# Patient Record
Sex: MALE | Race: WHITE | NOT HISPANIC OR LATINO
[De-identification: names, ages, dates, MRNs, and addresses within clinical notes are randomized per-mention and may not be internally consistent; named-entity substitution may affect disease eponyms.]

---

## 2017-09-22 ENCOUNTER — RESULT REVIEW (OUTPATIENT)
Age: 52
End: 2017-09-22

## 2017-11-13 PROBLEM — Z00.00 ENCOUNTER FOR PREVENTIVE HEALTH EXAMINATION: Status: ACTIVE | Noted: 2017-11-13

## 2020-02-20 ENCOUNTER — APPOINTMENT (OUTPATIENT)
Dept: ORTHOPEDIC SURGERY | Facility: CLINIC | Age: 55
End: 2020-02-20
Payer: COMMERCIAL

## 2020-02-20 VITALS
HEIGHT: 74 IN | WEIGHT: 250 LBS | HEART RATE: 76 BPM | BODY MASS INDEX: 32.08 KG/M2 | SYSTOLIC BLOOD PRESSURE: 110 MMHG | DIASTOLIC BLOOD PRESSURE: 68 MMHG

## 2020-02-20 PROCEDURE — 99203 OFFICE O/P NEW LOW 30 MIN: CPT

## 2020-02-20 PROCEDURE — 73560 X-RAY EXAM OF KNEE 1 OR 2: CPT | Mod: LT

## 2020-02-21 DIAGNOSIS — Z78.9 OTHER SPECIFIED HEALTH STATUS: ICD-10-CM

## 2020-02-21 DIAGNOSIS — Z72.89 OTHER PROBLEMS RELATED TO LIFESTYLE: ICD-10-CM

## 2020-02-21 DIAGNOSIS — Z80.9 FAMILY HISTORY OF MALIGNANT NEOPLASM, UNSPECIFIED: ICD-10-CM

## 2020-02-27 ENCOUNTER — APPOINTMENT (OUTPATIENT)
Dept: ORTHOPEDIC SURGERY | Facility: CLINIC | Age: 55
End: 2020-02-27
Payer: COMMERCIAL

## 2020-02-27 VITALS — HEIGHT: 74 IN

## 2020-02-27 PROCEDURE — 99213 OFFICE O/P EST LOW 20 MIN: CPT | Mod: 25

## 2020-02-27 PROCEDURE — 20610 DRAIN/INJ JOINT/BURSA W/O US: CPT | Mod: LT

## 2020-03-02 NOTE — DISCUSSION/SUMMARY
[de-identified] : At this time, due to left knee osteoarthritis, the patient decided that he would like the cortisone injection that he was offered a few weeks ago.  He is advised to ice it and take anti-inflammatories. He will return to the office.

## 2020-03-02 NOTE — ADDENDUM
[FreeTextEntry1] : This note was written by Macrina Nava on 01/15/2020 acting as a scribe for Annabelle Jimenez, OTDARNELL/JEROME, RPA-C\par

## 2020-03-02 NOTE — PHYSICAL EXAM
[de-identified] : Left Knee: \par Range of Motion in Degrees	\par 	                  Claimant:	Normal:	\par Flexion Active	  135 	                135-degrees	\par Flexion Passive	  135	                135-degrees	\par Extension Active	  0-5	                0-5-degrees	\par Extension Passive	  0-5	                0-5-degrees	\par \par No weakness to flexion/extension.  No evidence of instability in the AP plane or varus or valgus stress.  Negative  Lachman.  Negative pivot shift.  Negative anterior drawer test.  Negative posterior drawer test.  Negative Jossue.  Negative Apley grind.  No medial or lateral joint line tenderness.  Positive tenderness over the medial and lateral facet of the patella.  Positive patellofemoral crepitations.  No lateral tilting patella.  No patella apprehension.  Positive crepitation in the medial and lateral femoral condyle.  No proximal or distal swelling, edema or tenderness.  No gross motor or sensory deficits.  Mild intra-articular swelling.  2+ DP and PT pulses.  No varus or valgus malalignment.  Skin is intact.  No rashes, scars or lesions.  \par  [de-identified] : Appearance:  Well-developed, well-nourished male in no acute distress.\par \par   [de-identified] : Ambulating with a slightly antalgic to antalgic gait.  Station:  Normal.

## 2020-03-02 NOTE — PROCEDURE
[de-identified] : Consent: \par At this time, I have recommended an injection to the left knee.  The risks and benefits of the procedure were discussed with the patient in detail.  Upon verbal consent of the patient, we proceeded with the injection as noted below.  \par \par Procedure:  \par After a sterile prep, the patient underwent an injection of 9 cc of 1% Lidocaine without epinephrine and 1 cc of Kenalog into the left knee.  The patient tolerated the procedure well.  There were no complications.  \par

## 2020-03-05 NOTE — HISTORY OF PRESENT ILLNESS
[4] : a current pain level of 4/10 [de-identified] : Negro comes in today with complaints of left knee.  He states he was standing for three hours playing a video game.  The patient states he has had this condition for two weeks..  This injury is not due to an automobile accident.  The patient states the pain is intermittent.  The patient describes the pain as dull and sharp.  The patient states being off the leg makes the symptoms better while standing makes the symptoms worse. [] : No

## 2020-03-05 NOTE — ADDENDUM
[FreeTextEntry1] : This note was dictated by Annabelle Jimenez, OTR/L, PA. \par \par This note was written by Crystal Khalil on 03/02/2020 acting as scribe for Zaki Menchaca III, MD

## 2020-03-05 NOTE — DISCUSSION/SUMMARY
[de-identified] : The patient presents with left knee OA.  The patient was offered a cortisone injection.  We are going to just do conservative treatment at this time - anti-inflammatories and ice.  He will return back to the office in two weeks.  If he is not better, we will offer him the cortisone injection.

## 2020-03-05 NOTE — CONSULT LETTER

## 2020-03-05 NOTE — PHYSICAL EXAM
[de-identified] : Right knee:\par Knee: Range of Motion in Degrees	\par 	                  Claimant:	Normal:	\par Flexion Active	  135 	                135-degrees	\par Flexion Passive	  135	                135-degrees	\par Extension Active	  0-5	                0-5-degrees	\par Extension Passive	  0-5	                0-5-degrees	\par \par No weakness to flexion/extension.  No evidence of instability in the AP plane or varus or valgus stress.  Negative  Lachman.  Negative pivot shift.  Negative anterior drawer test.  Negative posterior drawer test.  Negative Jossue.  Negative Apley grind.  No medial or lateral joint line tenderness.  No tenderness over the medial and lateral facet of the patella.  No patellofemoral crepitations.  No lateral tilting patella.  No patellar apprehension.  No crepitation in the medial and lateral femoral condyle.  No proximal or distal swelling, edema or tenderness.  No gross motor or sensory deficits.  No intra-articular swelling.  2+ DP and PT pulses. No varus or valgus malalignment.  Skin is intact.  No rashes, scars or lesions.  \par  \par Left knee:\par Knee: Range of Motion in Degrees	\par 	                  Claimant:	Normal:	\par Flexion Active	  135 	                135-degrees	\par Flexion Passive	  135	                135-degrees	\par Extension Active	  0-5	                0-5-degrees	\par Extension Passive	  0-5	                0-5-degrees	\par \par No weakness to flexion/extension. No evidence of instability in the AP plane or varus or valgus stress.  Negative  Lachman.  Negative pivot shift.  Negative anterior drawer test.  Negative posterior drawer test.  Negative Jossue.  Negative Apley grind.  No medial or lateral joint line tenderness.  Positive tenderness over the medial and lateral facet of the patella.  Positive patellofemoral crepitations.  No lateral tilting patella.  No patella apprehension.  Positive crepitation in the medial and lateral femoral condyle.  No proximal or distal swelling, edema or tenderness.  No gross motor or sensory deficits. Mild intra-articular swelling.  2+ DP and PT pulses. No varus or valgus malalignment.  Skin is intact.  No rashes, scars or lesions.  [de-identified] : Gait: Slightly antalgic to antalgic gait.  Station: Normal  [de-identified] : Appearance: Well-developed, well-nourished male  in no acute distress.  [de-identified] : X-rays, one to two views of the left knee, reveal no acute fracture or dislocation.

## 2022-05-27 ENCOUNTER — INPATIENT (INPATIENT)
Facility: HOSPITAL | Age: 57
LOS: 24 days | Discharge: HOME | DRG: 720 | End: 2022-06-21
Attending: STUDENT IN AN ORGANIZED HEALTH CARE EDUCATION/TRAINING PROGRAM | Admitting: HOSPITALIST
Payer: COMMERCIAL

## 2022-05-27 VITALS — RESPIRATION RATE: 20 BRPM | TEMPERATURE: 99 F | OXYGEN SATURATION: 97 % | HEART RATE: 96 BPM

## 2022-05-27 DIAGNOSIS — R09.89 OTHER SPECIFIED SYMPTOMS AND SIGNS INVOLVING THE CIRCULATORY AND RESPIRATORY SYSTEMS: ICD-10-CM

## 2022-05-27 DIAGNOSIS — J18.9 PNEUMONIA, UNSPECIFIED ORGANISM: ICD-10-CM

## 2022-05-27 LAB
ALBUMIN SERPL ELPH-MCNC: 3.3 G/DL — SIGNIFICANT CHANGE UP (ref 3.3–5)
ALP SERPL-CCNC: 84 U/L — SIGNIFICANT CHANGE UP (ref 40–120)
ALT FLD-CCNC: 32 U/L — SIGNIFICANT CHANGE UP (ref 12–78)
ANION GAP SERPL CALC-SCNC: 10 MMOL/L — SIGNIFICANT CHANGE UP (ref 5–17)
APPEARANCE UR: CLEAR — SIGNIFICANT CHANGE UP
APTT BLD: 33.8 SEC — SIGNIFICANT CHANGE UP (ref 27.5–35.5)
AST SERPL-CCNC: 30 U/L — SIGNIFICANT CHANGE UP (ref 15–37)
BASOPHILS # BLD AUTO: 0.05 K/UL — SIGNIFICANT CHANGE UP (ref 0–0.2)
BASOPHILS NFR BLD AUTO: 0.5 % — SIGNIFICANT CHANGE UP (ref 0–2)
BILIRUB SERPL-MCNC: 0.7 MG/DL — SIGNIFICANT CHANGE UP (ref 0.2–1.2)
BILIRUB UR-MCNC: NEGATIVE — SIGNIFICANT CHANGE UP
BUN SERPL-MCNC: 12 MG/DL — SIGNIFICANT CHANGE UP (ref 7–23)
CALCIUM SERPL-MCNC: 9.6 MG/DL — SIGNIFICANT CHANGE UP (ref 8.5–10.1)
CHLORIDE SERPL-SCNC: 102 MMOL/L — SIGNIFICANT CHANGE UP (ref 96–108)
CO2 SERPL-SCNC: 23 MMOL/L — SIGNIFICANT CHANGE UP (ref 22–31)
COLOR SPEC: YELLOW — SIGNIFICANT CHANGE UP
CREAT SERPL-MCNC: 1.06 MG/DL — SIGNIFICANT CHANGE UP (ref 0.5–1.3)
DIFF PNL FLD: ABNORMAL
EGFR: 82 ML/MIN/1.73M2 — SIGNIFICANT CHANGE UP
EOSINOPHIL # BLD AUTO: 0.07 K/UL — SIGNIFICANT CHANGE UP (ref 0–0.5)
EOSINOPHIL NFR BLD AUTO: 0.7 % — SIGNIFICANT CHANGE UP (ref 0–6)
GLUCOSE SERPL-MCNC: 169 MG/DL — HIGH (ref 70–99)
GLUCOSE UR QL: NEGATIVE — SIGNIFICANT CHANGE UP
HCT VFR BLD CALC: 41 % — SIGNIFICANT CHANGE UP (ref 39–50)
HGB BLD-MCNC: 14.1 G/DL — SIGNIFICANT CHANGE UP (ref 13–17)
IMM GRANULOCYTES NFR BLD AUTO: 0.4 % — SIGNIFICANT CHANGE UP (ref 0–1.5)
INR BLD: 1.21 RATIO — HIGH (ref 0.88–1.16)
KETONES UR-MCNC: ABNORMAL
LACTATE SERPL-SCNC: 1.6 MMOL/L — SIGNIFICANT CHANGE UP (ref 0.7–2)
LEUKOCYTE ESTERASE UR-ACNC: NEGATIVE — SIGNIFICANT CHANGE UP
LYMPHOCYTES # BLD AUTO: 0.99 K/UL — LOW (ref 1–3.3)
LYMPHOCYTES # BLD AUTO: 9.7 % — LOW (ref 13–44)
MCHC RBC-ENTMCNC: 29.2 PG — SIGNIFICANT CHANGE UP (ref 27–34)
MCHC RBC-ENTMCNC: 34.4 GM/DL — SIGNIFICANT CHANGE UP (ref 32–36)
MCV RBC AUTO: 84.9 FL — SIGNIFICANT CHANGE UP (ref 80–100)
MONOCYTES # BLD AUTO: 1.2 K/UL — HIGH (ref 0–0.9)
MONOCYTES NFR BLD AUTO: 11.7 % — SIGNIFICANT CHANGE UP (ref 2–14)
NEUTROPHILS # BLD AUTO: 7.87 K/UL — HIGH (ref 1.8–7.4)
NEUTROPHILS NFR BLD AUTO: 77 % — SIGNIFICANT CHANGE UP (ref 43–77)
NITRITE UR-MCNC: NEGATIVE — SIGNIFICANT CHANGE UP
NT-PROBNP SERPL-SCNC: 34 PG/ML — SIGNIFICANT CHANGE UP (ref 0–125)
PH UR: 6 — SIGNIFICANT CHANGE UP (ref 5–8)
PLATELET # BLD AUTO: 326 K/UL — SIGNIFICANT CHANGE UP (ref 150–400)
POTASSIUM SERPL-MCNC: 3.3 MMOL/L — LOW (ref 3.5–5.3)
POTASSIUM SERPL-SCNC: 3.3 MMOL/L — LOW (ref 3.5–5.3)
PROT SERPL-MCNC: 7.9 GM/DL — SIGNIFICANT CHANGE UP (ref 6–8.3)
PROT UR-MCNC: NEGATIVE — SIGNIFICANT CHANGE UP
PROTHROM AB SERPL-ACNC: 14.1 SEC — HIGH (ref 10.5–13.4)
RAPID RVP RESULT: SIGNIFICANT CHANGE UP
RBC # BLD: 4.83 M/UL — SIGNIFICANT CHANGE UP (ref 4.2–5.8)
RBC # FLD: 13.2 % — SIGNIFICANT CHANGE UP (ref 10.3–14.5)
SARS-COV-2 RNA SPEC QL NAA+PROBE: SIGNIFICANT CHANGE UP
SODIUM SERPL-SCNC: 135 MMOL/L — SIGNIFICANT CHANGE UP (ref 135–145)
SP GR SPEC: 1.01 — SIGNIFICANT CHANGE UP (ref 1.01–1.02)
TROPONIN I, HIGH SENSITIVITY RESULT: 19.11 NG/L — SIGNIFICANT CHANGE UP
UROBILINOGEN FLD QL: NEGATIVE — SIGNIFICANT CHANGE UP
WBC # BLD: 10.22 K/UL — SIGNIFICANT CHANGE UP (ref 3.8–10.5)
WBC # FLD AUTO: 10.22 K/UL — SIGNIFICANT CHANGE UP (ref 3.8–10.5)

## 2022-05-27 PROCEDURE — 83880 ASSAY OF NATRIURETIC PEPTIDE: CPT

## 2022-05-27 PROCEDURE — 84550 ASSAY OF BLOOD/URIC ACID: CPT

## 2022-05-27 PROCEDURE — 93005 ELECTROCARDIOGRAM TRACING: CPT

## 2022-05-27 PROCEDURE — 86255 FLUORESCENT ANTIBODY SCREEN: CPT

## 2022-05-27 PROCEDURE — 99285 EMERGENCY DEPT VISIT HI MDM: CPT

## 2022-05-27 PROCEDURE — 71275 CT ANGIOGRAPHY CHEST: CPT | Mod: 26

## 2022-05-27 PROCEDURE — 81001 URINALYSIS AUTO W/SCOPE: CPT

## 2022-05-27 PROCEDURE — 93306 TTE W/DOPPLER COMPLETE: CPT

## 2022-05-27 PROCEDURE — 99497 ADVNCD CARE PLAN 30 MIN: CPT | Mod: 25

## 2022-05-27 PROCEDURE — 86160 COMPLEMENT ANTIGEN: CPT

## 2022-05-27 PROCEDURE — 86665 EPSTEIN-BARR CAPSID VCA: CPT

## 2022-05-27 PROCEDURE — 80053 COMPREHEN METABOLIC PANEL: CPT

## 2022-05-27 PROCEDURE — 71275 CT ANGIOGRAPHY CHEST: CPT

## 2022-05-27 PROCEDURE — 86720 LEPTOSPIRA ANTIBODY: CPT

## 2022-05-27 PROCEDURE — 87449 NOS EACH ORGANISM AG IA: CPT

## 2022-05-27 PROCEDURE — 94760 N-INVAS EAR/PLS OXIMETRY 1: CPT

## 2022-05-27 PROCEDURE — 82595 ASSAY OF CRYOGLOBULIN: CPT

## 2022-05-27 PROCEDURE — 99222 1ST HOSP IP/OBS MODERATE 55: CPT

## 2022-05-27 PROCEDURE — 86431 RHEUMATOID FACTOR QUANT: CPT

## 2022-05-27 PROCEDURE — 36415 COLL VENOUS BLD VENIPUNCTURE: CPT

## 2022-05-27 PROCEDURE — 82962 GLUCOSE BLOOD TEST: CPT

## 2022-05-27 PROCEDURE — 71045 X-RAY EXAM CHEST 1 VIEW: CPT

## 2022-05-27 PROCEDURE — 86664 EPSTEIN-BARR NUCLEAR ANTIGEN: CPT

## 2022-05-27 PROCEDURE — U0003: CPT

## 2022-05-27 PROCEDURE — 94640 AIRWAY INHALATION TREATMENT: CPT

## 2022-05-27 PROCEDURE — 83516 IMMUNOASSAY NONANTIBODY: CPT

## 2022-05-27 PROCEDURE — 85652 RBC SED RATE AUTOMATED: CPT

## 2022-05-27 PROCEDURE — 73560 X-RAY EXAM OF KNEE 1 OR 2: CPT | Mod: LT

## 2022-05-27 PROCEDURE — 86663 EPSTEIN-BARR ANTIBODY: CPT

## 2022-05-27 PROCEDURE — 86225 DNA ANTIBODY NATIVE: CPT

## 2022-05-27 PROCEDURE — 86645 CMV ANTIBODY IGM: CPT

## 2022-05-27 PROCEDURE — 87389 HIV-1 AG W/HIV-1&-2 AB AG IA: CPT

## 2022-05-27 PROCEDURE — 82164 ANGIOTENSIN I ENZYME TEST: CPT

## 2022-05-27 PROCEDURE — 86039 ANTINUCLEAR ANTIBODIES (ANA): CPT

## 2022-05-27 PROCEDURE — 71250 CT THORAX DX C-: CPT

## 2022-05-27 PROCEDURE — 86644 CMV ANTIBODY: CPT

## 2022-05-27 PROCEDURE — 71045 X-RAY EXAM CHEST 1 VIEW: CPT | Mod: 26

## 2022-05-27 PROCEDURE — 86235 NUCLEAR ANTIGEN ANTIBODY: CPT

## 2022-05-27 PROCEDURE — 86803 HEPATITIS C AB TEST: CPT

## 2022-05-27 PROCEDURE — 83935 ASSAY OF URINE OSMOLALITY: CPT

## 2022-05-27 PROCEDURE — 84443 ASSAY THYROID STIM HORMONE: CPT

## 2022-05-27 PROCEDURE — 94761 N-INVAS EAR/PLS OXIMETRY MLT: CPT

## 2022-05-27 PROCEDURE — 87493 C DIFF AMPLIFIED PROBE: CPT

## 2022-05-27 PROCEDURE — 84300 ASSAY OF URINE SODIUM: CPT

## 2022-05-27 PROCEDURE — 84132 ASSAY OF SERUM POTASSIUM: CPT

## 2022-05-27 PROCEDURE — 83036 HEMOGLOBIN GLYCOSYLATED A1C: CPT

## 2022-05-27 PROCEDURE — 85025 COMPLETE CBC W/AUTO DIFF WBC: CPT

## 2022-05-27 PROCEDURE — 86140 C-REACTIVE PROTEIN: CPT

## 2022-05-27 PROCEDURE — 86320 SERUM IMMUNOELECTROPHORESIS: CPT

## 2022-05-27 PROCEDURE — 93010 ELECTROCARDIOGRAM REPORT: CPT

## 2022-05-27 PROCEDURE — U0005: CPT

## 2022-05-27 PROCEDURE — 85379 FIBRIN DEGRADATION QUANT: CPT

## 2022-05-27 PROCEDURE — 85027 COMPLETE CBC AUTOMATED: CPT

## 2022-05-27 PROCEDURE — 86480 TB TEST CELL IMMUN MEASURE: CPT

## 2022-05-27 PROCEDURE — 83930 ASSAY OF BLOOD OSMOLALITY: CPT

## 2022-05-27 PROCEDURE — 80048 BASIC METABOLIC PNL TOTAL CA: CPT

## 2022-05-27 PROCEDURE — 84484 ASSAY OF TROPONIN QUANT: CPT

## 2022-05-27 PROCEDURE — 87507 IADNA-DNA/RNA PROBE TQ 12-25: CPT

## 2022-05-27 RX ORDER — SIMVASTATIN 20 MG/1
20 TABLET, FILM COATED ORAL AT BEDTIME
Refills: 0 | Status: DISCONTINUED | OUTPATIENT
Start: 2022-05-27 | End: 2022-06-21

## 2022-05-27 RX ORDER — PIPERACILLIN AND TAZOBACTAM 4; .5 G/20ML; G/20ML
3.38 INJECTION, POWDER, LYOPHILIZED, FOR SOLUTION INTRAVENOUS ONCE
Refills: 0 | Status: COMPLETED | OUTPATIENT
Start: 2022-05-27 | End: 2022-05-27

## 2022-05-27 RX ORDER — DOXAZOSIN MESYLATE 4 MG
8 TABLET ORAL AT BEDTIME
Refills: 0 | Status: DISCONTINUED | OUTPATIENT
Start: 2022-05-27 | End: 2022-06-21

## 2022-05-27 RX ORDER — SODIUM CHLORIDE 9 MG/ML
1000 INJECTION INTRAMUSCULAR; INTRAVENOUS; SUBCUTANEOUS
Refills: 0 | Status: DISCONTINUED | OUTPATIENT
Start: 2022-05-27 | End: 2022-05-31

## 2022-05-27 RX ORDER — AZITHROMYCIN 500 MG/1
TABLET, FILM COATED ORAL
Refills: 0 | Status: DISCONTINUED | OUTPATIENT
Start: 2022-05-27 | End: 2022-05-31

## 2022-05-27 RX ORDER — SODIUM CHLORIDE 9 MG/ML
2000 INJECTION INTRAMUSCULAR; INTRAVENOUS; SUBCUTANEOUS ONCE
Refills: 0 | Status: COMPLETED | OUTPATIENT
Start: 2022-05-27 | End: 2022-05-27

## 2022-05-27 RX ORDER — CEFTRIAXONE 500 MG/1
1000 INJECTION, POWDER, FOR SOLUTION INTRAMUSCULAR; INTRAVENOUS EVERY 24 HOURS
Refills: 0 | Status: DISCONTINUED | OUTPATIENT
Start: 2022-05-27 | End: 2022-05-27

## 2022-05-27 RX ORDER — LOSARTAN POTASSIUM 100 MG/1
100 TABLET, FILM COATED ORAL DAILY
Refills: 0 | Status: DISCONTINUED | OUTPATIENT
Start: 2022-05-27 | End: 2022-06-09

## 2022-05-27 RX ORDER — HEPARIN SODIUM 5000 [USP'U]/ML
5000 INJECTION INTRAVENOUS; SUBCUTANEOUS EVERY 8 HOURS
Refills: 0 | Status: DISCONTINUED | OUTPATIENT
Start: 2022-05-27 | End: 2022-06-21

## 2022-05-27 RX ORDER — POTASSIUM CHLORIDE 20 MEQ
40 PACKET (EA) ORAL EVERY 4 HOURS
Refills: 0 | Status: COMPLETED | OUTPATIENT
Start: 2022-05-27 | End: 2022-05-27

## 2022-05-27 RX ORDER — ACETAMINOPHEN 500 MG
650 TABLET ORAL ONCE
Refills: 0 | Status: COMPLETED | OUTPATIENT
Start: 2022-05-27 | End: 2022-05-27

## 2022-05-27 RX ORDER — PANTOPRAZOLE SODIUM 20 MG/1
40 TABLET, DELAYED RELEASE ORAL ONCE
Refills: 0 | Status: COMPLETED | OUTPATIENT
Start: 2022-05-27 | End: 2022-05-28

## 2022-05-27 RX ORDER — AZITHROMYCIN 500 MG/1
500 TABLET, FILM COATED ORAL ONCE
Refills: 0 | Status: COMPLETED | OUTPATIENT
Start: 2022-05-27 | End: 2022-05-27

## 2022-05-27 RX ORDER — AZITHROMYCIN 500 MG/1
500 TABLET, FILM COATED ORAL EVERY 24 HOURS
Refills: 0 | Status: DISCONTINUED | OUTPATIENT
Start: 2022-05-28 | End: 2022-05-31

## 2022-05-27 RX ORDER — CEFTRIAXONE 500 MG/1
1000 INJECTION, POWDER, FOR SOLUTION INTRAMUSCULAR; INTRAVENOUS EVERY 24 HOURS
Refills: 0 | Status: DISCONTINUED | OUTPATIENT
Start: 2022-05-27 | End: 2022-05-31

## 2022-05-27 RX ORDER — ALBUTEROL 90 UG/1
2 AEROSOL, METERED ORAL EVERY 6 HOURS
Refills: 0 | Status: DISCONTINUED | OUTPATIENT
Start: 2022-05-27 | End: 2022-06-21

## 2022-05-27 RX ADMIN — PIPERACILLIN AND TAZOBACTAM 200 GRAM(S): 4; .5 INJECTION, POWDER, LYOPHILIZED, FOR SOLUTION INTRAVENOUS at 12:43

## 2022-05-27 RX ADMIN — CEFTRIAXONE 100 MILLIGRAM(S): 500 INJECTION, POWDER, FOR SOLUTION INTRAMUSCULAR; INTRAVENOUS at 18:15

## 2022-05-27 RX ADMIN — Medication 40 MILLIEQUIVALENT(S): at 17:11

## 2022-05-27 RX ADMIN — Medication 650 MILLIGRAM(S): at 12:43

## 2022-05-27 RX ADMIN — AZITHROMYCIN 500 MILLIGRAM(S): 500 TABLET, FILM COATED ORAL at 17:09

## 2022-05-27 RX ADMIN — SODIUM CHLORIDE 125 MILLILITER(S): 9 INJECTION INTRAMUSCULAR; INTRAVENOUS; SUBCUTANEOUS at 18:11

## 2022-05-27 RX ADMIN — SODIUM CHLORIDE 2000 MILLILITER(S): 9 INJECTION INTRAMUSCULAR; INTRAVENOUS; SUBCUTANEOUS at 12:44

## 2022-05-27 NOTE — H&P ADULT - ASSESSMENT
56/M with PMHx of HTN, brought to the ED for c/o worsening SOB and cough along with a recent Dx of PNA. He also states that he has been feeling very weak and dizzy. He recently got 4th dose of COVID vaccine 3 weeks ago and Sx started 1 week thereafter.     Also HAs cough lizzie when inhales deeply.    Also Hypoxic on ra.     He also, c/o  chest pain when coughing.     States he has not been eating much recently and has a 15 lb weight loss.     Also states he has been very fatigued and sleeping more.    No recent travel or periods of immobilization or surgeries.    Pt admitted with ::    # SOB + Fever + Acute Hypoxic respiratory  failure: PNA CAP ; r/o PE  admit to med floor  iv fluids  supportive O2  stat CTA chest to r/o PE  iv rocephin and iv zithro  f/u blood cx  albuterol inh prn     # Hypokalemia 3.3:  replace recheck    # HTN: cont home meds    # DVT PPX: heparin s/q    poc discussed with pt, team.      GOC and advance care planning meeting done with the patient. He wishes to be fully resuscitated and mechanically ventilated if need arises. There are no limitations of any sort in his medical care and management. He would like everything to be done to treat him and keep him alive. He is FULL CODE. Additional time spent 18 min.    56/M with PMHx of HTN, brought to the ED for c/o worsening SOB and cough along with a recent Dx of PNA. He also states that he has been feeling very weak and dizzy. He recently got 4th dose of COVID vaccine 3 weeks ago and Sx started 1 week thereafter.     Also HAs cough lizzie when inhales deeply.    Also Hypoxic on ra.     He also, c/o  chest pain when coughing.     States he has not been eating much recently and has a 15 lb weight loss.     Also states he has been very fatigued and sleeping more.    No recent travel or periods of immobilization or surgeries.    COVID neg    Pt admitted with ::    # SOB + Fever + Acute Hypoxic respiratory  failure: PNA CAP ; r/o PE  admit to med floor  iv fluids  supportive O2  stat CTA chest to r/o PE; NO PE  iv rocephin and iv zithro  f/u blood cx  albuterol inh prn   pulmonary consult    # Hypokalemia 3.3:  replace recheck    # HTN: cont home meds    # DVT PPX: heparin s/q    poc discussed with pt, team.      GOC and advance care planning meeting done with the patient. He wishes to be fully resuscitated and mechanically ventilated if need arises. There are no limitations of any sort in his medical care and management. He would like everything to be done to treat him and keep him alive. He is FULL CODE. Additional time spent 18 min.

## 2022-05-27 NOTE — H&P ADULT - NSHPLABSRESULTS_GEN_ALL_CORE
All Labs/EKG/Radiology/Meds reviewed by me.     Lab Results:  CBC  CBC Full  -  ( 27 May 2022 12:24 )  WBC Count : 10.22 K/uL  RBC Count : 4.83 M/uL  Hemoglobin : 14.1 g/dL  Hematocrit : 41.0 %  Platelet Count - Automated : 326 K/uL  Mean Cell Volume : 84.9 fl  Mean Cell Hemoglobin : 29.2 pg  Mean Cell Hemoglobin Concentration : 34.4 gm/dL  Auto Neutrophil # : 7.87 K/uL  Auto Lymphocyte # : 0.99 K/uL  Auto Monocyte # : 1.20 K/uL  Auto Eosinophil # : 0.07 K/uL  Auto Basophil # : 0.05 K/uL  Auto Neutrophil % : 77.0 %  Auto Lymphocyte % : 9.7 %  Auto Monocyte % : 11.7 %  Auto Eosinophil % : 0.7 %  Auto Basophil % : 0.5 %    .		Differential:	[] Automated		[] Manual  Chemistry                        14.1   10.22 )-----------( 326      ( 27 May 2022 12:24 )             41.0     05-27    135  |  102  |  12  ----------------------------<  169<H>  3.3<L>   |  23  |  1.06    Ca    9.6      27 May 2022 12:24    TPro  7.9  /  Alb  3.3  /  TBili  0.7  /  DBili  x   /  AST  30  /  ALT  32  /  AlkPhos  84  05-27    LIVER FUNCTIONS - ( 27 May 2022 12:24 )  Alb: 3.3 g/dL / Pro: 7.9 gm/dL / ALK PHOS: 84 U/L / ALT: 32 U/L / AST: 30 U/L / GGT: x           PT/INR - ( 27 May 2022 12:24 )   PT: 14.1 sec;   INR: 1.21 ratio         PTT - ( 27 May 2022 12:24 )  PTT:33.8 sec      RADIOLOGY RESULTS:  cxr: ? PNA    MEDICATIONS  (STANDING):  azithromycin  IVPB      azithromycin  IVPB 500 milliGRAM(s) IV Intermittent once  cefTRIAXone   IVPB 1000 milliGRAM(s) IV Intermittent every 24 hours  doxazosin 8 milliGRAM(s) Oral at bedtime  heparin   Injectable 5000 Unit(s) SubCutaneous every 8 hours  losartan 100 milliGRAM(s) Oral daily  potassium chloride    Tablet ER 40 milliEquivalent(s) Oral every 4 hours  simvastatin 20 milliGRAM(s) Oral at bedtime  sodium chloride 0.9%. 1000 milliLiter(s) (125 mL/Hr) IV Continuous <Continuous>    MEDICATIONS  (PRN):

## 2022-05-27 NOTE — ED PROVIDER NOTE - OBJECTIVE STATEMENT
56 M hx HTN here complaining of shortness of breath and cough in setting of known multifocal pneumonia after having had chest x ray yesterday. reports associated symptoms of dizziness, lightheadedness. pt states that he coughs especially after taking  a deep breath. pt also states he has chest pain when coughing. states he not been eating as much recently and had a 15 lb weight loss. also states he has been very fatigued and sleeping more. denies LE swelling. no recent travel or periods of immobilization. no recent surgeries.

## 2022-05-27 NOTE — H&P ADULT - BIRTH SEX
11/8/2019 1:17 PM    Cell number on file is a wrong number. Left VM on home number to call office back to explain titration. Rx sent to pharmacy. Cannot be sent as 90 day supply as it has to be tirated up. If patient calls back please read them the following message. \"The Memantine which is generic for Johnathan Hicks has been sent to the pharmacy. It is a 5mg tablet. Take 1 tab each morning for 1 week then 1 tab twice a day the next week. Then the 3rd week take 2 tabs in AM and 1 tab in PM. The 4th week take 2 tabs twice daily. If she is tolerating the medication well after 2 weeks I will send in the next month and we will go up to the 10 mg tablets. The Rx cannot be sent in a 90 day supply as it has to be titrated up. \"    If she has any additional questions I would be happy to call her back.      Megan Peters, DO
Patient daughter called back, I have updated the contact information on the account and read her the message (excluding the dosing instructions - because I am not clinical) She was driving and could not take the directions from the nurse at this time. She will call back after she picks up the medication if she has any questions.
Male

## 2022-05-27 NOTE — ED STATDOCS - PROGRESS NOTE DETAILS
Alfredo Watkins for attending Dr. Day: 57 y/o M with PMHx of HTN, presents to ED sent in by MD for multi lobe PNA. Pt reports he has a multi lobe PNA. Pt reports he got a chest x-ray was done yesterday. MD advised patient to come to ED today. COVID test PCR results negative. Pt reports he received his 4th vaccine dose. He started feeling exhausted since 2 weeks and slight SOB. SOB has worsen. Pt also reports he is unable to lie flat and woke up sweating. Pt was advised by his MD to come to ED. No fevers. Pt with visible dyspnea and lightheadedness. Will sent pt to main ED for further evaluation.

## 2022-05-27 NOTE — PHARMACOTHERAPY INTERVENTION NOTE - COMMENTS
Medication history complete, reviewed medication with patient and confirmed with DrFirst.  Patient states he did not take any medication today except Doxycycline.

## 2022-05-27 NOTE — ED ADULT NURSE NOTE - OBJECTIVE STATEMENT
pt sent to ED by PMD for treatment of PNA. tp c/o SOB, worsened by lying flat. pt denies any other pain or complaints.

## 2022-05-27 NOTE — H&P ADULT - NSHPPHYSICALEXAM_GEN_ALL_CORE
PHYSICAL EXAM:    Daily     Daily     Vital Signs Last 24 Hrs  T(C): 38.3 (27 May 2022 12:23), Max: 38.3 (27 May 2022 12:23)  T(F): 101 (27 May 2022 12:23), Max: 101 (27 May 2022 12:23)  HR: 83 (27 May 2022 12:23) (83 - 96)  BP: 147/77 (27 May 2022 12:23) (136/83 - 147/77)  BP(mean): 94 (27 May 2022 12:23) (94 - 98)  RR: 23 (27 May 2022 12:23) (20 - 23)  SpO2: 98% (27 May 2022 12:23) (97% - 98%)    Constitutional: Weak  appearing  HEENT: Atraumatic, [place], Normal, No congestion  Respiratory: Breath Sounds normal, no rhonchi/wheeze  Cardiovascular: N S1S2;   Gastrointestinal: Abdomen soft, non tender, Bowel Sounds present  Extremities: No edema, peripheral pulses present  Neurological: AAO x 3, no gross focal motor deficits  Skin: Non cellulitic, no rash, ulcers  Lymph Nodes: No lymphadenopathy noted  Back: No CVA tenderness   Musculoskeletal: non tender  Breasts: Deferred  Genitourinary: deferred  Rectal: Deferred

## 2022-05-27 NOTE — H&P ADULT - HISTORY OF PRESENT ILLNESS
56/M with PMHx of HTN, brought to the ED for c/o worsening SOB and cough along with a recent Dx of PNA. He also states that he has been feeling very weak and dizzy. He recently got 4th dose of COVID vaccine 3 weeks ago and Sx started 1 week thereafter.     Also HAs cough lizzie when inhales deeply.    Also Hypoxic on ra.     He also, c/o  chest pain when coughing.     States he has not been eating much recently and has a 15 lb weight loss.     Also states he has been very fatigued and sleeping more.    No recent travel or periods of immobilization or surgeries. 56/M with PMHx of HTN, brought to the ED for c/o worsening SOB and cough along with a recent Dx of PNA. He also states that he has been feeling very weak and dizzy. He recently got 4th dose of COVID vaccine 3 weeks ago and Sx started 1 week thereafter.     Also HAs cough lizzie when inhales deeply.    Also Hypoxic on ra.     He also, c/o  chest pain when coughing.     States he has not been eating much recently and has a 15 lb weight loss.     Also states he has been very fatigued and sleeping more.    No recent travel or periods of immobilization or surgeries.    COVID neg

## 2022-05-27 NOTE — ED PROVIDER NOTE - CLINICAL SUMMARY MEDICAL DECISION MAKING FREE TEXT BOX
pt with known multifocal pneumonia. will likely require admission. labs and sepsis protocol initiated. pt with known multifocal pneumonia. will likely require admission. labs and sepsis protocol initiated.  Refractory to doxy.  Further care per inpatient treatment team.

## 2022-05-28 DIAGNOSIS — R06.00 DYSPNEA, UNSPECIFIED: ICD-10-CM

## 2022-05-28 DIAGNOSIS — R05.8 OTHER SPECIFIED COUGH: ICD-10-CM

## 2022-05-28 DIAGNOSIS — Z87.898 PERSONAL HISTORY OF OTHER SPECIFIED CONDITIONS: ICD-10-CM

## 2022-05-28 DIAGNOSIS — R91.8 OTHER NONSPECIFIC ABNORMAL FINDING OF LUNG FIELD: ICD-10-CM

## 2022-05-28 LAB
ANION GAP SERPL CALC-SCNC: 7 MMOL/L — SIGNIFICANT CHANGE UP (ref 5–17)
BUN SERPL-MCNC: 9 MG/DL — SIGNIFICANT CHANGE UP (ref 7–23)
CALCIUM SERPL-MCNC: 8.6 MG/DL — SIGNIFICANT CHANGE UP (ref 8.5–10.1)
CHLORIDE SERPL-SCNC: 108 MMOL/L — SIGNIFICANT CHANGE UP (ref 96–108)
CO2 SERPL-SCNC: 24 MMOL/L — SIGNIFICANT CHANGE UP (ref 22–31)
CREAT SERPL-MCNC: 0.95 MG/DL — SIGNIFICANT CHANGE UP (ref 0.5–1.3)
CULTURE RESULTS: SIGNIFICANT CHANGE UP
EGFR: 94 ML/MIN/1.73M2 — SIGNIFICANT CHANGE UP
GLUCOSE SERPL-MCNC: 149 MG/DL — HIGH (ref 70–99)
HCT VFR BLD CALC: 37.4 % — LOW (ref 39–50)
HCV AB S/CO SERPL IA: 0.11 S/CO — SIGNIFICANT CHANGE UP (ref 0–0.99)
HCV AB SERPL-IMP: SIGNIFICANT CHANGE UP
HGB BLD-MCNC: 12.4 G/DL — LOW (ref 13–17)
MCHC RBC-ENTMCNC: 28.8 PG — SIGNIFICANT CHANGE UP (ref 27–34)
MCHC RBC-ENTMCNC: 33.2 GM/DL — SIGNIFICANT CHANGE UP (ref 32–36)
MCV RBC AUTO: 86.8 FL — SIGNIFICANT CHANGE UP (ref 80–100)
PLATELET # BLD AUTO: 287 K/UL — SIGNIFICANT CHANGE UP (ref 150–400)
POTASSIUM SERPL-MCNC: 4 MMOL/L — SIGNIFICANT CHANGE UP (ref 3.5–5.3)
POTASSIUM SERPL-SCNC: 4 MMOL/L — SIGNIFICANT CHANGE UP (ref 3.5–5.3)
RBC # BLD: 4.31 M/UL — SIGNIFICANT CHANGE UP (ref 4.2–5.8)
RBC # FLD: 13.3 % — SIGNIFICANT CHANGE UP (ref 10.3–14.5)
SODIUM SERPL-SCNC: 139 MMOL/L — SIGNIFICANT CHANGE UP (ref 135–145)
SPECIMEN SOURCE: SIGNIFICANT CHANGE UP
TROPONIN I, HIGH SENSITIVITY RESULT: 24.76 NG/L — SIGNIFICANT CHANGE UP
WBC # BLD: 8.35 K/UL — SIGNIFICANT CHANGE UP (ref 3.8–10.5)
WBC # FLD AUTO: 8.35 K/UL — SIGNIFICANT CHANGE UP (ref 3.8–10.5)

## 2022-05-28 PROCEDURE — 99223 1ST HOSP IP/OBS HIGH 75: CPT

## 2022-05-28 PROCEDURE — 93010 ELECTROCARDIOGRAM REPORT: CPT

## 2022-05-28 PROCEDURE — 99233 SBSQ HOSP IP/OBS HIGH 50: CPT

## 2022-05-28 RX ORDER — ACETAMINOPHEN 500 MG
650 TABLET ORAL EVERY 6 HOURS
Refills: 0 | Status: DISCONTINUED | OUTPATIENT
Start: 2022-05-28 | End: 2022-06-21

## 2022-05-28 RX ORDER — LANOLIN ALCOHOL/MO/W.PET/CERES
3 CREAM (GRAM) TOPICAL AT BEDTIME
Refills: 0 | Status: COMPLETED | OUTPATIENT
Start: 2022-05-28 | End: 2022-05-28

## 2022-05-28 RX ORDER — PANTOPRAZOLE SODIUM 20 MG/1
40 TABLET, DELAYED RELEASE ORAL
Refills: 0 | Status: DISCONTINUED | OUTPATIENT
Start: 2022-05-28 | End: 2022-06-21

## 2022-05-28 RX ORDER — BUDESONIDE AND FORMOTEROL FUMARATE DIHYDRATE 160; 4.5 UG/1; UG/1
2 AEROSOL RESPIRATORY (INHALATION)
Refills: 0 | Status: DISCONTINUED | OUTPATIENT
Start: 2022-05-28 | End: 2022-06-21

## 2022-05-28 RX ADMIN — PANTOPRAZOLE SODIUM 40 MILLIGRAM(S): 20 TABLET, DELAYED RELEASE ORAL at 00:00

## 2022-05-28 RX ADMIN — SODIUM CHLORIDE 125 MILLILITER(S): 9 INJECTION INTRAMUSCULAR; INTRAVENOUS; SUBCUTANEOUS at 03:11

## 2022-05-28 RX ADMIN — LOSARTAN POTASSIUM 100 MILLIGRAM(S): 100 TABLET, FILM COATED ORAL at 10:23

## 2022-05-28 RX ADMIN — Medication 650 MILLIGRAM(S): at 23:15

## 2022-05-28 RX ADMIN — AZITHROMYCIN 255 MILLIGRAM(S): 500 TABLET, FILM COATED ORAL at 16:57

## 2022-05-28 RX ADMIN — Medication 1200 MILLIGRAM(S): at 22:13

## 2022-05-28 RX ADMIN — SIMVASTATIN 20 MILLIGRAM(S): 20 TABLET, FILM COATED ORAL at 22:13

## 2022-05-28 RX ADMIN — SODIUM CHLORIDE 125 MILLILITER(S): 9 INJECTION INTRAMUSCULAR; INTRAVENOUS; SUBCUTANEOUS at 10:23

## 2022-05-28 RX ADMIN — ALBUTEROL 2 PUFF(S): 90 AEROSOL, METERED ORAL at 06:53

## 2022-05-28 RX ADMIN — CEFTRIAXONE 100 MILLIGRAM(S): 500 INJECTION, POWDER, FOR SOLUTION INTRAMUSCULAR; INTRAVENOUS at 18:42

## 2022-05-28 RX ADMIN — Medication 8 MILLIGRAM(S): at 22:13

## 2022-05-28 RX ADMIN — HEPARIN SODIUM 5000 UNIT(S): 5000 INJECTION INTRAVENOUS; SUBCUTANEOUS at 05:44

## 2022-05-28 RX ADMIN — HEPARIN SODIUM 5000 UNIT(S): 5000 INJECTION INTRAVENOUS; SUBCUTANEOUS at 14:20

## 2022-05-28 RX ADMIN — Medication 650 MILLIGRAM(S): at 22:35

## 2022-05-28 RX ADMIN — HEPARIN SODIUM 5000 UNIT(S): 5000 INJECTION INTRAVENOUS; SUBCUTANEOUS at 22:13

## 2022-05-28 NOTE — PROGRESS NOTE ADULT - SUBJECTIVE AND OBJECTIVE BOX
History of Present Illness:   56/M with PMHx of HTN, brought to the ED for c/o worsening SOB and cough along with a recent Dx of PNA. He also states that he has been feeling very weak and dizzy. He recently got 4th dose of COVID vaccine 3 weeks ago and Sx started 1 week thereafter.   Also HAs cough lizzie when inhales deeply.  Hypoxic on ra.  States he has not been eating much recently and has a 15 lb weight loss. Also states he has been very fatigued and sleeping more.    5.28: +non productive cough, +CARMONA            REVIEW OF SYSTEMS:    CONSTITUTIONAL: No weakness, No fevers or chills  ENT: No ear ache, No sorethroat  NECK: No pain, No stiffness  RESPIRATORY: + cough, No wheezing, No hemoptysis; +dyspnea  CARDIOVASCULAR: No chest pain, No palpitations  GASTROINTESTINAL: No abd pain, No nausea, No vomiting, No hematemesis, No diarrhea or constipation. No melena, No hematochezia.  GENITOURINARY: No dysuria, No  hematuria  NEUROLOGICAL: No diplopia, No paresthesia, No motor dysfunction  MUSCULOSKELETAL: No arthralgia, No myalgia  SKIN: No rashes, or lesions   PSYCH: no anxiety, no suicidal ideation    All other review of systems is negative unless indicated above    Vital Signs Last 24 Hrs  T(C): 36.9 (28 May 2022 07:10), Max: 37.3 (27 May 2022 21:16)  T(F): 98.5 (28 May 2022 07:10), Max: 99.2 (27 May 2022 21:16)  HR: 77 (28 May 2022 07:10) (77 - 96)  BP: 135/69 (28 May 2022 07:10) (133/79 - 145/85)  BP(mean): --  RR: 17 (28 May 2022 07:10) (17 - 18)  SpO2: 95% (28 May 2022 07:10) (95% - 99%)    PHYSICAL EXAM:    GENERAL: NAD  HEENT:  NC/AT, EOMI, PERRLA, No scleral icterus, Moist mucous membranes  NECK: Supple, No JVD  CNS:  Alert & Oriented X3, Motor Strength 5/5 B/L upper and lower extremities; DTRs 2+ intact   LUNG: Normal Breath sounds,+bilat rales, No rhonchi, No wheezing  HEART: RRR; No murmurs, No rubs  ABDOMEN: +BS, ST/ND/NT  GENITOURINARY: Voiding, Bladder not distended  EXTREMITIES:  2+ Peripheral Pulses, No clubbing, No cyanosis, No tibial edema  MUSCULOSKELTAL: Joints normal ROM, No TTP, No effusion  SKIN: no rashes  RECTAL: deferred, not indicated  BREAST: deferred                          12.4   8.35  )-----------( 287      ( 28 May 2022 08:01 )             37.4     05-28    139  |  108  |  9   ----------------------------<  149<H>  4.0   |  24  |  0.95    Ca    8.6      28 May 2022 08:01    TPro  7.9  /  Alb  3.3  /  TBili  0.7  /  DBili  x   /  AST  30  /  ALT  32  /  AlkPhos  84  05-27    Vancomycin levels:   Cultures:     MEDICATIONS  (STANDING):  azithromycin  IVPB 500 milliGRAM(s) IV Intermittent every 24 hours  azithromycin  IVPB      cefTRIAXone   IVPB 1000 milliGRAM(s) IV Intermittent every 24 hours  doxazosin 8 milliGRAM(s) Oral at bedtime  heparin   Injectable 5000 Unit(s) SubCutaneous every 8 hours  losartan 100 milliGRAM(s) Oral daily  simvastatin 20 milliGRAM(s) Oral at bedtime  sodium chloride 0.9%. 1000 milliLiter(s) (125 mL/Hr) IV Continuous <Continuous>    MEDICATIONS  (PRN):  ALBUTerol    90 MICROgram(s) HFA Inhaler 2 Puff(s) Inhalation every 6 hours PRN Shortness of Breath and/or Wheezing      all labs reviewed  all imaging reviewed      a/p:    1. Acute hypoxic resp failure  Atypical Pna  Covid negative   failed oral Abx   admit to med floor  supportive O2  CTA chest; NO PE, atypical infiltrates   iv rocephin and iv zithro  f/u blood cx: ngtd  albuterol inh prn   Mucinex   pulmonary consult    # Hypokalemia 3.3:  replace recheck    # HTN: cont home meds    # DVT PPX: heparin s/q

## 2022-05-28 NOTE — CONSULT NOTE ADULT - SUBJECTIVE AND OBJECTIVE BOX
HPI:  56/M with PMHx of HTN, brought to the ED for c/o worsening SOB and cough along with a recent Dx of PNA. He also states that he has been feeling very weak and dizzy. He recently got 4th dose of COVID vaccine 3 weeks ago and Sx started 1 week thereafter.     Also HAs cough lizzie when inhales deeply.    Also Hypoxic on ra.     He also, c/o  chest pain when coughing.     States he has not been eating much recently and has a 15 lb weight loss.     Also states he has been very fatigued and sleeping more.    No recent travel or periods of immobilization or surgeries.    COVID neg (27 May 2022 16:25)    : History as above. Still having sob especially when supine. Had normal ECHO last week. Cough w clear phlegm. O2 sat 97% during episode dyspnea. CT scan noted - does have apppearance of covid. Nasal PCR negative 3 times including admission. No fever. WBC normal      PAST MEDICAL & SURGICAL HISTORY:  HTN (hypertension)          MEDICATIONS  (STANDING):  azithromycin  IVPB 500 milliGRAM(s) IV Intermittent every 24 hours  azithromycin  IVPB      cefTRIAXone   IVPB 1000 milliGRAM(s) IV Intermittent every 24 hours  doxazosin 8 milliGRAM(s) Oral at bedtime  heparin   Injectable 5000 Unit(s) SubCutaneous every 8 hours  losartan 100 milliGRAM(s) Oral daily  simvastatin 20 milliGRAM(s) Oral at bedtime  sodium chloride 0.9%. 1000 milliLiter(s) (125 mL/Hr) IV Continuous <Continuous>    MEDICATIONS  (PRN):  ALBUTerol    90 MICROgram(s) HFA Inhaler 2 Puff(s) Inhalation every 6 hours PRN Shortness of Breath and/or Wheezing      Allergies    codeine (Unknown)    Intolerances        SOCIAL HISTORY: Denies tobacco, etoh abuse or illicit drug use    FAMILY HISTORY:  No pertinent family history in first degree relatives        Vital Signs Last 24 Hrs  T(C): 36.9 (28 May 2022 07:10), Max: 38.3 (27 May 2022 12:23)  T(F): 98.5 (28 May 2022 07:10), Max: 101 (27 May 2022 12:23)  HR: 77 (28 May 2022 07:10) (77 - 96)  BP: 135/69 (28 May 2022 07:10) (133/79 - 147/77)  BP(mean): 94 (27 May 2022 12:23) (94 - 94)  RR: 17 (28 May 2022 07:10) (17 - 23)  SpO2: 95% (28 May 2022 07:10) (95% - 99%)    REVIEW OF SYSTEMS:    CONSTITUTIONAL:  As per HPI.  SKIN: no rashes  HEENT:  Eyes:  No diplopia or blurred vision. ENT:  No earache, sore throat or runny nose.  CARDIOVASCULAR:  No pressure, squeezing, tightness, heaviness or aching about the chest, neck, axilla or epigastrium.  RESPIRATORY:  cough, shortness of breath, orthopnea.  GASTROINTESTINAL:  No nausea, vomiting or diarrhea.  GENITOURINARY:  No dysuria, frequency or urgency.  MUSCULOSKELETAL:  As per HPI.  SKIN:  No change in skin, hair or nails.  NEUROLOGIC:  No paresthesias, fasciculations, seizures or weakness.  PSYCHIATRIC:  No disorder of thought or mood.  ENDOCRINE:  No heat or cold intolerance, polyuria or polydipsia.  HEMATOLOGICAL:  No easy bruising or bleedings:  .     PHYSICAL EXAMINATION:    GENERAL APPEARANCE:  Pt. is not currently dyspneic, in no distress. Pt. is alert, oriented, and pleasant.  HEENT:  Pupils are normal and react normally. No icterus. Mucous membranes well colored.  NECK:  Supple. No lymphadenopathy. Jugular venous pressure not elevated. Carotids equal.   HEART:   S1S2 reg  CHEST:  basilar crackles  ABDOMEN:  Soft and nontender.   EXTREMITIES:  There is no cyanosis, clubbing or edema.   SKIN:  No rash or significant lesions are noted.    LABS:                        12.4   8.35  )-----------( 287      ( 28 May 2022 08:01 )             37.4     05-    139  |  108  |  9   ----------------------------<  149<H>  4.0   |  24  |  0.95    Ca    8.6      28 May 2022 08:01    TPro  7.9  /  Alb  3.3  /  TBili  0.7  /  DBili  x   /  AST  30  /  ALT  32  /  AlkPhos  84  05-    LIVER FUNCTIONS - ( 27 May 2022 12:24 )  Alb: 3.3 g/dL / Pro: 7.9 gm/dL / ALK PHOS: 84 U/L / ALT: 32 U/L / AST: 30 U/L / GGT: x           PT/INR - ( 27 May 2022 12:24 )   PT: 14.1 sec;   INR: 1.21 ratio         PTT - ( 27 May 2022 12:24 )  PTT:33.8 sec      Urinalysis Basic - ( 27 May 2022 12:24 )    Color: Yellow / Appearance: Clear / S.010 / pH: x  Gluc: x / Ketone: Trace  / Bili: Negative / Urobili: Negative   Blood: x / Protein: Negative / Nitrite: Negative   Leuk Esterase: Negative / RBC: 3-5 /HPF / WBC 3-5   Sq Epi: x / Non Sq Epi: Occasional / Bacteria: Occasional          RADIOLOGY & ADDITIONAL STUDIES:

## 2022-05-29 LAB
CULTURE RESULTS: SIGNIFICANT CHANGE UP
SPECIMEN SOURCE: SIGNIFICANT CHANGE UP

## 2022-05-29 PROCEDURE — 99232 SBSQ HOSP IP/OBS MODERATE 35: CPT

## 2022-05-29 PROCEDURE — 99233 SBSQ HOSP IP/OBS HIGH 50: CPT

## 2022-05-29 RX ORDER — LANOLIN ALCOHOL/MO/W.PET/CERES
3 CREAM (GRAM) TOPICAL AT BEDTIME
Refills: 0 | Status: DISCONTINUED | OUTPATIENT
Start: 2022-05-29 | End: 2022-06-21

## 2022-05-29 RX ORDER — BENZOCAINE AND MENTHOL 5; 1 G/100ML; G/100ML
1 LIQUID ORAL THREE TIMES A DAY
Refills: 0 | Status: DISCONTINUED | OUTPATIENT
Start: 2022-05-29 | End: 2022-06-21

## 2022-05-29 RX ORDER — LACTOBACILLUS ACIDOPHILUS 100MM CELL
1 CAPSULE ORAL
Refills: 0 | Status: DISCONTINUED | OUTPATIENT
Start: 2022-05-29 | End: 2022-06-21

## 2022-05-29 RX ADMIN — Medication 3 MILLIGRAM(S): at 00:05

## 2022-05-29 RX ADMIN — Medication 3 MILLIGRAM(S): at 22:14

## 2022-05-29 RX ADMIN — SODIUM CHLORIDE 125 MILLILITER(S): 9 INJECTION INTRAMUSCULAR; INTRAVENOUS; SUBCUTANEOUS at 18:41

## 2022-05-29 RX ADMIN — PANTOPRAZOLE SODIUM 40 MILLIGRAM(S): 20 TABLET, DELAYED RELEASE ORAL at 06:33

## 2022-05-29 RX ADMIN — BUDESONIDE AND FORMOTEROL FUMARATE DIHYDRATE 2 PUFF(S): 160; 4.5 AEROSOL RESPIRATORY (INHALATION) at 20:38

## 2022-05-29 RX ADMIN — Medication 650 MILLIGRAM(S): at 10:01

## 2022-05-29 RX ADMIN — ALBUTEROL 2 PUFF(S): 90 AEROSOL, METERED ORAL at 20:36

## 2022-05-29 RX ADMIN — Medication 30 MILLILITER(S): at 06:33

## 2022-05-29 RX ADMIN — Medication 1200 MILLIGRAM(S): at 22:14

## 2022-05-29 RX ADMIN — BENZOCAINE AND MENTHOL 1 LOZENGE: 5; 1 LIQUID ORAL at 22:12

## 2022-05-29 RX ADMIN — Medication 1 TABLET(S): at 22:13

## 2022-05-29 RX ADMIN — CEFTRIAXONE 100 MILLIGRAM(S): 500 INJECTION, POWDER, FOR SOLUTION INTRAMUSCULAR; INTRAVENOUS at 17:29

## 2022-05-29 RX ADMIN — Medication 650 MILLIGRAM(S): at 11:00

## 2022-05-29 RX ADMIN — Medication 1200 MILLIGRAM(S): at 10:00

## 2022-05-29 RX ADMIN — SIMVASTATIN 20 MILLIGRAM(S): 20 TABLET, FILM COATED ORAL at 22:14

## 2022-05-29 RX ADMIN — SODIUM CHLORIDE 125 MILLILITER(S): 9 INJECTION INTRAMUSCULAR; INTRAVENOUS; SUBCUTANEOUS at 02:44

## 2022-05-29 RX ADMIN — BUDESONIDE AND FORMOTEROL FUMARATE DIHYDRATE 2 PUFF(S): 160; 4.5 AEROSOL RESPIRATORY (INHALATION) at 10:53

## 2022-05-29 RX ADMIN — LOSARTAN POTASSIUM 100 MILLIGRAM(S): 100 TABLET, FILM COATED ORAL at 10:00

## 2022-05-29 RX ADMIN — HEPARIN SODIUM 5000 UNIT(S): 5000 INJECTION INTRAVENOUS; SUBCUTANEOUS at 06:33

## 2022-05-29 RX ADMIN — AZITHROMYCIN 255 MILLIGRAM(S): 500 TABLET, FILM COATED ORAL at 16:27

## 2022-05-29 RX ADMIN — Medication 8 MILLIGRAM(S): at 22:13

## 2022-05-29 RX ADMIN — ALBUTEROL 2 PUFF(S): 90 AEROSOL, METERED ORAL at 10:52

## 2022-05-29 RX ADMIN — SODIUM CHLORIDE 125 MILLILITER(S): 9 INJECTION INTRAMUSCULAR; INTRAVENOUS; SUBCUTANEOUS at 10:03

## 2022-05-29 RX ADMIN — Medication 30 MILLILITER(S): at 22:12

## 2022-05-29 RX ADMIN — HEPARIN SODIUM 5000 UNIT(S): 5000 INJECTION INTRAVENOUS; SUBCUTANEOUS at 22:13

## 2022-05-29 RX ADMIN — HEPARIN SODIUM 5000 UNIT(S): 5000 INJECTION INTRAVENOUS; SUBCUTANEOUS at 14:10

## 2022-05-29 NOTE — PROGRESS NOTE ADULT - ASSESSMENT
- O2 sat 96% room air  - CT scan chest reviewed. No PE. has bilat ground glass and consolidative infiltrates'  - is covid negative  - had normal echo  - on rocephine/ zithro. Can change to po ceftin for outpatient rx  - should repeat CT scan in 2-4 weeks  - dvt proph

## 2022-05-29 NOTE — PROGRESS NOTE ADULT - SUBJECTIVE AND OBJECTIVE BOX
History of Present Illness:   56/M with PMHx of HTN, brought to the ED for c/o worsening SOB and cough along with a recent Dx of PNA. He also states that he has been feeling very weak and dizzy. He recently got 4th dose of COVID vaccine 3 weeks ago and Sx started 1 week thereafter.   Also HAs cough lizzie when inhales deeply.  Hypoxic on ra.  States he has not been eating much recently and has a 15 lb weight loss. Also states he has been very fatigued and sleeping more.    5.28: +non productive cough, +CARMONA  5.29: less dyspnea and less anxiety, slept well with melatonin, no hypoxia today  +multiple watery diarrhea episodes           REVIEW OF SYSTEMS:    CONSTITUTIONAL: No weakness, No fevers or chills  ENT: No ear ache, No sorethroat  NECK: No pain, No stiffness  RESPIRATORY: + cough, No wheezing, No hemoptysis; +dyspnea  CARDIOVASCULAR: No chest pain, No palpitations  GASTROINTESTINAL: No abd pain, No nausea, No vomiting, No hematemesis, No diarrhea or constipation. No melena, No hematochezia.  GENITOURINARY: No dysuria, No  hematuria  NEUROLOGICAL: No diplopia, No paresthesia, No motor dysfunction  MUSCULOSKELETAL: No arthralgia, No myalgia  SKIN: No rashes, or lesions   PSYCH: no anxiety, no suicidal ideation    All other review of systems is negative unless indicated above    Vital Signs Last 24 Hrs  T(C): 37.7 (29 May 2022 08:04), Max: 37.7 (29 May 2022 08:04)  T(F): 99.9 (29 May 2022 08:04), Max: 99.9 (29 May 2022 08:04)  HR: 75 (29 May 2022 08:04) (72 - 87)  BP: 128/70 (29 May 2022 08:04) (128/70 - 146/81)  BP(mean): --  RR: 18 (29 May 2022 08:04) (18 - 18)  SpO2: 93% (29 May 2022 08:04) (93% - 98%)    PHYSICAL EXAM:    GENERAL: NAD  HEENT:  NC/AT, EOMI, PERRLA, No scleral icterus, Moist mucous membranes  NECK: Supple, No JVD  CNS:  Alert & Oriented X3, Motor Strength 5/5 B/L upper and lower extremities; DTRs 2+ intact   LUNG: Normal Breath sounds,+bilat rales, No rhonchi, No wheezing  HEART: RRR; No murmurs, No rubs  ABDOMEN: +BS, ST/ND/NT  GENITOURINARY: Voiding, Bladder not distended  EXTREMITIES:  2+ Peripheral Pulses, No clubbing, No cyanosis, No tibial edema  MUSCULOSKELTAL: Joints normal ROM, No TTP, No effusion  SKIN: no rashes  RECTAL: deferred, not indicated  BREAST: deferred                          12.4   8.35  )-----------( 287      ( 28 May 2022 08:01 )             37.4     05-28    139  |  108  |  9   ----------------------------<  149<H>  4.0   |  24  |  0.95    Ca    8.6      28 May 2022 08:01    TPro  7.9  /  Alb  3.3  /  TBili  0.7  /  DBili  x   /  AST  30  /  ALT  32  /  AlkPhos  84  05-27    Vancomycin levels:   Cultures:     MEDICATIONS  (STANDING):  azithromycin  IVPB 500 milliGRAM(s) IV Intermittent every 24 hours  azithromycin  IVPB      cefTRIAXone   IVPB 1000 milliGRAM(s) IV Intermittent every 24 hours  doxazosin 8 milliGRAM(s) Oral at bedtime  heparin   Injectable 5000 Unit(s) SubCutaneous every 8 hours  losartan 100 milliGRAM(s) Oral daily  simvastatin 20 milliGRAM(s) Oral at bedtime  sodium chloride 0.9%. 1000 milliLiter(s) (125 mL/Hr) IV Continuous <Continuous>    MEDICATIONS  (PRN):  ALBUTerol    90 MICROgram(s) HFA Inhaler 2 Puff(s) Inhalation every 6 hours PRN Shortness of Breath and/or Wheezing      all labs reviewed  all imaging reviewed      a/p:    1. Acute hypoxic resp failure  Atypical Pna  Covid negative   failed oral Abx   admit to med floor  supportive O2 if needed   CTA chest; NO PE, atypical peripheral bilateral infiltrates   iv rocephin and iv zithro d#3  f/u blood cx: ngtd  albuterol inh prn   Mucinex   pulmonary consult    # Hypokalemia 3.3:  replaced    # HTN: cont home meds    # DVT PPX: heparin s/q    #Diarrhea r/o Cdiff  isolation     dc home tomorrow if not hypoxic on ambulation

## 2022-05-29 NOTE — PROGRESS NOTE ADULT - SUBJECTIVE AND OBJECTIVE BOX
Subjective:  much better    MEDICATIONS  (STANDING):  azithromycin  IVPB 500 milliGRAM(s) IV Intermittent every 24 hours  azithromycin  IVPB      budesonide 160 MICROgram(s)/formoterol 4.5 MICROgram(s) Inhaler 2 Puff(s) Inhalation two times a day  cefTRIAXone   IVPB 1000 milliGRAM(s) IV Intermittent every 24 hours  doxazosin 8 milliGRAM(s) Oral at bedtime  guaiFENesin ER 1200 milliGRAM(s) Oral every 12 hours  heparin   Injectable 5000 Unit(s) SubCutaneous every 8 hours  losartan 100 milliGRAM(s) Oral daily  pantoprazole    Tablet 40 milliGRAM(s) Oral before breakfast  simvastatin 20 milliGRAM(s) Oral at bedtime  sodium chloride 0.9%. 1000 milliLiter(s) (125 mL/Hr) IV Continuous <Continuous>    MEDICATIONS  (PRN):  acetaminophen     Tablet .. 650 milliGRAM(s) Oral every 6 hours PRN Temp greater or equal to 38C (100.4F), Mild Pain (1 - 3)  ALBUTerol    90 MICROgram(s) HFA Inhaler 2 Puff(s) Inhalation every 6 hours PRN Shortness of Breath and/or Wheezing  aluminum hydroxide/magnesium hydroxide/simethicone Suspension 30 milliLiter(s) Oral every 6 hours PRN Dyspepsia      Allergies    codeine (Unknown)    Intolerances        REVIEW OF SYSTEMS:    CONSTITUTIONAL:  As per HPI.  HEENT:  Eyes:  No diplopia or blurred vision. ENT:  No earache, sore throat or runny nose.  CARDIOVASCULAR:  No pressure, squeezing, tightness, heaviness or aching about the chest, neck, axilla or epigastrium.  RESPIRATORY:  No cough, shortness of breath, PND or orthopnea.  GASTROINTESTINAL:  No nausea, vomiting or diarrhea.  GENITOURINARY:  No dysuria, frequency or urgency.  MUSCULOSKELETAL:  no joint pain, deformity, tenderness  EXTREMITIES: no clubbing cyanosis,edema  SKIN:  No change in skin, hair or nails.  NEUROLOGIC:  No paresthesias, fasciculations, seizures or weakness.  PSYCHIATRIC:  No disorder of thought or mood.  ENDOCRINE:  No heat or cold intolerance, polyuria or polydipsia.  HEMATOLOGICAL:  No easy bruising or bleedings:    Vital Signs Last 24 Hrs  T(C): 37.7 (29 May 2022 08:04), Max: 37.7 (29 May 2022 08:04)  T(F): 99.9 (29 May 2022 08:04), Max: 99.9 (29 May 2022 08:04)  HR: 75 (29 May 2022 08:04) (72 - 87)  BP: 128/70 (29 May 2022 08:04) (128/70 - 146/81)  BP(mean): --  RR: 18 (29 May 2022 08:04) (18 - 18)  SpO2: 93% (29 May 2022 08:04) (93% - 98%)    PHYSICAL EXAMINATION:  SKIN: no rashes  HEAD: NC/AT  EYES: PERRLA, EOMI  EARS: TM's intact  NOSE: no abnormalities  NECK:  Supple. No lymphadenopathy. Jugular venous pressure not elevated. Carotids equal.   HEART:   scattered ronchi  CHEST:  Chest is clear to auscultation. Normal respiratory effort.  ABDOMEN:  Soft and nontender.   EXTREMITIES:  no C/C/E  NEURO: AAO x 3, no focal deficts       LABS:                        12.4   8.35  )-----------( 287      ( 28 May 2022 08:01 )             37.4     05-28    139  |  108  |  9   ----------------------------<  149<H>  4.0   |  24  |  0.95    Ca    8.6      28 May 2022 08:01            RADIOLOGY & ADDITIONAL TESTS:

## 2022-05-30 LAB
C DIFF BY PCR RESULT: SIGNIFICANT CHANGE UP
C DIFF TOX GENS STL QL NAA+PROBE: SIGNIFICANT CHANGE UP

## 2022-05-30 PROCEDURE — 99232 SBSQ HOSP IP/OBS MODERATE 35: CPT

## 2022-05-30 PROCEDURE — 99233 SBSQ HOSP IP/OBS HIGH 50: CPT

## 2022-05-30 RX ORDER — IBUPROFEN 200 MG
600 TABLET ORAL ONCE
Refills: 0 | Status: COMPLETED | OUTPATIENT
Start: 2022-05-30 | End: 2022-05-30

## 2022-05-30 RX ADMIN — ALBUTEROL 2 PUFF(S): 90 AEROSOL, METERED ORAL at 09:00

## 2022-05-30 RX ADMIN — Medication 30 MILLILITER(S): at 06:31

## 2022-05-30 RX ADMIN — HEPARIN SODIUM 5000 UNIT(S): 5000 INJECTION INTRAVENOUS; SUBCUTANEOUS at 06:31

## 2022-05-30 RX ADMIN — Medication 600 MILLIGRAM(S): at 21:42

## 2022-05-30 RX ADMIN — BENZOCAINE AND MENTHOL 1 LOZENGE: 5; 1 LIQUID ORAL at 06:31

## 2022-05-30 RX ADMIN — SIMVASTATIN 20 MILLIGRAM(S): 20 TABLET, FILM COATED ORAL at 21:43

## 2022-05-30 RX ADMIN — PANTOPRAZOLE SODIUM 40 MILLIGRAM(S): 20 TABLET, DELAYED RELEASE ORAL at 06:31

## 2022-05-30 RX ADMIN — BUDESONIDE AND FORMOTEROL FUMARATE DIHYDRATE 2 PUFF(S): 160; 4.5 AEROSOL RESPIRATORY (INHALATION) at 09:00

## 2022-05-30 RX ADMIN — ALBUTEROL 2 PUFF(S): 90 AEROSOL, METERED ORAL at 16:08

## 2022-05-30 RX ADMIN — LOSARTAN POTASSIUM 100 MILLIGRAM(S): 100 TABLET, FILM COATED ORAL at 09:21

## 2022-05-30 RX ADMIN — AZITHROMYCIN 255 MILLIGRAM(S): 500 TABLET, FILM COATED ORAL at 16:43

## 2022-05-30 RX ADMIN — Medication 8 MILLIGRAM(S): at 21:42

## 2022-05-30 RX ADMIN — Medication 1 TABLET(S): at 21:42

## 2022-05-30 RX ADMIN — BUDESONIDE AND FORMOTEROL FUMARATE DIHYDRATE 2 PUFF(S): 160; 4.5 AEROSOL RESPIRATORY (INHALATION) at 21:26

## 2022-05-30 RX ADMIN — HEPARIN SODIUM 5000 UNIT(S): 5000 INJECTION INTRAVENOUS; SUBCUTANEOUS at 16:43

## 2022-05-30 RX ADMIN — CEFTRIAXONE 100 MILLIGRAM(S): 500 INJECTION, POWDER, FOR SOLUTION INTRAMUSCULAR; INTRAVENOUS at 18:41

## 2022-05-30 RX ADMIN — Medication 650 MILLIGRAM(S): at 16:43

## 2022-05-30 RX ADMIN — Medication 1200 MILLIGRAM(S): at 09:20

## 2022-05-30 RX ADMIN — SODIUM CHLORIDE 125 MILLILITER(S): 9 INJECTION INTRAMUSCULAR; INTRAVENOUS; SUBCUTANEOUS at 20:11

## 2022-05-30 RX ADMIN — HEPARIN SODIUM 5000 UNIT(S): 5000 INJECTION INTRAVENOUS; SUBCUTANEOUS at 21:43

## 2022-05-30 RX ADMIN — Medication 1200 MILLIGRAM(S): at 21:43

## 2022-05-30 RX ADMIN — Medication 1 TABLET(S): at 09:20

## 2022-05-30 RX ADMIN — ALBUTEROL 2 PUFF(S): 90 AEROSOL, METERED ORAL at 21:18

## 2022-05-30 RX ADMIN — Medication 3 MILLIGRAM(S): at 21:43

## 2022-05-30 NOTE — PROGRESS NOTE ADULT - SUBJECTIVE AND OBJECTIVE BOX
History of Present Illness:   56/M with PMHx of HTN, brought to the ED for c/o worsening SOB and cough along with a recent Dx of PNA. He also states that he has been feeling very weak and dizzy. He recently got 4th dose of COVID vaccine 3 weeks ago and Sx started 1 week thereafter.   Also HAs cough lizzie when inhales deeply.  Hypoxic on ra.  States he has not been eating much recently and has a 15 lb weight loss. Also states he has been very fatigued and sleeping more.    5.28: +non productive cough, +CARMONA  5.29: less dyspnea and less anxiety, slept well with melatonin, no hypoxia today  +multiple watery diarrhea episodes     5/30: low grade temp overnight 99.1  diarrhea continue; GI PCR neg; stool for C diff awaited      PHYSICAL EXAM:    Daily     Daily     Vital Signs Last 24 Hrs  T(C): 37 (30 May 2022 08:50), Max: 37.3 (29 May 2022 22:17)  T(F): 98.6 (30 May 2022 08:50), Max: 99.1 (29 May 2022 22:17)  HR: 79 (30 May 2022 08:50) (79 - 80)  BP: 142/68 (30 May 2022 08:50) (131/68 - 150/73)  BP(mean): --  RR: 18 (30 May 2022 08:50) (18 - 19)  SpO2: 96% (30 May 2022 08:50) (93% - 97%)    Constitutional: Weak and ill appearing  HEENT: Atraumatic, [place]IXTO,   Respiratory: Breath Sounds normal, no rhonchi/wheeze  Cardiovascular: N S1S2;   Gastrointestinal: Abdomen soft, non tender, Bowel Sounds present  Extremities: No edema, peripheral pulses present  Neurological: AAO x 3, no gross focal motor deficits  Skin: Non cellulitic, no rash, ulcers  Lymph Nodes: No lymphadenopathy noted  Back: No CVA tenderness   Musculoskeletal: non tender  Breasts: Deferred  Genitourinary: deferred  Rectal: Deferred    All Labs/EKG/Radiology/Meds reviewed by me  \< from: CT Angio Chest PE Protocol w/ IV Cont (05.27.22 @ 16:55) >  No pulmonary embolism from the main pulmonary artery up to and including   the segmental branches. Limited assessment of subsegmental branches.    Bilateral peripheral lung disease. Differential includes infection (COVID   in particular), organizing pneumonia, or eosinophilic pneumonia.    Incidental incompletely characterized left adrenal nodule. Further   evaluation with MRI is recommended, which can be done on a nonemergent   basis.    < end of copied text >      MEDICATIONS  (STANDING):  azithromycin  IVPB 500 milliGRAM(s) IV Intermittent every 24 hours  azithromycin  IVPB      budesonide 160 MICROgram(s)/formoterol 4.5 MICROgram(s) Inhaler 2 Puff(s) Inhalation two times a day  cefTRIAXone   IVPB 1000 milliGRAM(s) IV Intermittent every 24 hours  doxazosin 8 milliGRAM(s) Oral at bedtime  guaiFENesin ER 1200 milliGRAM(s) Oral every 12 hours  heparin   Injectable 5000 Unit(s) SubCutaneous every 8 hours  lactobacillus acidophilus 1 Tablet(s) Oral two times a day  losartan 100 milliGRAM(s) Oral daily  melatonin 3 milliGRAM(s) Oral at bedtime  pantoprazole    Tablet 40 milliGRAM(s) Oral before breakfast  simvastatin 20 milliGRAM(s) Oral at bedtime  sodium chloride 0.9%. 1000 milliLiter(s) (125 mL/Hr) IV Continuous <Continuous>    MEDICATIONS  (PRN):  acetaminophen     Tablet .. 650 milliGRAM(s) Oral every 6 hours PRN Temp greater or equal to 38C (100.4F), Mild Pain (1 - 3)  ALBUTerol    90 MICROgram(s) HFA Inhaler 2 Puff(s) Inhalation every 6 hours PRN Shortness of Breath and/or Wheezing  aluminum hydroxide/magnesium hydroxide/simethicone Suspension 30 milliLiter(s) Oral every 6 hours PRN Dyspepsia  benzocaine 15 mG/menthol 3.6 mG Lozenge 1 Lozenge Oral three times a day PRN Sore Throat          a/p:    1. Acute hypoxic resp failure  Atypical Pna  Covid negative   failed oral Abx   admit to med floor  supportive O2 if needed   CTA chest; NO PE, atypical peripheral bilateral infiltrates   iv rocephin and iv zithro   f/u blood cx: ngtd  albuterol inh prn   Mucinex   pulmonary consult appreciated    # Hypokalemia 3.3:  replaced, 4.0    # HTN: cont home meds    # DVT PPX: heparin s/q    #Diarrhea r/o Cdiff  isolation

## 2022-05-30 NOTE — PROGRESS NOTE ADULT - SUBJECTIVE AND OBJECTIVE BOX
Subjective:    Awake, alert. Feeling better. Anxious. No sputum.    MEDICATIONS  (STANDING):  azithromycin  IVPB 500 milliGRAM(s) IV Intermittent every 24 hours  azithromycin  IVPB      budesonide 160 MICROgram(s)/formoterol 4.5 MICROgram(s) Inhaler 2 Puff(s) Inhalation two times a day  cefTRIAXone   IVPB 1000 milliGRAM(s) IV Intermittent every 24 hours  doxazosin 8 milliGRAM(s) Oral at bedtime  guaiFENesin ER 1200 milliGRAM(s) Oral every 12 hours  heparin   Injectable 5000 Unit(s) SubCutaneous every 8 hours  lactobacillus acidophilus 1 Tablet(s) Oral two times a day  losartan 100 milliGRAM(s) Oral daily  melatonin 3 milliGRAM(s) Oral at bedtime  pantoprazole    Tablet 40 milliGRAM(s) Oral before breakfast  simvastatin 20 milliGRAM(s) Oral at bedtime  sodium chloride 0.9%. 1000 milliLiter(s) (125 mL/Hr) IV Continuous <Continuous>    MEDICATIONS  (PRN):  acetaminophen     Tablet .. 650 milliGRAM(s) Oral every 6 hours PRN Temp greater or equal to 38C (100.4F), Mild Pain (1 - 3)  ALBUTerol    90 MICROgram(s) HFA Inhaler 2 Puff(s) Inhalation every 6 hours PRN Shortness of Breath and/or Wheezing  aluminum hydroxide/magnesium hydroxide/simethicone Suspension 30 milliLiter(s) Oral every 6 hours PRN Dyspepsia  benzocaine 15 mG/menthol 3.6 mG Lozenge 1 Lozenge Oral three times a day PRN Sore Throat      Allergies    codeine (Unknown)    Intolerances        Vital Signs Last 24 Hrs  T(C): 37.3 (29 May 2022 22:17), Max: 37.7 (29 May 2022 08:04)  T(F): 99.1 (29 May 2022 22:17), Max: 99.9 (29 May 2022 08:04)  HR: 79 (29 May 2022 22:17) (75 - 80)  BP: 150/73 (29 May 2022 22:17) (128/70 - 150/73)  BP(mean): --  RR: 19 (29 May 2022 22:17) (18 - 19)  SpO2: 97% (29 May 2022 22:17) (93% - 97%)    PHYSICAL EXAMINATION:    NECK:  Supple. No lymphadenopathy. Jugular venous pressure not elevated.   HEART:   The cardiac impulse has a normal quality. Reg., Nl S1 and S2.    CHEST:  Chest with bilateral rales in lower lung zones. Normal respiratory effort.  ABDOMEN:  Soft and nontender.   EXTREMITIES:  There is no edema.       LABS:                        12.4   8.35  )-----------( 287      ( 28 May 2022 08:01 )             37.4     05-28    139  |  108  |  9   ----------------------------<  149<H>  4.0   |  24  |  0.95    Ca    8.6      28 May 2022 08:01            RADIOLOGY & ADDITIONAL TESTS:    Assessment and Plan:   · Assessment  	  - O2 sat 96% room air  - CT scan chest reviewed. No PE. has bilat ground glass and consolidative infiltrates'  - on rocephin/ zithro.   - should repeat CT scan in 2-4 weeks  - dvt proph  - Ambulatory O2 Sat prior to D/C    Problem/Plan - 1:  ·  Problem: Multilobar lung infiltrate.   Problem/Plan - 2:  ·  Problem: Acute dyspnea.   Problem/Plan - 3:  ·  Problem: Paroxysmal cough.   Problem/Plan - 4:  ·  Problem: H/O fatigue.

## 2022-05-31 PROCEDURE — 99232 SBSQ HOSP IP/OBS MODERATE 35: CPT

## 2022-05-31 RX ORDER — CEFEPIME 1 G/1
1000 INJECTION, POWDER, FOR SOLUTION INTRAMUSCULAR; INTRAVENOUS EVERY 12 HOURS
Refills: 0 | Status: ACTIVE | OUTPATIENT
Start: 2022-05-31 | End: 2023-04-29

## 2022-05-31 RX ADMIN — SODIUM CHLORIDE 125 MILLILITER(S): 9 INJECTION INTRAMUSCULAR; INTRAVENOUS; SUBCUTANEOUS at 10:18

## 2022-05-31 RX ADMIN — SODIUM CHLORIDE 125 MILLILITER(S): 9 INJECTION INTRAMUSCULAR; INTRAVENOUS; SUBCUTANEOUS at 05:05

## 2022-05-31 RX ADMIN — HEPARIN SODIUM 5000 UNIT(S): 5000 INJECTION INTRAVENOUS; SUBCUTANEOUS at 06:14

## 2022-05-31 RX ADMIN — Medication 1200 MILLIGRAM(S): at 10:18

## 2022-05-31 RX ADMIN — CEFEPIME 1000 MILLIGRAM(S): 1 INJECTION, POWDER, FOR SOLUTION INTRAMUSCULAR; INTRAVENOUS at 22:29

## 2022-05-31 RX ADMIN — BUDESONIDE AND FORMOTEROL FUMARATE DIHYDRATE 2 PUFF(S): 160; 4.5 AEROSOL RESPIRATORY (INHALATION) at 09:37

## 2022-05-31 RX ADMIN — PANTOPRAZOLE SODIUM 40 MILLIGRAM(S): 20 TABLET, DELAYED RELEASE ORAL at 06:14

## 2022-05-31 RX ADMIN — HEPARIN SODIUM 5000 UNIT(S): 5000 INJECTION INTRAVENOUS; SUBCUTANEOUS at 13:54

## 2022-05-31 RX ADMIN — Medication 3 MILLIGRAM(S): at 22:30

## 2022-05-31 RX ADMIN — ALBUTEROL 2 PUFF(S): 90 AEROSOL, METERED ORAL at 18:22

## 2022-05-31 RX ADMIN — ALBUTEROL 2 PUFF(S): 90 AEROSOL, METERED ORAL at 09:41

## 2022-05-31 RX ADMIN — LOSARTAN POTASSIUM 100 MILLIGRAM(S): 100 TABLET, FILM COATED ORAL at 10:18

## 2022-05-31 RX ADMIN — BUDESONIDE AND FORMOTEROL FUMARATE DIHYDRATE 2 PUFF(S): 160; 4.5 AEROSOL RESPIRATORY (INHALATION) at 21:08

## 2022-05-31 RX ADMIN — SIMVASTATIN 20 MILLIGRAM(S): 20 TABLET, FILM COATED ORAL at 22:40

## 2022-05-31 RX ADMIN — Medication 100 MILLIGRAM(S): at 22:30

## 2022-05-31 RX ADMIN — Medication 1200 MILLIGRAM(S): at 22:31

## 2022-05-31 RX ADMIN — Medication 1 TABLET(S): at 22:30

## 2022-05-31 RX ADMIN — Medication 1 TABLET(S): at 10:18

## 2022-05-31 RX ADMIN — Medication 650 MILLIGRAM(S): at 22:29

## 2022-05-31 RX ADMIN — Medication 8 MILLIGRAM(S): at 22:30

## 2022-05-31 RX ADMIN — HEPARIN SODIUM 5000 UNIT(S): 5000 INJECTION INTRAVENOUS; SUBCUTANEOUS at 22:29

## 2022-05-31 NOTE — CONSULT NOTE ADULT - SUBJECTIVE AND OBJECTIVE BOX
Patient is a 56y old  Male who presents with a chief complaint of PNA (30 May 2022 13:58)    HPI:  56/M with PMHx of HTN, admitted on 5/27 for evaluation of shortness of breath about one week after receiving second COVID-19 booster, which was given over 3 weeks ago; he tested multiple times on COVID-19 tests, has mild cough and was initially hypoxic on room air, which is resolved; has been started on zithromax and ceftriaxone since admission, with mild improvement. 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 Overall he does not fully feel himself. Had low grade fever overnight.       PMH: as above  PSH: as above  Meds: per reconciliation sheet, noted below  MEDICATIONS  (STANDING):  budesonide 160 MICROgram(s)/formoterol 4.5 MICROgram(s) Inhaler 2 Puff(s) Inhalation two times a day  cefepime  Injectable. 1000 milliGRAM(s) IV Push every 12 hours  doxazosin 8 milliGRAM(s) Oral at bedtime  doxycycline hyclate Capsule 100 milliGRAM(s) Oral every 12 hours  guaiFENesin ER 1200 milliGRAM(s) Oral every 12 hours  heparin   Injectable 5000 Unit(s) SubCutaneous every 8 hours  lactobacillus acidophilus 1 Tablet(s) Oral two times a day  losartan 100 milliGRAM(s) Oral daily  melatonin 3 milliGRAM(s) Oral at bedtime  pantoprazole    Tablet 40 milliGRAM(s) Oral before breakfast    simvastatin 20 milliGRAM(s) Oral at bedtime  sodium chloride 0.9%. 1000 milliLiter(s) (125 mL/Hr) IV Continuous <Continuous>    MEDICATIONS  (PRN):  acetaminophen     Tablet .. 650 milliGRAM(s) Oral every 6 hours PRN Temp greater or equal to 38C (100.4F), Mild Pain (1 - 3)  ALBUTerol    90 MICROgram(s) HFA Inhaler 2 Puff(s) Inhalation every 6 hours PRN Shortness of Breath and/or Wheezing  aluminum hydroxide/magnesium hydroxide/simethicone Suspension 30 milliLiter(s) Oral every 6 hours PRN Dyspepsia  benzocaine 15 mG/menthol 3.6 mG Lozenge 1 Lozenge Oral three times a day PRN Sore Throat    Allergies    codeine (Unknown)    Intolerances      Social: no smoking, no alcohol, no illegal drugs; no recent travel, no exposure to TB  FAMILY HISTORY:  No pertinent family history in first degree relatives       no history of premature cardiovascular disease in first degree relatives  ROS: the patient has no chills, no HA, no dizziness, no sore throat, no blurry vision, no CP, no palpitations,  no abdominal pain, no diarrhea, no N/V, no dysuria, no leg pain, no claudication, no rash, no joint aches, no rectal pain or bleeding, no night sweats  All other systems reviewed and are negative    Vital Signs Last 24 Hrs  T(C): 37.2 (31 May 2022 08:02), Max: 38.1 (30 May 2022 21:19)  T(F): 99 (31 May 2022 08:02), Max: 100.6 (30 May 2022 21:19)  HR: 87 (31 May 2022 08:02) (87 - 89)  BP: 144/80 (31 May 2022 08:02) (130/75 - 144/80)  BP(mean): --  RR: 19 (31 May 2022 08:02) (19 - 20)  SpO2: 94% (31 May 2022 08:02) (93% - 95%)  Daily     Daily     PE:    Constitutional: frail looking  HEENT: NC/AT, EOMI, PERRLA, conjunctivae clear; ears and nose atraumatic; pharynx clear  Neck: supple; thyroid not palpable  Back: no tenderness  Respiratory: respiratory effort normal; clear to auscultation  Cardiovascular: S1S2 regular, no murmurs  Abdomen: soft, not tender, not distended, positive BS; no liver or spleen organomegaly  Genitourinary: no suprapubic tenderness  Musculoskeletal: no muscle tenderness, no joint swelling or tenderness  Neurological/ Psychiatric: AxOx3, judgement and insight normal;  moving all extremities  Skin: no rashes; no palpable lesions    Labs: all available labs reviewed         Labs:                 Cultures:       GI PCR Panel, Stool (collected 05-29-22 @ 08:20)  Source: .Stool Feces  Final Report (05-29-22 @ 18:46):    GI PCR Results: NOT detected    *******Please Note:*******    GI panel PCR evaluates for:    Campylobacter, Plesiomonas shigelloides, Salmonella,    Vibrio, Yersinia enterocolitica, Enteroaggregative    Escherichia coli (EAEC), Enteropathogenic E.coli (EPEC),    Enterotoxigenic E. coli (ETEC) lt/st, Shiga-like    toxin-producing E. coli (STEC) stx1/stx2,    Shigella/ Enteroinvasive E. coli (EIEC), Cryptosporidium,    Cyclospora cayetanensis, Entamoeba histolytica,    Giardia lamblia, Adenovirus F 40/41, Astrovirus,    Norovirus GI/GII, Rotavirus A, Sapovirus    Culture - Urine (collected 05-27-22 @ 12:24)  Source: Clean Catch Clean Catch (Midstream)  Final Report (05-28-22 @ 20:31):    <10,000 CFU/mL Normal Urogenital Aliyah    Culture - Blood (collected 05-27-22 @ 12:24)  Source: .Blood Blood-Peripheral  Preliminary Report (05-28-22 @ 15:06):    No growth to date.    Culture - Blood (collected 05-27-22 @ 12:24)  Source: .Blood Blood-Peripheral  Preliminary Report (05-28-22 @ 15:06):    No growth to date.      < from: CT Angio Chest PE Protocol w/ IV Cont (05.27.22 @ 16:55) >    ACC: [id number] EXAM:  CT ANGIO CHEST PULM [facility]                          PROCEDURE DATE:  05/27/2022          INTERPRETATION:  INDICATION: Shortness of breath    TECHNIQUE: Volumetric images of the chest were obtained after the   administration of 90 mL of Omnipaque 350. Maximum intensity projection   images were generated.    COMPARISON: None.    FINDINGS:    PULMONARY ANGIOGRAM: No pulmonary embolism from the main pulmonary artery   up to and including the segmental branches. Limited assessment of   subsegmental branches in the lower lobes as they are not well opacified.   Normal caliber pulmonary artery.    LUNGS/AIRWAYS/PLEURA: Patent trachea and bronchi. Peripheral groundglass   and consolidative opacities in all lobes, preferentialto the lower   lobes. Unremarkable pleura.    LYMPH NODES/MEDIASTINUM: No enlarged lymph nodes.    HEART/VASCULATURE: Normal heart size. Small pericardial effusion. Normal   caliber aorta. Coronary artery calcifications.    UPPER ABDOMEN: Partially included incompletely characterized 2.0 cm left   adrenal nodule.    BONES/SOFT TISSUES: Degenerative changes of the spine.      IMPRESSION:    No pulmonary embolism from the main pulmonary artery up to and including   the segmental branches. Limited assessment of subsegmental branches.    Bilateral peripheral lung disease. Differential includes infection (COVID   in particular), organizing pneumonia, or eosinophilic pneumonia.    Incidental incompletely characterized left adrenal nodule. Further   evaluation with MRI is recommended, which can be done on a nonemergent   basis.    < end of copied text >            Radiology: all available radiological tests reviewed    Advanced directives addressed: full resuscitation

## 2022-05-31 NOTE — PROGRESS NOTE ADULT - SUBJECTIVE AND OBJECTIVE BOX
History of Present Illness:   56/M with PMHx of HTN, brought to the ED for c/o worsening SOB and cough along with a recent Dx of PNA. He also states that he has been feeling very weak and dizzy. He recently got 4th dose of COVID vaccine 3 weeks ago and Sx started 1 week thereafter.   Also HAs cough lizzie when inhales deeply.  Hypoxic on ra.  States he has not been eating much recently and has a 15 lb weight loss. Also states he has been very fatigued and sleeping more.    5.28: +non productive cough, +CARMONA  5.29: less dyspnea and less anxiety, slept well with melatonin, no hypoxia today  +multiple watery diarrhea episodes     5/30: low grade temp overnight 99.1  diarrhea continue; GI PCR neg; stool for C diff awaited    5/31: diarrhea much improved  no c diff  temp of 100.6 last night  ABX changed to cefepime + doxy  ID appreciated    PHYSICAL EXAM:    Daily     Daily     Vital Signs Last 24 Hrs  T(C): 37.2 (31 May 2022 08:02), Max: 38.1 (30 May 2022 21:19)  T(F): 99 (31 May 2022 08:02), Max: 100.6 (30 May 2022 21:19)  HR: 87 (31 May 2022 08:02) (87 - 89)  BP: 144/80 (31 May 2022 08:02) (130/75 - 144/80)  BP(mean): --  RR: 19 (31 May 2022 08:02) (19 - 20)  SpO2: 94% (31 May 2022 08:02) (93% - 94%)    Constitutional: Well appearing  HEENT: Atraumatic, [place]IXTO,   Respiratory: Breath Sounds normal, no rhonchi/wheeze  Cardiovascular: N S1S2;   Gastrointestinal: Abdomen soft, non tender, Bowel Sounds present  Extremities: No edema, peripheral pulses present  Neurological: AAO x 3, no gross focal motor deficits  Skin: Non cellulitic, no rash, ulcers  Lymph Nodes: No lymphadenopathy noted  Back: No CVA tenderness   Musculoskeletal: non tender  Breasts: Deferred  Genitourinary: deferred  Rectal: Deferred    All Labs/EKG/Radiology/Meds reviewed by me      \< from: CT Angio Chest PE Protocol w/ IV Cont (05.27.22 @ 16:55) >  No pulmonary embolism from the main pulmonary artery up to and including   the segmental branches. Limited assessment of subsegmental branches.    Bilateral peripheral lung disease. Differential includes infection (COVID   in particular), organizing pneumonia, or eosinophilic pneumonia.    Incidental incompletely characterized left adrenal nodule. Further   evaluation with MRI is recommended, which can be done on a nonemergent   basis.    < end of copied text >      MEDICATIONS  (STANDING):  budesonide 160 MICROgram(s)/formoterol 4.5 MICROgram(s) Inhaler 2 Puff(s) Inhalation two times a day  cefepime  Injectable. 1000 milliGRAM(s) IV Push every 12 hours  doxazosin 8 milliGRAM(s) Oral at bedtime  doxycycline hyclate Capsule 100 milliGRAM(s) Oral every 12 hours  guaiFENesin ER 1200 milliGRAM(s) Oral every 12 hours  heparin   Injectable 5000 Unit(s) SubCutaneous every 8 hours  lactobacillus acidophilus 1 Tablet(s) Oral two times a day  losartan 100 milliGRAM(s) Oral daily  melatonin 3 milliGRAM(s) Oral at bedtime  pantoprazole    Tablet 40 milliGRAM(s) Oral before breakfast  simvastatin 20 milliGRAM(s) Oral at bedtime    MEDICATIONS  (PRN):  acetaminophen     Tablet .. 650 milliGRAM(s) Oral every 6 hours PRN Temp greater or equal to 38C (100.4F), Mild Pain (1 - 3)  ALBUTerol    90 MICROgram(s) HFA Inhaler 2 Puff(s) Inhalation every 6 hours PRN Shortness of Breath and/or Wheezing  aluminum hydroxide/magnesium hydroxide/simethicone Suspension 30 milliLiter(s) Oral every 6 hours PRN Dyspepsia  benzocaine 15 mG/menthol 3.6 mG Lozenge 1 Lozenge Oral three times a day PRN Sore Throat      1. Acute hypoxic resp failure  Atypical Pna  Covid negative   failed oral Abx   admit to med floor  supportive O2 if needed   CTA chest; NO PE, atypical peripheral bilateral infiltrates   iv rocephin and iv zithro changed to cefepime + doxy on 5/31  f/u blood cx: ngtd  albuterol inh prn   Mucinex   pulmonary consult appreciated  ID consult appreciated    # Hypokalemia 3.3:  replaced, 4.0    # HTN: cont home meds    # DVT PPX: heparin s/q    #Diarrhea no Cdiff  isolation d/suad

## 2022-06-01 LAB
CRP SERPL-MCNC: 183 MG/L — HIGH
CULTURE RESULTS: SIGNIFICANT CHANGE UP
CULTURE RESULTS: SIGNIFICANT CHANGE UP
ERYTHROCYTE [SEDIMENTATION RATE] IN BLOOD: 94 MM/HR — HIGH (ref 0–20)
LEGIONELLA AG UR QL: NEGATIVE — SIGNIFICANT CHANGE UP
RHEUMATOID FACT SERPL-ACNC: 14 IU/ML — HIGH (ref 0–13)
SPECIMEN SOURCE: SIGNIFICANT CHANGE UP
SPECIMEN SOURCE: SIGNIFICANT CHANGE UP

## 2022-06-01 PROCEDURE — 99232 SBSQ HOSP IP/OBS MODERATE 35: CPT

## 2022-06-01 PROCEDURE — 99233 SBSQ HOSP IP/OBS HIGH 50: CPT

## 2022-06-01 PROCEDURE — 73560 X-RAY EXAM OF KNEE 1 OR 2: CPT | Mod: 26,LT

## 2022-06-01 RX ADMIN — BUDESONIDE AND FORMOTEROL FUMARATE DIHYDRATE 2 PUFF(S): 160; 4.5 AEROSOL RESPIRATORY (INHALATION) at 21:17

## 2022-06-01 RX ADMIN — Medication 1 TABLET(S): at 22:20

## 2022-06-01 RX ADMIN — BUDESONIDE AND FORMOTEROL FUMARATE DIHYDRATE 2 PUFF(S): 160; 4.5 AEROSOL RESPIRATORY (INHALATION) at 09:21

## 2022-06-01 RX ADMIN — PANTOPRAZOLE SODIUM 40 MILLIGRAM(S): 20 TABLET, DELAYED RELEASE ORAL at 06:30

## 2022-06-01 RX ADMIN — Medication 650 MILLIGRAM(S): at 20:24

## 2022-06-01 RX ADMIN — Medication 1200 MILLIGRAM(S): at 22:20

## 2022-06-01 RX ADMIN — Medication 1 TABLET(S): at 11:08

## 2022-06-01 RX ADMIN — Medication 1200 MILLIGRAM(S): at 11:08

## 2022-06-01 RX ADMIN — SIMVASTATIN 20 MILLIGRAM(S): 20 TABLET, FILM COATED ORAL at 22:20

## 2022-06-01 RX ADMIN — Medication 650 MILLIGRAM(S): at 21:14

## 2022-06-01 RX ADMIN — HEPARIN SODIUM 5000 UNIT(S): 5000 INJECTION INTRAVENOUS; SUBCUTANEOUS at 06:30

## 2022-06-01 RX ADMIN — Medication 100 MILLIGRAM(S): at 11:09

## 2022-06-01 RX ADMIN — CEFEPIME 1000 MILLIGRAM(S): 1 INJECTION, POWDER, FOR SOLUTION INTRAMUSCULAR; INTRAVENOUS at 11:10

## 2022-06-01 RX ADMIN — HEPARIN SODIUM 5000 UNIT(S): 5000 INJECTION INTRAVENOUS; SUBCUTANEOUS at 13:15

## 2022-06-01 RX ADMIN — ALBUTEROL 2 PUFF(S): 90 AEROSOL, METERED ORAL at 04:25

## 2022-06-01 RX ADMIN — Medication 3 MILLIGRAM(S): at 22:20

## 2022-06-01 RX ADMIN — Medication 650 MILLIGRAM(S): at 00:28

## 2022-06-01 RX ADMIN — HEPARIN SODIUM 5000 UNIT(S): 5000 INJECTION INTRAVENOUS; SUBCUTANEOUS at 22:19

## 2022-06-01 RX ADMIN — Medication 100 MILLIGRAM(S): at 22:20

## 2022-06-01 RX ADMIN — ALBUTEROL 2 PUFF(S): 90 AEROSOL, METERED ORAL at 18:34

## 2022-06-01 RX ADMIN — Medication 8 MILLIGRAM(S): at 22:20

## 2022-06-01 RX ADMIN — CEFEPIME 1000 MILLIGRAM(S): 1 INJECTION, POWDER, FOR SOLUTION INTRAMUSCULAR; INTRAVENOUS at 22:19

## 2022-06-01 RX ADMIN — Medication 650 MILLIGRAM(S): at 11:09

## 2022-06-01 RX ADMIN — LOSARTAN POTASSIUM 100 MILLIGRAM(S): 100 TABLET, FILM COATED ORAL at 11:08

## 2022-06-01 NOTE — PROGRESS NOTE ADULT - ASSESSMENT
1. Acute hypoxic resp failure- slow recovery   Atypical PNA  COVID negative   failed oral Abx  supplemental O2 as needed   CTA chest; NO PE, atypical peripheral bilateral infiltrates   iv rocephin and iv zithro changed to cefepime + doxy on 5/31  f/u blood cx: ngtd  albuterol inh prn   Mucinex   pulmonary consult appreciated- sent for legionella/ ILD workup/ CMV and EBV serologies   ID consult appreciated    # Hypokalemia 3.3:  replaced, 4.0    # HTN: cont home meds    # DVT PPX: heparin s/q    #Diarrhea no Cdiff- improved   -isolation d/suad      Case d/w team on IDR.

## 2022-06-01 NOTE — PROGRESS NOTE ADULT - SUBJECTIVE AND OBJECTIVE BOX
Subjective:  still dyspneic with minimal exertion  desats    MEDICATIONS  (STANDING):  budesonide 160 MICROgram(s)/formoterol 4.5 MICROgram(s) Inhaler 2 Puff(s) Inhalation two times a day  cefepime  Injectable. 1000 milliGRAM(s) IV Push every 12 hours  doxazosin 8 milliGRAM(s) Oral at bedtime  doxycycline hyclate Capsule 100 milliGRAM(s) Oral every 12 hours  guaiFENesin ER 1200 milliGRAM(s) Oral every 12 hours  heparin   Injectable 5000 Unit(s) SubCutaneous every 8 hours  lactobacillus acidophilus 1 Tablet(s) Oral two times a day  losartan 100 milliGRAM(s) Oral daily  melatonin 3 milliGRAM(s) Oral at bedtime  pantoprazole    Tablet 40 milliGRAM(s) Oral before breakfast  simvastatin 20 milliGRAM(s) Oral at bedtime    MEDICATIONS  (PRN):  acetaminophen     Tablet .. 650 milliGRAM(s) Oral every 6 hours PRN Temp greater or equal to 38C (100.4F), Mild Pain (1 - 3)  ALBUTerol    90 MICROgram(s) HFA Inhaler 2 Puff(s) Inhalation every 6 hours PRN Shortness of Breath and/or Wheezing  aluminum hydroxide/magnesium hydroxide/simethicone Suspension 30 milliLiter(s) Oral every 6 hours PRN Dyspepsia  benzocaine 15 mG/menthol 3.6 mG Lozenge 1 Lozenge Oral three times a day PRN Sore Throat      Allergies    codeine (Unknown)    Intolerances        REVIEW OF SYSTEMS:    CONSTITUTIONAL:  As per HPI.  HEENT:  Eyes:  No diplopia or blurred vision. ENT:  No earache, sore throat or runny nose.  CARDIOVASCULAR:  No pressure, squeezing, tightness, heaviness or aching about the chest, neck, axilla or epigastrium.  RESPIRATORY: sob  GASTROINTESTINAL:  No nausea, vomiting or diarrhea.  GENITOURINARY:  No dysuria, frequency or urgency.  MUSCULOSKELETAL:  no joint pain, deformity, tenderness  EXTREMITIES: no clubbing cyanosis,edema  SKIN:  No change in skin, hair or nails.  NEUROLOGIC:  No paresthesias, fasciculations, seizures or weakness.  PSYCHIATRIC:  No disorder of thought or mood.  ENDOCRINE:  No heat or cold intolerance, polyuria or polydipsia.  HEMATOLOGICAL:  No easy bruising or bleedings:    Vital Signs Last 24 Hrs  T(C): 37.2 (01 Jun 2022 08:55), Max: 38.1 (31 May 2022 22:10)  T(F): 99 (01 Jun 2022 08:55), Max: 100.6 (31 May 2022 22:10)  HR: 79 (01 Jun 2022 08:55) (79 - 103)  BP: 139/80 (01 Jun 2022 08:55) (126/70 - 139/80)  BP(mean): 86 (31 May 2022 22:10) (86 - 86)  RR: 18 (01 Jun 2022 08:55) (18 - 19)  SpO2: 94% (01 Jun 2022 08:55) (90% - 94%)    PHYSICAL EXAMINATION:  SKIN: no rashes  HEAD: NC/AT  EYES: PERRLA, EOMI  EARS: TM's intact  NOSE: no abnormalities  NECK:  Supple. No lymphadenopathy. Jugular venous pressure not elevated. Carotids equal.   HEART:   S1S2 reg  CHEST:  bilat inspiratory crackles 1/2  ABDOMEN:  Soft and nontender.   EXTREMITIES:  no C/C/E  NEURO: AAO x 3, no focal deficts       LABS:                RADIOLOGY & ADDITIONAL TESTS:

## 2022-06-01 NOTE — PROGRESS NOTE ADULT - SUBJECTIVE AND OBJECTIVE BOX
Date of service: 06-01-22 @ 14:46      Patient lying in bed; is fatigued, has shortness of breath with exertion, on oxygen via supplemental oxygen      ROS: no  chills; denies dizziness, no HA,  no abdominal pain, no diarrhea or constipation; no dysuria, no urinary frequency, no legs pain, no rashes    MEDICATIONS  (STANDING):  budesonide 160 MICROgram(s)/formoterol 4.5 MICROgram(s) Inhaler 2 Puff(s) Inhalation two times a day  cefepime  Injectable. 1000 milliGRAM(s) IV Push every 12 hours  doxazosin 8 milliGRAM(s) Oral at bedtime  doxycycline hyclate Capsule 100 milliGRAM(s) Oral every 12 hours  guaiFENesin ER 1200 milliGRAM(s) Oral every 12 hours  heparin   Injectable 5000 Unit(s) SubCutaneous every 8 hours  lactobacillus acidophilus 1 Tablet(s) Oral two times a day  losartan 100 milliGRAM(s) Oral daily  melatonin 3 milliGRAM(s) Oral at bedtime  pantoprazole    Tablet 40 milliGRAM(s) Oral before breakfast  simvastatin 20 milliGRAM(s) Oral at bedtime    MEDICATIONS  (PRN):  acetaminophen     Tablet .. 650 milliGRAM(s) Oral every 6 hours PRN Temp greater or equal to 38C (100.4F), Mild Pain (1 - 3)  ALBUTerol    90 MICROgram(s) HFA Inhaler 2 Puff(s) Inhalation every 6 hours PRN Shortness of Breath and/or Wheezing  aluminum hydroxide/magnesium hydroxide/simethicone Suspension 30 milliLiter(s) Oral every 6 hours PRN Dyspepsia  benzocaine 15 mG/menthol 3.6 mG Lozenge 1 Lozenge Oral three times a day PRN Sore Throat      Vital Signs Last 24 Hrs  T(C): 37.2 (01 Jun 2022 08:55), Max: 38.1 (31 May 2022 22:10)  T(F): 99 (01 Jun 2022 08:55), Max: 100.6 (31 May 2022 22:10)  HR: 79 (01 Jun 2022 08:55) (79 - 103)  BP: 139/80 (01 Jun 2022 08:55) (126/70 - 139/80)  BP(mean): 86 (31 May 2022 22:10) (86 - 86)  RR: 18 (01 Jun 2022 08:55) (18 - 19)  SpO2: 95% (01 Jun 2022 09:20) (90% - 95%)        Physical Exam:            Constitutional: frail looking  HEENT: NC/AT, EOMI, PERRLA, conjunctivae clear; ears and nose atraumatic; pharynx clear  Neck: supple; thyroid not palpable  Back: no tenderness  Respiratory: respiratory effort normal; clear to auscultation  Cardiovascular: S1S2 regular, no murmurs  Abdomen: soft, not tender, not distended, positive BS; no liver or spleen organomegaly  Genitourinary: no suprapubic tenderness  Musculoskeletal: no muscle tenderness, no joint swelling or tenderness  Neurological/ Psychiatric: AxOx3, judgement and insight normal;  moving all extremities  Skin: no rashes; no palpable lesions    Labs: all available labs reviewed        Labs:                 Cultures:       GI PCR Panel, Stool (collected 05-29-22 @ 08:20)  Source: .Stool Feces  Final Report (05-29-22 @ 18:46):    GI PCR Results: NOT detected    *******Please Note:*******    GI panel PCR evaluates for:    Campylobacter, Plesiomonas shigelloides, Salmonella,    Vibrio, Yersinia enterocolitica, Enteroaggregative    Escherichia coli (EAEC), Enteropathogenic E.coli (EPEC),    Enterotoxigenic E. coli (ETEC) lt/st, Shiga-like    toxin-producing E. coli (STEC) stx1/stx2,    Shigella/ Enteroinvasive E. coli (EIEC), Cryptosporidium,    Cyclospora cayetanensis, Entamoeba histolytica,    Giardia lamblia, Adenovirus F 40/41, Astrovirus,    Norovirus GI/GII, Rotavirus A, Sapovirus    Culture - Urine (collected 05-27-22 @ 12:24)  Source: Clean Catch Clean Catch (Midstream)  Final Report (05-28-22 @ 20:31):    <10,000 CFU/mL Normal Urogenital Aliyah    Culture - Blood (collected 05-27-22 @ 12:24)  Source: .Blood Blood-Peripheral  Preliminary Report (05-28-22 @ 15:06):    No growth to date.    Culture - Blood (collected 05-27-22 @ 12:24)  Source: .Blood Blood-Peripheral  Preliminary Report (05-28-22 @ 15:06):    No growth to date.          < from: CT Angio Chest PE Protocol w/ IV Cont (05.27.22 @ 16:55) >    ACC: [id number] EXAM:  CT ANGIO CHEST PULM KENYETTA SAWANT                          PROCEDURE DATE:  05/27/2022          INTERPRETATION:  INDICATION: Shortness of breath    TECHNIQUE: Volumetric images of the chest were obtained after the   administration of 90 mL of Omnipaque 350. Maximum intensity projection   images were generated.    COMPARISON: None.    FINDINGS:    PULMONARY ANGIOGRAM: No pulmonary embolism from the main pulmonary artery   up to and including the segmental branches. Limited assessment of   subsegmental branches in the lower lobes as they are not well opacified.   Normal caliber pulmonary artery.    LUNGS/AIRWAYS/PLEURA: Patent trachea and bronchi. Peripheral groundglass   and consolidative opacities in all lobes, preferentialto the lower   lobes. Unremarkable pleura.    LYMPH NODES/MEDIASTINUM: No enlarged lymph nodes.    HEART/VASCULATURE: Normal heart size. Small pericardial effusion. Normal   caliber aorta. Coronary artery calcifications.    UPPER ABDOMEN: Partially included incompletely characterized 2.0 cm left   adrenal nodule.    BONES/SOFT TISSUES: Degenerative changes of the spine.      IMPRESSION:    No pulmonary embolism from the main pulmonary artery up to and including   the segmental branches. Limited assessment of subsegmental branches.    Bilateral peripheral lung disease. Differential includes infection (COVID   in particular), organizing pneumonia, or eosinophilic pneumonia.    Incidental incompletely characterized left adrenal nodule. Further   evaluation with MRI is recommended, which can be done on a nonemergent   basis.    < end of copied text >            Radiology: all available radiological tests reviewed    Advanced directives addressed: full resuscitation

## 2022-06-01 NOTE — PROGRESS NOTE ADULT - ASSESSMENT
56/M with PMHx of HTN, admitted on 5/27 for evaluation of shortness of breath about one week after receiving second COVID-19 booster, which was given over 3 weeks ago; he tested multiple times on COVID-19 tests, has mild cough and was initially hypoxic on room air, which is resolved; has been started on zithromax and ceftriaxone since admission, with mild improvement. 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 Overall he does not fully feel himself. Had low grade fever overnight.    1. Patient admitted with pneumonia, which is not really improving with typical coverage for community acquired pneumonia  - does not work around cooling towers or swimming pools, however will send urine for Legionella  - all other cultures are no growth  - serial cbc and monitor temperature   - reviewed prior medical records to evaluate for resistant or atypical pathogens   - COVID-19 test negative  - day #2 doxycycline and cefepime  - tolerating antibiotics without rashes or side effects   - iv hydration and supportive care   - oxygen and nebs as needed   - will check for viral serology for leptospirosis with sick dog  - also check for cmv and ebv serologies  - if eventually no improvement may need pulmonary workup including bronchoscopy  2. other issues: per medicine

## 2022-06-01 NOTE — PROGRESS NOTE ADULT - SUBJECTIVE AND OBJECTIVE BOX
History of Present Illness:   56/M with PMHx of HTN, brought to the ED for c/o worsening SOB and cough along with a recent Dx of PNA. He also states that he has been feeling very weak and dizzy. He recently got 4th dose of COVID vaccine 3 weeks ago and Sx started 1 week thereafter.   Also HAs cough lizzie when inhales deeply.  Hypoxic on ra.  States he has not been eating much recently and has a 15 lb weight loss. Also states he has been very fatigued and sleeping more.    6/1- Tmax 100.6- patient was seen and examined. Patient states "I am not feeling well and I seem to be getting worse." He reported that last night he has SOB walking to the BR and it took him a while to recover- he states that he is also SOB when he just attempts to get out of bed and use the commode. He feels that he is suffocating because he cannot control the temp or air in his room. He is also complaining of sore throat from coughing, +left knee painon ROM- he is asking for cortisone shot for left knee.     Vital Signs Last 24 Hrs  T(C): 37.2 (01 Jun 2022 08:55), Max: 38.1 (31 May 2022 22:10)  T(F): 99 (01 Jun 2022 08:55), Max: 100.6 (31 May 2022 22:10)  HR: 79 (01 Jun 2022 08:55) (79 - 103)  BP: 139/80 (01 Jun 2022 08:55) (126/70 - 139/80)  BP(mean): 86 (31 May 2022 22:10) (86 - 86)  RR: 18 (01 Jun 2022 08:55) (18 - 19)  SpO2: 95% (01 Jun 2022 09:20) (90% - 95%)    PE:  Constitutional: NAD, laying in bed  HEENT: NC/AT  Back: no tenderness  Respiratory: respirations even and non labored, LCTA  Cardiovascular: S1S2 regular, no murmurs  Abdomen: soft, not tender, not distended, positive BS  Genitourinary: voiding  Musculoskeletal: no muscle tenderness, no joint swelling or tenderness  Extremities: no pedal edema - left knee with limited movement 10/10 pain with ROM   Neurological: no focal deficits      Labs- reviewed    MEDICATIONS  (STANDING):  budesonide 160 MICROgram(s)/formoterol 4.5 MICROgram(s) Inhaler 2 Puff(s) Inhalation two times a day  cefepime  Injectable. 1000 milliGRAM(s) IV Push every 12 hours  doxazosin 8 milliGRAM(s) Oral at bedtime  doxycycline hyclate Capsule 100 milliGRAM(s) Oral every 12 hours  guaiFENesin ER 1200 milliGRAM(s) Oral every 12 hours  heparin   Injectable 5000 Unit(s) SubCutaneous every 8 hours  lactobacillus acidophilus 1 Tablet(s) Oral two times a day  losartan 100 milliGRAM(s) Oral daily  melatonin 3 milliGRAM(s) Oral at bedtime  pantoprazole    Tablet 40 milliGRAM(s) Oral before breakfast  simvastatin 20 milliGRAM(s) Oral at bedtime    MEDICATIONS  (PRN):  acetaminophen     Tablet .. 650 milliGRAM(s) Oral every 6 hours PRN Temp greater or equal to 38C (100.4F), Mild Pain (1 - 3)  ALBUTerol    90 MICROgram(s) HFA Inhaler 2 Puff(s) Inhalation every 6 hours PRN Shortness of Breath and/or Wheezing  aluminum hydroxide/magnesium hydroxide/simethicone Suspension 30 milliLiter(s) Oral every 6 hours PRN Dyspepsia  benzocaine 15 mG/menthol 3.6 mG Lozenge 1 Lozenge Oral three times a day PRN Sore Throat          CT Angio Chest PE Protocol w/ IV Cont (05.27.22 @ 16:55) >  No pulmonary embolism from the main pulmonary artery up to and including   the segmental branches. Limited assessment of subsegmental branches.    Bilateral peripheral lung disease. Differential includes infection (COVID   in particular), organizing pneumonia, or eosinophilic pneumonia.    Incidental incompletely characterized left adrenal nodule. Further   evaluation with MRI is recommended, which can be done on a nonemergent   basis.

## 2022-06-02 LAB
ANION GAP SERPL CALC-SCNC: 11 MMOL/L — SIGNIFICANT CHANGE UP (ref 5–17)
BUN SERPL-MCNC: 12 MG/DL — SIGNIFICANT CHANGE UP (ref 7–23)
CALCIUM SERPL-MCNC: 8.9 MG/DL — SIGNIFICANT CHANGE UP (ref 8.5–10.1)
CHLORIDE SERPL-SCNC: 102 MMOL/L — SIGNIFICANT CHANGE UP (ref 96–108)
CO2 SERPL-SCNC: 22 MMOL/L — SIGNIFICANT CHANGE UP (ref 22–31)
CREAT SERPL-MCNC: 0.78 MG/DL — SIGNIFICANT CHANGE UP (ref 0.5–1.3)
EGFR: 105 ML/MIN/1.73M2 — SIGNIFICANT CHANGE UP
GLUCOSE SERPL-MCNC: 159 MG/DL — HIGH (ref 70–99)
HCT VFR BLD CALC: 35.2 % — LOW (ref 39–50)
HGB BLD-MCNC: 11.9 G/DL — LOW (ref 13–17)
MCHC RBC-ENTMCNC: 29.2 PG — SIGNIFICANT CHANGE UP (ref 27–34)
MCHC RBC-ENTMCNC: 33.8 GM/DL — SIGNIFICANT CHANGE UP (ref 32–36)
MCV RBC AUTO: 86.3 FL — SIGNIFICANT CHANGE UP (ref 80–100)
PLATELET # BLD AUTO: 337 K/UL — SIGNIFICANT CHANGE UP (ref 150–400)
POTASSIUM SERPL-MCNC: 3.4 MMOL/L — LOW (ref 3.5–5.3)
POTASSIUM SERPL-SCNC: 3.4 MMOL/L — LOW (ref 3.5–5.3)
RBC # BLD: 4.08 M/UL — LOW (ref 4.2–5.8)
RBC # FLD: 14.1 % — SIGNIFICANT CHANGE UP (ref 10.3–14.5)
SARS-COV-2 RNA SPEC QL NAA+PROBE: SIGNIFICANT CHANGE UP
SODIUM SERPL-SCNC: 135 MMOL/L — SIGNIFICANT CHANGE UP (ref 135–145)
WBC # BLD: 10.08 K/UL — SIGNIFICANT CHANGE UP (ref 3.8–10.5)
WBC # FLD AUTO: 10.08 K/UL — SIGNIFICANT CHANGE UP (ref 3.8–10.5)

## 2022-06-02 PROCEDURE — 99223 1ST HOSP IP/OBS HIGH 75: CPT

## 2022-06-02 PROCEDURE — 99232 SBSQ HOSP IP/OBS MODERATE 35: CPT

## 2022-06-02 PROCEDURE — 71250 CT THORAX DX C-: CPT | Mod: 26

## 2022-06-02 PROCEDURE — 99233 SBSQ HOSP IP/OBS HIGH 50: CPT

## 2022-06-02 RX ORDER — POTASSIUM CHLORIDE 20 MEQ
40 PACKET (EA) ORAL ONCE
Refills: 0 | Status: COMPLETED | OUTPATIENT
Start: 2022-06-02 | End: 2022-06-02

## 2022-06-02 RX ORDER — ALPRAZOLAM 0.25 MG
0.25 TABLET ORAL EVERY 12 HOURS
Refills: 0 | Status: DISCONTINUED | OUTPATIENT
Start: 2022-06-02 | End: 2022-06-08

## 2022-06-02 RX ORDER — SIMETHICONE 80 MG/1
80 TABLET, CHEWABLE ORAL EVERY 6 HOURS
Refills: 0 | Status: DISCONTINUED | OUTPATIENT
Start: 2022-06-02 | End: 2022-06-21

## 2022-06-02 RX ADMIN — Medication 30 MILLILITER(S): at 01:03

## 2022-06-02 RX ADMIN — ALBUTEROL 2 PUFF(S): 90 AEROSOL, METERED ORAL at 14:40

## 2022-06-02 RX ADMIN — HEPARIN SODIUM 5000 UNIT(S): 5000 INJECTION INTRAVENOUS; SUBCUTANEOUS at 22:35

## 2022-06-02 RX ADMIN — CEFEPIME 1000 MILLIGRAM(S): 1 INJECTION, POWDER, FOR SOLUTION INTRAMUSCULAR; INTRAVENOUS at 10:19

## 2022-06-02 RX ADMIN — HEPARIN SODIUM 5000 UNIT(S): 5000 INJECTION INTRAVENOUS; SUBCUTANEOUS at 14:43

## 2022-06-02 RX ADMIN — Medication 40 MILLIEQUIVALENT(S): at 11:16

## 2022-06-02 RX ADMIN — Medication 1 TABLET(S): at 10:17

## 2022-06-02 RX ADMIN — PANTOPRAZOLE SODIUM 40 MILLIGRAM(S): 20 TABLET, DELAYED RELEASE ORAL at 06:09

## 2022-06-02 RX ADMIN — SIMVASTATIN 20 MILLIGRAM(S): 20 TABLET, FILM COATED ORAL at 22:36

## 2022-06-02 RX ADMIN — Medication 40 MILLIGRAM(S): at 13:42

## 2022-06-02 RX ADMIN — Medication 3 MILLIGRAM(S): at 22:36

## 2022-06-02 RX ADMIN — Medication 8 MILLIGRAM(S): at 22:36

## 2022-06-02 RX ADMIN — ALBUTEROL 2 PUFF(S): 90 AEROSOL, METERED ORAL at 20:24

## 2022-06-02 RX ADMIN — Medication 650 MILLIGRAM(S): at 12:10

## 2022-06-02 RX ADMIN — Medication 30 MILLILITER(S): at 11:20

## 2022-06-02 RX ADMIN — SIMETHICONE 80 MILLIGRAM(S): 80 TABLET, CHEWABLE ORAL at 22:54

## 2022-06-02 RX ADMIN — Medication 650 MILLIGRAM(S): at 11:16

## 2022-06-02 RX ADMIN — SIMETHICONE 80 MILLIGRAM(S): 80 TABLET, CHEWABLE ORAL at 11:17

## 2022-06-02 RX ADMIN — LOSARTAN POTASSIUM 100 MILLIGRAM(S): 100 TABLET, FILM COATED ORAL at 11:17

## 2022-06-02 RX ADMIN — ALBUTEROL 2 PUFF(S): 90 AEROSOL, METERED ORAL at 06:13

## 2022-06-02 RX ADMIN — Medication 1200 MILLIGRAM(S): at 10:18

## 2022-06-02 RX ADMIN — Medication 1 TABLET(S): at 22:37

## 2022-06-02 RX ADMIN — BUDESONIDE AND FORMOTEROL FUMARATE DIHYDRATE 2 PUFF(S): 160; 4.5 AEROSOL RESPIRATORY (INHALATION) at 21:50

## 2022-06-02 RX ADMIN — HEPARIN SODIUM 5000 UNIT(S): 5000 INJECTION INTRAVENOUS; SUBCUTANEOUS at 06:09

## 2022-06-02 RX ADMIN — Medication 100 MILLIGRAM(S): at 10:18

## 2022-06-02 RX ADMIN — Medication 1200 MILLIGRAM(S): at 22:36

## 2022-06-02 RX ADMIN — BUDESONIDE AND FORMOTEROL FUMARATE DIHYDRATE 2 PUFF(S): 160; 4.5 AEROSOL RESPIRATORY (INHALATION) at 09:43

## 2022-06-02 RX ADMIN — Medication 40 MILLIGRAM(S): at 22:35

## 2022-06-02 NOTE — PROGRESS NOTE ADULT - ASSESSMENT
1. Acute hypoxic resp failure- very slow recovery   - Atypical PNA r/o ILD   - COVID negative   - failed oral Abx  - supplemental O2 as needed   - CTA chest; NO PE, atypical peripheral bilateral infiltrates   - iv rocephin and iv zithro changed to cefepime + doxy on 5/31  - f/u blood cx: ngtd  - albuterol inh prn   - Mucinex   - pulmonary consult appreciated- sent for legionella/ ILD workup/ CMV and EBV serologies   - ?ILD- elevated CRP/ESR- very slightly elevated Rheumatoid factor- rheumatology consult  - repeat CT chest  - Check COVID PCR- repeat  - possible lung biopsy vs bronchoscopy   - ID consult appreciated    # Hypokalemia 3.3:  replete and monitor     # HTN: cont home meds    # DVT PPX: heparin s/q    *left knee pain 10/10  - patient requesting steroid shot on the left knee  - Ortho consult  - Left knee Xray results noted    #Diarrhea no Cdiff- improved   -isolation d/suad      Case d/w team on IDR.    1. Sepsis POA due to atypical PNA-  Acute hypoxic resp failure- very slow recovery   - Atypical PNA r/o ILD   - COVID negative   - failed oral Abx  - supplemental O2 as needed   - CTA chest; NO PE, atypical peripheral bilateral infiltrates   - iv rocephin and iv zithro changed to cefepime + doxy on 5/31  - f/u blood cx: ngtd  - albuterol inh prn   - Mucinex   - pulmonary consult appreciated- sent for legionella/ ILD workup/ CMV and EBV serologies   - ?ILD- elevated CRP/ESR- very slightly elevated Rheumatoid factor- rheumatology consult  - repeat CT chest  - Check COVID PCR- repeat  - possible lung biopsy vs bronchoscopy   - ID consult appreciated    # Hypokalemia 3.3:  replete and monitor     # HTN: cont home meds    # DVT PPX: heparin s/q    *left knee pain 10/10  - patient requesting steroid shot on the left knee  - Ortho consult  - Left knee Xray results noted    #Diarrhea no Cdiff- improved   -isolation d/suad      Case d/w team on IDR.

## 2022-06-02 NOTE — PROGRESS NOTE ADULT - SUBJECTIVE AND OBJECTIVE BOX
Subjective:  feeling better    MEDICATIONS  (STANDING):  budesonide 160 MICROgram(s)/formoterol 4.5 MICROgram(s) Inhaler 2 Puff(s) Inhalation two times a day  cefepime  Injectable. 1000 milliGRAM(s) IV Push every 12 hours  doxazosin 8 milliGRAM(s) Oral at bedtime  doxycycline hyclate Capsule 100 milliGRAM(s) Oral every 12 hours  guaiFENesin ER 1200 milliGRAM(s) Oral every 12 hours  heparin   Injectable 5000 Unit(s) SubCutaneous every 8 hours  lactobacillus acidophilus 1 Tablet(s) Oral two times a day  losartan 100 milliGRAM(s) Oral daily  melatonin 3 milliGRAM(s) Oral at bedtime  pantoprazole    Tablet 40 milliGRAM(s) Oral before breakfast  simvastatin 20 milliGRAM(s) Oral at bedtime    MEDICATIONS  (PRN):  acetaminophen     Tablet .. 650 milliGRAM(s) Oral every 6 hours PRN Temp greater or equal to 38C (100.4F), Mild Pain (1 - 3)  ALBUTerol    90 MICROgram(s) HFA Inhaler 2 Puff(s) Inhalation every 6 hours PRN Shortness of Breath and/or Wheezing  aluminum hydroxide/magnesium hydroxide/simethicone Suspension 30 milliLiter(s) Oral every 6 hours PRN Dyspepsia  benzocaine 15 mG/menthol 3.6 mG Lozenge 1 Lozenge Oral three times a day PRN Sore Throat      Allergies    codeine (Unknown)    Intolerances        REVIEW OF SYSTEMS:    CONSTITUTIONAL:  As per HPI.  HEENT:  Eyes:  No diplopia or blurred vision. ENT:  No earache, sore throat or runny nose.  CARDIOVASCULAR:  No pressure, squeezing, tightness, heaviness or aching about the chest, neck, axilla or epigastrium.  RESPIRATORY:  No cough, shortness of breath, PND or orthopnea.  GASTROINTESTINAL:  No nausea, vomiting or diarrhea.  GENITOURINARY:  No dysuria, frequency or urgency.  MUSCULOSKELETAL:  no joint pain, deformity, tenderness  EXTREMITIES: no clubbing cyanosis,edema  SKIN:  No change in skin, hair or nails.  NEUROLOGIC:  No paresthesias, fasciculations, seizures or weakness.  PSYCHIATRIC:  No disorder of thought or mood.  ENDOCRINE:  No heat or cold intolerance, polyuria or polydipsia.  HEMATOLOGICAL:  No easy bruising or bleedings:    Vital Signs Last 24 Hrs  T(C): 37.4 (02 Jun 2022 07:49), Max: 37.8 (01 Jun 2022 20:20)  T(F): 99.3 (02 Jun 2022 07:49), Max: 100.1 (01 Jun 2022 20:20)  HR: 78 (02 Jun 2022 09:47) (78 - 105)  BP: 121/71 (02 Jun 2022 07:49) (118/70 - 141/66)  BP(mean): 84 (01 Jun 2022 20:20) (84 - 84)  RR: 19 (02 Jun 2022 07:49) (18 - 19)  SpO2: 93% (02 Jun 2022 07:49) (93% - 95%)    PHYSICAL EXAMINATION:  SKIN: no rashes  HEAD: NC/AT  EYES: PERRLA, EOMI  EARS: TM's intact  NOSE: no abnormalities  NECK:  Supple. No lymphadenopathy. Jugular venous pressure not elevated. Carotids equal.   HEART:   S1S2 reg  CHEST:  bibasilar crackles L>R  ABDOMEN:  Soft and nontender.   EXTREMITIES:  no C/C/E  NEURO: AAO x 3, no focal deficts       LABS:                        11.9   10.08 )-----------( 337      ( 02 Jun 2022 08:35 )             35.2     06-02    135  |  102  |  12  ----------------------------<  159<H>  3.4<L>   |  22  |  0.78    Ca    8.9      02 Jun 2022 08:35            RADIOLOGY & ADDITIONAL TESTS:

## 2022-06-02 NOTE — CHART NOTE - NSCHARTNOTEFT_GEN_A_CORE
Pt seen, chart reviewed.  bilat GGO, thoracic consult for lung biopsy.    Will discuss with Dr. Fernández - full consult to follow

## 2022-06-02 NOTE — PROGRESS NOTE ADULT - SUBJECTIVE AND OBJECTIVE BOX
History of Present Illness:   56/M with PMHx of HTN, brought to the ED for c/o worsening SOB and cough along with a recent Dx of PNA. He also states that he has been feeling very weak and dizzy. He recently got 4th dose of COVID vaccine 3 weeks ago and Sx started 1 week thereafter.   Also HAs cough lizzie when inhales deeply.  Hypoxic on ra.  States he has not been eating much recently and has a 15 lb weight loss. Also states he has been very fatigued and sleeping more.    6/1- Tmax 100.6- patient was seen and examined. Patient states "I am not feeling well and I seem to be getting worse." He reported that last night he has SOB walking to the BR and it took him a while to recover- he states that he is also SOB when he just attempts to get out of bed and use the commode. He feels that he is suffocating because he cannot control the temp or air in his room. He is also complaining of sore throat from coughing, +left knee painon ROM- he is asking for cortisone shot for left knee.   6/2    Vital Signs Last 24 Hrs  T(C): 37.4 (02 Jun 2022 07:49), Max: 37.8 (01 Jun 2022 20:20)  T(F): 99.3 (02 Jun 2022 07:49), Max: 100.1 (01 Jun 2022 20:20)  HR: 78 (02 Jun 2022 09:47) (78 - 105)  BP: 121/71 (02 Jun 2022 07:49) (118/70 - 141/66)  BP(mean): 84 (01 Jun 2022 20:20) (84 - 84)  RR: 19 (02 Jun 2022 07:49) (18 - 19)  SpO2: 93% (02 Jun 2022 07:49) (93% - 95%)    PE:  Constitutional: NAD, laying in bed  HEENT: NC/AT  Back: no tenderness  Respiratory: respirations even and non labored, LCTA  Cardiovascular: S1S2 regular, no murmurs  Abdomen: soft, not tender, not distended, positive BS  Genitourinary: voiding  Musculoskeletal: no muscle tenderness, no joint swelling or tenderness  Extremities: no pedal edema - left knee with limited movement 10/10 pain with ROM   Neurological: no focal deficits      Labs                11.9   10.08 )-----------( 337      ( 02 Jun 2022 08:35 )             35.2       06-02    135  |  102  |  12  ----------------------------<  159<H>  3.4<L>   |  22  |  0.78    Ca    8.9      02 Jun 2022 08:35    Rheumatoid Factor Quant, Serum or Plasma: 14 IU/mL (06.01.22 @ 13:01)     C-Reactive Protein, Serum: 183 mg/L (06.01.22 @ 13:01)  Sedimentation Rate, Erythrocyte: 94 mm/hr (06.01.22 @ 13:01)   Legionella Urine- negative     MEDICATIONS  (STANDING):  budesonide 160 MICROgram(s)/formoterol 4.5 MICROgram(s) Inhaler 2 Puff(s) Inhalation two times a day  cefepime  Injectable. 1000 milliGRAM(s) IV Push every 12 hours  doxazosin 8 milliGRAM(s) Oral at bedtime  doxycycline hyclate Capsule 100 milliGRAM(s) Oral every 12 hours  guaiFENesin ER 1200 milliGRAM(s) Oral every 12 hours  heparin   Injectable 5000 Unit(s) SubCutaneous every 8 hours  lactobacillus acidophilus 1 Tablet(s) Oral two times a day  losartan 100 milliGRAM(s) Oral daily  melatonin 3 milliGRAM(s) Oral at bedtime  pantoprazole    Tablet 40 milliGRAM(s) Oral before breakfast  simvastatin 20 milliGRAM(s) Oral at bedtime    MEDICATIONS  (PRN):  acetaminophen     Tablet .. 650 milliGRAM(s) Oral every 6 hours PRN Temp greater or equal to 38C (100.4F), Mild Pain (1 - 3)  ALBUTerol    90 MICROgram(s) HFA Inhaler 2 Puff(s) Inhalation every 6 hours PRN Shortness of Breath and/or Wheezing  aluminum hydroxide/magnesium hydroxide/simethicone Suspension 30 milliLiter(s) Oral every 6 hours PRN Dyspepsia  benzocaine 15 mG/menthol 3.6 mG Lozenge 1 Lozenge Oral three times a day PRN Sore Throat          CT Angio Chest PE Protocol w/ IV Cont (05.27.22 @ 16:55) >  No pulmonary embolism from the main pulmonary artery up to and including   the segmental branches. Limited assessment of subsegmental branches.    Bilateral peripheral lung disease. Differential includes infection (COVID   in particular), organizing pneumonia, or eosinophilic pneumonia.    Incidental incompletely characterized left adrenal nodule. Further   evaluation with MRI is recommended, which can be done on a nonemergent   basis.                 History of Present Illness:   56/M with PMHx of HTN, brought to the ED for c/o worsening SOB and cough along with a recent Dx of PNA. He also states that he has been feeling very weak and dizzy. He recently got 4th dose of COVID vaccine 3 weeks ago and Sx started 1 week thereafter.   Also HAs cough lizzie when inhales deeply.  Hypoxic on ra.  States he has not been eating much recently and has a 15 lb weight loss. Also states he has been very fatigued and sleeping more.    6/1- Tmax 100.6- patient was seen and examined. Patient states "I am not feeling well and I seem to be getting worse." He reported that last night he has SOB walking to the [place] and it took him a while to recover- he states that he is also SOB when he just attempts to get out of bed and use the commode. He feels that he is suffocating because he cannot control the temp or air in his room. He is also complaining of sore throat from coughing, +left knee painon ROM- he is asking for cortisone shot for left knee.   6/2- patient was seen and examined. stated that he feels better this morning- able to take deep breaths and was able to walk to the stretcher for CT_ still with some CARMONA but improved. Left knee pain is improved- left hip pain is improved. he feels that he pulled a muscle when he turned. cough is improved. c/o abdominal distention and burping- stated that gasx works well  when he takes it. Denies chest pain, fever or chills.       Vital Signs Last 24 Hrs  T(C): 37.4 (02 Jun 2022 07:49), Max: 37.8 (01 Jun 2022 20:20)  T(F): 99.3 (02 Jun 2022 07:49), Max: 100.1 (01 Jun 2022 20:20)  HR: 78 (02 Jun 2022 09:47) (78 - 105)  BP: 121/71 (02 Jun 2022 07:49) (118/70 - 141/66)  BP(mean): 84 (01 Jun 2022 20:20) (84 - 84)  RR: 19 (02 Jun 2022 07:49) (18 - 19)  SpO2: 93% (02 Jun 2022 07:49) (93% - 95%)    PE:  Constitutional: NAD, laying in bed  HEENT: NC/AT  Back: no tenderness  Respiratory: respirations even and non labored, LCTA  Cardiovascular: S1S2 regular, no murmurs  Abdomen: soft, not tender, not distended, positive BS  Genitourinary: voiding  Musculoskeletal: no muscle tenderness, no joint swelling or tenderness  Extremities: no pedal edema  Neurological: no focal deficits      Labs                11.9   10.08 )-----------( 337      ( 02 Jun 2022 08:35 )             35.2       06-02    135  |  102  |  12  ----------------------------<  159<H>  3.4<L>   |  22  |  0.78    Ca    8.9      02 Jun 2022 08:35    Rheumatoid Factor Quant, Serum or Plasma: 14 IU/mL (06.01.22 @ 13:01)     C-Reactive Protein, Serum: 183 mg/L (06.01.22 @ 13:01)  Sedimentation Rate, Erythrocyte: 94 mm/hr (06.01.22 @ 13:01)   Legionella Urine- negative     MEDICATIONS  (STANDING):  budesonide 160 MICROgram(s)/formoterol 4.5 MICROgram(s) Inhaler 2 Puff(s) Inhalation two times a day  cefepime  Injectable. 1000 milliGRAM(s) IV Push every 12 hours  doxazosin 8 milliGRAM(s) Oral at bedtime  doxycycline hyclate Capsule 100 milliGRAM(s) Oral every 12 hours  guaiFENesin ER 1200 milliGRAM(s) Oral every 12 hours  heparin   Injectable 5000 Unit(s) SubCutaneous every 8 hours  lactobacillus acidophilus 1 Tablet(s) Oral two times a day  losartan 100 milliGRAM(s) Oral daily  melatonin 3 milliGRAM(s) Oral at bedtime  pantoprazole    Tablet 40 milliGRAM(s) Oral before breakfast  simvastatin 20 milliGRAM(s) Oral at bedtime    MEDICATIONS  (PRN):  acetaminophen     Tablet .. 650 milliGRAM(s) Oral every 6 hours PRN Temp greater or equal to 38C (100.4F), Mild Pain (1 - 3)  ALBUTerol    90 MICROgram(s) HFA Inhaler 2 Puff(s) Inhalation every 6 hours PRN Shortness of Breath and/or Wheezing  aluminum hydroxide/magnesium hydroxide/simethicone Suspension 30 milliLiter(s) Oral every 6 hours PRN Dyspepsia  benzocaine 15 mG/menthol 3.6 mG Lozenge 1 Lozenge Oral three times a day PRN Sore Throat          CT Angio Chest PE Protocol w/ IV Cont (05.27.22 @ 16:55) >  No pulmonary embolism from the main pulmonary artery up to and including   the segmental branches. Limited assessment of subsegmental branches.    Bilateral peripheral lung disease. Differential includes infection (COVID   in particular), organizing pneumonia, or eosinophilic pneumonia.    Incidental incompletely characterized left adrenal nodule. Further   evaluation with MRI is recommended, which can be done on a nonemergent   basis.

## 2022-06-02 NOTE — CONSULT NOTE ADULT - SUBJECTIVE AND OBJECTIVE BOX
HPI:  56/M with PMHx of HTN, brought to the ED for c/o worsening SOB and cough along with a recent Dx of PNA. He also states that he has been feeling very weak and dizzy. He recently got 4th dose of COVID vaccine 3 weeks ago and Sx started 1 week thereafter.     Also HAs cough lizzie when inhales deeply.    Also Hypoxic on ra.     He also, c/o  chest pain when coughing.     States he has not been eating much recently and has a 15 lb weight loss.     Also states he has been very fatigued and sleeping more.    No recent travel or periods of immobilization or surgeries.    COVID neg (27 May 2022 16:25)    : History as above. Still having sob especially when supine. Had normal ECHO last week. Cough w clear phlegm. O2 sat 97% during episode dyspnea. CT scan noted - does have apppearance of covid. Nasal PCR negative 3 times including admission. No fever. WBC normal      PAST MEDICAL & SURGICAL HISTORY:  HTN (hypertension)          MEDICATIONS  (STANDING):  azithromycin  IVPB 500 milliGRAM(s) IV Intermittent every 24 hours  azithromycin  IVPB      cefTRIAXone   IVPB 1000 milliGRAM(s) IV Intermittent every 24 hours  doxazosin 8 milliGRAM(s) Oral at bedtime  heparin   Injectable 5000 Unit(s) SubCutaneous every 8 hours  losartan 100 milliGRAM(s) Oral daily  simvastatin 20 milliGRAM(s) Oral at bedtime  sodium chloride 0.9%. 1000 milliLiter(s) (125 mL/Hr) IV Continuous <Continuous>    MEDICATIONS  (PRN):  ALBUTerol    90 MICROgram(s) HFA Inhaler 2 Puff(s) Inhalation every 6 hours PRN Shortness of Breath and/or Wheezing      Allergies    codeine (Unknown)    Intolerances        SOCIAL HISTORY: Denies tobacco, etoh abuse or illicit drug use    FAMILY HISTORY:  No pertinent family history in first degree relatives        Vital Signs Last 24 Hrs  T(C): 36.9 (28 May 2022 07:10), Max: 38.3 (27 May 2022 12:23)  T(F): 98.5 (28 May 2022 07:10), Max: 101 (27 May 2022 12:23)  HR: 77 (28 May 2022 07:10) (77 - 96)  BP: 135/69 (28 May 2022 07:10) (133/79 - 147/77)  BP(mean): 94 (27 May 2022 12:23) (94 - 94)  RR: 17 (28 May 2022 07:10) (17 - 23)  SpO2: 95% (28 May 2022 07:10) (95% - 99%)    REVIEW OF SYSTEMS:    CONSTITUTIONAL:  As per HPI.  SKIN: no rashes  HEENT:  Eyes:  No diplopia or blurred vision. ENT:  No earache, sore throat or runny nose.  CARDIOVASCULAR:  No pressure, squeezing, tightness, heaviness or aching about the chest, neck, axilla or epigastrium.  RESPIRATORY:  cough, shortness of breath, orthopnea.  GASTROINTESTINAL:  No nausea, vomiting or diarrhea.  GENITOURINARY:  No dysuria, frequency or urgency.  MUSCULOSKELETAL:  As per HPI.  SKIN:  No change in skin, hair or nails.  NEUROLOGIC:  No paresthesias, fasciculations, seizures or weakness.  PSYCHIATRIC:  No disorder of thought or mood.  ENDOCRINE:  No heat or cold intolerance, polyuria or polydipsia.  HEMATOLOGICAL:  No easy bruising or bleedings:  .     PHYSICAL EXAMINATION:    GENERAL APPEARANCE:  Pt. is not currently dyspneic, in no distress. Pt. is alert, oriented, and pleasant.  HEENT:  Pupils are normal and react normally. No icterus. Mucous membranes well colored.  NECK:  Supple. No lymphadenopathy. Jugular venous pressure not elevated. Carotids equal.   HEART:   S1S2 reg  CHEST:  basilar crackles  ABDOMEN:  Soft and nontender.   EXTREMITIES:  There is no cyanosis, clubbing or edema.   SKIN:  No rash or significant lesions are noted.    LABS:                        12.4   8.35  )-----------( 287      ( 28 May 2022 08:01 )             37.4     05-    139  |  108  |  9   ----------------------------<  149<H>  4.0   |  24  |  0.95    Ca    8.6      28 May 2022 08:01    TPro  7.9  /  Alb  3.3  /  TBili  0.7  /  DBili  x   /  AST  30  /  ALT  32  /  AlkPhos  84  05-    LIVER FUNCTIONS - ( 27 May 2022 12:24 )  Alb: 3.3 g/dL / Pro: 7.9 gm/dL / ALK PHOS: 84 U/L / ALT: 32 U/L / AST: 30 U/L / GGT: x           PT/INR - ( 27 May 2022 12:24 )   PT: 14.1 sec;   INR: 1.21 ratio         PTT - ( 27 May 2022 12:24 )  PTT:33.8 sec      Urinalysis Basic - ( 27 May 2022 12:24 )    Color: Yellow / Appearance: Clear / S.010 / pH: x  Gluc: x / Ketone: Trace  / Bili: Negative / Urobili: Negative   Blood: x / Protein: Negative / Nitrite: Negative   Leuk Esterase: Negative / RBC: 3-5 /HPF / WBC 3-5   Sq Epi: x / Non Sq Epi: Occasional / Bacteria: Occasional          RADIOLOGY & ADDITIONAL STUDIES:        HPI:  56/M with PMHx of HTN, brought to the ED for c/o worsening SOB and cough along with a recent Dx of PNA.     Patient describes onset of SOB and fatigue occurring 1 week following the COVID vaccine.   He notes that other than SOB, fatigue and intermittent cough he had no other symptoms.  Denies sick contacts, COVID testing was negative, and had no URI symptoms.  Denies hemoptysis, significant fever or NS.   He was treated as an outpatient with oral abx which did not improve his symptoms and therefore was sent to the hospital.  He notes that his dyspnea was worsened with laying flat and therefore also underwent outpatient cardio workup which was negative.  He notes that he had significant CARMONA as well.   Upon admission he was found to be hypoxic on RA.   -- denies hemoptysis   -- denies rashes, inflammatory eye disease, motor dysfunction, nerve issues   -- denies joint swelling, oral ulcers rp or sicca  -- had some weight loss 2/2 loss of appetite   -- states the only other symptoms he felt were associated was extreme fatigue - he wanted to sleep all day  -- no recent travel or sick contacts   -- diarrhea after the abx   -- left knee pain - this only began yesterday.  he feels that he was sleeping crossed legs and on his back.  he says he was putting pressure on his left knee and hip and they were hurting quite severely.  denies swelling, warmth or redness.  has had oa in the past and was treated with cortisone.  today the left knee is much improved after placing a pillow between the knees o/n.  the left hip is still painful, though not as bad as yesterday.  he describes pain in his hip with flexion, not rotation, and with bearing weight.      Currently he feels much better.  He says the last 24 hours has made an enormous difference in his breathing and fatigue.  He has less coughing as well.  He is able to now carry on a full conversation without SOB.  He feels that the air blowing on his face improves his symptoms.   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  has a dog in the home    FAMILY HISTORY:  No pertinent family history in first degree relatives    Allergies  codeine (Unknown)  Intolerances    MEDICATIONS  (STANDING):  budesonide 160 MICROgram(s)/formoterol 4.5 MICROgram(s) Inhaler 2 Puff(s) Inhalation two times a day  doxazosin 8 milliGRAM(s) Oral at bedtime  guaiFENesin ER 1200 milliGRAM(s) Oral every 12 hours  heparin   Injectable 5000 Unit(s) SubCutaneous every 8 hours  lactobacillus acidophilus 1 Tablet(s) Oral two times a day  losartan 100 milliGRAM(s) Oral daily  melatonin 3 milliGRAM(s) Oral at bedtime  methylPREDNISolone sodium succinate Injectable 40 milliGRAM(s) IV Push every 8 hours  pantoprazole    Tablet 40 milliGRAM(s) Oral before breakfast  simvastatin 20 milliGRAM(s) Oral at bedtime    MEDICATIONS  (PRN):  acetaminophen     Tablet .. 650 milliGRAM(s) Oral every 6 hours PRN Temp greater or equal to 38C (100.4F), Mild Pain (1 - 3)  ALBUTerol    90 MICROgram(s) HFA Inhaler 2 Puff(s) Inhalation every 6 hours PRN Shortness of Breath and/or Wheezing  ALPRAZolam 0.25 milliGRAM(s) Oral every 12 hours PRN anxiety  aluminum hydroxide/magnesium hydroxide/simethicone Suspension 30 milliLiter(s) Oral every 6 hours PRN Dyspepsia  benzocaine 15 mG/menthol 3.6 mG Lozenge 1 Lozenge Oral three times a day PRN Sore Throat  simethicone 80 milliGRAM(s) Chew every 6 hours PRN Gas      Vital Signs Last 24 Hrs  T(C): 37.1 (02 Jun 2022 16:03), Max: 37.8 (01 Jun 2022 20:20)  T(F): 98.8 (02 Jun 2022 16:03), Max: 100.1 (01 Jun 2022 20:20)  HR: 89 (02 Jun 2022 16:03) (78 - 93)  BP: 137/83 (02 Jun 2022 16:03) (118/70 - 137/83)  BP(mean): 84 (01 Jun 2022 20:20) (84 - 84)  RR: 17 (02 Jun 2022 16:03) (17 - 19)  SpO2: 95% (02 Jun 2022 16:03) (93% - 95%)    REVIEW OF SYSTEMS:  CONSTITUTIONAL:  per HPI  SKIN: no rashes  HEENT:  Eyes:  No diplopia or blurred vision. ENT:  No earache, sore throat or runny nose.  CARDIOVASCULAR:  No pressure, squeezing, tightness, heaviness or aching about the chest, neck, axilla or epigastrium.  RESPIRATORY:  per HPI  GASTROINTESTINAL:  No nausea, vomiting - endorses a h/o diarrhea after abx - improving  GENITOURINARY:  No dysuria, frequency or urgency.  MUSCULOSKELETAL:  As per HPI.  SKIN:  No change in skin, hair or nails.  NEUROLOGIC:  No paresthesias, fasciculations, seizures or weakness.  PSYCHIATRIC:  No disorder of thought or mood.  ENDOCRINE:  No heat or cold intolerance, polyuria or polydipsia.  HEMATOLOGICAL:  No easy bruising or bleedings:  .     PHYSICAL EXAMINATION:    GENERAL APPEARANCE:  Pt. is not currently dyspneic, in no distress. Pt. is alert, oriented, and pleasant.  NC in place, able to speak clearly without coughing, though had a bit of CARMONA  HEENT:  Pupils are normal and react normally. No icterus. Mucous membranes well colored. sclera white  NECK:  Supple. No lymphadenopathy. Jugular venous pressure not elevated. Carotids equal.   HEART:   S1S2 reg  CHEST:  basilar crackles  ABDOMEN:  Soft and nontender.   EXTREMITIES:  There is no cyanosis, clubbing or edema.   VASC: no RP, LR  SKIN:  No rash or significant lesions are noted.  MSK:  no synovitis, no dactylitis.  left knee cool with from and NT passive motion.  left hip rotation internal and external full and wihtout pain or stiffness.   patient unable to flex at the left hip 2/2 pain.  distal strength at the wrist and ankle are 5/5    LABS:                                      11.9   10.08 )-----------( 337      ( 02 Jun 2022 08:35 )             35.2   06-02    135  |  102  |  12  ----------------------------<  159<H>  3.4<L>   |  22  |  0.78    Ca    8.9      02 Jun 2022 08:35    Rheumatoid Factor Quant, Serum or Plasma: 14 IU/mL (06.01.22 @ 13:01)   C-Reactive Protein, Serum: 183 mg/L (06.01.22 @ 13:01)   Sedimentation Rate, Erythrocyte: 94 mm/hr (06.01.22 @ 13:01)   Hepatitis C Virus S/CO Ratio: 0.11 S/CO (05.28.22 @ 08:01)         RADIOLOGY & ADDITIONAL STUDIES:     < from: CT Chest No Cont (06.02.22 @ 10:20) >    ACC: [id number] EXAM:  CT CHEST                          PROCEDURE DATE:  06/02/2022          INTERPRETATION:  HISTORY: Pneumonia with slow improvement.    EXAMINATION: CT CHEST was performed without IV contrast.    COMPARISON: 5/27/2022.    FINDINGS:    AIRWAYS, LUNGS, PLEURA: Central airways clear. No pleural effusion.    Peripheral predominant ground-glass and consolidative opacities involving   all lung lobes have demonstrated interval progression compared to   5/27/2022. For example, region of consolidation in the posterior right   upper lobe (image 44, series 2) is new and right lower lobe superior   segment consolidation (image 59, series 2) has progressed.    MEDIASTINUM: Normal heart size. Coronary atherosclerosis. Small   pericardial effusion. Thoracic aorta normal caliber.  No large   mediastinal lymph nodes. Small amount of fluid within the lumen of the   lower esophagus.    IMAGED ABDOMEN: Unchanged 2 cm left adrenal nodule.    SOFT TISSUES: Unremarkable.    BONES: Unremarkable.      IMPRESSION:.    Extensive peripheral predominant airspace disease involving all lung   lobes with interval progression compared to 5/27/2022. The exact etiology   is unclear and diagnostic considerations include infectious etiology and   inflammatory etiology (such as organizing pneumonia).    CT chest follow-up in one month recommended to ensure clearing.    --- End of Report ---            SILAS HERMAN MD; Attending Radiologist  This document has been electronically signed. Jun 2 2022 10:31AM    < end of copied text >  < from: CT Chest No Cont (06.02.22 @ 10:20) >    ACC: [id number] EXAM:  CT CHEST                          PROCEDURE DATE:  06/02/2022          INTERPRETATION:  HISTORY: Pneumonia with slow improvement.    EXAMINATION: CT CHEST was performed without IV contrast.    COMPARISON: 5/27/2022.    FINDINGS:    AIRWAYS, LUNGS, PLEURA: Central airways clear. No pleural effusion.    Peripheral predominant ground-glass and consolidative opacities involving   all lung lobes have demonstrated interval progression compared to   5/27/2022. For example, region of consolidation in the posterior right   upper lobe (image 44, series 2) is new and right lower lobe superior   segment consolidation (image 59, series 2) has progressed.    MEDIASTINUM: Normal heart size. Coronary atherosclerosis. Small   pericardial effusion. Thoracic aorta normal caliber.  No large   mediastinal lymph nodes. Small amount of fluid within the lumen of the   lower esophagus.    IMAGED ABDOMEN: Unchanged 2 cm left adrenal nodule.    SOFT TISSUES: Unremarkable.    BONES: Unremarkable.      IMPRESSION:.    Extensive peripheral predominant airspace disease involving all lung   lobes with interval progression compared to 5/27/2022. The exact etiology   is unclear and diagnostic considerations include infectious etiology and   inflammatory etiology (such as organizing pneumonia).    CT chest follow-up in one month recommended to ensure clearing.    --- End of Report ---            SILAS HERMAN MD; Attending Radiologist  This document has been electronically signed. Jun 2 2022 10:31AM    < end of copied text >  < from: Xray Knee 1 or 2 Views, Left (06.01.22 @ 16:03) >  CC: [id number] EXAM:  XR KNEE 1-2 VIEWS LT                          PROCEDURE DATE:  06/01/2022          INTERPRETATION:  CLINICAL HISTORY: LEFT knee pain.    TECHNIQUE: AP, lateral, and oblique radiographs    FINDINGS: The bone mineralization is normal.  There is no fracture or dislocation.  Mild degenerative narrowing of the medial joint compartment with medial   peripheral femoral tibial condyle osteophytes. Lateral joint compartment   height maintained. No joint effusion.   No gross soft tissue/abnormalities..    IMPRESSION:  Mild degenerative narrowing of the medial joint compartment.    If pain persists despite conservative therapy and soft tissue internal   derangement or occult fracture is clinically suspected follow-up MRI   recommended.    --- End of Report ---            ASHLEIGH SEPULVEDA MD; Attending Radiologist  This document has been electronically signed. Jun 1 2022  7:57PM    < end of copied text >  < from: CT Angio Chest PE Protocol w/ IV Cont (05.27.22 @ 16:55) >  ACC: [id number] EXAM:  CT ANGIO CHEST PULM [facility]                          PROCEDURE DATE:  05/27/2022          INTERPRETATION:  INDICATION: Shortness of breath    TECHNIQUE: Volumetric images of the chest were obtained after the   administration of 90 mL of Omnipaque 350. Maximum intensity projection   images were generated.    COMPARISON: None.    FINDINGS:    PULMONARY ANGIOGRAM: No pulmonary embolism from the main pulmonary artery   up to and including the segmental branches. Limited assessment of   subsegmental branches in the lower lobes as they are not well opacified.   Normal caliber pulmonary artery.    LUNGS/AIRWAYS/PLEURA: Patent trachea and bronchi. Peripheral groundglass   and consolidative opacities in all lobes, preferentialto the lower   lobes. Unremarkable pleura.    LYMPH NODES/MEDIASTINUM: No enlarged lymph nodes.    HEART/VASCULATURE: Normal heart size. Small pericardial effusion. Normal   caliber aorta. Coronary artery calcifications.    UPPER ABDOMEN: Partially included incompletely characterized 2.0 cm left   adrenal nodule.    BONES/SOFT TISSUES: Degenerative changes of the spine.      IMPRESSION:    No pulmonary embolism from the main pulmonary artery up to and including   the segmental branches. Limited assessment of subsegmental branches.    Bilateral peripheral lung disease. Differential includes infection (COVID   in particular), organizing pneumonia, or eosinophilic pneumonia.    Incidental incompletely characterized left adrenal nodule. Further   evaluation with MRI is recommended, which can be done on a nonemergent   basis.    --- End of Report ---          < end of copied text >

## 2022-06-02 NOTE — CONSULT NOTE ADULT - NSCONSULTADDITIONALINFOA_GEN_ALL_CORE
56/M with PMHx of HTN, brought to the ED for c/o worsening SOB and cough along with a recent Dx of PNA.     #SOB, fatigue   admitted for possible PNA with sob, fatigue associated with very elevated inflammatory markers.  Initially not improving on outpatient oral regimen, he notes improvement in the last 24 hours.  He notes significant reduction in cough, improved SOB and less fatigue.  Rheum asked to evaluated for aiCTD.    -- currently markedly abnormal CT scan - though not specific for aiCTD.  improvement after abx change hopeful for infectious issue.   -- markedly elevated inflammatory markers not specific for aiCTD - pro-calcitonin may be  helpful to help distinguish between processes  -- patient actively denying all other s/s of aiCTD - PE unremarkable for vasculitis, myopathy or SLE type of condition.  Given inflammatory markers and duration of illness will rec aiCTD workup to include serologies and sub-serologies (ZORA< LEA< dsDNA, myomarker 3, CPK, complements, cryo, SPEP, IPEP, RF, CCP, SS.  -- patient was empirically started on steroids today - will continue to monitor on steroids - though initial improvement was prior to steroids  -- if patient's pulmonary status continues to decline without explanation - bx should be considered  -- appreciate id and pulmonary evaluations    #left knee and hip pain   unclear etiology though it is non-inflammatory in nature without heat, redness or effusion. knee self-resolved in 24 hours.  hip is more persistent but a bit better c/w yesterday   -- xray reviewed and c/w OA - now with from and nt - observe  -- rec xray of the left hip should pain continue -   -- patient noted improvement with repositioning of legs - continue for now and re-evaluated tomorrow     #OA knee  has had steroid shots in the past.  currently with normal rom and nt  -- IA depot not indicated at this time     d/w Yrn Baker  will continue to follow

## 2022-06-02 NOTE — CDI QUERY NOTE - NSCDIOTHERTXTBX_GEN_ALL_CORE_HH
PN 5/30 "  brought to the ED for c/o worsening SOB and cough along with a recent Dx of PNA. He also states that he has been feeling very weak and dizzy."  " Acute hypoxic resp failure  Atypical Pna  Covid negative   failed oral Abx "  "atypical peripheral bilateral infiltrates , iv rocephin and iv zithro "    On admission 5/27:  WBC= 10.22  Temp= 98.6 > 101  HR= 96  RR= 20 > 23  BP= 136/83    Are the above clinical indicators indicative of a further diagnosis  a) Sepsis on admission due to atypical PNA  b) No clinical evidence of Sepsis  c) Above clinical indicators clinically insignificant  d) Other, please clarify
PN 5/30 "  brought to the ED for c/o worsening SOB and cough along with a recent Dx of PNA. He also states that he has been feeling very weak and dizzy."  " Acute hypoxic resp failure  Atypical Pna  Covid negative   failed oral Abx "  "atypical peripheral bilateral infiltrates , iv rocephin and iv zithro "    PN  6/2 : "  Acute hypoxic resp failure- very slow recovery   - Atypical PNA r/o ILD   - CTA chest; NO PE, atypical peripheral bilateral infiltrates   - iv rocephin and iv zithro changed to cefepime + doxy on 5/31    On admission 5/27:  WBC= 10.22  Temp= 98.6 > 101  HR= 96  RR= 20 > 23  BP= 136/83    Are the above clinical indicators indicative of a further diagnosis  a) Sepsis on admission due to atypical PNA  b) No clinical evidence of Sepsis  c) Above clinical indicators clinically insignificant  d) Other, please clarify

## 2022-06-02 NOTE — PROGRESS NOTE ADULT - SUBJECTIVE AND OBJECTIVE BOX
Date of service: 06-02-22 @ 15:16      Patient still with shortness of breath, on supplemental oxygen via nasal cannula, low grade fevers, anxious      ROS: no fever or chills; denies dizziness, no HA, no abdominal pain, no diarrhea or constipation; no dysuria, no urinary frequency, no legs pain, no rashes    MEDICATIONS  (STANDING):  budesonide 160 MICROgram(s)/formoterol 4.5 MICROgram(s) Inhaler 2 Puff(s) Inhalation two times a day  doxazosin 8 milliGRAM(s) Oral at bedtime  guaiFENesin ER 1200 milliGRAM(s) Oral every 12 hours  heparin   Injectable 5000 Unit(s) SubCutaneous every 8 hours  lactobacillus acidophilus 1 Tablet(s) Oral two times a day  losartan 100 milliGRAM(s) Oral daily  melatonin 3 milliGRAM(s) Oral at bedtime  methylPREDNISolone sodium succinate Injectable 40 milliGRAM(s) IV Push every 8 hours  pantoprazole    Tablet 40 milliGRAM(s) Oral before breakfast  simvastatin 20 milliGRAM(s) Oral at bedtime    MEDICATIONS  (PRN):  acetaminophen     Tablet .. 650 milliGRAM(s) Oral every 6 hours PRN Temp greater or equal to 38C (100.4F), Mild Pain (1 - 3)  ALBUTerol    90 MICROgram(s) HFA Inhaler 2 Puff(s) Inhalation every 6 hours PRN Shortness of Breath and/or Wheezing  ALPRAZolam 0.25 milliGRAM(s) Oral every 12 hours PRN anxiety  aluminum hydroxide/magnesium hydroxide/simethicone Suspension 30 milliLiter(s) Oral every 6 hours PRN Dyspepsia  benzocaine 15 mG/menthol 3.6 mG Lozenge 1 Lozenge Oral three times a day PRN Sore Throat  simethicone 80 milliGRAM(s) Chew every 6 hours PRN Gas      Vital Signs Last 24 Hrs  T(C): 37.4 (02 Jun 2022 07:49), Max: 37.8 (01 Jun 2022 20:20)  T(F): 99.3 (02 Jun 2022 07:49), Max: 100.1 (01 Jun 2022 20:20)  HR: 90 (02 Jun 2022 14:42) (78 - 105)  BP: 121/71 (02 Jun 2022 07:49) (118/70 - 141/66)  BP(mean): 84 (01 Jun 2022 20:20) (84 - 84)  RR: 19 (02 Jun 2022 07:49) (18 - 19)  SpO2: 93% (02 Jun 2022 07:49) (93% - 95%)        Physical Exam:          Constitutional: frail looking  HEENT: NC/AT, EOMI, PERRLA, conjunctivae clear; ears and nose atraumatic; pharynx clear  Neck: supple; thyroid not palpable  Back: no tenderness  Respiratory: respiratory effort normal; clear to auscultation  Cardiovascular: S1S2 regular, no murmurs  Abdomen: soft, not tender, not distended, positive BS; no liver or spleen organomegaly  Genitourinary: no suprapubic tenderness  Musculoskeletal: no muscle tenderness, no joint swelling or tenderness  Neurological/ Psychiatric: AxOx3, judgement and insight normal;  moving all extremities  Skin: no rashes; no palpable lesions    Labs: all available labs reviewed      Labs:                        11.9   10.08 )-----------( 337      ( 02 Jun 2022 08:35 )             35.2     06-02    135  |  102  |  12  ----------------------------<  159<H>  3.4<L>   |  22  |  0.78    Ca    8.9      02 Jun 2022 08:35    EBV-- past infection, CMV negative         Cultures:       GI PCR Panel, Stool (collected 05-29-22 @ 08:20)  Source: .Stool Feces  Final Report (05-29-22 @ 18:46):    GI PCR Results: NOT detected    *******Please Note:*******    GI panel PCR evaluates for:    Campylobacter, Plesiomonas shigelloides, Salmonella,    Vibrio, Yersinia enterocolitica, Enteroaggregative    Escherichia coli (EAEC), Enteropathogenic E.coli (EPEC),    Enterotoxigenic E. coli (ETEC) lt/st, Shiga-like    toxin-producing E. coli (STEC) stx1/stx2,    Shigella/ Enteroinvasive E. coli (EIEC), Cryptosporidium,    Cyclospora cayetanensis, Entamoeba histolytica,    Giardia lamblia, Adenovirus F 40/41, Astrovirus,    Norovirus GI/GII, Rotavirus A, Sapovirus    Culture - Urine (collected 05-27-22 @ 12:24)  Source: Clean Catch Clean Catch (Midstream)  Final Report (05-28-22 @ 20:31):    <10,000 CFU/mL Normal Urogenital Aliyah    Culture - Blood (collected 05-27-22 @ 12:24)  Source: .Blood Blood-Peripheral  Final Report (06-01-22 @ 15:05):    No Growth Final    Culture - Blood (collected 05-27-22 @ 12:24)  Source: .Blood Blood-Peripheral  Final Report (06-01-22 @ 15:05):    No Growth Final      C-Reactive Protein, Serum: 183 mg/L (06-01-22 @ 13:01)    Legionella pneumophila Antigen, Urine (06.01.22 @ 05:55)    Legionella Antigen, Urine: Negative: Positive Testing method: Immunochromatographic Assay.  Presumptive detection of L. pneumophila serogroup 1 antigen in urine,  suggesting recent or current infection. Order “Culture –Legionella” as  recommended to confirm infection.  Negative Testing method: Immunochromatographic Assay.  L. pneumophila serogroup 1 antigen in urine NOT detected, suggesting NO  recent or current infection. Infection due to Legionella cannot be ruled  out: other serogroups and species may cause disease, antigen may not be  present in urine in early infection, or the level of antigens in urine  may be below the detection limit of the test.  Order “Culture  –Legionella” is recommended for uncommon cases of suspected Legionella  pneumonia due to organisms other thanL. pneumophila serogroup 1.      < from: CT Chest No Cont (06.02.22 @ 10:20) >    ACC: [id number] EXAM:  CT CHEST                          PROCEDURE DATE:  06/02/2022          INTERPRETATION:  HISTORY: Pneumonia with slow improvement.    EXAMINATION: CT CHEST was performed without IV contrast.    COMPARISON: 5/27/2022.    FINDINGS:    AIRWAYS, LUNGS, PLEURA: Central airways clear. No pleural effusion.    Peripheral predominant ground-glass and consolidative opacities involving   all lung lobes have demonstrated interval progression compared to   5/27/2022. For example, region of consolidation in the posterior right   upper lobe (image 44, series 2) is new and right lower lobe superior   segment consolidation (image 59, series 2) has progressed.    MEDIASTINUM: Normal heart size. Coronary atherosclerosis. Small   pericardial effusion. Thoracic aorta normal caliber.  No large   mediastinal lymph nodes. Small amount of fluid within the lumen of the   lower esophagus.    IMAGED ABDOMEN: Unchanged 2 cm left adrenal nodule.    SOFT TISSUES: Unremarkable.    BONES: Unremarkable.      IMPRESSION:.    Extensive peripheral predominant airspace disease involving all lung   lobes with interval progression compared to 5/27/2022. The exact etiology   is unclear and diagnostic considerations include infectious etiology and     < end of copied text >                  < from: CT Angio Chest PE Protocol w/ IV Cont (05.27.22 @ 16:55) >    ACC: [id number] EXAM:  CT ANGIO CHEST PULM ART [facility]                          PROCEDURE DATE:  05/27/2022          INTERPRETATION:  INDICATION: Shortness of breath    TECHNIQUE: Volumetric images of the chest were obtained after the   administration of 90 mL of Omnipaque 350. Maximum intensity projection   images were generated.    COMPARISON: None.    FINDINGS:    PULMONARY ANGIOGRAM: No pulmonary embolism from the main pulmonary artery   up to and including the segmental branches. Limited assessment of   subsegmental branches in the lower lobes as they are not well opacified.   Normal caliber pulmonary artery.    LUNGS/AIRWAYS/PLEURA: Patent trachea and bronchi. Peripheral groundglass   and consolidative opacities in all lobes, preferentialto the lower   lobes. Unremarkable pleura.    LYMPH NODES/MEDIASTINUM: No enlarged lymph nodes.    HEART/VASCULATURE: Normal heart size. Small pericardial effusion. Normal   caliber aorta. Coronary artery calcifications.    UPPER ABDOMEN: Partially included incompletely characterized 2.0 cm left   adrenal nodule.    BONES/SOFT TISSUES: Degenerative changes of the spine.      IMPRESSION:    No pulmonary embolism from the main pulmonary artery up to and including   the segmental branches. Limited assessment of subsegmental branches.    Bilateral peripheral lung disease. Differential includes infection (COVID   in particular), organizing pneumonia, or eosinophilic pneumonia.    Incidental incompletely characterized left adrenal nodule. Further   evaluation with MRI is recommended, which can be done on a nonemergent   basis.    < end of copied text >            Radiology: all available radiological tests reviewed    Advanced directives addressed: full resuscitation

## 2022-06-02 NOTE — PROGRESS NOTE ADULT - ASSESSMENT
56/M with PMHx of HTN, admitted on 5/27 for evaluation of shortness of breath about one week after receiving second COVID-19 booster, which was given over 3 weeks ago; he tested multiple times on COVID-19 tests, has mild cough and was initially hypoxic on room air, which is resolved; has been started on zithromax and ceftriaxone since admission, with mild improvement. 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 Overall he does not fully feel himself. Had low grade fever overnight.    1. Patient admitted with pneumonia, which is not really improving with typical coverage for community acquired pneumonia  - does not work around cooling towers or swimming pools, however will send urine for Legionella---negative  - all other cultures are no growth  - serial cbc and monitor temperature   - reviewed prior medical records to evaluate for resistant or atypical pathogens   - COVID-19 test negative  - day #2 doxycycline and cefepime; will stop antibiotics, as may have autoimmune process in lungs, will be worked up with appropriate labs, and started on steroids  - tolerating antibiotics without rashes or side effects   - iv hydration and supportive care   - oxygen and nebs as needed   - will check for viral serology for leptospirosis with sick dog  - also check for cmv and ebv serologies--- unremarkable  - if eventually no improvement may need pulmonary workup including bronchoscopy or biopsy  2. other issues: per medicine

## 2022-06-02 NOTE — PROGRESS NOTE ADULT - ASSESSMENT
- cont O2  - CT scan chest reviewed. No PE. has bilat ground glass and consolidative infiltrates'  - reswab for COVID  - on cefepime  - think this may not be infectious. WBC normal. consider interstitial lung disease/inflammatory process  - inflammatory markers extremely elevated  - ESR 94, , RF 14  - rheumatology evaluation  - repeat CT scan chest. may consider lung biopsy  - dvt proph

## 2022-06-03 LAB
ANA PAT FLD IF-IMP: ABNORMAL
ANA TITR SER: ABNORMAL
ANION GAP SERPL CALC-SCNC: 10 MMOL/L — SIGNIFICANT CHANGE UP (ref 5–17)
BUN SERPL-MCNC: 18 MG/DL — SIGNIFICANT CHANGE UP (ref 7–23)
C3 SERPL-MCNC: 163 MG/DL — HIGH (ref 81–157)
C4 SERPL-MCNC: 40 MG/DL — HIGH (ref 13–39)
CALCIUM SERPL-MCNC: 9.2 MG/DL — SIGNIFICANT CHANGE UP (ref 8.5–10.1)
CHLORIDE SERPL-SCNC: 102 MMOL/L — SIGNIFICANT CHANGE UP (ref 96–108)
CO2 SERPL-SCNC: 23 MMOL/L — SIGNIFICANT CHANGE UP (ref 22–31)
CREAT SERPL-MCNC: 0.83 MG/DL — SIGNIFICANT CHANGE UP (ref 0.5–1.3)
DSDNA AB SER-ACNC: 12 IU/ML — SIGNIFICANT CHANGE UP
EGFR: 103 ML/MIN/1.73M2 — SIGNIFICANT CHANGE UP
GLUCOSE SERPL-MCNC: 264 MG/DL — HIGH (ref 70–99)
HCT VFR BLD CALC: 39.7 % — SIGNIFICANT CHANGE UP (ref 39–50)
HGB BLD-MCNC: 13 G/DL — SIGNIFICANT CHANGE UP (ref 13–17)
IGA FLD-MCNC: 237 MG/DL — SIGNIFICANT CHANGE UP (ref 84–499)
IGG FLD-MCNC: 828 MG/DL — SIGNIFICANT CHANGE UP (ref 610–1660)
IGM SERPL-MCNC: 128 MG/DL — SIGNIFICANT CHANGE UP (ref 35–242)
KAPPA LC SER QL IFE: 2.52 MG/DL — HIGH (ref 0.33–1.94)
KAPPA/LAMBDA FREE LIGHT CHAIN RATIO, SERUM: 1.23 RATIO — SIGNIFICANT CHANGE UP (ref 0.26–1.65)
LAMBDA LC SER QL IFE: 2.05 MG/DL — SIGNIFICANT CHANGE UP (ref 0.57–2.63)
MCHC RBC-ENTMCNC: 28.4 PG — SIGNIFICANT CHANGE UP (ref 27–34)
MCHC RBC-ENTMCNC: 32.7 GM/DL — SIGNIFICANT CHANGE UP (ref 32–36)
MCV RBC AUTO: 86.9 FL — SIGNIFICANT CHANGE UP (ref 80–100)
PLATELET # BLD AUTO: 416 K/UL — HIGH (ref 150–400)
POTASSIUM SERPL-MCNC: 4 MMOL/L — SIGNIFICANT CHANGE UP (ref 3.5–5.3)
POTASSIUM SERPL-SCNC: 4 MMOL/L — SIGNIFICANT CHANGE UP (ref 3.5–5.3)
RBC # BLD: 4.57 M/UL — SIGNIFICANT CHANGE UP (ref 4.2–5.8)
RBC # FLD: 14.1 % — SIGNIFICANT CHANGE UP (ref 10.3–14.5)
SODIUM SERPL-SCNC: 135 MMOL/L — SIGNIFICANT CHANGE UP (ref 135–145)
WBC # BLD: 9.29 K/UL — SIGNIFICANT CHANGE UP (ref 3.8–10.5)
WBC # FLD AUTO: 9.29 K/UL — SIGNIFICANT CHANGE UP (ref 3.8–10.5)

## 2022-06-03 PROCEDURE — 99232 SBSQ HOSP IP/OBS MODERATE 35: CPT

## 2022-06-03 PROCEDURE — 99221 1ST HOSP IP/OBS SF/LOW 40: CPT

## 2022-06-03 PROCEDURE — 99233 SBSQ HOSP IP/OBS HIGH 50: CPT

## 2022-06-03 RX ORDER — DEXTROSE 50 % IN WATER 50 %
15 SYRINGE (ML) INTRAVENOUS ONCE
Refills: 0 | Status: DISCONTINUED | OUTPATIENT
Start: 2022-06-03 | End: 2022-06-05

## 2022-06-03 RX ORDER — DEXTROSE 50 % IN WATER 50 %
25 SYRINGE (ML) INTRAVENOUS ONCE
Refills: 0 | Status: DISCONTINUED | OUTPATIENT
Start: 2022-06-03 | End: 2022-06-05

## 2022-06-03 RX ORDER — SODIUM CHLORIDE 9 MG/ML
1000 INJECTION, SOLUTION INTRAVENOUS
Refills: 0 | Status: DISCONTINUED | OUTPATIENT
Start: 2022-06-03 | End: 2022-06-05

## 2022-06-03 RX ORDER — GLUCAGON INJECTION, SOLUTION 0.5 MG/.1ML
1 INJECTION, SOLUTION SUBCUTANEOUS ONCE
Refills: 0 | Status: DISCONTINUED | OUTPATIENT
Start: 2022-06-03 | End: 2022-06-05

## 2022-06-03 RX ORDER — DEXTROSE 50 % IN WATER 50 %
12.5 SYRINGE (ML) INTRAVENOUS ONCE
Refills: 0 | Status: DISCONTINUED | OUTPATIENT
Start: 2022-06-03 | End: 2022-06-05

## 2022-06-03 RX ORDER — INSULIN LISPRO 100/ML
VIAL (ML) SUBCUTANEOUS
Refills: 0 | Status: DISCONTINUED | OUTPATIENT
Start: 2022-06-03 | End: 2022-06-05

## 2022-06-03 RX ORDER — INSULIN LISPRO 100/ML
VIAL (ML) SUBCUTANEOUS AT BEDTIME
Refills: 0 | Status: DISCONTINUED | OUTPATIENT
Start: 2022-06-03 | End: 2022-06-05

## 2022-06-03 RX ADMIN — ALBUTEROL 2 PUFF(S): 90 AEROSOL, METERED ORAL at 21:55

## 2022-06-03 RX ADMIN — HEPARIN SODIUM 5000 UNIT(S): 5000 INJECTION INTRAVENOUS; SUBCUTANEOUS at 13:41

## 2022-06-03 RX ADMIN — Medication 1200 MILLIGRAM(S): at 21:28

## 2022-06-03 RX ADMIN — Medication 1200 MILLIGRAM(S): at 11:00

## 2022-06-03 RX ADMIN — BUDESONIDE AND FORMOTEROL FUMARATE DIHYDRATE 2 PUFF(S): 160; 4.5 AEROSOL RESPIRATORY (INHALATION) at 08:53

## 2022-06-03 RX ADMIN — Medication 1 TABLET(S): at 11:00

## 2022-06-03 RX ADMIN — HEPARIN SODIUM 5000 UNIT(S): 5000 INJECTION INTRAVENOUS; SUBCUTANEOUS at 06:12

## 2022-06-03 RX ADMIN — Medication 1 TABLET(S): at 21:29

## 2022-06-03 RX ADMIN — Medication 40 MILLIGRAM(S): at 21:29

## 2022-06-03 RX ADMIN — Medication 40 MILLIGRAM(S): at 06:12

## 2022-06-03 RX ADMIN — LOSARTAN POTASSIUM 100 MILLIGRAM(S): 100 TABLET, FILM COATED ORAL at 11:00

## 2022-06-03 RX ADMIN — SIMVASTATIN 20 MILLIGRAM(S): 20 TABLET, FILM COATED ORAL at 21:29

## 2022-06-03 RX ADMIN — Medication 40 MILLIGRAM(S): at 13:41

## 2022-06-03 RX ADMIN — Medication 8 MILLIGRAM(S): at 21:29

## 2022-06-03 RX ADMIN — PANTOPRAZOLE SODIUM 40 MILLIGRAM(S): 20 TABLET, DELAYED RELEASE ORAL at 06:12

## 2022-06-03 RX ADMIN — SIMETHICONE 80 MILLIGRAM(S): 80 TABLET, CHEWABLE ORAL at 21:44

## 2022-06-03 RX ADMIN — HEPARIN SODIUM 5000 UNIT(S): 5000 INJECTION INTRAVENOUS; SUBCUTANEOUS at 21:28

## 2022-06-03 RX ADMIN — BUDESONIDE AND FORMOTEROL FUMARATE DIHYDRATE 2 PUFF(S): 160; 4.5 AEROSOL RESPIRATORY (INHALATION) at 21:55

## 2022-06-03 RX ADMIN — Medication 2: at 21:33

## 2022-06-03 RX ADMIN — Medication 3 MILLIGRAM(S): at 21:30

## 2022-06-03 NOTE — PROGRESS NOTE ADULT - SUBJECTIVE AND OBJECTIVE BOX
Goal Outcome Evaluation:              Outcome Summary: VSS. Patient A/Ox4. Remains on room air. A-fib with rate controlled. Patient continues to frequently seek out staff attention. No acute events this shift. Continue POC.   Subjective:  still dyspneic  very anxious    MEDICATIONS  (STANDING):  budesonide 160 MICROgram(s)/formoterol 4.5 MICROgram(s) Inhaler 2 Puff(s) Inhalation two times a day  dextrose 5%. 1000 milliLiter(s) (50 mL/Hr) IV Continuous <Continuous>  dextrose 5%. 1000 milliLiter(s) (100 mL/Hr) IV Continuous <Continuous>  dextrose 50% Injectable 25 Gram(s) IV Push once  dextrose 50% Injectable 12.5 Gram(s) IV Push once  dextrose 50% Injectable 25 Gram(s) IV Push once  doxazosin 8 milliGRAM(s) Oral at bedtime  glucagon  Injectable 1 milliGRAM(s) IntraMuscular once  guaiFENesin ER 1200 milliGRAM(s) Oral every 12 hours  heparin   Injectable 5000 Unit(s) SubCutaneous every 8 hours  insulin lispro (ADMELOG) corrective regimen sliding scale   SubCutaneous three times a day before meals  insulin lispro (ADMELOG) corrective regimen sliding scale   SubCutaneous at bedtime  lactobacillus acidophilus 1 Tablet(s) Oral two times a day  losartan 100 milliGRAM(s) Oral daily  melatonin 3 milliGRAM(s) Oral at bedtime  methylPREDNISolone sodium succinate Injectable 40 milliGRAM(s) IV Push every 8 hours  pantoprazole    Tablet 40 milliGRAM(s) Oral before breakfast  simvastatin 20 milliGRAM(s) Oral at bedtime    MEDICATIONS  (PRN):  acetaminophen     Tablet .. 650 milliGRAM(s) Oral every 6 hours PRN Temp greater or equal to 38C (100.4F), Mild Pain (1 - 3)  ALBUTerol    90 MICROgram(s) HFA Inhaler 2 Puff(s) Inhalation every 6 hours PRN Shortness of Breath and/or Wheezing  ALPRAZolam 0.25 milliGRAM(s) Oral every 12 hours PRN anxiety  aluminum hydroxide/magnesium hydroxide/simethicone Suspension 30 milliLiter(s) Oral every 6 hours PRN Dyspepsia  benzocaine 15 mG/menthol 3.6 mG Lozenge 1 Lozenge Oral three times a day PRN Sore Throat  dextrose Oral Gel 15 Gram(s) Oral once PRN Blood Glucose LESS THAN 70 milliGRAM(s)/deciliter  simethicone 80 milliGRAM(s) Chew every 6 hours PRN Gas      Allergies    codeine (Unknown)    Intolerances        REVIEW OF SYSTEMS:    CONSTITUTIONAL:  As per HPI.  HEENT:  Eyes:  No diplopia or blurred vision. ENT:  No earache, sore throat or runny nose.  CARDIOVASCULAR:  No pressure, squeezing, tightness, heaviness or aching about the chest, neck, axilla or epigastrium.  RESPIRATORY:  sob  GASTROINTESTINAL:  No nausea, vomiting or diarrhea.  GENITOURINARY:  No dysuria, frequency or urgency.  MUSCULOSKELETAL:  no joint pain, deformity, tenderness  EXTREMITIES: no clubbing cyanosis,edema  SKIN:  No change in skin, hair or nails.  NEUROLOGIC:  No paresthesias, fasciculations, seizures or weakness.  PSYCHIATRIC:  No disorder of thought or mood.  ENDOCRINE:  No heat or cold intolerance, polyuria or polydipsia.  HEMATOLOGICAL:  No easy bruising or bleedings:    Vital Signs Last 24 Hrs  T(C): 36.7 (03 Jun 2022 08:14), Max: 37.1 (02 Jun 2022 16:03)  T(F): 98 (03 Jun 2022 08:14), Max: 98.8 (02 Jun 2022 16:03)  HR: 96 (03 Jun 2022 08:14) (78 - 96)  BP: 154/80 (03 Jun 2022 08:14) (137/83 - 154/80)  BP(mean): 99 (02 Jun 2022 22:30) (99 - 99)  RR: 18 (03 Jun 2022 08:14) (17 - 18)  SpO2: 92% (03 Jun 2022 08:14) (92% - 95%)    PHYSICAL EXAMINATION:  SKIN: no rashes  HEAD: NC/AT  EYES: PERRLA, EOMI  EARS: TM's intact  NOSE: no abnormalities  NECK:  Supple. No lymphadenopathy. Jugular venous pressure not elevated. Carotids equal.   HEART:   sob  CHEST:  bibasilar crackles R>L  ABDOMEN:  Soft and nontender.   EXTREMITIES:  no C/C/E  NEURO: AAO x 3, no focal deficts       LABS:                        13.0   9.29  )-----------( 416      ( 03 Jun 2022 06:33 )             39.7     06-03    135  |  102  |  18  ----------------------------<  264<H>  4.0   |  23  |  0.83    Ca    9.2      03 Jun 2022 06:33            RADIOLOGY & ADDITIONAL TESTS:

## 2022-06-03 NOTE — PROGRESS NOTE ADULT - ASSESSMENT
- cont O2  - repeat CT scan chest reviewed. No PE. has bilat ground glass and consolidative infiltrates which have progressed'  - remains covid negative  - doubt infectious etiology. WBC normal w "pneumonia". Consider interstitial lung disease/inflammatory process  - inflammatory markers extremely elevated  - ESR 94, , RF 14  - rheumatology/ID f/u noted. Agree w d/c abx and serologic w/u  - started on steroids  - with worsening CT scan, elevated inflammatory markers and no response to abx recommend lung biopsy  - patient anxious and resistant to lung biopsy at present  - dvt proph

## 2022-06-03 NOTE — PROGRESS NOTE ADULT - ASSESSMENT
1. Sepsis POA due to atypical PNA-  Acute hypoxic resp failure- very slow recovery   - Atypical PNA r/o ILD   - COVID negative   - failed oral Abx  - supplemental O2 as needed   - CTA chest; NO PE, atypical peripheral bilateral infiltrates   - iv rocephin and iv zithro changed to cefepime + doxy on 5/31- DC'd   - f/u blood cx: ngtd  - albuterol inh prn   - Mucinex   - pulmonary consult appreciated- sent for legionella/ ILD workup/ CMV and EBV serologies   - ?ILD- elevated CRP/ESR- very slightly elevated Rheumatoid factor- rheumatology consult  - repeat CT chest- showed extensive predominant airspace disease involving all lung   lobes with interval progression- unclear etiology infectious v inflammatory.  - Check COVID PCR- repeat negative   - possible lung biopsy vs bronchoscopy - CT surgery consulted  - ID consult appreciated- antibiotic dc'd and patient was started on IV Steroids.   - Rheumatology consult for possible connective tissue disease- rheum labs sent d/w Dr Roa    # Hypokalemia 3.3:  replete and monitor     # HTN: cont home meds    # DVT PPX: heparin s/q    *left knee pain 10/10  - patient requesting steroid shot on the left knee  - Ortho consult  - Left knee Xray results noted    #Diarrhea no Cdiff- improved   -isolation d/suad      Case d/w team on IDR. Plan was d/w patient.

## 2022-06-03 NOTE — PROGRESS NOTE ADULT - ASSESSMENT
56/M with PMHx of HTN, brought to the ED for c/o worsening SOB and cough along with a recent Dx of PNA.     #SOB, fatigue   admitted for possible PNA with sob, fatigue associated with very elevated inflammatory markers.  Initially not improving on outpatient oral regimen, he notes improvement in the last 24 hours after IV.  however, now off IV Abx and on steroids with worsening of the CARMONA.  WBC have remained normal throughout his admission.  Rheum asked to evaluated for aiCTD given markedly abnormal CT scan - though not specific for aiCTD and strkenly elevated inflammatory which are also not specific for aiCTD   -- pro-calcitonin may be  helpful to help distinguish between processes  -- patient actively denying all other s/s of aiCTD - PE unremarkable for vasculitis, myopathy or SLE type of condition.  Given inflammatory markers and duration of illness will rec aiCTD workup to include serologies and sub-serologies (ZORA< LEA< dsDNA, myomarker 3, CPK, complements, cryo, SPEP, IPEP, RF, CCP, SS.  -- patient was empirically started on steroids thus far without improvement, though patietn very upset all day and this could be obstructing his progress  -- appreciate id and pulmonary evaluations  -- serologies and subserologies pending.   thus far only a borderline positive ZORA and elevated complements have returned, which could be from solely inflammatory/infectious process.   Await further testing.    #left knee and hip pain   unclear etiology though it is non-inflammatory in nature without heat, redness or effusion. knee self-resolved in 24 hours.  hip is more persistent but a bit better c/w yesterday   -- xray reviewed and c/w OA - now with from and nt - observe  -- complete resolution with changing position - observe for now    #OA knee  has had steroid shots in the past.  currently with normal rom and nt  -- IA depot not indicated at this time     #medication monitoring, steroid use   -- steroids - increased BS now on insulin - d/w patient.  patient still wants to continue steroids   -- check vitamin D  -- quant tb p    d/w Yrn Baker  will continue to follow

## 2022-06-03 NOTE — PROGRESS NOTE ADULT - SUBJECTIVE AND OBJECTIVE BOX
HPI:  56/M with PMHx of HTN, brought to the ED for c/o worsening SOB and cough along with a recent Dx of PNA.     INTERVAL HX   Mr. Garcia says he is worse today.  He says he doesn't know why his breathing is worse today and wonders if it is because he has been arguing with people all day. He was irate over a number of issues regarding his room and process during his stay, doesn't understand the hospital policy of no fans in the room, and is self-conscious to go to the bathroom at the bedside commode.  He says as a result of this, today is more sob and finds it difficult to take a deep breath in.  He denies new symptoms, but does note that today his left knee and left hip are perfect.  He says that he is completely able to move them and he no longer has pain.     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 1000 milliLiter(s) (50 mL/Hr) IV Continuous <Continuous>  dextrose 5%. 1000 milliLiter(s) (100 mL/Hr) IV Continuous <Continuous>  dextrose 50% Injectable 12.5 Gram(s) IV Push once  dextrose 50% Injectable 25 Gram(s) IV Push once  dextrose 50% Injectable 25 Gram(s) IV Push once  doxazosin 8 milliGRAM(s) Oral at bedtime  glucagon  Injectable 1 milliGRAM(s) IntraMuscular once  guaiFENesin ER 1200 milliGRAM(s) Oral every 12 hours  heparin   Injectable 5000 Unit(s) SubCutaneous every 8 hours  insulin lispro (ADMELOG) corrective regimen sliding scale   SubCutaneous three times a day before meals  insulin lispro (ADMELOG) corrective regimen sliding scale   SubCutaneous at bedtime  lactobacillus acidophilus 1 Tablet(s) Oral two times a day  losartan 100 milliGRAM(s) Oral daily  melatonin 3 milliGRAM(s) Oral at bedtime  methylPREDNISolone sodium succinate Injectable 40 milliGRAM(s) IV Push every 8 hours  pantoprazole    Tablet 40 milliGRAM(s) Oral before breakfast  simvastatin 20 milliGRAM(s) Oral at bedtime    MEDICATIONS  (PRN):  acetaminophen     Tablet .. 650 milliGRAM(s) Oral every 6 hours PRN Temp greater or equal to 38C (100.4F), Mild Pain (1 - 3)  ALBUTerol    90 MICROgram(s) HFA Inhaler 2 Puff(s) Inhalation every 6 hours PRN Shortness of Breath and/or Wheezing  ALPRAZolam 0.25 milliGRAM(s) Oral every 12 hours PRN anxiety  aluminum hydroxide/magnesium hydroxide/simethicone Suspension 30 milliLiter(s) Oral every 6 hours PRN Dyspepsia  benzocaine 15 mG/menthol 3.6 mG Lozenge 1 Lozenge Oral three times a day PRN Sore Throat  dextrose Oral Gel 15 Gram(s) Oral once PRN Blood Glucose LESS THAN 70 milliGRAM(s)/deciliter  simethicone 80 milliGRAM(s) Chew every 6 hours PRN Gas    Vital Signs Last 24 Hrs  T(C): 36.7 (2022 08:14), Max: 37.1 (2022 16:03)  T(F): 98 (2022 08:14), Max: 98.8 (2022 16:03)  HR: 96 (2022 08:14) (87 - 96)  BP: 154/80 (2022 08:14) (137/83 - 154/80)  BP(mean): 99 (2022 22:30) (99 - 99)  RR: 18 (2022 08:14) (17 - 18)  SpO2: 92% (2022 08:14) (92% - 95%)    REVIEW OF SYSTEMS:  CONSTITUTIONAL:  per HPI  SKIN: no rashes  HEENT:  Eyes:  No diplopia or blurred vision. ENT:  No earache, sore throat or runny nose.  CARDIOVASCULAR:  No pressure, squeezing, tightness, heaviness or aching about the chest, neck, axilla or epigastrium.  RESPIRATORY:  per HPI  GASTROINTESTINAL:  No nausea, vomiting - endorses a h/o diarrhea after abx - improving  GENITOURINARY:  No dysuria, frequency or urgency.  MUSCULOSKELETAL:  As per HPI.  SKIN:  No change in skin, hair or nails.  NEUROLOGIC:  No paresthesias, fasciculations, seizures or weakness.  PSYCHIATRIC:  No disorder of thought or mood.  ENDOCRINE:  No heat or cold intolerance, polyuria or polydipsia.  HEMATOLOGICAL:  No easy bruising or bleedings:  .     PHYSICAL EXAMINATION:    GENERAL APPEARANCE:  Mr. Garcia was speaking fast, irate and every so often would need to stop to take a deep breath in, which didnt happen yesterday.  He is alert, oriented, and pleasant.  NC in place, able to speak clearly without coughing, though CARMONA  EXTREMITIES:  There is no cyanosis, clubbing or edema.   VASC: no RP, LR  SKIN:  No rash or significant lesions are noted.  MSK:  no synovitis, no dactylitis.  able to spontaneously move at his knee and hip without pain. distal strength at the wrist and ankle are 5/5    LABS:             Labs                        13.0   9.29  )-----------( 416      ( 2022 06:33 )             39.7   06-    135  |  102  |  18  ----------------------------<  264<H>  4.0   |  23  |  0.83    Ca    9.2      2022 06:33      C4 Complement, Serum: 40 mg/dL (22 @ 16:33) C3 Complement, Serum: 163 mg/dL (22 @ 16:33) Quantitative Ig mg/dL   Quantitative IgA: 237 mg/dL   Quantitative IgM: 128 mg/dL   IFRAH Kappa: 2.52 mg/dL   IFRAH Lambda: 2.05 mg/dL   [place]/Lambda Free Light Chain Ratio, Serum: 1.23 Ratio Anti Nuclear Factor Titer: 1:80: Antinuclear AB (ZORA), IFA Method (22 @ 13:01)   Rheumatoid Factor Quant, Serum or Plasma: 14 IU/mL (22 @ 13:01)   C-Reactive Protein, Serum: 183 mg/L (22 @ 13:01)   Sedimentation Rate, Erythrocyte: 94 mm/hr (22 @ 13:01)   Hepatitis C Virus S/CO Ratio: 0.11 S/CO (22 @ 08:01)         RADIOLOGY & ADDITIONAL STUDIES:     < from: CT Chest No Cont (22 @ 10:20) >    ACC: [id number] EXAM:  CT CHEST                          PROCEDURE DATE:  2022          INTERPRETATION:  HISTORY: Pneumonia with slow improvement.    EXAMINATION: CT CHEST was performed without IV contrast.    COMPARISON: 2022.    FINDINGS:    AIRWAYS, LUNGS, PLEURA: Central airways clear. No pleural effusion.    Peripheral predominant ground-glass and consolidative opacities involving   all lung lobes have demonstrated interval progression compared to   2022. For example, region of consolidation in the posterior right   upper lobe (image 44, series 2) is new and right lower lobe superior   segment consolidation (image 59, series 2) has progressed.    MEDIASTINUM: Normal heart size. Coronary atherosclerosis. Small   pericardial effusion. Thoracic aorta normal caliber.  No large   mediastinal lymph nodes. Small amount of fluid within the lumen of the   lower esophagus.    IMAGED ABDOMEN: Unchanged 2 cm left adrenal nodule.    SOFT TISSUES: Unremarkable.    BONES: Unremarkable.      IMPRESSION:.    Extensive peripheral predominant airspace disease involving all lung   lobes with interval progression compared to 2022. The exact etiology   is unclear and diagnostic considerations include infectious etiology and   inflammatory etiology (such as organizing pneumonia).    CT chest follow-up in one month recommended to ensure clearing.    --- End of Report ---            SILAS HERMAN MD; Attending Radiologist  This document has been electronically signed. 2022 10:31AM    < end of copied text >  < from: CT Chest No Cont (22 @ 10:20) >    ACC: [id number] EXAM:  CT CHEST                          PROCEDURE DATE:  2022          INTERPRETATION:  HISTORY: Pneumonia with slow improvement.    EXAMINATION: CT CHEST was performed without IV contrast.    COMPARISON: 2022.    FINDINGS:    AIRWAYS, LUNGS, PLEURA: Central airways clear. No pleural effusion.    Peripheral predominant ground-glass and consolidative opacities involving   all lung lobes have demonstrated interval progression compared to   2022. For example, region of consolidation in the posterior right   upper lobe (image 44, series 2) is new and right lower lobe superior   segment consolidation (image 59, series 2) has progressed.    MEDIASTINUM: Normal heart size. Coronary atherosclerosis. Small   pericardial effusion. Thoracic aorta normal caliber.  No large   mediastinal lymph nodes. Small amount of fluid within the lumen of the   lower esophagus.    IMAGED ABDOMEN: Unchanged 2 cm left adrenal nodule.    SOFT TISSUES: Unremarkable.    BONES: Unremarkable.      IMPRESSION:.    Extensive peripheral predominant airspace disease involving all lung   lobes with interval progression compared to 2022. The exact etiology   is unclear and diagnostic considerations include infectious etiology and   inflammatory etiology (such as organizing pneumonia).    CT chest follow-up in one month recommended to ensure clearing.    --- End of Report ---            SILAS HERMAN MD; Attending Radiologist  This document has been electronically signed. 2022 10:31AM    < end of copied text >  < from: Xray Knee 1 or 2 Views, Left (22 @ 16:03) >  CC: [id number] EXAM:  XR KNEE 1-2 VIEWS LT                          PROCEDURE DATE:  2022          INTERPRETATION:  CLINICAL HISTORY: LEFT knee pain.    TECHNIQUE: AP, lateral, and oblique radiographs    FINDINGS: The bone mineralization is normal.  There is no fracture or dislocation.  Mild degenerative narrowing of the medial joint compartment with medial   peripheral femoral tibial condyle osteophytes. Lateral joint compartment   height maintained. No joint effusion.   No gross soft tissue/abnormalities..    IMPRESSION:  Mild degenerative narrowing of the medial joint compartment.    If pain persists despite conservative therapy and soft tissue internal   derangement or occult fracture is clinically suspected follow-up MRI   recommended.    --- End of Report ---            ASHLEIGH SEPULVEDA MD; Attending Radiologist  This document has been electronically signed. 2022  7:57PM    < end of copied text >  < from: CT Angio Chest PE Protocol w/ IV Cont (22 @ 16:55) >  ACC: [id number] EXAM:  CT ANGIO CHEST PULM ART [facility]                          PROCEDURE DATE:  2022          INTERPRETATION:  INDICATION: Shortness of breath    TECHNIQUE: Volumetric images of the chest were obtained after the   administration of 90 mL of Omnipaque 350. Maximum intensity projection   images were generated.    COMPARISON: None.    FINDINGS:    PULMONARY ANGIOGRAM: No pulmonary embolism from the main pulmonary artery   up to and including the segmental branches. Limited assessment of   subsegmental branches in the lower lobes as they are not well opacified.   Normal caliber pulmonary artery.    LUNGS/AIRWAYS/PLEURA: Patent trachea and bronchi. Peripheral groundglass   and consolidative opacities in all lobes, preferentialto the lower   lobes. Unremarkable pleura.    LYMPH NODES/MEDIASTINUM: No enlarged lymph nodes.    HEART/VASCULATURE: Normal heart size. Small pericardial effusion. Normal   caliber aorta. Coronary artery calcifications.    UPPER ABDOMEN: Partially included incompletely characterized 2.0 cm left   adrenal nodule.    BONES/SOFT TISSUES: Degenerative changes of the spine.      IMPRESSION:    No pulmonary embolism from the main pulmonary artery up to and including   the segmental branches. Limited assessment of subsegmental branches.    Bilateral peripheral lung disease. Differential includes infection (COVID   in particular), organizing pneumonia, or eosinophilic pneumonia.    Incidental incompletely characterized left adrenal nodule. Further   evaluation with MRI is recommended, which can be done on a nonemergent   basis.    --- End of Report ---          < end of copied text >

## 2022-06-03 NOTE — CONSULT NOTE ADULT - SUBJECTIVE AND OBJECTIVE BOX
Surgeon:  Pradeep     Consult requesting by: Chemo     HISTORY OF PRESENT ILLNESS:  56/M with PMHx of HTN, brought to the ED for c/o worsening SOB and cough along with a recent Dx of PNA. He also states that he has been feeling very weak and dizzy. He recently got 4th dose of COVID vaccine 3 weeks ago and Sx started 1 week thereafter.  Pt upon workup noted to have RUL RLL consolidation    pt seen at bedside. Is comfortable.    CT scan :  < from: CT Chest No Cont (06.02.22 @ 10:20) >  Extensive peripheral predominant airspace disease involving all lung   lobes with interval progression compared to 5/27/2022. The exact etiology   is unclear and diagnostic considerations include infectious etiology and   inflammatory etiology (such as organizing pneumonia).    CT chest follow-up in one month recommended to ensure clearing.    < end of copied text >      PAST MEDICAL & SURGICAL HISTORY:  HTN (hypertension)          MEDICATIONS  (STANDING):  budesonide 160 MICROgram(s)/formoterol 4.5 MICROgram(s) Inhaler 2 Puff(s) Inhalation two times a day  dextrose 5%. 1000 milliLiter(s) (100 mL/Hr) IV Continuous <Continuous>  dextrose 5%. 1000 milliLiter(s) (50 mL/Hr) IV Continuous <Continuous>  dextrose 50% Injectable 25 Gram(s) IV Push once  dextrose 50% Injectable 12.5 Gram(s) IV Push once  dextrose 50% Injectable 25 Gram(s) IV Push once  doxazosin 8 milliGRAM(s) Oral at bedtime  glucagon  Injectable 1 milliGRAM(s) IntraMuscular once  guaiFENesin ER 1200 milliGRAM(s) Oral every 12 hours  heparin   Injectable 5000 Unit(s) SubCutaneous every 8 hours  insulin lispro (ADMELOG) corrective regimen sliding scale   SubCutaneous three times a day before meals  insulin lispro (ADMELOG) corrective regimen sliding scale   SubCutaneous at bedtime  lactobacillus acidophilus 1 Tablet(s) Oral two times a day  losartan 100 milliGRAM(s) Oral daily  melatonin 3 milliGRAM(s) Oral at bedtime  methylPREDNISolone sodium succinate Injectable 40 milliGRAM(s) IV Push every 8 hours  pantoprazole    Tablet 40 milliGRAM(s) Oral before breakfast  simvastatin 20 milliGRAM(s) Oral at bedtime    MEDICATIONS  (PRN):  acetaminophen     Tablet .. 650 milliGRAM(s) Oral every 6 hours PRN Temp greater or equal to 38C (100.4F), Mild Pain (1 - 3)  ALBUTerol    90 MICROgram(s) HFA Inhaler 2 Puff(s) Inhalation every 6 hours PRN Shortness of Breath and/or Wheezing  ALPRAZolam 0.25 milliGRAM(s) Oral every 12 hours PRN anxiety  aluminum hydroxide/magnesium hydroxide/simethicone Suspension 30 milliLiter(s) Oral every 6 hours PRN Dyspepsia  benzocaine 15 mG/menthol 3.6 mG Lozenge 1 Lozenge Oral three times a day PRN Sore Throat  dextrose Oral Gel 15 Gram(s) Oral once PRN Blood Glucose LESS THAN 70 milliGRAM(s)/deciliter  simethicone 80 milliGRAM(s) Chew every 6 hours PRN Gas    Antiplatelet therapy:    HSQ                       Last dose/amt: current     Allergies    codeine (Unknown)    Intolerances        SOCIAL HISTORY:  Smoker: [ ] Yes  [ x] No        PACK YEARS:                         WHEN QUIT?  ETOH use: [ ] Yes  [x ] No              FREQUENCY / QUANTITY:  Ilicit Drug use:  [ ] Yes  [ ] No  Occupation: sells medical insurance   Live with: self   Assisted device use: no    FAMILY HISTORY:  No pertinent family history in first degree relatives        Review of Systems  CONSTITUTIONAL:  Fevers[ ] chills[ ] sweats[ x] fatigue[ ] weight loss[ ] weight gain [ ]                                     NEGATIVE [ ]   NEURO:  parathesias[ ] seizures [ ]  syncope [ ]  confusion [ ]                                                                                NEGATIVE[x ]   EYES: glasses[ ]  blurry vision[ ]  discharge[ ] pain[ ] glaucoma [ ]                                                                          NEGATIVE[x ]   ENMT:  difficulty hearing [ ]  vertigo[ ]  dysphagia[ ] epistaxis[ ] recent dental work [ ]                                    NEGATIVE[x ]   CV:  chest pain[ ] palpitations[ ] CARMONA [ ] diaphoresis [ ]                                                                                           NEGATIVE[ ]   RESPIRATORY:  wheezing[ ] SOB[x ] cough [ x] sputum[ ] hemoptysis[ ]                                                                  NEGATIVE[ ]   GI:  nausea[ ]  vomiting [ ]  diarrhea[ ] constipation [ ] melena [ ]                                                                      NEGATIVE[x ]   : hematuria[ ]  dysuria[ ] urgency[ ] incontinence[ ]                                                                                            NEGATIVE[x ]   MUSCULOSKELETAL  arthritis[ ]  joint swelling [ ] muscle weakness [ ]                                                                NEGATIVE[x ]   SKIN/BREAST:  rash[ ] itching [ ]  hair loss[ ] masses[ ]                                                                                              NEGATIVE[ x]   PSYCH:  dementia [ ] depression [ ] anxiety[ ]                                                                                                               NEGATIVE[x ]   HEME/LYMPH:  bruises easily[ ] enlarged lymph nodes[ ] tender lymph nodes[ ]                                               NEGATIVE[x ]   ENDOCRINE:  cold intolerance[ ] heat intolerance[ ] polydipsia[ ]                                                                          NEGATIVEx[ ]     PHYSICAL EXAM  Vital Signs Last 24 Hrs  T(C): 36.7 (03 Jun 2022 08:14), Max: 36.8 (02 Jun 2022 22:30)  T(F): 98 (03 Jun 2022 08:14), Max: 98.2 (02 Jun 2022 22:30)  HR: 96 (03 Jun 2022 08:14) (87 - 96)  BP: 154/80 (03 Jun 2022 08:14) (141/85 - 154/80)  BP(mean): 99 (02 Jun 2022 22:30) (99 - 99)  RR: 18 (03 Jun 2022 08:14) (18 - 18)  SpO2: 92% (03 Jun 2022 08:14) (92% - 95%)    CONSTITUTIONAL:                                                                          WNL[ x]   Neuro: WNL[x ] Normal exam oriented to person/place & time with no focal motor or sensory  deficits. Other                     Eyes: WNL[ x]   Normal exam of conjunctiva & lids, pupils equally reactive. Other     ENT: WNL[x ]    Normal exam of nasal/oral mucosa with absence of cyanosis. Other  Neck: WNL[x ]  Normal exam of jugular veins, trachea & thyroid. Other  Chest: WNL[ ] Normal lung exam with good air movement absence of wheezes, rales, or rhonchi: Other decreased b/l                                                                                 CV:  Auscultation: normal [x ] S3[ ] S4[ ] Irregular [ ] Rub[ ] Clicks[ ]    Murmurs none:[x ]systolic [ ]  diastolic [ ] holosystolic [ ]  Carotids: No Bruits[ x] Other                 Abdominal Aorta: normal [ ] nonpalpable[ ]Other                                                                                      GI:           WNL[x ] Normal exam of abdomen, liver & spleen with no noted masses or tenderness. Other                                                                                                        Extremities: WNL[ ] Normal no evidence of cyanosis or deformity Edema: none[ ]trace[x ]1+[ ]2+[ ]3+[ ]4+[ ]                                                            LABS:                        13.0   9.29  )-----------( 416      ( 03 Jun 2022 06:33 )             39.7     06-03    135  |  102  |  18  ----------------------------<  264<H>  4.0   |  23  |  0.83    Ca    9.2      03 Jun 2022 06:33

## 2022-06-03 NOTE — PROGRESS NOTE ADULT - SUBJECTIVE AND OBJECTIVE BOX
History of Present Illness:   56/M with PMHx of HTN, brought to the ED for c/o worsening SOB and cough along with a recent Dx of PNA. He also states that he has been feeling very weak and dizzy. He recently got 4th dose of COVID vaccine 3 weeks ago and Sx started 1 week thereafter.   Also HAs cough lizzie when inhales deeply.  Hypoxic on ra.  States he has not been eating much recently and has a 15 lb weight loss. Also states he has been very fatigued and sleeping more.    6/1- Tmax 100.6- patient was seen and examined. Patient states "I am not feeling well and I seem to be getting worse." He reported that last night he has SOB walking to the BR and it took him a while to recover- he states that he is also SOB when he just attempts to get out of bed and use the commode. He feels that he is suffocating because he cannot control the temp or air in his room. He is also complaining of sore throat from coughing, +left knee painon ROM- he is asking for cortisone shot for left knee.   6/2- patient was seen and examined. stated that he feels better this morning- able to take deep breaths and was able to walk to the stretcher for CT_ still with some CARMONA but improved. Left knee pain is improved- left hip pain is improved. he feels that he pulled a muscle when he turned. cough is improved. c/o abdominal distention and burping- stated that gasx works well  when he takes it. Denies chest pain, fever or chills.   6/3-       Vital Signs Last 24 Hrs  T(C): 36.7 (03 Jun 2022 08:14), Max: 37.1 (02 Jun 2022 16:03)  T(F): 98 (03 Jun 2022 08:14), Max: 98.8 (02 Jun 2022 16:03)  HR: 96 (03 Jun 2022 08:14) (78 - 96)  BP: 154/80 (03 Jun 2022 08:14) (137/83 - 154/80)  BP(mean): 99 (02 Jun 2022 22:30) (99 - 99)  RR: 18 (03 Jun 2022 08:14) (17 - 18)  SpO2: 92% (03 Jun 2022 08:14) (92% - 95%)      ROS:   All 10 systems reviewed and found to be negative with the exception of what has been described above.   PE:  Constitutional: NAD, laying in bed  HEENT: NC/AT  Back: no tenderness  Respiratory: respirations even and non labored, LCTA  Cardiovascular: S1S2 regular, no murmurs  Abdomen: soft, not tender, not distended, positive BS  Genitourinary: voiding  Musculoskeletal: no muscle tenderness, no joint swelling or tenderness  Extremities: no pedal edema  Neurological: no focal deficits      Labs      06-03    135  |  102  |  18  ----------------------------<  264<H>  4.0   |  23  |  0.83    Ca    9.2      03 Jun 2022 06:33              13.0   9.29  )-----------( 416      ( 03 Jun 2022 06:33 )             39.7                 11.9  10.08 )-----------( 337      ( 02 Jun 2022 08:35 )             35.2       06-02    135  |  102  |  12  ----------------------------<  159<H>  3.4<L>   |  22  |  0.78    Ca    8.9      02 Jun 2022 08:35    Rheumatoid Factor Quant, Serum or Plasma: 14 IU/mL (06.01.22 @ 13:01)     C-Reactive Protein, Serum: 183 mg/L (06.01.22 @ 13:01)  Sedimentation Rate, Erythrocyte: 94 mm/hr (06.01.22 @ 13:01)   Legionella Urine- negative     MEDICATIONS  (STANDING):  budesonide 160 MICROgram(s)/formoterol 4.5 MICROgram(s) Inhaler 2 Puff(s) Inhalation two times a day  doxazosin 8 milliGRAM(s) Oral at bedtime  guaiFENesin ER 1200 milliGRAM(s) Oral every 12 hours  heparin   Injectable 5000 Unit(s) SubCutaneous every 8 hours  lactobacillus acidophilus 1 Tablet(s) Oral two times a day  losartan 100 milliGRAM(s) Oral daily  melatonin 3 milliGRAM(s) Oral at bedtime  methylPREDNISolone sodium succinate Injectable 40 milliGRAM(s) IV Push every 8 hours  pantoprazole    Tablet 40 milliGRAM(s) Oral before breakfast  simvastatin 20 milliGRAM(s) Oral at bedtime    MEDICATIONS  (PRN):  acetaminophen     Tablet .. 650 milliGRAM(s) Oral every 6 hours PRN Temp greater or equal to 38C (100.4F), Mild Pain (1 - 3)  ALBUTerol    90 MICROgram(s) HFA Inhaler 2 Puff(s) Inhalation every 6 hours PRN Shortness of Breath and/or Wheezing  ALPRAZolam 0.25 milliGRAM(s) Oral every 12 hours PRN anxiety  aluminum hydroxide/magnesium hydroxide/simethicone Suspension 30 milliLiter(s) Oral every 6 hours PRN Dyspepsia  benzocaine 15 mG/menthol 3.6 mG Lozenge 1 Lozenge Oral three times a day PRN Sore Throat  simethicone 80 milliGRAM(s) Chew every 6 hours PRN Gas      CT Angio Chest PE Protocol w/ IV Cont (05.27.22 @ 16:55) >  No pulmonary embolism from the main pulmonary artery up to and including   the segmental branches. Limited assessment of subsegmental branches.    Bilateral peripheral lung disease. Differential includes infection (COVID   in particular), organizing pneumonia, or eosinophilic pneumonia.    Incidental incompletely characterized left adrenal nodule. Further   evaluation with MRI is recommended, which can be done on a nonemergent   basis.     CT Chest No Cont (06.02.22 @ 10:20) >  CT CHEST                        PROCEDURE DATE:  06/02/2022    INTERPRETATION:  HISTORY: Pneumonia with slow improvement.  EXAMINATION: CT CHEST was performed without IV contrast.  COMPARISON: 5/27/2022.  FINDINGS:  AIRWAYS, LUNGS, PLEURA: Central airways clear. No pleural effusion.  Peripheral predominant ground-glass and consolidative opacities involving   all lung lobes have demonstrated interval progression compared to   5/27/2022. For example, region of consolidation in the posterior right   upper lobe (image 44, series 2) is new and right lower lobe superior   segment consolidation (image 59, series 2) has progressed.  MEDIASTINUM: Normal heart size. Coronary atherosclerosis. Small   pericardial effusion. Thoracic aorta normal caliber.  No large   mediastinal lymph nodes. Small amount of fluid within the lumen of the   lower esophagus.  IMAGED ABDOMEN: Unchanged 2 cm left adrenal nodule.  SOFT TISSUES: Unremarkable.  BONES: Unremarkable.  IMPRESSION:.  Extensive peripheral predominant airspace disease involving all lung   lobes with interval progression compared to 5/27/2022. The exact etiology   is unclear and diagnostic considerations include infectious etiology and   inflammatory etiology (such as organizing pneumonia).  CT chest follow-up in one month recommended to ensure clearing.                       History of Present Illness:   56/M with PMHx of HTN, brought to the ED for c/o worsening SOB and cough along with a recent Dx of PNA. He also states that he has been feeling very weak and dizzy. He recently got 4th dose of COVID vaccine 3 weeks ago and Sx started 1 week thereafter.   Also HAs cough lizzie when inhales deeply.  Hypoxic on ra.  States he has not been eating much recently and has a 15 lb weight loss. Also states he has been very fatigued and sleeping more.    6/1- Tmax 100.6- patient was seen and examined. Patient states "I am not feeling well and I seem to be getting worse." He reported that last night he has SOB walking to the BR and it took him a while to recover- he states that he is also SOB when he just attempts to get out of bed and use the commode. He feels that he is suffocating because he cannot control the temp or air in his room. He is also complaining of sore throat from coughing, +left knee painon ROM- he is asking for cortisone shot for left knee.   6/2- patient was seen and examined. stated that he feels better this morning- able to take deep breaths and was able to walk to the stretcher for CT_ still with some CARMONA but improved. Left knee pain is improved- left hip pain is improved. he feels that he pulled a muscle when he turned. cough is improved. c/o abdominal distention and burping- stated that gasx works well  when he takes it. Denies chest pain, fever or chills.   6/3- patient very irate - stated that he wants a private room and he wants a fan blowing air directly on him. He complains that he cannot use the commode because there are too many people in the room. He is very unhappy with the care he is receiving. He is upset that the antibiotic was discontinued and that he was started on steroids. He feels so much worse than yesterday- he states that cannot take deep breaths      Vital Signs Last 24 Hrs  T(C): 36.7 (03 Jun 2022 08:14), Max: 37.1 (02 Jun 2022 16:03)  T(F): 98 (03 Jun 2022 08:14), Max: 98.8 (02 Jun 2022 16:03)  HR: 96 (03 Jun 2022 08:14) (78 - 96)  BP: 154/80 (03 Jun 2022 08:14) (137/83 - 154/80)  BP(mean): 99 (02 Jun 2022 22:30) (99 - 99)  RR: 18 (03 Jun 2022 08:14) (17 - 18)  SpO2: 92% (03 Jun 2022 08:14) (92% - 95%)      ROS:   All 10 systems reviewed and found to be negative with the exception of what has been described above.   PE:  Constitutional: NAD, laying in bed  HEENT: NC/AT  Back: no tenderness  Respiratory: respirations even and non labored, LCTA  Cardiovascular: S1S2 regular, no murmurs  Abdomen: soft, not tender, not distended, positive BS  Genitourinary: voiding  Musculoskeletal: no muscle tenderness, no joint swelling or tenderness  Extremities: no pedal edema  Neurological: no focal deficits      Labs      06-03    135  |  102  |  18  ----------------------------<  264<H>  4.0   |  23  |  0.83    Ca    9.2      03 Jun 2022 06:33              13.0   9.29  )-----------( 416      ( 03 Jun 2022 06:33 )             39.7                 11.9  10.08 )-----------( 337      ( 02 Jun 2022 08:35 )             35.2       06-02    135  |  102  |  12  ----------------------------<  159<H>  3.4<L>   |  22  |  0.78    Ca    8.9      02 Jun 2022 08:35    Rheumatoid Factor Quant, Serum or Plasma: 14 IU/mL (06.01.22 @ 13:01)     C-Reactive Protein, Serum: 183 mg/L (06.01.22 @ 13:01)  Sedimentation Rate, Erythrocyte: 94 mm/hr (06.01.22 @ 13:01)   Legionella Urine- negative     MEDICATIONS  (STANDING):  budesonide 160 MICROgram(s)/formoterol 4.5 MICROgram(s) Inhaler 2 Puff(s) Inhalation two times a day  doxazosin 8 milliGRAM(s) Oral at bedtime  guaiFENesin ER 1200 milliGRAM(s) Oral every 12 hours  heparin   Injectable 5000 Unit(s) SubCutaneous every 8 hours  lactobacillus acidophilus 1 Tablet(s) Oral two times a day  losartan 100 milliGRAM(s) Oral daily  melatonin 3 milliGRAM(s) Oral at bedtime  methylPREDNISolone sodium succinate Injectable 40 milliGRAM(s) IV Push every 8 hours  pantoprazole    Tablet 40 milliGRAM(s) Oral before breakfast  simvastatin 20 milliGRAM(s) Oral at bedtime    MEDICATIONS  (PRN):  acetaminophen     Tablet .. 650 milliGRAM(s) Oral every 6 hours PRN Temp greater or equal to 38C (100.4F), Mild Pain (1 - 3)  ALBUTerol    90 MICROgram(s) HFA Inhaler 2 Puff(s) Inhalation every 6 hours PRN Shortness of Breath and/or Wheezing  ALPRAZolam 0.25 milliGRAM(s) Oral every 12 hours PRN anxiety  aluminum hydroxide/magnesium hydroxide/simethicone Suspension 30 milliLiter(s) Oral every 6 hours PRN Dyspepsia  benzocaine 15 mG/menthol 3.6 mG Lozenge 1 Lozenge Oral three times a day PRN Sore Throat  simethicone 80 milliGRAM(s) Chew every 6 hours PRN Gas      CT Angio Chest PE Protocol w/ IV Cont (05.27.22 @ 16:55) >  No pulmonary embolism from the main pulmonary artery up to and including   the segmental branches. Limited assessment of subsegmental branches.    Bilateral peripheral lung disease. Differential includes infection (COVID   in particular), organizing pneumonia, or eosinophilic pneumonia.    Incidental incompletely characterized left adrenal nodule. Further   evaluation with MRI is recommended, which can be done on a nonemergent   basis.     CT Chest No Cont (06.02.22 @ 10:20) >  CT CHEST                        PROCEDURE DATE:  06/02/2022    INTERPRETATION:  HISTORY: Pneumonia with slow improvement.  EXAMINATION: CT CHEST was performed without IV contrast.  COMPARISON: 5/27/2022.  FINDINGS:  AIRWAYS, LUNGS, PLEURA: Central airways clear. No pleural effusion.  Peripheral predominant ground-glass and consolidative opacities involving   all lung lobes have demonstrated interval progression compared to   5/27/2022. For example, region of consolidation in the posterior right   upper lobe (image 44, series 2) is new and right lower lobe superior   segment consolidation (image 59, series 2) has progressed.  MEDIASTINUM: Normal heart size. Coronary atherosclerosis. Small   pericardial effusion. Thoracic aorta normal caliber.  No large   mediastinal lymph nodes. Small amount of fluid within the lumen of the   lower esophagus.  IMAGED ABDOMEN: Unchanged 2 cm left adrenal nodule.  SOFT TISSUES: Unremarkable.  BONES: Unremarkable.  IMPRESSION:.  Extensive peripheral predominant airspace disease involving all lung   lobes with interval progression compared to 5/27/2022. The exact etiology   is unclear and diagnostic considerations include infectious etiology and   inflammatory etiology (such as organizing pneumonia).  CT chest follow-up in one month recommended to ensure clearing.

## 2022-06-04 LAB
ANION GAP SERPL CALC-SCNC: 8 MMOL/L — SIGNIFICANT CHANGE UP (ref 5–17)
ANTI-RIBONUCLEAR PROTEIN: <0.2 AI — SIGNIFICANT CHANGE UP
BUN SERPL-MCNC: 26 MG/DL — HIGH (ref 7–23)
CALCIUM SERPL-MCNC: 9.3 MG/DL — SIGNIFICANT CHANGE UP (ref 8.5–10.1)
CHLORIDE SERPL-SCNC: 103 MMOL/L — SIGNIFICANT CHANGE UP (ref 96–108)
CO2 SERPL-SCNC: 23 MMOL/L — SIGNIFICANT CHANGE UP (ref 22–31)
CREAT SERPL-MCNC: 0.9 MG/DL — SIGNIFICANT CHANGE UP (ref 0.5–1.3)
DSDNA AB FLD-ACNC: <0.2 AI — SIGNIFICANT CHANGE UP
EGFR: 100 ML/MIN/1.73M2 — SIGNIFICANT CHANGE UP
ENA JO1 AB SER-ACNC: <0.2 AI — SIGNIFICANT CHANGE UP
ENA SM AB FLD QL: <0.2 AI — SIGNIFICANT CHANGE UP
ENA SS-A AB FLD IA-ACNC: <0.2 AI — SIGNIFICANT CHANGE UP
GAMMA INTERFERON BACKGROUND BLD IA-ACNC: 0.02 IU/ML — SIGNIFICANT CHANGE UP
GLUCOSE SERPL-MCNC: 337 MG/DL — HIGH (ref 70–99)
HCT VFR BLD CALC: 41.3 % — SIGNIFICANT CHANGE UP (ref 39–50)
HGB BLD-MCNC: 13.6 G/DL — SIGNIFICANT CHANGE UP (ref 13–17)
M TB IFN-G BLD-IMP: ABNORMAL
M TB IFN-G CD4+ BCKGRND COR BLD-ACNC: 0 IU/ML — SIGNIFICANT CHANGE UP
M TB IFN-G CD4+CD8+ BCKGRND COR BLD-ACNC: 0 IU/ML — SIGNIFICANT CHANGE UP
MCHC RBC-ENTMCNC: 28.5 PG — SIGNIFICANT CHANGE UP (ref 27–34)
MCHC RBC-ENTMCNC: 32.9 GM/DL — SIGNIFICANT CHANGE UP (ref 32–36)
MCV RBC AUTO: 86.6 FL — SIGNIFICANT CHANGE UP (ref 80–100)
PLATELET # BLD AUTO: 439 K/UL — HIGH (ref 150–400)
POTASSIUM SERPL-MCNC: 4.6 MMOL/L — SIGNIFICANT CHANGE UP (ref 3.5–5.3)
POTASSIUM SERPL-SCNC: 4.6 MMOL/L — SIGNIFICANT CHANGE UP (ref 3.5–5.3)
QUANT TB PLUS MITOGEN MINUS NIL: 0.02 IU/ML — SIGNIFICANT CHANGE UP
RBC # BLD: 4.77 M/UL — SIGNIFICANT CHANGE UP (ref 4.2–5.8)
RBC # FLD: 14.1 % — SIGNIFICANT CHANGE UP (ref 10.3–14.5)
SODIUM SERPL-SCNC: 134 MMOL/L — LOW (ref 135–145)
WBC # BLD: 14.79 K/UL — HIGH (ref 3.8–10.5)
WBC # FLD AUTO: 14.79 K/UL — HIGH (ref 3.8–10.5)

## 2022-06-04 PROCEDURE — 99233 SBSQ HOSP IP/OBS HIGH 50: CPT

## 2022-06-04 PROCEDURE — 99232 SBSQ HOSP IP/OBS MODERATE 35: CPT

## 2022-06-04 RX ADMIN — PANTOPRAZOLE SODIUM 40 MILLIGRAM(S): 20 TABLET, DELAYED RELEASE ORAL at 07:13

## 2022-06-04 RX ADMIN — Medication 1200 MILLIGRAM(S): at 08:57

## 2022-06-04 RX ADMIN — Medication 3: at 21:54

## 2022-06-04 RX ADMIN — SIMVASTATIN 20 MILLIGRAM(S): 20 TABLET, FILM COATED ORAL at 21:53

## 2022-06-04 RX ADMIN — ALBUTEROL 2 PUFF(S): 90 AEROSOL, METERED ORAL at 21:00

## 2022-06-04 RX ADMIN — Medication 3 MILLIGRAM(S): at 21:53

## 2022-06-04 RX ADMIN — LOSARTAN POTASSIUM 100 MILLIGRAM(S): 100 TABLET, FILM COATED ORAL at 08:56

## 2022-06-04 RX ADMIN — Medication 8 MILLIGRAM(S): at 21:53

## 2022-06-04 RX ADMIN — HEPARIN SODIUM 5000 UNIT(S): 5000 INJECTION INTRAVENOUS; SUBCUTANEOUS at 15:34

## 2022-06-04 RX ADMIN — HEPARIN SODIUM 5000 UNIT(S): 5000 INJECTION INTRAVENOUS; SUBCUTANEOUS at 21:52

## 2022-06-04 RX ADMIN — HEPARIN SODIUM 5000 UNIT(S): 5000 INJECTION INTRAVENOUS; SUBCUTANEOUS at 05:43

## 2022-06-04 RX ADMIN — Medication 5: at 12:04

## 2022-06-04 RX ADMIN — Medication 1 TABLET(S): at 21:53

## 2022-06-04 RX ADMIN — Medication 5: at 16:51

## 2022-06-04 RX ADMIN — Medication 40 MILLIGRAM(S): at 05:43

## 2022-06-04 RX ADMIN — Medication 40 MILLIGRAM(S): at 15:29

## 2022-06-04 RX ADMIN — Medication 40 MILLIGRAM(S): at 21:52

## 2022-06-04 RX ADMIN — Medication 1 TABLET(S): at 08:57

## 2022-06-04 RX ADMIN — SIMETHICONE 80 MILLIGRAM(S): 80 TABLET, CHEWABLE ORAL at 21:54

## 2022-06-04 RX ADMIN — BUDESONIDE AND FORMOTEROL FUMARATE DIHYDRATE 2 PUFF(S): 160; 4.5 AEROSOL RESPIRATORY (INHALATION) at 21:02

## 2022-06-04 RX ADMIN — ALBUTEROL 2 PUFF(S): 90 AEROSOL, METERED ORAL at 09:39

## 2022-06-04 RX ADMIN — Medication 3: at 07:13

## 2022-06-04 RX ADMIN — Medication 1200 MILLIGRAM(S): at 21:53

## 2022-06-04 NOTE — PROGRESS NOTE ADULT - ASSESSMENT
1. Sepsis POA due to atypical PNA-  Acute hypoxic resp failure- very slow recovery   - Atypical PNA r/o ILD   - COVID negative   - failed oral Abx  - supplemental O2 as needed   - CTA chest; NO PE, atypical peripheral bilateral infiltrates   - iv rocephin and iv zithro changed to cefepime + doxy on 5/31- DC'd   - f/u blood cx: ngtd  - albuterol inh prn   - Mucinex   - pulmonary consult appreciated- sent for legionella/ ILD workup/ CMV and EBV serologies   - ?ILD- elevated CRP/ESR- very slightly elevated Rheumatoid factor- rheumatology consult  - repeat CT chest- showed extensive predominant airspace disease involving all lung   lobes with interval progression- unclear etiology infectious v inflammatory.  - Check COVID PCR- repeat negative   - possible lung biopsy vs bronchoscopy - CT surgery consulted  - ID consult appreciated- antibiotic dc'd and patient was started on IV Steroids.   hyperglycemia 2/2 steroids - continue ISS  - Rheumatology consult for possible connective tissue disease- rheum labs sent d/w Dr Roa    # Hypokalemia resolved    # HTN: cont home meds    # DVT PPX: heparin s/q    *left knee pain 10/10  - patient requesting steroid shot on the left knee  - Ortho consult  - Left knee Xray results noted    #Diarrhea no Cdiff- improved   -isolation d/suad      Case d/w team on IDR. Plan was d/w patient.

## 2022-06-04 NOTE — PROGRESS NOTE ADULT - SUBJECTIVE AND OBJECTIVE BOX
Subjective:    Awake, alert. Anxious. No sputum. +CARMONA    MEDICATIONS  (STANDING):  budesonide 160 MICROgram(s)/formoterol 4.5 MICROgram(s) Inhaler 2 Puff(s) Inhalation two times a day  dextrose 5%. 1000 milliLiter(s) (50 mL/Hr) IV Continuous <Continuous>  dextrose 5%. 1000 milliLiter(s) (100 mL/Hr) IV Continuous <Continuous>  dextrose 50% Injectable 25 Gram(s) IV Push once  dextrose 50% Injectable 12.5 Gram(s) IV Push once  dextrose 50% Injectable 25 Gram(s) IV Push once  doxazosin 8 milliGRAM(s) Oral at bedtime  glucagon  Injectable 1 milliGRAM(s) IntraMuscular once  guaiFENesin ER 1200 milliGRAM(s) Oral every 12 hours  heparin   Injectable 5000 Unit(s) SubCutaneous every 8 hours  insulin lispro (ADMELOG) corrective regimen sliding scale   SubCutaneous three times a day before meals  insulin lispro (ADMELOG) corrective regimen sliding scale   SubCutaneous at bedtime  lactobacillus acidophilus 1 Tablet(s) Oral two times a day  losartan 100 milliGRAM(s) Oral daily  melatonin 3 milliGRAM(s) Oral at bedtime  methylPREDNISolone sodium succinate Injectable 40 milliGRAM(s) IV Push every 8 hours  pantoprazole    Tablet 40 milliGRAM(s) Oral before breakfast  simvastatin 20 milliGRAM(s) Oral at bedtime    MEDICATIONS  (PRN):  acetaminophen     Tablet .. 650 milliGRAM(s) Oral every 6 hours PRN Temp greater or equal to 38C (100.4F), Mild Pain (1 - 3)  ALBUTerol    90 MICROgram(s) HFA Inhaler 2 Puff(s) Inhalation every 6 hours PRN Shortness of Breath and/or Wheezing  ALPRAZolam 0.25 milliGRAM(s) Oral every 12 hours PRN anxiety  aluminum hydroxide/magnesium hydroxide/simethicone Suspension 30 milliLiter(s) Oral every 6 hours PRN Dyspepsia  benzocaine 15 mG/menthol 3.6 mG Lozenge 1 Lozenge Oral three times a day PRN Sore Throat  dextrose Oral Gel 15 Gram(s) Oral once PRN Blood Glucose LESS THAN 70 milliGRAM(s)/deciliter  simethicone 80 milliGRAM(s) Chew every 6 hours PRN Gas      Allergies    codeine (Unknown)    Intolerances        Vital Signs Last 24 Hrs  T(C): 36.7 (04 Jun 2022 08:29), Max: 36.7 (03 Jun 2022 16:22)  T(F): 98 (04 Jun 2022 08:29), Max: 98 (03 Jun 2022 16:22)  HR: 79 (04 Jun 2022 08:29) (79 - 103)  BP: 139/87 (04 Jun 2022 08:29) (130/81 - 139/87)  BP(mean): --  RR: 18 (04 Jun 2022 08:29) (17 - 20)  SpO2: 95% (04 Jun 2022 08:29) (92% - 95%)    PHYSICAL EXAMINATION:    NECK:  Supple. No lymphadenopathy. Jugular venous pressure not elevated.   HEART:   The cardiac impulse has a normal quality. Reg., Nl S1 and S2.    CHEST:  Chest with crackles in lower lung zones, L>R. Increased respiratory effort.  ABDOMEN:  Soft and nontender.   EXTREMITIES:  There is no edema.       LABS:                        13.6   14.79 )-----------( 439      ( 04 Jun 2022 06:37 )             41.3     06-04    134<L>  |  103  |  26<H>  ----------------------------<  337<H>  4.6   |  23  |  0.90    Ca    9.3      04 Jun 2022 06:37            RADIOLOGY & ADDITIONAL TESTS:    Assessment and Plan:   · Assessment  	  - cont O2  - repeat CT scan chest reviewed. No PE. has bilat ground glass and consolidative infiltrates which have progressed'  - doubt infectious etiology. WBC normal w "pneumonia". Consider interstitial lung disease/inflammatory process  - inflammatory markers extremely elevated  - rheumatology/ID f/u noted. Agree w d/c abx and serologic w/u-await results  - started on steroids-Solumedrol 40MG Q8H  - May need lung biopsy  - patient anxious and resistant to lung biopsy at present  - dvt proph    Problem/Plan - 1:  ·  Problem: Multilobar lung infiltrate.   Problem/Plan - 2:  ·  Problem: Acute dyspnea.   Problem/Plan - 3:  ·  Problem: Paroxysmal cough.   Problem/Plan - 4:  ·  Problem: H/O fatigue.

## 2022-06-04 NOTE — PROGRESS NOTE ADULT - SUBJECTIVE AND OBJECTIVE BOX
History of Present Illness:   56/M with PMHx of HTN, brought to the ED for c/o worsening SOB and cough along with a recent Dx of PNA. He also states that he has been feeling very weak and dizzy. He recently got 4th dose of COVID vaccine 3 weeks ago and Sx started 1 week thereafter.   Also HAs cough lizzie when inhales deeply.  Hypoxic on ra.  States he has not been eating much recently and has a 15 lb weight loss. Also states he has been very fatigued and sleeping more.    6/1- Tmax 100.6- patient was seen and examined. Patient states "I am not feeling well and I seem to be getting worse." He reported that last night he has SOB walking to the [place] and it took him a while to recover- he states that he is also SOB when he just attempts to get out of bed and use the commode. He feels that he is suffocating because he cannot control the temp or air in his room. He is also complaining of sore throat from coughing, +left knee painon ROM- he is asking for cortisone shot for left knee.   6/2- patient was seen and examined. stated that he feels better this morning- able to take deep breaths and was able to walk to the stretcher for CT_ still with some CARMONA but improved. Left knee pain is improved- left hip pain is improved. he feels that he pulled a muscle when he turned. cough is improved. c/o abdominal distention and burping- stated that gasx works well  when he takes it. Denies chest pain, fever or chills.   6/3- patient very irate - stated that he wants a private room and he wants a fan blowing air directly on him. He complains that he cannot use the commode because there are too many people in the room. He is very unhappy with the care he is receiving. He is upset that the antibiotic was discontinued and that he was started on steroids. He feels so much worse than yesterday- he states that cannot take deep breaths  6/4 - seen and examined with dr arora at bedside.  both of us explained to patient in detail regarding care plan and reviewed imaging and lab results.      ROS:   All 10 systems reviewed and found to be negative with the exception of what has been described above.    Vital Signs Last 24 Hrs  T(C): 36.7 (04 Jun 2022 08:29), Max: 36.7 (03 Jun 2022 16:22)  T(F): 98 (04 Jun 2022 08:29), Max: 98 (03 Jun 2022 16:22)  HR: 79 (04 Jun 2022 08:29) (79 - 103)  BP: 139/87 (04 Jun 2022 08:29) (130/81 - 139/87)  BP(mean): --  RR: 18 (04 Jun 2022 08:29) (17 - 20)  SpO2: 95% (04 Jun 2022 08:29) (92% - 95%)    Constitutional: NAD, laying in bed  HEENT: NC/AT  Back: no tenderness  Respiratory: respirations even and non labored, LCTA  Cardiovascular: S1S2 regular, no murmurs  Abdomen: soft, not tender, not distended, positive BS  Genitourinary: voiding  Musculoskeletal: no muscle tenderness, no joint swelling or tenderness  Extremities: no pedal edema  Neurological: no focal deficits                              13.6   14.79 )-----------( 439      ( 04 Jun 2022 06:37 )             41.3     06-04    134<L>  |  103  |  26<H>  ----------------------------<  337<H>  4.6   |  23  |  0.90    Ca    9.3      04 Jun 2022 06:37    ZORA 1:80 speckled pattern     NEgative - SSA SSB JO1    Rheumatoid Factor Quant, Serum or Plasma: 14 IU/mL (06.01.22 @ 13:01)     C-Reactive Protein, Serum: 183 mg/L (06.01.22 @ 13:01)  Sedimentation Rate, Erythrocyte: 94 mm/hr (06.01.22 @ 13:01)   Legionella Urine- negative     MEDICATIONS  (STANDING):  budesonide 160 MICROgram(s)/formoterol 4.5 MICROgram(s) Inhaler 2 Puff(s) Inhalation two times a day  doxazosin 8 milliGRAM(s) Oral at bedtime  guaiFENesin ER 1200 milliGRAM(s) Oral every 12 hours  heparin   Injectable 5000 Unit(s) SubCutaneous every 8 hours  lactobacillus acidophilus 1 Tablet(s) Oral two times a day  losartan 100 milliGRAM(s) Oral daily  melatonin 3 milliGRAM(s) Oral at bedtime  methylPREDNISolone sodium succinate Injectable 40 milliGRAM(s) IV Push every 8 hours  pantoprazole    Tablet 40 milliGRAM(s) Oral before breakfast  simvastatin 20 milliGRAM(s) Oral at bedtime    MEDICATIONS  (PRN):  acetaminophen     Tablet .. 650 milliGRAM(s) Oral every 6 hours PRN Temp greater or equal to 38C (100.4F), Mild Pain (1 - 3)  ALBUTerol    90 MICROgram(s) HFA Inhaler 2 Puff(s) Inhalation every 6 hours PRN Shortness of Breath and/or Wheezing  ALPRAZolam 0.25 milliGRAM(s) Oral every 12 hours PRN anxiety  aluminum hydroxide/magnesium hydroxide/simethicone Suspension 30 milliLiter(s) Oral every 6 hours PRN Dyspepsia  benzocaine 15 mG/menthol 3.6 mG Lozenge 1 Lozenge Oral three times a day PRN Sore Throat  simethicone 80 milliGRAM(s) Chew every 6 hours PRN Gas      CT Angio Chest PE Protocol w/ IV Cont (05.27.22 @ 16:55) >  No pulmonary embolism from the main pulmonary artery up to and including   the segmental branches. Limited assessment of subsegmental branches.    Bilateral peripheral lung disease. Differential includes infection (COVID   in particular), organizing pneumonia, or eosinophilic pneumonia.    Incidental incompletely characterized left adrenal nodule. Further   evaluation with MRI is recommended, which can be done on a nonemergent   basis.     CT Chest No Cont (06.02.22 @ 10:20) >  CT CHEST                        PROCEDURE DATE:  06/02/2022    INTERPRETATION:  HISTORY: Pneumonia with slow improvement.  EXAMINATION: CT CHEST was performed without IV contrast.  COMPARISON: 5/27/2022.  FINDINGS:  AIRWAYS, LUNGS, PLEURA: Central airways clear. No pleural effusion.  Peripheral predominant ground-glass and consolidative opacities involving   all lung lobes have demonstrated interval progression compared to   5/27/2022. For example, region of consolidation in the posterior right   upper lobe (image 44, series 2) is new and right lower lobe superior   segment consolidation (image 59, series 2) has progressed.  MEDIASTINUM: Normal heart size. Coronary atherosclerosis. Small   pericardial effusion. Thoracic aorta normal caliber.  No large   mediastinal lymph nodes. Small amount of fluid within the lumen of the   lower esophagus.  IMAGED ABDOMEN: Unchanged 2 cm left adrenal nodule.  SOFT TISSUES: Unremarkable.  BONES: Unremarkable.  IMPRESSION:.  Extensive peripheral predominant airspace disease involving all lung   lobes with interval progression compared to 5/27/2022. The exact etiology   is unclear and diagnostic considerations include infectious etiology and   inflammatory etiology (such as organizing pneumonia).  CT chest follow-up in one month recommended to ensure clearing.

## 2022-06-05 LAB
ANION GAP SERPL CALC-SCNC: 8 MMOL/L — SIGNIFICANT CHANGE UP (ref 5–17)
AUTO DIFF PNL BLD: NEGATIVE — SIGNIFICANT CHANGE UP
BUN SERPL-MCNC: 32 MG/DL — HIGH (ref 7–23)
C-ANCA SER-ACNC: NEGATIVE — SIGNIFICANT CHANGE UP
CALCIUM SERPL-MCNC: 9.1 MG/DL — SIGNIFICANT CHANGE UP (ref 8.5–10.1)
CHLORIDE SERPL-SCNC: 102 MMOL/L — SIGNIFICANT CHANGE UP (ref 96–108)
CO2 SERPL-SCNC: 23 MMOL/L — SIGNIFICANT CHANGE UP (ref 22–31)
CREAT SERPL-MCNC: 1.01 MG/DL — SIGNIFICANT CHANGE UP (ref 0.5–1.3)
EGFR: 87 ML/MIN/1.73M2 — SIGNIFICANT CHANGE UP
GLUCOSE SERPL-MCNC: 360 MG/DL — HIGH (ref 70–99)
HCT VFR BLD CALC: 40.7 % — SIGNIFICANT CHANGE UP (ref 39–50)
HGB BLD-MCNC: 13.5 G/DL — SIGNIFICANT CHANGE UP (ref 13–17)
MCHC RBC-ENTMCNC: 28.9 PG — SIGNIFICANT CHANGE UP (ref 27–34)
MCHC RBC-ENTMCNC: 33.2 GM/DL — SIGNIFICANT CHANGE UP (ref 32–36)
MCV RBC AUTO: 87.2 FL — SIGNIFICANT CHANGE UP (ref 80–100)
P-ANCA SER-ACNC: NEGATIVE — SIGNIFICANT CHANGE UP
PLATELET # BLD AUTO: 456 K/UL — HIGH (ref 150–400)
POTASSIUM SERPL-MCNC: 4.9 MMOL/L — SIGNIFICANT CHANGE UP (ref 3.5–5.3)
POTASSIUM SERPL-SCNC: 4.9 MMOL/L — SIGNIFICANT CHANGE UP (ref 3.5–5.3)
RBC # BLD: 4.67 M/UL — SIGNIFICANT CHANGE UP (ref 4.2–5.8)
RBC # FLD: 14.2 % — SIGNIFICANT CHANGE UP (ref 10.3–14.5)
SARS-COV-2 RNA SPEC QL NAA+PROBE: SIGNIFICANT CHANGE UP
SODIUM SERPL-SCNC: 133 MMOL/L — LOW (ref 135–145)
WBC # BLD: 14.94 K/UL — HIGH (ref 3.8–10.5)
WBC # FLD AUTO: 14.94 K/UL — HIGH (ref 3.8–10.5)

## 2022-06-05 PROCEDURE — 99233 SBSQ HOSP IP/OBS HIGH 50: CPT

## 2022-06-05 PROCEDURE — 99232 SBSQ HOSP IP/OBS MODERATE 35: CPT

## 2022-06-05 RX ORDER — SODIUM CHLORIDE 9 MG/ML
1000 INJECTION, SOLUTION INTRAVENOUS
Refills: 0 | Status: DISCONTINUED | OUTPATIENT
Start: 2022-06-05 | End: 2022-06-21

## 2022-06-05 RX ORDER — DEXTROSE 50 % IN WATER 50 %
15 SYRINGE (ML) INTRAVENOUS ONCE
Refills: 0 | Status: DISCONTINUED | OUTPATIENT
Start: 2022-06-05 | End: 2022-06-21

## 2022-06-05 RX ORDER — DEXTROSE 50 % IN WATER 50 %
12.5 SYRINGE (ML) INTRAVENOUS ONCE
Refills: 0 | Status: DISCONTINUED | OUTPATIENT
Start: 2022-06-05 | End: 2022-06-21

## 2022-06-05 RX ORDER — INSULIN LISPRO 100/ML
VIAL (ML) SUBCUTANEOUS AT BEDTIME
Refills: 0 | Status: DISCONTINUED | OUTPATIENT
Start: 2022-06-05 | End: 2022-06-21

## 2022-06-05 RX ORDER — INSULIN LISPRO 100/ML
VIAL (ML) SUBCUTANEOUS
Refills: 0 | Status: DISCONTINUED | OUTPATIENT
Start: 2022-06-05 | End: 2022-06-21

## 2022-06-05 RX ORDER — GLUCAGON INJECTION, SOLUTION 0.5 MG/.1ML
1 INJECTION, SOLUTION SUBCUTANEOUS ONCE
Refills: 0 | Status: DISCONTINUED | OUTPATIENT
Start: 2022-06-05 | End: 2022-06-21

## 2022-06-05 RX ORDER — DEXTROSE 50 % IN WATER 50 %
25 SYRINGE (ML) INTRAVENOUS ONCE
Refills: 0 | Status: DISCONTINUED | OUTPATIENT
Start: 2022-06-05 | End: 2022-06-21

## 2022-06-05 RX ADMIN — HEPARIN SODIUM 5000 UNIT(S): 5000 INJECTION INTRAVENOUS; SUBCUTANEOUS at 13:47

## 2022-06-05 RX ADMIN — Medication 3 MILLIGRAM(S): at 22:23

## 2022-06-05 RX ADMIN — Medication 1200 MILLIGRAM(S): at 22:23

## 2022-06-05 RX ADMIN — Medication 10: at 17:35

## 2022-06-05 RX ADMIN — Medication 40 MILLIGRAM(S): at 22:27

## 2022-06-05 RX ADMIN — Medication 1 TABLET(S): at 22:22

## 2022-06-05 RX ADMIN — SIMVASTATIN 20 MILLIGRAM(S): 20 TABLET, FILM COATED ORAL at 22:27

## 2022-06-05 RX ADMIN — ALBUTEROL 2 PUFF(S): 90 AEROSOL, METERED ORAL at 20:44

## 2022-06-05 RX ADMIN — Medication 1200 MILLIGRAM(S): at 10:57

## 2022-06-05 RX ADMIN — ALBUTEROL 2 PUFF(S): 90 AEROSOL, METERED ORAL at 09:35

## 2022-06-05 RX ADMIN — Medication 3: at 22:24

## 2022-06-05 RX ADMIN — Medication 40 MILLIGRAM(S): at 05:34

## 2022-06-05 RX ADMIN — Medication 5: at 12:37

## 2022-06-05 RX ADMIN — PANTOPRAZOLE SODIUM 40 MILLIGRAM(S): 20 TABLET, DELAYED RELEASE ORAL at 05:36

## 2022-06-05 RX ADMIN — HEPARIN SODIUM 5000 UNIT(S): 5000 INJECTION INTRAVENOUS; SUBCUTANEOUS at 22:22

## 2022-06-05 RX ADMIN — Medication 5: at 07:52

## 2022-06-05 RX ADMIN — Medication 1 TABLET(S): at 10:57

## 2022-06-05 RX ADMIN — SIMETHICONE 80 MILLIGRAM(S): 80 TABLET, CHEWABLE ORAL at 22:23

## 2022-06-05 RX ADMIN — Medication 8 MILLIGRAM(S): at 22:23

## 2022-06-05 RX ADMIN — BUDESONIDE AND FORMOTEROL FUMARATE DIHYDRATE 2 PUFF(S): 160; 4.5 AEROSOL RESPIRATORY (INHALATION) at 20:47

## 2022-06-05 RX ADMIN — HEPARIN SODIUM 5000 UNIT(S): 5000 INJECTION INTRAVENOUS; SUBCUTANEOUS at 05:33

## 2022-06-05 RX ADMIN — BUDESONIDE AND FORMOTEROL FUMARATE DIHYDRATE 2 PUFF(S): 160; 4.5 AEROSOL RESPIRATORY (INHALATION) at 09:31

## 2022-06-05 RX ADMIN — Medication 40 MILLIGRAM(S): at 13:47

## 2022-06-05 RX ADMIN — LOSARTAN POTASSIUM 100 MILLIGRAM(S): 100 TABLET, FILM COATED ORAL at 10:57

## 2022-06-05 NOTE — PROGRESS NOTE ADULT - SUBJECTIVE AND OBJECTIVE BOX
Subjective:    Awake, alert. Non-productive cough. CT scan noted    MEDICATIONS  (STANDING):  budesonide 160 MICROgram(s)/formoterol 4.5 MICROgram(s) Inhaler 2 Puff(s) Inhalation two times a day  dextrose 5%. 1000 milliLiter(s) (100 mL/Hr) IV Continuous <Continuous>  dextrose 5%. 1000 milliLiter(s) (50 mL/Hr) IV Continuous <Continuous>  dextrose 50% Injectable 25 Gram(s) IV Push once  dextrose 50% Injectable 12.5 Gram(s) IV Push once  dextrose 50% Injectable 25 Gram(s) IV Push once  doxazosin 8 milliGRAM(s) Oral at bedtime  glucagon  Injectable 1 milliGRAM(s) IntraMuscular once  guaiFENesin ER 1200 milliGRAM(s) Oral every 12 hours  heparin   Injectable 5000 Unit(s) SubCutaneous every 8 hours  insulin lispro (ADMELOG) corrective regimen sliding scale   SubCutaneous three times a day before meals  insulin lispro (ADMELOG) corrective regimen sliding scale   SubCutaneous at bedtime  lactobacillus acidophilus 1 Tablet(s) Oral two times a day  losartan 100 milliGRAM(s) Oral daily  melatonin 3 milliGRAM(s) Oral at bedtime  methylPREDNISolone sodium succinate Injectable 40 milliGRAM(s) IV Push every 8 hours  pantoprazole    Tablet 40 milliGRAM(s) Oral before breakfast  simvastatin 20 milliGRAM(s) Oral at bedtime    MEDICATIONS  (PRN):  acetaminophen     Tablet .. 650 milliGRAM(s) Oral every 6 hours PRN Temp greater or equal to 38C (100.4F), Mild Pain (1 - 3)  ALBUTerol    90 MICROgram(s) HFA Inhaler 2 Puff(s) Inhalation every 6 hours PRN Shortness of Breath and/or Wheezing  ALPRAZolam 0.25 milliGRAM(s) Oral every 12 hours PRN anxiety  aluminum hydroxide/magnesium hydroxide/simethicone Suspension 30 milliLiter(s) Oral every 6 hours PRN Dyspepsia  benzocaine 15 mG/menthol 3.6 mG Lozenge 1 Lozenge Oral three times a day PRN Sore Throat  dextrose Oral Gel 15 Gram(s) Oral once PRN Blood Glucose LESS THAN 70 milliGRAM(s)/deciliter  simethicone 80 milliGRAM(s) Chew every 6 hours PRN Gas      Allergies    codeine (Unknown)    Intolerances        Vital Signs Last 24 Hrs  T(C): 36.9 (05 Jun 2022 09:11), Max: 36.9 (05 Jun 2022 09:11)  T(F): 98.4 (05 Jun 2022 09:11), Max: 98.4 (05 Jun 2022 09:11)  HR: 72 (05 Jun 2022 09:11) (72 - 95)  BP: 152/86 (05 Jun 2022 09:11) (138/80 - 152/86)  BP(mean): --  RR: 18 (05 Jun 2022 09:11) (18 - 18)  SpO2: 96% (05 Jun 2022 09:11) (95% - 96%)    PHYSICAL EXAMINATION:    NECK:  Supple. No lymphadenopathy. Jugular venous pressure not elevated.   HEART:   The cardiac impulse has a normal quality. Reg., Nl S1 and S2.    CHEST:  Chest with bilateral crackles in mid-lung fields. Normal respiratory effort.  ABDOMEN:  Soft and nontender.   EXTREMITIES:  There is no edema.       LABS:                        13.5   14.94 )-----------( 456      ( 05 Jun 2022 05:44 )             40.7     06-05    133<L>  |  102  |  32<H>  ----------------------------<  360<H>  4.9   |  23  |  1.01    Ca    9.1      05 Jun 2022 05:44            RADIOLOGY & ADDITIONAL TESTS:    Assessment and Plan:   · Assessment  	  - cont O2  - repeat CT scan chest reviewed. No PE. has bilat ground glass and consolidative infiltrates which have progressed'  - doubt infectious etiology. Consider interstitial lung disease/inflammatory process  - inflammatory markers extremely elevated  - rheumatology/ID f/u noted. Agree w/ serologic w/u-await results. ZORA 1:80  - Continue Solumedrol 40MG Q8H  - May need lung biopsy  - patient anxious and resistant to lung biopsy at present  - dvt proph  - Consider repeat chest CT in 24-48 hours    Problem/Plan - 1:  ·  Problem: Multilobar lung infiltrate.   Problem/Plan - 2:  ·  Problem: Acute dyspnea.   Problem/Plan - 3:  ·  Problem: Paroxysmal cough.   Problem/Plan - 4:  ·  Problem: H/O fatigue.

## 2022-06-05 NOTE — PROGRESS NOTE ADULT - SUBJECTIVE AND OBJECTIVE BOX
56/M with PMHx of HTN, brought to the ED for c/o worsening SOB and cough along with a recent Dx of PNA. He also states that he has been feeling very weak and dizzy. He recently got 4th dose of COVID vaccine 3 weeks ago and Sx started 1 week thereafter.   Also HAs cough lizzie when inhales deeply.  Hypoxic on ra.  States he has not been eating much recently and has a 15 lb weight loss. Also states he has been very fatigued and sleeping more.    6/1- Tmax 100.6- patient was seen and examined. Patient states "I am not feeling well and I seem to be getting worse." He reported that last night he has SOB walking to the [place] and it took him a while to recover- he states that he is also SOB when he just attempts to get out of bed and use the commode. He feels that he is suffocating because he cannot control the temp or air in his room. He is also complaining of sore throat from coughing, +left knee painon ROM- he is asking for cortisone shot for left knee.   6/2- patient was seen and examined. stated that he feels better this morning- able to take deep breaths and [facility] still with some CARMONA but improved. Left knee pain is improved- left hip pain is improved. he feels that he pulled a muscle when he turned. cough is improved. c/o abdominal distention and burping- stated that gasx works well  when he takes it. Denies chest pain, fever or chills.   6/3- patient very irate - stated that he wants a private room and he wants a fan blowing air directly on him. He complains that he cannot use the commode because there are too many people in the room. He is very unhappy with the care he is receiving. He is upset that the antibiotic was discontinued and that he was started on steroids. He feels so much worse than yesterday- he states that cannot take deep breaths  6/4 - seen and examined with dr arora at bedside.  both of us explained to patient in detail regarding care plan and reviewed imaging and lab results.    6/5 pt very upset , says he wants to be be discharged today , still feels sob , explained to patient in detail plan of care and imaging and lab results     ROS:   All 10 systems reviewed and found to be negative with the exception of what has been described above.Constitutional: NAD, laying in bed  HEENT: NC/AT  Back: no tenderness  Respiratory: respirations even and non labored, LCTA  Cardiovascular: S1S2 regular, no murmurs  Abdomen: soft, not tender, not distended, positive BS  Genitourinary: voiding  Musculoskeletal: no muscle tenderness, no joint swelling or tenderness  Extremities: no pedal edema  Neurological: no focal deficits      PHYSICAL EXAM:    Daily     Daily     ICU Vital Signs Last 24 Hrs  T(C): 36.9 (05 Jun 2022 09:11), Max: 36.9 (05 Jun 2022 09:11)  T(F): 98.4 (05 Jun 2022 09:11), Max: 98.4 (05 Jun 2022 09:11)  HR: 72 (05 Jun 2022 09:11) (72 - 95)  BP: 152/86 (05 Jun 2022 09:11) (138/80 - 152/86)  BP(mean): --  ABP: --  ABP(mean): --  RR: 18 (05 Jun 2022 09:11) (18 - 18)  SpO2: 96% (05 Jun 2022 09:11) (95% - 96%)                 13.5   14.94 )-----------( 456      ( 05 Jun 2022 05:44 )             40.7       CBC Full  -  ( 05 Jun 2022 05:44 )  WBC Count : 14.94 K/uL  RBC Count : 4.67 M/uL  Hemoglobin : 13.5 g/dL  Hematocrit : 40.7 %  Platelet Count - Automated : 456 K/uL  Mean Cell Volume : 87.2 fl  Mean Cell Hemoglobin : 28.9 pg  Mean Cell Hemoglobin Concentration : 33.2 gm/dL  Auto Neutrophil # : x  Auto Lymphocyte # : x  Auto Monocyte # : x  Auto Eosinophil # : x  Auto Basophil # : x  Auto Neutrophil % : x  Auto Lymphocyte % : x  Auto Monocyte % : x  Auto Eosinophil % : x  Auto Basophil % : x      06-05    133<L>  |  102  |  32<H>  ----------------------------<  360<H>  4.9   |  23  |  1.01    Ca    9.1      05 Jun 2022 05:44                              MEDICATIONS  (STANDING):  artificial  tears Solution 1 Drop(s) Both EYES three times a day  budesonide 160 MICROgram(s)/formoterol 4.5 MICROgram(s) Inhaler 2 Puff(s) Inhalation two times a day  dextrose 5%. 1000 milliLiter(s) (100 mL/Hr) IV Continuous <Continuous>  dextrose 5%. 1000 milliLiter(s) (50 mL/Hr) IV Continuous <Continuous>  dextrose 50% Injectable 25 Gram(s) IV Push once  dextrose 50% Injectable 12.5 Gram(s) IV Push once  dextrose 50% Injectable 25 Gram(s) IV Push once  doxazosin 8 milliGRAM(s) Oral at bedtime  glucagon  Injectable 1 milliGRAM(s) IntraMuscular once  guaiFENesin ER 1200 milliGRAM(s) Oral every 12 hours  heparin   Injectable 5000 Unit(s) SubCutaneous every 8 hours  insulin lispro (ADMELOG) corrective regimen sliding scale   SubCutaneous three times a day before meals  insulin lispro (ADMELOG) corrective regimen sliding scale   SubCutaneous at bedtime  lactobacillus acidophilus 1 Tablet(s) Oral two times a day  losartan 100 milliGRAM(s) Oral daily  melatonin 3 milliGRAM(s) Oral at bedtime  methylPREDNISolone sodium succinate Injectable 40 milliGRAM(s) IV Push every 8 hours  pantoprazole    Tablet 40 milliGRAM(s) Oral before breakfast  simvastatin 20 milliGRAM(s) Oral at bedtime          ZORA 1:80 speckled pattern     NEgative - SSA SSB JO1    Rheumatoid Factor Quant, Serum or Plasma: 14 IU/mL (06.01.22 @ 13:01)     C-Reactive Protein, Serum: 183 mg/L (06.01.22 @ 13:01)  Sedimentation Rate, Erythrocyte: 94 mm/hr (06.01.22 @ 13:01)   Legionella Urine- negative CT Angio Chest PE Protocol w/ IV Cont (05.27.22 @ 16:55) >  No pulmonary embolism from the main pulmonary artery up to and including   the segmental branches. Limited assessment of subsegmental branches.    Bilateral peripheral lung disease. Differential includes infection (COVID   in particular), organizing pneumonia, or eosinophilic pneumonia.    Incidental incompletely characterized left adrenal nodule. Further   evaluation with MRI is recommended, which can be done on a nonemergent   basis.   CT Chest No Cont (06.02.22 @ 10:20) >  CT CHEST                        PROCEDURE DATE:  06/02/2022    INTERPRETATION:  HISTORY: Pneumonia with slow improvement.  EXAMINATION: CT CHEST was performed without IV contrast.  COMPARISON: 5/27/2022.  FINDINGS:  AIRWAYS, LUNGS, PLEURA: Central airways clear. No pleural effusion.  Peripheral predominant ground-glass and consolidative opacities involving   all lung lobes have demonstrated interval progression compared to   5/27/2022. For example, region of consolidation in the posterior right   upper lobe (image 44, series 2) is new and right lower lobe superior   segment consolidation (image 59, series 2) has progressed.  MEDIASTINUM: Normal heart size. Coronary atherosclerosis. Small   pericardial effusion. Thoracic aorta normal caliber.  No large   mediastinal lymph nodes. Small amount of fluid within the lumen of the   lower esophagus.  IMAGED ABDOMEN: Unchanged 2 cm left adrenal nodule.  SOFT TISSUES: Unremarkable.  BONES: Unremarkable.  IMPRESSION:.  Extensive peripheral predominant airspace disease involving all lung   lobes with interval progression compared to 5/27/2022. The exact etiology   is unclear and diagnostic considerations include infectious etiology and   inflammatory etiology (such as organizing pneumonia).  CT chest follow-up in one month recommended to ensure clearing.

## 2022-06-06 DIAGNOSIS — J84.9 INTERSTITIAL PULMONARY DISEASE, UNSPECIFIED: ICD-10-CM

## 2022-06-06 LAB
% ALBUMIN: 42.2 % — SIGNIFICANT CHANGE UP
% ALPHA 1: 10 % — SIGNIFICANT CHANGE UP
% ALPHA 2: 20.4 % — SIGNIFICANT CHANGE UP
% BETA: 13.2 % — SIGNIFICANT CHANGE UP
% GAMMA: 14.2 % — SIGNIFICANT CHANGE UP
A1C WITH ESTIMATED AVERAGE GLUCOSE RESULT: 8.3 % — HIGH (ref 4–5.6)
ACE SERPL-CCNC: 25 U/L — SIGNIFICANT CHANGE UP (ref 14–82)
ALBUMIN SERPL ELPH-MCNC: 2.5 G/DL — LOW (ref 3.6–5.5)
ALBUMIN/GLOB SERPL ELPH: 0.7 RATIO — SIGNIFICANT CHANGE UP
ALPHA1 GLOB SERPL ELPH-MCNC: 0.6 G/DL — HIGH (ref 0.1–0.4)
ALPHA2 GLOB SERPL ELPH-MCNC: 1.2 G/DL — HIGH (ref 0.5–1)
ANION GAP SERPL CALC-SCNC: 5 MMOL/L — SIGNIFICANT CHANGE UP (ref 5–17)
B-GLOBULIN SERPL ELPH-MCNC: 0.8 G/DL — SIGNIFICANT CHANGE UP (ref 0.5–1)
BASOPHILS # BLD AUTO: 0 K/UL — SIGNIFICANT CHANGE UP (ref 0–0.2)
BASOPHILS NFR BLD AUTO: 0 % — SIGNIFICANT CHANGE UP (ref 0–2)
BUN SERPL-MCNC: 35 MG/DL — HIGH (ref 7–23)
CALCIUM SERPL-MCNC: 9.2 MG/DL — SIGNIFICANT CHANGE UP (ref 8.5–10.1)
CHLORIDE SERPL-SCNC: 101 MMOL/L — SIGNIFICANT CHANGE UP (ref 96–108)
CO2 SERPL-SCNC: 25 MMOL/L — SIGNIFICANT CHANGE UP (ref 22–31)
CREAT SERPL-MCNC: 1.07 MG/DL — SIGNIFICANT CHANGE UP (ref 0.5–1.3)
CRP SERPL-MCNC: 28 MG/L — HIGH
CRYOGLOB SERPL-MCNC: NEGATIVE — SIGNIFICANT CHANGE UP
EGFR: 81 ML/MIN/1.73M2 — SIGNIFICANT CHANGE UP
EOSINOPHIL # BLD AUTO: 0 K/UL — SIGNIFICANT CHANGE UP (ref 0–0.5)
EOSINOPHIL NFR BLD AUTO: 0 % — SIGNIFICANT CHANGE UP (ref 0–6)
ERYTHROCYTE [SEDIMENTATION RATE] IN BLOOD: 48 MM/HR — HIGH (ref 0–20)
ESTIMATED AVERAGE GLUCOSE: 192 MG/DL — HIGH (ref 68–114)
GAMMA GLOBULIN: 0.9 G/DL — SIGNIFICANT CHANGE UP (ref 0.6–1.6)
GLUCOSE SERPL-MCNC: 365 MG/DL — HIGH (ref 70–99)
HCT VFR BLD CALC: 40.1 % — SIGNIFICANT CHANGE UP (ref 39–50)
HGB BLD-MCNC: 13.6 G/DL — SIGNIFICANT CHANGE UP (ref 13–17)
INTERPRETATION SERPL IFE-IMP: SIGNIFICANT CHANGE UP
LYMPHOCYTES # BLD AUTO: 1.19 K/UL — SIGNIFICANT CHANGE UP (ref 1–3.3)
LYMPHOCYTES # BLD AUTO: 8 % — LOW (ref 13–44)
MCHC RBC-ENTMCNC: 28.9 PG — SIGNIFICANT CHANGE UP (ref 27–34)
MCHC RBC-ENTMCNC: 33.9 GM/DL — SIGNIFICANT CHANGE UP (ref 32–36)
MCV RBC AUTO: 85.3 FL — SIGNIFICANT CHANGE UP (ref 80–100)
MONOCYTES # BLD AUTO: 1.04 K/UL — HIGH (ref 0–0.9)
MONOCYTES NFR BLD AUTO: 7 % — SIGNIFICANT CHANGE UP (ref 2–14)
NEUTROPHILS # BLD AUTO: 12.64 K/UL — HIGH (ref 1.8–7.4)
NEUTROPHILS NFR BLD AUTO: 85 % — HIGH (ref 43–77)
NRBC # BLD: SIGNIFICANT CHANGE UP /100 WBCS (ref 0–0)
PLATELET # BLD AUTO: 403 K/UL — HIGH (ref 150–400)
POTASSIUM SERPL-MCNC: 4.6 MMOL/L — SIGNIFICANT CHANGE UP (ref 3.5–5.3)
POTASSIUM SERPL-MCNC: 5.7 MMOL/L — HIGH (ref 3.5–5.3)
POTASSIUM SERPL-SCNC: 4.6 MMOL/L — SIGNIFICANT CHANGE UP (ref 3.5–5.3)
POTASSIUM SERPL-SCNC: 5.7 MMOL/L — HIGH (ref 3.5–5.3)
PROT PATTERN SERPL ELPH-IMP: SIGNIFICANT CHANGE UP
PROT SERPL-MCNC: 6 G/DL — SIGNIFICANT CHANGE UP (ref 6–8.3)
PROT SERPL-MCNC: 6 G/DL — SIGNIFICANT CHANGE UP (ref 6–8.3)
RBC # BLD: 4.7 M/UL — SIGNIFICANT CHANGE UP (ref 4.2–5.8)
RBC # FLD: 13.9 % — SIGNIFICANT CHANGE UP (ref 10.3–14.5)
SODIUM SERPL-SCNC: 131 MMOL/L — LOW (ref 135–145)
WBC # BLD: 14.87 K/UL — HIGH (ref 3.8–10.5)
WBC # FLD AUTO: 14.87 K/UL — HIGH (ref 3.8–10.5)

## 2022-06-06 PROCEDURE — 99232 SBSQ HOSP IP/OBS MODERATE 35: CPT

## 2022-06-06 PROCEDURE — 99233 SBSQ HOSP IP/OBS HIGH 50: CPT

## 2022-06-06 RX ORDER — INSULIN GLARGINE 100 [IU]/ML
10 INJECTION, SOLUTION SUBCUTANEOUS ONCE
Refills: 0 | Status: COMPLETED | OUTPATIENT
Start: 2022-06-06 | End: 2022-06-06

## 2022-06-06 RX ORDER — SODIUM POLYSTYRENE SULFONATE 4.1 MEQ/G
15 POWDER, FOR SUSPENSION ORAL ONCE
Refills: 0 | Status: DISCONTINUED | OUTPATIENT
Start: 2022-06-06 | End: 2022-06-06

## 2022-06-06 RX ORDER — INSULIN GLARGINE 100 [IU]/ML
10 INJECTION, SOLUTION SUBCUTANEOUS EVERY MORNING
Refills: 0 | Status: DISCONTINUED | OUTPATIENT
Start: 2022-06-07 | End: 2022-06-07

## 2022-06-06 RX ADMIN — HEPARIN SODIUM 5000 UNIT(S): 5000 INJECTION INTRAVENOUS; SUBCUTANEOUS at 06:27

## 2022-06-06 RX ADMIN — Medication 0.25 MILLIGRAM(S): at 03:37

## 2022-06-06 RX ADMIN — Medication 1200 MILLIGRAM(S): at 21:48

## 2022-06-06 RX ADMIN — Medication 3 MILLIGRAM(S): at 21:47

## 2022-06-06 RX ADMIN — Medication 8 MILLIGRAM(S): at 21:48

## 2022-06-06 RX ADMIN — Medication 8: at 11:51

## 2022-06-06 RX ADMIN — Medication 8: at 07:51

## 2022-06-06 RX ADMIN — Medication 1200 MILLIGRAM(S): at 10:34

## 2022-06-06 RX ADMIN — INSULIN GLARGINE 10 UNIT(S): 100 INJECTION, SOLUTION SUBCUTANEOUS at 10:42

## 2022-06-06 RX ADMIN — Medication 1 TABLET(S): at 10:34

## 2022-06-06 RX ADMIN — LOSARTAN POTASSIUM 100 MILLIGRAM(S): 100 TABLET, FILM COATED ORAL at 10:34

## 2022-06-06 RX ADMIN — Medication 40 MILLIGRAM(S): at 06:27

## 2022-06-06 RX ADMIN — Medication 10: at 16:33

## 2022-06-06 RX ADMIN — SIMVASTATIN 20 MILLIGRAM(S): 20 TABLET, FILM COATED ORAL at 21:48

## 2022-06-06 RX ADMIN — ALBUTEROL 2 PUFF(S): 90 AEROSOL, METERED ORAL at 10:12

## 2022-06-06 RX ADMIN — BUDESONIDE AND FORMOTEROL FUMARATE DIHYDRATE 2 PUFF(S): 160; 4.5 AEROSOL RESPIRATORY (INHALATION) at 21:49

## 2022-06-06 RX ADMIN — BUDESONIDE AND FORMOTEROL FUMARATE DIHYDRATE 2 PUFF(S): 160; 4.5 AEROSOL RESPIRATORY (INHALATION) at 09:57

## 2022-06-06 RX ADMIN — Medication 40 MILLIGRAM(S): at 21:48

## 2022-06-06 RX ADMIN — Medication 0.25 MILLIGRAM(S): at 21:47

## 2022-06-06 RX ADMIN — HEPARIN SODIUM 5000 UNIT(S): 5000 INJECTION INTRAVENOUS; SUBCUTANEOUS at 13:26

## 2022-06-06 RX ADMIN — PANTOPRAZOLE SODIUM 40 MILLIGRAM(S): 20 TABLET, DELAYED RELEASE ORAL at 06:27

## 2022-06-06 RX ADMIN — SIMETHICONE 80 MILLIGRAM(S): 80 TABLET, CHEWABLE ORAL at 21:47

## 2022-06-06 RX ADMIN — Medication 40 MILLIGRAM(S): at 10:34

## 2022-06-06 RX ADMIN — Medication 1 TABLET(S): at 21:47

## 2022-06-06 RX ADMIN — Medication 3: at 21:58

## 2022-06-06 RX ADMIN — ALBUTEROL 2 PUFF(S): 90 AEROSOL, METERED ORAL at 20:40

## 2022-06-06 RX ADMIN — HEPARIN SODIUM 5000 UNIT(S): 5000 INJECTION INTRAVENOUS; SUBCUTANEOUS at 21:48

## 2022-06-06 NOTE — PROGRESS NOTE ADULT - SUBJECTIVE AND OBJECTIVE BOX
56/M with PMHx of HTN, brought to the ED for c/o worsening SOB and cough along with a recent Dx of PNA. He also states that he has been feeling very weak and dizzy. He recently got 4th dose of COVID vaccine 3 weeks ago and Sx started 1 week thereafter.   Also HAs cough lizzie when inhales deeply.  Hypoxic on ra.  States he has not been eating much recently and has a 15 lb weight loss. Also states he has been very fatigued and sleeping more.    6/1- Tmax 100.6- patient was seen and examined. Patient states "I am not feeling well and I seem to be getting worse." He reported that last night he has SOB walking to the [place] and it took him a while to recover- he states that he is also SOB when he just attempts to get out of bed and use the commode. He feels that he is suffocating because he cannot control the temp or air in his room. He is also complaining of sore throat from coughing, +left knee painon ROM- he is asking for cortisone shot for left knee.   6/2- patient was seen and examined. stated that he feels better this morning- able to take deep breaths and [facility] still with some CARMONA but improved. Left knee pain is improved- left hip pain is improved. he feels that he pulled a muscle when he turned. cough is improved. c/o abdominal distention and burping- stated that gasx works well  when he takes it. Denies chest pain, fever or chills.   6/3- patient very irate - stated that he wants a private room and he wants a fan blowing air directly on him. He complains that he cannot use the commode because there are too many people in the room. He is very unhappy with the care he is receiving. He is upset that the antibiotic was discontinued and that he was started on steroids. He feels so much worse than yesterday- he states that cannot take deep breaths  6/4 - seen and examined with dr arora at bedside.  both of us explained to patient in detail regarding care plan and reviewed imaging and lab results.    6/5 pt very upset , says he wants to be be discharged today , still feels sob , explained to patient in detail plan of care and imaging and lab results   6/6-     ROS:   All 10 systems reviewed and found to be negative with the exception of what has been described above.Constitutional: NAD, laying in bed  HEENT: NC/AT  Back: no tenderness  Respiratory: respirations even and non labored, LCTA  Cardiovascular: S1S2 regular, no murmurs  Abdomen: soft, not tender, not distended, positive BS  Genitourinary: voiding  Musculoskeletal: no muscle tenderness, no joint swelling or tenderness  Extremities: no pedal edema  Neurological: no focal deficits      PHYSICAL EXAM:    Vital Signs Last 24 Hrs  T(C): 36.5 (06 Jun 2022 08:11), Max: 36.9 (05 Jun 2022 09:11)  T(F): 97.7 (06 Jun 2022 08:11), Max: 98.4 (05 Jun 2022 09:11)  HR: 71 (06 Jun 2022 08:11) (71 - 82)  BP: 139/81 (06 Jun 2022 08:11) (137/85 - 152/86)  BP(mean): --  RR: 18 (06 Jun 2022 08:11) (18 - 19)  SpO2: 93% (06 Jun 2022 08:11) (93% - 96%)       06-06    131<L>  |  101  |  35<H>  ----------------------------<  365<H>  5.7<H>   |  25  |  1.07    Ca    9.2      06 Jun 2022 06:26                               13.6   14.87 )-----------( 403      ( 06 Jun 2022 06:26 )             40.1         06-05    133<L>  |  102  |  32<H>  ----------------------------<  360<H>  4.9   |  23  |  1.01    Ca    9.1      05 Jun 2022 05:44                              MEDICATIONS  (STANDING):  budesonide 160 MICROgram(s)/formoterol 4.5 MICROgram(s) Inhaler 2 Puff(s) Inhalation two times a day  dextrose 5%. 1000 milliLiter(s) (50 mL/Hr) IV Continuous <Continuous>  dextrose 5%. 1000 milliLiter(s) (100 mL/Hr) IV Continuous <Continuous>  dextrose 50% Injectable 25 Gram(s) IV Push once  dextrose 50% Injectable 12.5 Gram(s) IV Push once  dextrose 50% Injectable 25 Gram(s) IV Push once  doxazosin 8 milliGRAM(s) Oral at bedtime  glucagon  Injectable 1 milliGRAM(s) IntraMuscular once  guaiFENesin ER 1200 milliGRAM(s) Oral every 12 hours  heparin   Injectable 5000 Unit(s) SubCutaneous every 8 hours  insulin lispro (ADMELOG) corrective regimen sliding scale   SubCutaneous three times a day before meals  insulin lispro (ADMELOG) corrective regimen sliding scale   SubCutaneous at bedtime  lactobacillus acidophilus 1 Tablet(s) Oral two times a day  losartan 100 milliGRAM(s) Oral daily  melatonin 3 milliGRAM(s) Oral at bedtime  methylPREDNISolone sodium succinate Injectable 40 milliGRAM(s) IV Push every 8 hours  pantoprazole    Tablet 40 milliGRAM(s) Oral before breakfast  simvastatin 20 milliGRAM(s) Oral at bedtime    MEDICATIONS  (PRN):  acetaminophen     Tablet .. 650 milliGRAM(s) Oral every 6 hours PRN Temp greater or equal to 38C (100.4F), Mild Pain (1 - 3)  ALBUTerol    90 MICROgram(s) HFA Inhaler 2 Puff(s) Inhalation every 6 hours PRN Shortness of Breath and/or Wheezing  ALPRAZolam 0.25 milliGRAM(s) Oral every 12 hours PRN anxiety  aluminum hydroxide/magnesium hydroxide/simethicone Suspension 30 milliLiter(s) Oral every 6 hours PRN Dyspepsia  artificial  tears Solution 1 Drop(s) Both EYES every 8 hours PRN Dry Eyes  benzocaine 15 mG/menthol 3.6 mG Lozenge 1 Lozenge Oral three times a day PRN Sore Throat  dextrose Oral Gel 15 Gram(s) Oral once PRN Blood Glucose LESS THAN 70 milliGRAM(s)/deciliter  simethicone 80 milliGRAM(s) Chew every 6 hours PRN Gas      ZORA 1:80 speckled pattern     NEgative - SSA SSB JO1    Rheumatoid Factor Quant, Serum or Plasma: 14 IU/mL (06.01.22 @ 13:01)     C-Reactive Protein, Serum: 183 mg/L (06.01.22 @ 13:01)  Sedimentation Rate, Erythrocyte: 94 mm/hr (06.01.22 @ 13:01)   Legionella Urine- negative     CT Angio Chest PE Protocol w/ IV Cont (05.27.22 @ 16:55) >  No pulmonary embolism from the main pulmonary artery up to and including   the segmental branches. Limited assessment of subsegmental branches.    Bilateral peripheral lung disease. Differential includes infection (COVID   in particular), organizing pneumonia, or eosinophilic pneumonia.    Incidental incompletely characterized left adrenal nodule. Further   evaluation with MRI is recommended, which can be done on a nonemergent   basis.       CT Chest No Cont (06.02.22 @ 10:20) >  CT CHEST                        PROCEDURE DATE:  06/02/2022    INTERPRETATION:  HISTORY: Pneumonia with slow improvement.  EXAMINATION: CT CHEST was performed without IV contrast.  COMPARISON: 5/27/2022.  FINDINGS:  AIRWAYS, LUNGS, PLEURA: Central airways clear. No pleural effusion.  Peripheral predominant ground-glass and consolidative opacities involving   all lung lobes have demonstrated interval progression compared to   5/27/2022. For example, region of consolidation in the posterior right   upper lobe (image 44, series 2) is new and right lower lobe superior   segment consolidation (image 59, series 2) has progressed.  MEDIASTINUM: Normal heart size. Coronary atherosclerosis. Small   pericardial effusion. Thoracic aorta normal caliber.  No large   mediastinal lymph nodes. Small amount of fluid within the lumen of the   lower esophagus.  IMAGED ABDOMEN: Unchanged 2 cm left adrenal nodule.  SOFT TISSUES: Unremarkable.  BONES: Unremarkable.  IMPRESSION:.  Extensive peripheral predominant airspace disease involving all lung   lobes with interval progression compared to 5/27/2022. The exact etiology   is unclear and diagnostic considerations include infectious etiology and   inflammatory etiology (such as organizing pneumonia).  CT chest follow-up in one month recommended to ensure clearing.       56/M with PMHx of HTN, brought to the ED for c/o worsening SOB and cough along with a recent Dx of PNA. He also states that he has been feeling very weak and dizzy. He recently got 4th dose of COVID vaccine 3 weeks ago and Sx started 1 week thereafter.   Also HAs cough lizzie when inhales deeply.  Hypoxic on ra.  States he has not been eating much recently and has a 15 lb weight loss. Also states he has been very fatigued and sleeping more.    6/1- Tmax 100.6- patient was seen and examined. Patient states "I am not feeling well and I seem to be getting worse." He reported that last night he has SO[place] and it took him a while to recover- he states that he is also SOB when he just attempts to get out of bed and use the commode. He feels that he is suffocating because he cannot control the temp or air in his room. He is also complaining of sore throat from coughing, +left knee painon ROM- he is asking for cortisone shot for left knee.   6/2- patient was seen and examined. stated that he feels better this morning- able to take deep breaths and [facility] still with some CARMONA but improved. Left knee pain is improved- left hip pain is improved. he feels that he pulled a muscle when he turned. cough is improved. c/o abdominal distention and burping- stated that gasx works well  when he takes it. Denies chest pain, fever or chills.   6/3- patient very irate - stated that he wants a private room and he wants a fan blowing air directly on him. He complains that he cannot use the commode because there are too many people in the room. He is very unhappy with the care he is receiving. He is upset that the antibiotic was discontinued and that he was started on steroids. He feels so much worse than yesterday- he states that cannot take deep breaths  6/4 - seen and examined with dr arora at bedside.  both of us explained to patient in detail regarding care plan and reviewed imaging and lab results.    6/5 pt very upset , says he wants to be be discharged today , still feels sob , explained to patient in detail plan of care and imaging and lab results   6/6- stated he feels better this morning. No acute overnight events. no new complaints thus far.     ROS:   All 10 systems reviewed and found to be negative with the exception of what has been described above.Constitutional: NAD, laying in bed  HEENT: NC/AT  Back: no tenderness  Respiratory: respirations even and non labored, LCTA  Cardiovascular: S1S2 regular, no murmurs  Abdomen: soft, not tender, not distended, positive BS  Genitourinary: voiding  Musculoskeletal: no muscle tenderness, no joint swelling or tenderness  Extremities: no pedal edema  Neurological: no focal deficits      PHYSICAL EXAM:    Vital Signs Last 24 Hrs  T(C): 36.5 (06 Jun 2022 08:11), Max: 36.9 (05 Jun 2022 09:11)  T(F): 97.7 (06 Jun 2022 08:11), Max: 98.4 (05 Jun 2022 09:11)  HR: 71 (06 Jun 2022 08:11) (71 - 82)  BP: 139/81 (06 Jun 2022 08:11) (137/85 - 152/86)  BP(mean): --  RR: 18 (06 Jun 2022 08:11) (18 - 19)  SpO2: 93% (06 Jun 2022 08:11) (93% - 96%)       06-06    131<L>  |  101  |  35<H>  ----------------------------<  365<H>  5.7<H>   |  25  |  1.07    Ca    9.2      06 Jun 2022 06:26                               13.6   14.87 )-----------( 403      ( 06 Jun 2022 06:26 )             40.1         06-05    133<L>  |  102  |  32<H>  ----------------------------<  360<H>  4.9   |  23  |  1.01    Ca    9.1      05 Jun 2022 05:44                              MEDICATIONS  (STANDING):  budesonide 160 MICROgram(s)/formoterol 4.5 MICROgram(s) Inhaler 2 Puff(s) Inhalation two times a day  dextrose 5%. 1000 milliLiter(s) (50 mL/Hr) IV Continuous <Continuous>  dextrose 5%. 1000 milliLiter(s) (100 mL/Hr) IV Continuous <Continuous>  dextrose 50% Injectable 25 Gram(s) IV Push once  dextrose 50% Injectable 12.5 Gram(s) IV Push once  dextrose 50% Injectable 25 Gram(s) IV Push once  doxazosin 8 milliGRAM(s) Oral at bedtime  glucagon  Injectable 1 milliGRAM(s) IntraMuscular once  guaiFENesin ER 1200 milliGRAM(s) Oral every 12 hours  heparin   Injectable 5000 Unit(s) SubCutaneous every 8 hours  insulin lispro (ADMELOG) corrective regimen sliding scale   SubCutaneous three times a day before meals  insulin lispro (ADMELOG) corrective regimen sliding scale   SubCutaneous at bedtime  lactobacillus acidophilus 1 Tablet(s) Oral two times a day  losartan 100 milliGRAM(s) Oral daily  melatonin 3 milliGRAM(s) Oral at bedtime  methylPREDNISolone sodium succinate Injectable 40 milliGRAM(s) IV Push every 8 hours  pantoprazole    Tablet 40 milliGRAM(s) Oral before breakfast  simvastatin 20 milliGRAM(s) Oral at bedtime    MEDICATIONS  (PRN):  acetaminophen     Tablet .. 650 milliGRAM(s) Oral every 6 hours PRN Temp greater or equal to 38C (100.4F), Mild Pain (1 - 3)  ALBUTerol    90 MICROgram(s) HFA Inhaler 2 Puff(s) Inhalation every 6 hours PRN Shortness of Breath and/or Wheezing  ALPRAZolam 0.25 milliGRAM(s) Oral every 12 hours PRN anxiety  aluminum hydroxide/magnesium hydroxide/simethicone Suspension 30 milliLiter(s) Oral every 6 hours PRN Dyspepsia  artificial  tears Solution 1 Drop(s) Both EYES every 8 hours PRN Dry Eyes  benzocaine 15 mG/menthol 3.6 mG Lozenge 1 Lozenge Oral three times a day PRN Sore Throat  dextrose Oral Gel 15 Gram(s) Oral once PRN Blood Glucose LESS THAN 70 milliGRAM(s)/deciliter  simethicone 80 milliGRAM(s) Chew every 6 hours PRN Gas      ZORA 1:80 speckled pattern     NEgative - SSA SSB JO1    Rheumatoid Factor Quant, Serum or Plasma: 14 IU/mL (06.01.22 @ 13:01)     C-Reactive Protein, Serum: 183 mg/L (06.01.22 @ 13:01)  Sedimentation Rate, Erythrocyte: 94 mm/hr (06.01.22 @ 13:01)   Legionella Urine- negative     CT Angio Chest PE Protocol w/ IV Cont (05.27.22 @ 16:55) >  No pulmonary embolism from the main pulmonary artery up to and including   the segmental branches. Limited assessment of subsegmental branches.    Bilateral peripheral lung disease. Differential includes infection (COVID   in particular), organizing pneumonia, or eosinophilic pneumonia.    Incidental incompletely characterized left adrenal nodule. Further   evaluation with MRI is recommended, which can be done on a nonemergent   basis.       CT Chest No Cont (06.02.22 @ 10:20) >  CT CHEST                        PROCEDURE DATE:  06/02/2022    INTERPRETATION:  HISTORY: Pneumonia with slow improvement.  EXAMINATION: CT CHEST was performed without IV contrast.  COMPARISON: 5/27/2022.  FINDINGS:  AIRWAYS, LUNGS, PLEURA: Central airways clear. No pleural effusion.  Peripheral predominant ground-glass and consolidative opacities involving   all lung lobes have demonstrated interval progression compared to   5/27/2022. For example, region of consolidation in the posterior right   upper lobe (image 44, series 2) is new and right lower lobe superior   segment consolidation (image 59, series 2) has progressed.  MEDIASTINUM: Normal heart size. Coronary atherosclerosis. Small   pericardial effusion. Thoracic aorta normal caliber.  No large   mediastinal lymph nodes. Small amount of fluid within the lumen of the   lower esophagus.  IMAGED ABDOMEN: Unchanged 2 cm left adrenal nodule.  SOFT TISSUES: Unremarkable.  BONES: Unremarkable.  IMPRESSION:.  Extensive peripheral predominant airspace disease involving all lung   lobes with interval progression compared to 5/27/2022. The exact etiology   is unclear and diagnostic considerations include infectious etiology and   inflammatory etiology (such as organizing pneumonia).  CT chest follow-up in one month recommended to ensure clearing.

## 2022-06-06 NOTE — PROGRESS NOTE ADULT - ASSESSMENT
1. Sepsis POA due to atypical PNA-  Acute hypoxic resp failure- very slow recovery   - Atypical PNA r/o ILD   - COVID negative   - failed oral Abx  - supplemental O2 as needed   - CTA chest; NO PE, atypical peripheral bilateral infiltrates   - iv rocephin and iv zithro changed to cefepime + doxy on 5/31- DC'd   - f/u blood cx: ngtd  - albuterol inh prn   - Mucinex   - pulmonary consult appreciated- sent for legionella/ ILD workup/ CMV and EBV serologies   - ?ILD- elevated CRP/ESR- very slightly elevated Rheumatoid factor- rheumatology consult  - repeat CT chest- showed extensive predominant airspace disease involving all lung   lobes with interval progression- unclear etiology infectious v inflammatory.  - Check COVID PCR- repeat negative   - possible lung biopsy vs bronchoscopy - CT surgery consulted  - ID consult appreciated- antibiotic dc'd and patient was started on IV Steroids.     - Rheumatology consult for possible connective tissue disease- rheum labs sent d/w Dr Roa    # Hypokalemia resolved  # hyperkalemia- monitor  - Kayexalate     #hyperglycemia 2/2 steroids - continue ISS    # HTN: cont home meds      *left knee pain 10/10- resolved   - patient requesting steroid shot on the left knee  - Ortho consult  - Left knee Xray results noted    Case d/w team on IDR. Plan was d/w patient.     # DVT PPX: heparin s/q       1. Sepsis POA due to atypical PNA-  Acute hypoxic resp failure- very slow recovery   - Atypical PNA r/o ILD   - COVID negative   - failed oral Abx  - supplemental O2 as needed   - CTA chest; NO PE, atypical peripheral bilateral infiltrates   - iv rocephin and iv zithro changed to cefepime + doxy on 5/31- DC'd   - f/u blood cx: ngtd  - albuterol inh prn   - Mucinex   - pulmonary consult appreciated- sent for legionella/ ILD workup/ CMV and EBV serologies   - ?ILD- elevated CRP/ESR- very slightly elevated Rheumatoid factor- rheumatology consult  - repeat CT chest- showed extensive predominant airspace disease involving all lung   lobes with interval progression- unclear etiology infectious v inflammatory.  - Check COVID PCR- repeat negative   - possible lung biopsy vs bronchoscopy - CT surgery consulted  - ID consult appreciated- antibiotic dc'd and patient was started on IV Steroids. Methylprednisone changed to every 12 hours   - Rheumatology consult for possible connective tissue disease- rheum labs sent d/w Dr Roa    # Hypokalemia resolved      #hyperglycemia 2/2 steroids - continue ISS   adjust ISS and start Lantus - d/w patient    # HTN: cont home meds      *left knee pain 10/10- resolved   - patient requesting steroid shot on the left knee  - Ortho consult  - Left knee Xray results noted    Case d/w team on IDR. Plan was d/w patient.     # DVT PPX: heparin s/q

## 2022-06-06 NOTE — PROGRESS NOTE ADULT - ASSESSMENT
- cont O2  - check O2 sat on room air  - no lung biopsy  - cont iv steroids  - will repeat CT scan prior to discharge  - repeat esr/crp  - dvt proph

## 2022-06-06 NOTE — PROGRESS NOTE ADULT - NS ATTEND AMEND GEN_ALL_CORE FT
Constitutional: NAD, laying in bed  HEENT: NC/AT  Back: no tenderness  Respiratory: respirations even and non labored, LCTA  Cardiovascular: S1S2 regular, no murmurs  Abdomen: soft, not tender, not distended, positive BS  Genitourinary: voiding  Musculoskeletal: no muscle tenderness, no joint swelling or tenderness  Extremities: no pedal edema  Neurological: no focal deficits1. Sepsis POA due to atypical PNA-  Acute hypoxic resp failure- very slow recovery   - Atypical PNA r/o ILD   - COVID negative   - failed oral Abx  - supplemental O2 as needed   - CTA chest; NO PE, atypical peripheral bilateral infiltrates   - iv rocephin and iv zithro changed to cefepime + doxy on 5/31- DC'd   - f/u blood cx: ngtd  - albuterol inh prn   - Mucinex   - pulmonary consult appreciated- sent for legionella/ ILD workup/ CMV and EBV serologies   - ?ILD- elevated CRP/ESR- very slightly elevated Rheumatoid factor- rheumatology consult  - repeat CT chest- showed extensive predominant airspace disease involving all lung   lobes with interval progression- unclear etiology infectious v inflammatory.  - Check COVID PCR- repeat negative   - possible lung biopsy vs bronchoscopy - CT surgery consulted  - ID consult appreciated- antibiotic dc'd and patient was started on IV Steroids. Methylprednisone changed to every 12 hours   - Rheumatology consult for possible connective tissue disease- rheum labs sent d/w Dr Roa    # Hypokalemia resolved      #hyperglycemia 2/2 steroids - continue ISS   adjust ISS and start Lantus - d/w patient    # HTN: cont home meds      *left knee pain 10/10- resolved   - patient requesting steroid shot on the left knee  - Ortho consult  - Left knee Xray results noted    Case d/w team on IDR. Plan was d/w patient.     # DVT PPX: heparin s/q

## 2022-06-06 NOTE — PROGRESS NOTE ADULT - SUBJECTIVE AND OBJECTIVE BOX
Subjective:  looking better less dyspneic since starting steroids    MEDICATIONS  (STANDING):  budesonide 160 MICROgram(s)/formoterol 4.5 MICROgram(s) Inhaler 2 Puff(s) Inhalation two times a day  dextrose 5%. 1000 milliLiter(s) (50 mL/Hr) IV Continuous <Continuous>  dextrose 5%. 1000 milliLiter(s) (100 mL/Hr) IV Continuous <Continuous>  dextrose 50% Injectable 25 Gram(s) IV Push once  dextrose 50% Injectable 12.5 Gram(s) IV Push once  dextrose 50% Injectable 25 Gram(s) IV Push once  doxazosin 8 milliGRAM(s) Oral at bedtime  glucagon  Injectable 1 milliGRAM(s) IntraMuscular once  guaiFENesin ER 1200 milliGRAM(s) Oral every 12 hours  heparin   Injectable 5000 Unit(s) SubCutaneous every 8 hours  insulin lispro (ADMELOG) corrective regimen sliding scale   SubCutaneous three times a day before meals  insulin lispro (ADMELOG) corrective regimen sliding scale   SubCutaneous at bedtime  lactobacillus acidophilus 1 Tablet(s) Oral two times a day  losartan 100 milliGRAM(s) Oral daily  melatonin 3 milliGRAM(s) Oral at bedtime  methylPREDNISolone sodium succinate Injectable 40 milliGRAM(s) IV Push every 8 hours  pantoprazole    Tablet 40 milliGRAM(s) Oral before breakfast  simvastatin 20 milliGRAM(s) Oral at bedtime    MEDICATIONS  (PRN):  acetaminophen     Tablet .. 650 milliGRAM(s) Oral every 6 hours PRN Temp greater or equal to 38C (100.4F), Mild Pain (1 - 3)  ALBUTerol    90 MICROgram(s) HFA Inhaler 2 Puff(s) Inhalation every 6 hours PRN Shortness of Breath and/or Wheezing  ALPRAZolam 0.25 milliGRAM(s) Oral every 12 hours PRN anxiety  aluminum hydroxide/magnesium hydroxide/simethicone Suspension 30 milliLiter(s) Oral every 6 hours PRN Dyspepsia  artificial  tears Solution 1 Drop(s) Both EYES every 8 hours PRN Dry Eyes  benzocaine 15 mG/menthol 3.6 mG Lozenge 1 Lozenge Oral three times a day PRN Sore Throat  dextrose Oral Gel 15 Gram(s) Oral once PRN Blood Glucose LESS THAN 70 milliGRAM(s)/deciliter  simethicone 80 milliGRAM(s) Chew every 6 hours PRN Gas      Allergies    codeine (Unknown)    Intolerances        REVIEW OF SYSTEMS:    CONSTITUTIONAL:  As per HPI.  HEENT:  Eyes:  No diplopia or blurred vision. ENT:  No earache, sore throat or runny nose.  CARDIOVASCULAR:  No pressure, squeezing, tightness, heaviness or aching about the chest, neck, axilla or epigastrium.  RESPIRATORY:  No cough, shortness of breath, PND or orthopnea.  GASTROINTESTINAL:  No nausea, vomiting or diarrhea.  GENITOURINARY:  No dysuria, frequency or urgency.  MUSCULOSKELETAL:  no joint pain, deformity, tenderness  EXTREMITIES: no clubbing cyanosis,edema  SKIN:  No change in skin, hair or nails.  NEUROLOGIC:  No paresthesias, fasciculations, seizures or weakness.  PSYCHIATRIC:  No disorder of thought or mood.  ENDOCRINE:  No heat or cold intolerance, polyuria or polydipsia.  HEMATOLOGICAL:  No easy bruising or bleedings:    Vital Signs Last 24 Hrs  T(C): 36.5 (06 Jun 2022 08:11), Max: 36.9 (05 Jun 2022 09:11)  T(F): 97.7 (06 Jun 2022 08:11), Max: 98.4 (05 Jun 2022 09:11)  HR: 71 (06 Jun 2022 08:11) (71 - 82)  BP: 139/81 (06 Jun 2022 08:11) (137/85 - 152/86)  BP(mean): --  RR: 18 (06 Jun 2022 08:11) (18 - 19)  SpO2: 93% (06 Jun 2022 08:11) (93% - 96%)    PHYSICAL EXAMINATION:  SKIN: no rashes  HEAD: NC/AT  EYES: PERRLA, EOMI  EARS: TM's intact  NOSE: no abnormalities  NECK:  Supple. No lymphadenopathy. Jugular venous pressure not elevated. Carotids equal.   HEART:   The cardiac impulse has a normal quality. Reg., Nl S1 and S2.  There are no murmurs, rubs or gallops noted  CHEST:  basilar crackles improved  ABDOMEN:  Soft and nontender.   EXTREMITIES:  no C/C/E  NEURO: AAO x 3, no focal deficts       LABS:                        13.6   14.87 )-----------( 403      ( 06 Jun 2022 06:26 )             40.1     06-06    131<L>  |  101  |  35<H>  ----------------------------<  365<H>  5.7<H>   |  25  |  1.07    Ca    9.2      06 Jun 2022 06:26            RADIOLOGY & ADDITIONAL TESTS:

## 2022-06-07 LAB
ANION GAP SERPL CALC-SCNC: 9 MMOL/L — SIGNIFICANT CHANGE UP (ref 5–17)
BUN SERPL-MCNC: 36 MG/DL — HIGH (ref 7–23)
CALCIUM SERPL-MCNC: 9.1 MG/DL — SIGNIFICANT CHANGE UP (ref 8.5–10.1)
CHLORIDE SERPL-SCNC: 98 MMOL/L — SIGNIFICANT CHANGE UP (ref 96–108)
CO2 SERPL-SCNC: 23 MMOL/L — SIGNIFICANT CHANGE UP (ref 22–31)
CREAT SERPL-MCNC: 1.01 MG/DL — SIGNIFICANT CHANGE UP (ref 0.5–1.3)
EGFR: 87 ML/MIN/1.73M2 — SIGNIFICANT CHANGE UP
GLUCOSE SERPL-MCNC: 373 MG/DL — HIGH (ref 70–99)
HCT VFR BLD CALC: 42.3 % — SIGNIFICANT CHANGE UP (ref 39–50)
HGB BLD-MCNC: 14.3 G/DL — SIGNIFICANT CHANGE UP (ref 13–17)
MCHC RBC-ENTMCNC: 29.1 PG — SIGNIFICANT CHANGE UP (ref 27–34)
MCHC RBC-ENTMCNC: 33.8 GM/DL — SIGNIFICANT CHANGE UP (ref 32–36)
MCV RBC AUTO: 86 FL — SIGNIFICANT CHANGE UP (ref 80–100)
PLATELET # BLD AUTO: 418 K/UL — HIGH (ref 150–400)
POTASSIUM SERPL-MCNC: 5.1 MMOL/L — SIGNIFICANT CHANGE UP (ref 3.5–5.3)
POTASSIUM SERPL-SCNC: 5.1 MMOL/L — SIGNIFICANT CHANGE UP (ref 3.5–5.3)
RBC # BLD: 4.92 M/UL — SIGNIFICANT CHANGE UP (ref 4.2–5.8)
RBC # FLD: 14.1 % — SIGNIFICANT CHANGE UP (ref 10.3–14.5)
SODIUM SERPL-SCNC: 130 MMOL/L — LOW (ref 135–145)
WBC # BLD: 15.75 K/UL — HIGH (ref 3.8–10.5)
WBC # FLD AUTO: 15.75 K/UL — HIGH (ref 3.8–10.5)

## 2022-06-07 PROCEDURE — 99233 SBSQ HOSP IP/OBS HIGH 50: CPT

## 2022-06-07 PROCEDURE — 99232 SBSQ HOSP IP/OBS MODERATE 35: CPT

## 2022-06-07 RX ORDER — INSULIN LISPRO 100/ML
10 VIAL (ML) SUBCUTANEOUS
Refills: 0 | Status: DISCONTINUED | OUTPATIENT
Start: 2022-06-07 | End: 2022-06-21

## 2022-06-07 RX ORDER — INSULIN GLARGINE 100 [IU]/ML
30 INJECTION, SOLUTION SUBCUTANEOUS EVERY MORNING
Refills: 0 | Status: DISCONTINUED | OUTPATIENT
Start: 2022-06-08 | End: 2022-06-19

## 2022-06-07 RX ORDER — INSULIN GLARGINE 100 [IU]/ML
5 INJECTION, SOLUTION SUBCUTANEOUS ONCE
Refills: 0 | Status: COMPLETED | OUTPATIENT
Start: 2022-06-07 | End: 2022-06-07

## 2022-06-07 RX ORDER — INSULIN LISPRO 100/ML
VIAL (ML) SUBCUTANEOUS AT BEDTIME
Refills: 0 | Status: DISCONTINUED | OUTPATIENT
Start: 2022-06-07 | End: 2022-06-07

## 2022-06-07 RX ADMIN — Medication 1 TABLET(S): at 21:59

## 2022-06-07 RX ADMIN — Medication 1 TABLET(S): at 09:31

## 2022-06-07 RX ADMIN — Medication 8: at 11:28

## 2022-06-07 RX ADMIN — SIMVASTATIN 20 MILLIGRAM(S): 20 TABLET, FILM COATED ORAL at 21:59

## 2022-06-07 RX ADMIN — Medication 3 MILLIGRAM(S): at 21:59

## 2022-06-07 RX ADMIN — INSULIN GLARGINE 10 UNIT(S): 100 INJECTION, SOLUTION SUBCUTANEOUS at 07:55

## 2022-06-07 RX ADMIN — Medication 1200 MILLIGRAM(S): at 21:58

## 2022-06-07 RX ADMIN — Medication 5 UNIT(S): at 11:27

## 2022-06-07 RX ADMIN — Medication 1200 MILLIGRAM(S): at 09:31

## 2022-06-07 RX ADMIN — Medication 8: at 07:54

## 2022-06-07 RX ADMIN — Medication 8 MILLIGRAM(S): at 21:59

## 2022-06-07 RX ADMIN — HEPARIN SODIUM 5000 UNIT(S): 5000 INJECTION INTRAVENOUS; SUBCUTANEOUS at 13:59

## 2022-06-07 RX ADMIN — HEPARIN SODIUM 5000 UNIT(S): 5000 INJECTION INTRAVENOUS; SUBCUTANEOUS at 21:58

## 2022-06-07 RX ADMIN — Medication 8: at 16:59

## 2022-06-07 RX ADMIN — HEPARIN SODIUM 5000 UNIT(S): 5000 INJECTION INTRAVENOUS; SUBCUTANEOUS at 06:07

## 2022-06-07 RX ADMIN — Medication 40 MILLIGRAM(S): at 09:31

## 2022-06-07 RX ADMIN — Medication 40 MILLIGRAM(S): at 21:57

## 2022-06-07 RX ADMIN — LOSARTAN POTASSIUM 100 MILLIGRAM(S): 100 TABLET, FILM COATED ORAL at 09:31

## 2022-06-07 RX ADMIN — Medication 3: at 22:00

## 2022-06-07 RX ADMIN — PANTOPRAZOLE SODIUM 40 MILLIGRAM(S): 20 TABLET, DELAYED RELEASE ORAL at 06:07

## 2022-06-07 RX ADMIN — BUDESONIDE AND FORMOTEROL FUMARATE DIHYDRATE 2 PUFF(S): 160; 4.5 AEROSOL RESPIRATORY (INHALATION) at 08:46

## 2022-06-07 RX ADMIN — INSULIN GLARGINE 5 UNIT(S): 100 INJECTION, SOLUTION SUBCUTANEOUS at 11:26

## 2022-06-07 RX ADMIN — Medication 5 UNIT(S): at 16:59

## 2022-06-07 NOTE — PROGRESS NOTE ADULT - ASSESSMENT
- cont O2  - O2 sat on room air 88-90%  - inflammatory markers significantly better; ESR 48 from 94; CRP 28 [id number]  - no lung biopsy  - cont iv steroids; BGM's elevated  - will repeat CT scan tomorrow  - dvt proph

## 2022-06-07 NOTE — PROGRESS NOTE ADULT - SUBJECTIVE AND OBJECTIVE BOX
Subjective:  feeling better  less sob    MEDICATIONS  (STANDING):  budesonide 160 MICROgram(s)/formoterol 4.5 MICROgram(s) Inhaler 2 Puff(s) Inhalation two times a day  dextrose 5%. 1000 milliLiter(s) (100 mL/Hr) IV Continuous <Continuous>  dextrose 5%. 1000 milliLiter(s) (50 mL/Hr) IV Continuous <Continuous>  dextrose 50% Injectable 25 Gram(s) IV Push once  dextrose 50% Injectable 12.5 Gram(s) IV Push once  dextrose 50% Injectable 25 Gram(s) IV Push once  doxazosin 8 milliGRAM(s) Oral at bedtime  glucagon  Injectable 1 milliGRAM(s) IntraMuscular once  guaiFENesin ER 1200 milliGRAM(s) Oral every 12 hours  heparin   Injectable 5000 Unit(s) SubCutaneous every 8 hours  insulin glargine Injectable (LANTUS) 10 Unit(s) SubCutaneous every morning  insulin lispro (ADMELOG) corrective regimen sliding scale   SubCutaneous three times a day before meals  insulin lispro (ADMELOG) corrective regimen sliding scale   SubCutaneous at bedtime  lactobacillus acidophilus 1 Tablet(s) Oral two times a day  losartan 100 milliGRAM(s) Oral daily  melatonin 3 milliGRAM(s) Oral at bedtime  methylPREDNISolone sodium succinate Injectable 40 milliGRAM(s) IV Push every 12 hours  pantoprazole    Tablet 40 milliGRAM(s) Oral before breakfast  simvastatin 20 milliGRAM(s) Oral at bedtime    MEDICATIONS  (PRN):  acetaminophen     Tablet .. 650 milliGRAM(s) Oral every 6 hours PRN Temp greater or equal to 38C (100.4F), Mild Pain (1 - 3)  ALBUTerol    90 MICROgram(s) HFA Inhaler 2 Puff(s) Inhalation every 6 hours PRN Shortness of Breath and/or Wheezing  ALPRAZolam 0.25 milliGRAM(s) Oral every 12 hours PRN anxiety  aluminum hydroxide/magnesium hydroxide/simethicone Suspension 30 milliLiter(s) Oral every 6 hours PRN Dyspepsia  artificial  tears Solution 1 Drop(s) Both EYES every 8 hours PRN Dry Eyes  benzocaine 15 mG/menthol 3.6 mG Lozenge 1 Lozenge Oral three times a day PRN Sore Throat  dextrose Oral Gel 15 Gram(s) Oral once PRN Blood Glucose LESS THAN 70 milliGRAM(s)/deciliter  simethicone 80 milliGRAM(s) Chew every 6 hours PRN Gas      Allergies    codeine (Unknown)    Intolerances        REVIEW OF SYSTEMS:    CONSTITUTIONAL:  As per HPI.  HEENT:  Eyes:  No diplopia or blurred vision. ENT:  No earache, sore throat or runny nose.  CARDIOVASCULAR:  No pressure, squeezing, tightness, heaviness or aching about the chest, neck, axilla or epigastrium.  RESPIRATORY:  No cough, shortness of breath, PND or orthopnea.  GASTROINTESTINAL:  No nausea, vomiting or diarrhea.  GENITOURINARY:  No dysuria, frequency or urgency.  MUSCULOSKELETAL:  no joint pain, deformity, tenderness  EXTREMITIES: no clubbing cyanosis,edema  SKIN:  No change in skin, hair or nails.  NEUROLOGIC:  No paresthesias, fasciculations, seizures or weakness.  PSYCHIATRIC:  No disorder of thought or mood.  ENDOCRINE:  No heat or cold intolerance, polyuria or polydipsia.  HEMATOLOGICAL:  No easy bruising or bleedings:    Vital Signs Last 24 Hrs  T(C): 36.4 (07 Jun 2022 08:04), Max: 36.7 (06 Jun 2022 21:56)  T(F): 97.5 (07 Jun 2022 08:04), Max: 98 (06 Jun 2022 21:56)  HR: 57 (07 Jun 2022 08:04) (57 - 111)  BP: 128/80 (07 Jun 2022 08:04) (120/80 - 128/80)  BP(mean): --  RR: 18 (07 Jun 2022 08:04) (16 - 18)  SpO2: 94% (07 Jun 2022 08:04) (90% - 97%)    PHYSICAL EXAMINATION:  SKIN: no rashes  HEAD: NC/AT  EYES: PERRLA, EOMI  EARS: TM's intact  NOSE: no abnormalities  NECK:  Supple. No lymphadenopathy. Jugular venous pressure not elevated. Carotids equal.   HEART:   The cardiac impulse has a normal quality. Reg., Nl S1 and S2.  There are no murmurs, rubs or gallops noted  CHEST:  fine bibasilar crackles  ABDOMEN:  Soft and nontender.   EXTREMITIES:  no C/C/E  NEURO: AAO x 3, no focal deficts       LABS:                        13.6   14.87 )-----------( 403      ( 06 Jun 2022 06:26 )             40.1     06-06    x   |  x   |  x   ----------------------------<  x   4.6   |  x   |  x     Ca    9.2      06 Jun 2022 06:26            RADIOLOGY & ADDITIONAL TESTS:

## 2022-06-07 NOTE — PROGRESS NOTE ADULT - ASSESSMENT
1. Sepsis POA due to atypical PNA-  Acute hypoxic resp failure- very slow recovery   - Atypical PNA r/o ILD   - COVID negative   - failed oral Abx  - supplemental O2 as needed   - CTA chest; NO PE, atypical peripheral bilateral infiltrates   - iv rocephin and iv zithro changed to cefepime + doxy on 5/31- DC'd   - f/u blood cx: ngtd  - albuterol inh prn   - Mucinex   - pulmonary consult appreciated- sent for legionella/ ILD workup/ CMV and EBV serologies   - ?ILD- elevated CRP/ESR- very slightly elevated Rheumatoid factor- rheumatology consult  - repeat CT chest- showed extensive predominant airspace disease involving all lung   lobes with interval progression- unclear etiology infectious v inflammatory.  - Check COVID PCR- repeat negative   - possible lung biopsy vs bronchoscopy - CT surgery consulted  - ID consult appreciated- antibiotic dc'd and patient was started on IV Steroids. Methylprednisone changed to every 12 hours   - Rheumatology consult for possible connective tissue disease- rheum labs sent d/w Dr Roa  - leukocytosis- likely related to steroid administration - will monitor    # Hypokalemia resolved  # hyponatremia- monitor       #hyperglycemia 2/2 steroids - continue ISS   adjust ISS and start Lantus - d/w patient    # HTN: cont home meds      *left knee pain 10/10- resolved   - patient requesting steroid shot on the left knee  - Ortho consult  - Left knee Xray results noted        # DVT PPX: heparin s/q  Plan for repeat CT chest tomorrow- likely dc in 24-48 hours. Will eval for Home O2 requirement.     Case d/w team on IDR. Plan was d/w patient.        1. Sepsis POA due to atypical PNA-  Acute hypoxic resp failure- very slow recovery   - Atypical PNA r/o ILD   - COVID negative   - failed oral Abx  - supplemental O2 as needed   - CTA chest; NO PE, atypical peripheral bilateral infiltrates   - iv rocephin and iv zithro changed to cefepime + doxy on 5/31- DC'd   - f/u blood cx: ngtd  - albuterol inh prn   - Mucinex   - pulmonary consult appreciated- sent for legionella/ ILD workup/ CMV and EBV serologies   - ?ILD- elevated CRP/ESR- very slightly elevated Rheumatoid factor- rheumatology consult  - repeat CT chest- showed extensive predominant airspace disease involving all lung   lobes with interval progression- unclear etiology infectious v inflammatory.  - Check COVID PCR- repeat negative   - possible lung biopsy vs bronchoscopy - CT surgery consulted  - ID consult appreciated- antibiotic dc'd and patient was started on IV Steroids. Methylprednisone changed to every 12 hours   - Rheumatology consult for possible connective tissue disease- rheum labs sent d/w Dr Roa  - leukocytosis- likely related to steroid administration - will monitor    # hyponatremia secondary to hypernatremia - corrected Na 135 - monitor       #hyperglycemia 2/2 steroids - continue ISS   adjust ISS and start Lantus - d/w patient  A1c 8.3- will need to be on oral hypoglycemic on DC     # HTN: cont home meds      *left knee pain 10/10- resolved   - patient requesting steroid shot on the left knee  - Ortho consult  - Left knee Xray results noted        # DVT PPX: heparin s/q  Plan for repeat CT chest tomorrow- likely dc in 24-48 hours. Will eval for Home O2 requirement.     Case d/w team on IDR. Plan was d/w patient.        1. Sepsis POA due to atypical PNA-  Acute hypoxic resp failure- very slow recovery   - Atypical PNA r/o ILD   - COVID negative   - failed oral Abx  - supplemental O2 as needed   - CTA chest; NO PE, atypical peripheral bilateral infiltrates   - iv rocephin and iv zithro changed to cefepime + doxy on 5/31- DC'd   - f/u blood cx: ngtd  - albuterol inh prn   - Mucinex   - pulmonary consult appreciated- sent for legionella/ ILD workup/ CMV and EBV serologies   - ?ILD- elevated CRP/ESR- very slightly elevated Rheumatoid factor- rheumatology consult  - repeat CT chest- showed extensive predominant airspace disease involving all lung   lobes with interval progression- unclear etiology infectious v inflammatory.  - Check COVID PCR- repeat negative   - possible lung biopsy vs bronchoscopy - CT surgery consulted  - ID consult appreciated- antibiotic dc'd and patient was started on IV Steroids. Methylprednisone changed to every 12 hours   - Rheumatology consult for possible connective tissue disease- rheum labs sent d/w Dr Roa  - leukocytosis- likely related to steroid administration - will monitor    # hyponatremia secondary to hypernatremia - corrected Na 135 - monitor       #hyperglycemia 2/2 steroids - continue ISS   adjust ISS and start Lantus - d/w patient  A1c 8.3- will need to be on oral hypoglycemic on DC   - diabetes education     # HTN: cont home meds      *left knee pain 10/10- resolved   - patient requesting steroid shot on the left knee  - Ortho consult  - Left knee Xray results noted        # DVT PPX: heparin s/q  Plan for repeat CT chest tomorrow- likely dc in 24-48 hours. Will eval for Home O2 requirement.     Case d/w team on IDR. Plan was d/w patient.        1. Sepsis POA due to atypical PNA-  Acute hypoxic resp failure- very slow recovery   - Atypical PNA r/o ILD   - COVID negative   - failed oral Abx  - supplemental O2 as needed   - CTA chest; NO PE, atypical peripheral bilateral infiltrates   - iv rocephin and iv zithro changed to cefepime + doxy on 5/31- DC'd   - f/u blood cx: ngtd  - albuterol inh prn   - Mucinex   - pulmonary consult appreciated- sent for legionella/ ILD workup/ CMV and EBV serologies   - ?ILD- elevated CRP/ESR- very slightly elevated Rheumatoid factor- rheumatology consult  - repeat CT chest- showed extensive predominant airspace disease involving all lung   lobes with interval progression- unclear etiology infectious v inflammatory.  - Check COVID PCR- repeat negative   - possible lung biopsy vs bronchoscopy - CT surgery consulted  - ID consult appreciated- antibiotic dc'd and patient was started on IV Steroids. Methylprednisone changed to every 12 hours   - Rheumatology consult for possible connective tissue disease- rheum labs sent d/w Dr Roa  - leukocytosis- likely related to steroid administration - will monitor    # hyponatremia secondary to hyperglycemia- corrected Na 135 - monitor       #hyperglycemia 2/2 steroids - continue ISS   adjust ISS and start Lantus - d/w patient  A1c 8.3- will need to be on oral hypoglycemic on DC   - diabetes education     # HTN: cont home meds      *left knee pain 10/10- resolved   - patient requesting steroid shot on the left knee  - Ortho consult  - Left knee Xray results noted        # DVT PPX: heparin s/q  Plan for repeat CT chest tomorrow- likely dc in 24-48 hours. Will eval for Home O2 requirement.     Case d/w team on IDR. Plan was d/w patient.

## 2022-06-07 NOTE — PROGRESS NOTE ADULT - ASSESSMENT
A/P:    56/M with PMHx of HTN, brought to the ED for c/o worsening SOB and cough along with a recent Dx of PNA. Admitted for possible PNA with sob, fatigue associated with very elevated inflammatory markers. Rheum asked to evaluated for aiCTD given markedly abnormal CT scan - though not specific for aiCTD and strikingly elevated inflammatory which are also not specific for aiCTD .    -Currently no clinical evidence suggestive of underlying connective tissue disease  -So far serologies negative except for a low positive rheumatoid factor of 14 and ZORA of 1:80   -inflammatory markers significantly better; ESR 48 from 94; CRP 28 [id number]  - continue IV steroids, no lung biopsy per pulmonary  - will repeat CT scan tomorrow  -- appreciate id and pulmonary evaluations      Will follow.

## 2022-06-07 NOTE — PROGRESS NOTE ADULT - SUBJECTIVE AND OBJECTIVE BOX
HPI: 56/M with PMHx of HTN, brought to the ED for c/o worsening SOB and cough along with a recent Dx of PNA.     SUBJECTIVE: Overall doing better.  Lying in bed.  States that he is less short of breath.  Denies any new symptoms.    REVIEW OF SYSTEMS:  General: denies fevers, chills or night sweats  Skin: denies rashes or photosensitivity  Ophthalmologic: denies sicca symptoms  ENMT: denies frequent sinus infections, or ear infections, denies nasal perforation	  Respiratory and Thorax: denies asthma, chest pain or SOB  Cardiovascular: denies chest pain or SOB	  Gastrointestinal: denies colitis	  Musculoskeletal: Complains of left knee pain, denies any swelling or stiffness  Neurological: denies migraines or seizures    Vital Signs Last 24 Hrs  T(C): 36.4 (07 Jun 2022 08:04), Max: 36.7 (06 Jun 2022 21:56)  T(F): 97.5 (07 Jun 2022 08:04), Max: 98 (06 Jun 2022 21:56)  HR: 74 (07 Jun 2022 08:47) (57 - 106)  BP: 128/80 (07 Jun 2022 08:04) (120/80 - 128/80)  BP(mean): --  RR: 18 (07 Jun 2022 08:04) (16 - 18)  SpO2: 97% (07 Jun 2022 08:47) (93% - 97%)    PHYSICAL EXAM:  Patient lying in bed comfortably  Constitutional: NAD, awake and alert  HEENT- no oral ulcers noted, no lymphadenoapthy noted  Heart- S1 S2 reg  Lungs- CTA b/l  Abd- Soft NT  Ext- no edema  Skin- no rashes  Musculoskelatal- FROM all joints, no active synovitis noted  Neurological- No focal deficits      MEDICATIONS:  MEDICATIONS  (STANDING):  budesonide 160 MICROgram(s)/formoterol 4.5 MICROgram(s) Inhaler 2 Puff(s) Inhalation two times a day  dextrose 5%. 1000 milliLiter(s) (100 mL/Hr) IV Continuous <Continuous>  dextrose 5%. 1000 milliLiter(s) (50 mL/Hr) IV Continuous <Continuous>  dextrose 50% Injectable 25 Gram(s) IV Push once  dextrose 50% Injectable 12.5 Gram(s) IV Push once  dextrose 50% Injectable 25 Gram(s) IV Push once  doxazosin 8 milliGRAM(s) Oral at bedtime  glucagon  Injectable 1 milliGRAM(s) IntraMuscular once  guaiFENesin ER 1200 milliGRAM(s) Oral every 12 hours  heparin   Injectable 5000 Unit(s) SubCutaneous every 8 hours  insulin lispro (ADMELOG) corrective regimen sliding scale   SubCutaneous three times a day before meals  insulin lispro (ADMELOG) corrective regimen sliding scale   SubCutaneous at bedtime  insulin lispro Injectable (ADMELOG) 5 Unit(s) SubCutaneous three times a day before meals  lactobacillus acidophilus 1 Tablet(s) Oral two times a day  losartan 100 milliGRAM(s) Oral daily  melatonin 3 milliGRAM(s) Oral at bedtime  methylPREDNISolone sodium succinate Injectable 40 milliGRAM(s) IV Push every 12 hours  pantoprazole    Tablet 40 milliGRAM(s) Oral before breakfast  simvastatin 20 milliGRAM(s) Oral at bedtime    MEDICATIONS  (PRN):  acetaminophen     Tablet .. 650 milliGRAM(s) Oral every 6 hours PRN Temp greater or equal to 38C (100.4F), Mild Pain (1 - 3)  ALBUTerol    90 MICROgram(s) HFA Inhaler 2 Puff(s) Inhalation every 6 hours PRN Shortness of Breath and/or Wheezing  ALPRAZolam 0.25 milliGRAM(s) Oral every 12 hours PRN anxiety  aluminum hydroxide/magnesium hydroxide/simethicone Suspension 30 milliLiter(s) Oral every 6 hours PRN Dyspepsia  artificial  tears Solution 1 Drop(s) Both EYES every 8 hours PRN Dry Eyes  benzocaine 15 mG/menthol 3.6 mG Lozenge 1 Lozenge Oral three times a day PRN Sore Throat  dextrose Oral Gel 15 Gram(s) Oral once PRN Blood Glucose LESS THAN 70 milliGRAM(s)/deciliter  simethicone 80 milliGRAM(s) Chew every 6 hours PRN Gas      LABS: All Labs Reviewed:                        14.3   15.75 )-----------( 418      ( 07 Jun 2022 07:12 )             42.3     06-07    130<L>  |  98  |  36<H>  ----------------------------<  373<H>  5.1   |  23  |  1.01    Ca    9.1      07 Jun 2022 07:12    ZORA 1: 80, speckled pattern  Rheumatoid factor I4  SSA, SSB, Khalil, RNP and Cindy 1 negative      CT chest from 6/2/2022:  IMPRESSION:.    Extensive peripheral predominant airspace disease involving all lung   lobes with interval progression compared to 5/27/2022. The exact etiology   is unclear and diagnostic considerations include infectious etiology and   inflammatory etiology (such as organizing pneumonia).    CT chest follow-up in one month recommended to ensure clearing.

## 2022-06-07 NOTE — PROGRESS NOTE ADULT - SUBJECTIVE AND OBJECTIVE BOX
56/M with PMHx of HTN, brought to the ED for c/o worsening SOB and cough along with a recent Dx of PNA. He also states that he has been feeling very weak and dizzy. He recently got 4th dose of COVID vaccine 3 weeks ago and Sx started 1 week thereafter.   Also HAs cough lizzie when inhales deeply.  Hypoxic on ra.  States he has not been eating much recently and has a 15 lb weight loss. Also states he has been very fatigued and sleeping more.    6/1- Tmax 100.6- patient was seen and examined. Patient states "I am not feeling well and I seem to be getting worse." He reported that last night he has SOB [place] the [place] and it took him a while to recover- he states that he is also SOB when he just attempts to get out of bed and use the commode. He feels that he is suffocating because he cannot control the temp or air in his room. He is also complaining of sore throat from coughing, +left knee painon ROM- he is asking for cortisone shot for left knee.   6/2- patient was seen and examined. stated that he feels better this morning- able to take deep breaths and [facility] still with some CARMONA but improved. Left knee pain is improved- left hip pain is improved. he feels that he pulled a muscle when he turned. cough is improved. c/o abdominal distention and burping- stated that gasx works well  when he takes it. Denies chest pain, fever or chills.   6/3- patient very irate - stated that he wants a private room and he wants a fan blowing air directly on him. He complains that he cannot use the commode because there are too many people in the room. He is very unhappy with the care he is receiving. He is upset that the antibiotic was discontinued and that he was started on steroids. He feels so much worse than yesterday- he states that cannot take deep breaths  6/4 - seen and examined with dr arora at bedside.  both of us explained to patient in detail regarding care plan and reviewed imaging and lab results.    6/5 pt very upset , says he wants to be be discharged today , still feels sob , explained to patient in detail plan of care and imaging and lab results   6/6- stated he feels better this morning. No acute overnight events. no new complaints thus far.     ROS:   All 10 systems reviewed and found to be negative with the exception of what has been described above.Constitutional: NAD, laying in bed  HEENT: NC/AT  Back: no tenderness  Respiratory: respirations even and non labored, LCTA- diminished at the base  Cardiovascular: S1S2 regular, no murmurs  Abdomen: soft, not tender, not distended, positive BS  Genitourinary: voiding  Musculoskeletal: no muscle tenderness, no joint swelling or tenderness  Extremities: no pedal edema  Neurological: no focal deficits      PHYSICAL EXAM:    Vital Signs Last 24 Hrs  T(C): 36.4 (07 Jun 2022 08:04), Max: 36.7 (06 Jun 2022 21:56)  T(F): 97.5 (07 Jun 2022 08:04), Max: 98 (06 Jun 2022 21:56)  HR: 57 (07 Jun 2022 08:04) (57 - 111)  BP: 128/80 (07 Jun 2022 08:04) (120/80 - 128/80)  BP(mean): --  RR: 18 (07 Jun 2022 08:04) (16 - 18)  SpO2: 94% (07 Jun 2022 08:04) (90% - 97%)       MEDICATIONS  (STANDING):  budesonide 160 MICROgram(s)/formoterol 4.5 MICROgram(s) Inhaler 2 Puff(s) Inhalation two times a day  dextrose 5%. 1000 milliLiter(s) (100 mL/Hr) IV Continuous <Continuous>  dextrose 5%. 1000 milliLiter(s) (50 mL/Hr) IV Continuous <Continuous>  dextrose 50% Injectable 25 Gram(s) IV Push once  dextrose 50% Injectable 12.5 Gram(s) IV Push once  dextrose 50% Injectable 25 Gram(s) IV Push once  doxazosin 8 milliGRAM(s) Oral at bedtime  glucagon  Injectable 1 milliGRAM(s) IntraMuscular once  guaiFENesin ER 1200 milliGRAM(s) Oral every 12 hours  heparin   Injectable 5000 Unit(s) SubCutaneous every 8 hours  insulin glargine Injectable (LANTUS) 10 Unit(s) SubCutaneous every morning  insulin lispro (ADMELOG) corrective regimen sliding scale   SubCutaneous three times a day before meals  insulin lispro (ADMELOG) corrective regimen sliding scale   SubCutaneous at bedtime  lactobacillus acidophilus 1 Tablet(s) Oral two times a day  losartan 100 milliGRAM(s) Oral daily  melatonin 3 milliGRAM(s) Oral at bedtime  methylPREDNISolone sodium succinate Injectable 40 milliGRAM(s) IV Push every 12 hours  pantoprazole    Tablet 40 milliGRAM(s) Oral before breakfast  simvastatin 20 milliGRAM(s) Oral at bedtime    MEDICATIONS  (PRN):  acetaminophen     Tablet .. 650 milliGRAM(s) Oral every 6 hours PRN Temp greater or equal to 38C (100.4F), Mild Pain (1 - 3)  ALBUTerol    90 MICROgram(s) HFA Inhaler 2 Puff(s) Inhalation every 6 hours PRN Shortness of Breath and/or Wheezing  ALPRAZolam 0.25 milliGRAM(s) Oral every 12 hours PRN anxiety  aluminum hydroxide/magnesium hydroxide/simethicone Suspension 30 milliLiter(s) Oral every 6 hours PRN Dyspepsia  artificial  tears Solution 1 Drop(s) Both EYES every 8 hours PRN Dry Eyes  benzocaine 15 mG/menthol 3.6 mG Lozenge 1 Lozenge Oral three times a day PRN Sore Throat  dextrose Oral Gel 15 Gram(s) Oral once PRN Blood Glucose LESS THAN 70 milliGRAM(s)/deciliter  simethicone 80 milliGRAM(s) Chew every 6 hours PRN Gas  06-07    130<L>  |  98  |  36<H>  ----------------------------<  373<H>  5.1   |  23  |  1.01    Ca    9.1      07 Jun 2022 07:12                          14.3   15.75 )-----------( 418      ( 07 Jun 2022 07:12 )             42.3     06-06    131<L>  |  101  |  35<H>  ----------------------------<  365<H>  5.7<H>   |  25  |  1.07    Ca    9.2      06 Jun 2022 06:26                               13.6   14.87 )-----------( 403      ( 06 Jun 2022 06:26 )             40.1         06-05    133<L>  |  102  |  32<H>  ----------------------------<  360<H>  4.9   |  23  |  1.01    Ca    9.1      05 Jun 2022 05:44            ZORA 1:80 speckled pattern     NEgative - SSA SSB JO1    Rheumatoid Factor Quant, Serum or Plasma: 14 IU/mL (06.01.22 @ 13:01)     C-Reactive Protein, Serum: 183 mg/L (06.01.22 @ 13:01)  Sedimentation Rate, Erythrocyte: 94 mm/hr (06.01.22 @ 13:01)   Legionella Urine- negative     CT Angio Chest PE Protocol w/ IV Cont (05.27.22 @ 16:55) >  No pulmonary embolism from the main pulmonary artery up to and including   the segmental branches. Limited assessment of subsegmental branches.    Bilateral peripheral lung disease. Differential includes infection (COVID   in particular), organizing pneumonia, or eosinophilic pneumonia.    Incidental incompletely characterized left adrenal nodule. Further   evaluation with MRI is recommended, which can be done on a nonemergent   basis.       CT Chest No Cont (06.02.22 @ 10:20) >  CT CHEST                        PROCEDURE DATE:  06/02/2022    INTERPRETATION:  HISTORY: Pneumonia with slow improvement.  EXAMINATION: CT CHEST was performed without IV contrast.  COMPARISON: 5/27/2022.  FINDINGS:  AIRWAYS, LUNGS, PLEURA: Central airways clear. No pleural effusion.  Peripheral predominant ground-glass and consolidative opacities involving   all lung lobes have demonstrated interval progression compared to   5/27/2022. For example, region of consolidation in the posterior right   upper lobe (image 44, series 2) is new and right lower lobe superior   segment consolidation (image 59, series 2) has progressed.  MEDIASTINUM: Normal heart size. Coronary atherosclerosis. Small   pericardial effusion. Thoracic aorta normal caliber.  No large   mediastinal lymph nodes. Small amount of fluid within the lumen of the   lower esophagus.  IMAGED ABDOMEN: Unchanged 2 cm left adrenal nodule.  SOFT TISSUES: Unremarkable.  BONES: Unremarkable.  IMPRESSION:.  Extensive peripheral predominant airspace disease involving all lung   lobes with interval progression compared to 5/27/2022. The exact etiology   is unclear and diagnostic considerations include infectious etiology and   inflammatory etiology (such as organizing pneumonia).  CT chest follow-up in one month recommended to ensure clearing.       56/M with PMHx of HTN, brought to the ED for c/o worsening SOB and cough along with a recent Dx of PNA. He also states that he has been feeling very weak and dizzy. He recently got 4th dose of COVID vaccine 3 weeks ago and Sx started 1 week thereafter.   Also HAs cough lizzie when inhales deeply.  Hypoxic on ra.  States he has not been eating much recently and has a 15 lb weight loss. Also states he has been very fatigued and sleeping more.    6/1- Tmax 100.6- patient was seen and examined. Patient states "I am not feeling well and I seem to be getting worse." He reported that last night he [place] and it took him a while to recover- he states that he is also SOB when he just attempts to get out of bed and use the commode. He feels that he is suffocating because he cannot control the temp or air in his room. He is also complaining of sore throat from coughing, +left knee painon ROM- he is asking for cortisone shot for left knee.   6/2- patient was seen and examined. stated that he feels better this morning- able to take deep breaths and [facility] still with some CARMONA but improved. Left knee pain is improved- left hip pain is improved. he feels that he pulled a muscle when he turned. cough is improved. c/o abdominal distention and burping- stated that gasx works well  when he takes it. Denies chest pain, fever or chills.   6/3- patient very irate - stated that he wants a private room and he wants a fan blowing air directly on him. He complains that he cannot use the commode because there are too many people in the room. He is very unhappy with the care he is receiving. He is upset that the antibiotic was discontinued and that he was started on steroids. He feels so much worse than yesterday- he states that cannot take deep breaths  6/4 - seen and examined with dr arora at bedside.  both of us explained to patient in detail regarding care plan and reviewed imaging and lab results.    6/5 pt very upset , says he wants to be be discharged today , still feels sob , explained to patient in detail plan of care and imaging and lab results   6/6- stated he feels better this morning. No acute overnight events. no new complaints thus far.  6/7-feels better today compared to yesterday. Worried about his a1c- stated that his A1c was drawn a couple of months ago and was borderline but not as high as the new reading which is 8.3. he denies SOB- stated that he feels he is responding to the steroids but the steroids is what made his a1c up to 8.3.      ROS:   All 10 systems reviewed and found to be negative with the exception of what has been described above.   Constitutional: NAD, laying in bed  HEENT: NC/AT  Back: no tenderness  Respiratory: respirations even and non labored, LCTA- diminished at the base  Cardiovascular: S1S2 regular, no murmurs  Abdomen: soft, not tender, not distended, positive BS  Genitourinary: voiding  Musculoskeletal: no muscle tenderness, no joint swelling or tenderness  Extremities: no pedal edema  Neurological: no focal deficits      PHYSICAL EXAM:    Vital Signs Last 24 Hrs  T(C): 36.4 (07 Jun 2022 08:04), Max: 36.7 (06 Jun 2022 21:56)  T(F): 97.5 (07 Jun 2022 08:04), Max: 98 (06 Jun 2022 21:56)  HR: 74 (07 Jun 2022 08:47) (57 - 106)  BP: 128/80 (07 Jun 2022 08:04) (120/80 - 128/80)  BP(mean): --  RR: 18 (07 Jun 2022 08:04) (16 - 18)  SpO2: 97% (07 Jun 2022 08:47) (93% - 97%)       MEDICATIONS  (STANDING):  budesonide 160 MICROgram(s)/formoterol 4.5 MICROgram(s) Inhaler 2 Puff(s) Inhalation two times a day  dextrose 5%. 1000 milliLiter(s) (100 mL/Hr) IV Continuous <Continuous>  dextrose 5%. 1000 milliLiter(s) (50 mL/Hr) IV Continuous <Continuous>  dextrose 50% Injectable 25 Gram(s) IV Push once  dextrose 50% Injectable 12.5 Gram(s) IV Push once  dextrose 50% Injectable 25 Gram(s) IV Push once  doxazosin 8 milliGRAM(s) Oral at bedtime  glucagon  Injectable 1 milliGRAM(s) IntraMuscular once  guaiFENesin ER 1200 milliGRAM(s) Oral every 12 hours  heparin   Injectable 5000 Unit(s) SubCutaneous every 8 hours  insulin lispro (ADMELOG) corrective regimen sliding scale   SubCutaneous three times a day before meals  insulin lispro (ADMELOG) corrective regimen sliding scale   SubCutaneous at bedtime  insulin lispro Injectable (ADMELOG) 5 Unit(s) SubCutaneous three times a day before meals  lactobacillus acidophilus 1 Tablet(s) Oral two times a day  losartan 100 milliGRAM(s) Oral daily  melatonin 3 milliGRAM(s) Oral at bedtime  methylPREDNISolone sodium succinate Injectable 40 milliGRAM(s) IV Push every 12 hours  pantoprazole    Tablet 40 milliGRAM(s) Oral before breakfast  simvastatin 20 milliGRAM(s) Oral at bedtime    MEDICATIONS  (PRN):  acetaminophen     Tablet .. 650 milliGRAM(s) Oral every 6 hours PRN Temp greater or equal to 38C (100.4F), Mild Pain (1 - 3)  ALBUTerol    90 MICROgram(s) HFA Inhaler 2 Puff(s) Inhalation every 6 hours PRN Shortness of Breath and/or Wheezing  ALPRAZolam 0.25 milliGRAM(s) Oral every 12 hours PRN anxiety  aluminum hydroxide/magnesium hydroxide/simethicone Suspension 30 milliLiter(s) Oral every 6 hours PRN Dyspepsia  artificial  tears Solution 1 Drop(s) Both EYES every 8 hours PRN Dry Eyes  benzocaine 15 mG/menthol 3.6 mG Lozenge 1 Lozenge Oral three times a day PRN Sore Throat  dextrose Oral Gel 15 Gram(s) Oral once PRN Blood Glucose LESS THAN 70 milliGRAM(s)/deciliter  simethicone 80 milliGRAM(s) Chew every 6 hours PRN Gas      06-07    130<L>  |  98  |  36<H>  ----------------------------<  373<H>  5.1   |  23  |  1.01    Ca    9.1      07 Jun 2022 07:12                          14.3   15.75 )-----------( 418      ( 07 Jun 2022 07:12 )             42.3     06-06    131<L>  |  101  |  35<H>  ----------------------------<  365<H>  5.7<H>   |  25  |  1.07    Ca    9.2      06 Jun 2022 06:26                               13.6   14.87 )-----------( 403      ( 06 Jun 2022 06:26 )             40.1         06-05    133<L>  |  102  |  32<H>  ----------------------------<  360<H>  4.9   |  23  |  1.01    Ca    9.1      05 Jun 2022 05:44            ZORA 1:80 speckled pattern     NEgative - SSA SSB JO1    Rheumatoid Factor Quant, Serum or Plasma: 14 IU/mL (06.01.22 @ 13:01)     C-Reactive Protein, Serum: 183 mg/L (06.01.22 @ 13:01)  Sedimentation Rate, Erythrocyte: 94 mm/hr (06.01.22 @ 13:01)   Legionella Urine- negative     CT Angio Chest PE Protocol w/ IV Cont (05.27.22 @ 16:55) >  No pulmonary embolism from the main pulmonary artery up to and including   the segmental branches. Limited assessment of subsegmental branches.    Bilateral peripheral lung disease. Differential includes infection (COVID   in particular), organizing pneumonia, or eosinophilic pneumonia.    Incidental incompletely characterized left adrenal nodule. Further   evaluation with MRI is recommended, which can be done on a nonemergent   basis.       CT Chest No Cont (06.02.22 @ 10:20) >  CT CHEST                        PROCEDURE DATE:  06/02/2022    INTERPRETATION:  HISTORY: Pneumonia with slow improvement.  EXAMINATION: CT CHEST was performed without IV contrast.  COMPARISON: 5/27/2022.  FINDINGS:  AIRWAYS, LUNGS, PLEURA: Central airways clear. No pleural effusion.  Peripheral predominant ground-glass and consolidative opacities involving   all lung lobes have demonstrated interval progression compared to   5/27/2022. For example, region of consolidation in the posterior right   upper lobe (image 44, series 2) is new and right lower lobe superior   segment consolidation (image 59, series 2) has progressed.  MEDIASTINUM: Normal heart size. Coronary atherosclerosis. Small   pericardial effusion. Thoracic aorta normal caliber.  No large   mediastinal lymph nodes. Small amount of fluid within the lumen of the   lower esophagus.  IMAGED ABDOMEN: Unchanged 2 cm left adrenal nodule.  SOFT TISSUES: Unremarkable.  BONES: Unremarkable.  IMPRESSION:.  Extensive peripheral predominant airspace disease involving all lung   lobes with interval progression compared to 5/27/2022. The exact etiology   is unclear and diagnostic considerations include infectious etiology and   inflammatory etiology (such as organizing pneumonia).  CT chest follow-up in one month recommended to ensure clearing.

## 2022-06-08 PROCEDURE — 99233 SBSQ HOSP IP/OBS HIGH 50: CPT

## 2022-06-08 PROCEDURE — 99232 SBSQ HOSP IP/OBS MODERATE 35: CPT

## 2022-06-08 PROCEDURE — 71250 CT THORAX DX C-: CPT | Mod: 26

## 2022-06-08 RX ORDER — LANOLIN ALCOHOL/MO/W.PET/CERES
1 CREAM (GRAM) TOPICAL
Qty: 0 | Refills: 0 | DISCHARGE
Start: 2022-06-08

## 2022-06-08 RX ORDER — ALBUTEROL 90 UG/1
2 AEROSOL, METERED ORAL
Qty: 1 | Refills: 0
Start: 2022-06-08 | End: 2022-07-07

## 2022-06-08 RX ORDER — LOSARTAN POTASSIUM 100 MG/1
1 TABLET, FILM COATED ORAL
Qty: 0 | Refills: 0 | DISCHARGE
Start: 2022-06-08

## 2022-06-08 RX ORDER — PANTOPRAZOLE SODIUM 20 MG/1
1 TABLET, DELAYED RELEASE ORAL
Qty: 30 | Refills: 0
Start: 2022-06-08 | End: 2022-07-07

## 2022-06-08 RX ORDER — DOXAZOSIN MESYLATE 4 MG
1 TABLET ORAL
Qty: 0 | Refills: 0 | DISCHARGE

## 2022-06-08 RX ORDER — BUDESONIDE AND FORMOTEROL FUMARATE DIHYDRATE 160; 4.5 UG/1; UG/1
2 AEROSOL RESPIRATORY (INHALATION)
Qty: 1 | Refills: 0
Start: 2022-06-08 | End: 2022-07-07

## 2022-06-08 RX ORDER — DOXAZOSIN MESYLATE 4 MG
1 TABLET ORAL
Qty: 0 | Refills: 0 | DISCHARGE
Start: 2022-06-08

## 2022-06-08 RX ORDER — LACTOBACILLUS ACIDOPHILUS 100MM CELL
1 CAPSULE ORAL
Qty: 0 | Refills: 0 | DISCHARGE
Start: 2022-06-08

## 2022-06-08 RX ORDER — LOSARTAN POTASSIUM 100 MG/1
1 TABLET, FILM COATED ORAL
Qty: 0 | Refills: 0 | DISCHARGE

## 2022-06-08 RX ORDER — ACETAMINOPHEN 500 MG
2 TABLET ORAL
Qty: 0 | Refills: 0 | DISCHARGE
Start: 2022-06-08

## 2022-06-08 RX ADMIN — Medication 1200 MILLIGRAM(S): at 22:21

## 2022-06-08 RX ADMIN — SIMETHICONE 80 MILLIGRAM(S): 80 TABLET, CHEWABLE ORAL at 22:21

## 2022-06-08 RX ADMIN — Medication 5 UNIT(S): at 12:03

## 2022-06-08 RX ADMIN — Medication 0.25 MILLIGRAM(S): at 22:21

## 2022-06-08 RX ADMIN — Medication 1 TABLET(S): at 22:21

## 2022-06-08 RX ADMIN — BUDESONIDE AND FORMOTEROL FUMARATE DIHYDRATE 2 PUFF(S): 160; 4.5 AEROSOL RESPIRATORY (INHALATION) at 09:11

## 2022-06-08 RX ADMIN — SIMVASTATIN 20 MILLIGRAM(S): 20 TABLET, FILM COATED ORAL at 22:22

## 2022-06-08 RX ADMIN — Medication 5 UNIT(S): at 08:29

## 2022-06-08 RX ADMIN — INSULIN GLARGINE 15 UNIT(S): 100 INJECTION, SOLUTION SUBCUTANEOUS at 08:36

## 2022-06-08 RX ADMIN — Medication 3 MILLIGRAM(S): at 22:21

## 2022-06-08 RX ADMIN — Medication 8: at 08:29

## 2022-06-08 RX ADMIN — HEPARIN SODIUM 5000 UNIT(S): 5000 INJECTION INTRAVENOUS; SUBCUTANEOUS at 05:38

## 2022-06-08 RX ADMIN — Medication 650 MILLIGRAM(S): at 22:20

## 2022-06-08 RX ADMIN — Medication 650 MILLIGRAM(S): at 22:48

## 2022-06-08 RX ADMIN — BUDESONIDE AND FORMOTEROL FUMARATE DIHYDRATE 2 PUFF(S): 160; 4.5 AEROSOL RESPIRATORY (INHALATION) at 20:58

## 2022-06-08 RX ADMIN — Medication 8: at 17:42

## 2022-06-08 RX ADMIN — Medication 8 UNIT(S): at 17:41

## 2022-06-08 RX ADMIN — Medication 10: at 12:03

## 2022-06-08 RX ADMIN — HEPARIN SODIUM 5000 UNIT(S): 5000 INJECTION INTRAVENOUS; SUBCUTANEOUS at 22:21

## 2022-06-08 RX ADMIN — Medication 1 TABLET(S): at 08:39

## 2022-06-08 RX ADMIN — PANTOPRAZOLE SODIUM 40 MILLIGRAM(S): 20 TABLET, DELAYED RELEASE ORAL at 05:38

## 2022-06-08 RX ADMIN — Medication 8 MILLIGRAM(S): at 22:21

## 2022-06-08 RX ADMIN — HEPARIN SODIUM 5000 UNIT(S): 5000 INJECTION INTRAVENOUS; SUBCUTANEOUS at 13:26

## 2022-06-08 RX ADMIN — Medication 1200 MILLIGRAM(S): at 08:39

## 2022-06-08 RX ADMIN — Medication 40 MILLIGRAM(S): at 08:39

## 2022-06-08 RX ADMIN — LOSARTAN POTASSIUM 100 MILLIGRAM(S): 100 TABLET, FILM COATED ORAL at 13:26

## 2022-06-08 NOTE — DIETITIAN INITIAL EVALUATION ADULT - PERTINENT MEDS FT
MEDICATIONS  (STANDING):  budesonide 160 MICROgram(s)/formoterol 4.5 MICROgram(s) Inhaler 2 Puff(s) Inhalation two times a day  dextrose 5%. 1000 milliLiter(s) (50 mL/Hr) IV Continuous <Continuous>  dextrose 5%. 1000 milliLiter(s) (100 mL/Hr) IV Continuous <Continuous>  dextrose 50% Injectable 25 Gram(s) IV Push once  dextrose 50% Injectable 12.5 Gram(s) IV Push once  dextrose 50% Injectable 25 Gram(s) IV Push once  doxazosin 8 milliGRAM(s) Oral at bedtime  glucagon  Injectable 1 milliGRAM(s) IntraMuscular once  guaiFENesin ER 1200 milliGRAM(s) Oral every 12 hours  heparin   Injectable 5000 Unit(s) SubCutaneous every 8 hours  insulin glargine Injectable (LANTUS) 15 Unit(s) SubCutaneous every morning  insulin lispro (ADMELOG) corrective regimen sliding scale   SubCutaneous three times a day before meals  insulin lispro (ADMELOG) corrective regimen sliding scale   SubCutaneous at bedtime  insulin lispro Injectable (ADMELOG) 5 Unit(s) SubCutaneous three times a day before meals  lactobacillus acidophilus 1 Tablet(s) Oral two times a day  losartan 100 milliGRAM(s) Oral daily  melatonin 3 milliGRAM(s) Oral at bedtime  methylPREDNISolone sodium succinate Injectable 40 milliGRAM(s) IV Push every 12 hours  pantoprazole    Tablet 40 milliGRAM(s) Oral before breakfast  simvastatin 20 milliGRAM(s) Oral at bedtime    MEDICATIONS  (PRN):  acetaminophen     Tablet .. 650 milliGRAM(s) Oral every 6 hours PRN Temp greater or equal to 38C (100.4F), Mild Pain (1 - 3)  ALBUTerol    90 MICROgram(s) HFA Inhaler 2 Puff(s) Inhalation every 6 hours PRN Shortness of Breath and/or Wheezing  ALPRAZolam 0.25 milliGRAM(s) Oral every 12 hours PRN anxiety  aluminum hydroxide/magnesium hydroxide/simethicone Suspension 30 milliLiter(s) Oral every 6 hours PRN Dyspepsia  artificial  tears Solution 1 Drop(s) Both EYES every 8 hours PRN Dry Eyes  benzocaine 15 mG/menthol 3.6 mG Lozenge 1 Lozenge Oral three times a day PRN Sore Throat  dextrose Oral Gel 15 Gram(s) Oral once PRN Blood Glucose LESS THAN 70 milliGRAM(s)/deciliter  simethicone 80 milliGRAM(s) Chew every 6 hours PRN Gas

## 2022-06-08 NOTE — DIETITIAN INITIAL EVALUATION ADULT - OTHER INFO
56/M with PMHx of HTN, brought to the ED for c/o worsening SOB and cough along with a recent Dx of PNA. He also states that he has been feeling very weak and dizzy. He recently got 4th dose of COVID vaccine 3 weeks ago and Sx started 1 week thereafter. Sepsis POA due to atypical PNA-  Acute hypoxic resp failure- very slow recovery - Atypical PNA r/o ILD. #hyperglycemia 2/2 steroids - continue ISS.  Possible dc in 24-48 hours. Pt c/o being tortured and not able to sleep in the hospital. A1C 8.3% (steroid induced however lab value indicating DM). PO intake improving since admission. Pt refused DM education/teaching because he stated his brain is not working due to inability to sleep in hospital. Significant weight loss x 3 weeks 10%/28#. Bed scale weight 247# taken by RD on 6/8. NFPE-no muscle/fat wasting at this time. Will continue to monitor and follow up prn. Criteria met for severe malnutrition.

## 2022-06-08 NOTE — DIETITIAN INITIAL EVALUATION ADULT - NSFNSGIIOFT_GEN_A_CORE
I&O's Detail    07 Jun 2022 07:01  -  08 Jun 2022 07:00  --------------------------------------------------------  IN:  Total IN: 0 mL    OUT:    Voided (mL): 500 mL  Total OUT: 500 mL    Total NET: -500 mL

## 2022-06-08 NOTE — PROGRESS NOTE ADULT - SUBJECTIVE AND OBJECTIVE BOX
Subjective:  feeling better  less sob    6/8: in bed, on NC O2, no distress, awake, alert.     MEDICATIONS  (STANDING):  budesonide 160 MICROgram(s)/formoterol 4.5 MICROgram(s) Inhaler 2 Puff(s) Inhalation two times a day  dextrose 5%. 1000 milliLiter(s) (100 mL/Hr) IV Continuous <Continuous>  dextrose 5%. 1000 milliLiter(s) (50 mL/Hr) IV Continuous <Continuous>  dextrose 50% Injectable 25 Gram(s) IV Push once  dextrose 50% Injectable 12.5 Gram(s) IV Push once  dextrose 50% Injectable 25 Gram(s) IV Push once  doxazosin 8 milliGRAM(s) Oral at bedtime  glucagon  Injectable 1 milliGRAM(s) IntraMuscular once  guaiFENesin ER 1200 milliGRAM(s) Oral every 12 hours  heparin   Injectable 5000 Unit(s) SubCutaneous every 8 hours  insulin glargine Injectable (LANTUS) 10 Unit(s) SubCutaneous every morning  insulin lispro (ADMELOG) corrective regimen sliding scale   SubCutaneous three times a day before meals  insulin lispro (ADMELOG) corrective regimen sliding scale   SubCutaneous at bedtime  lactobacillus acidophilus 1 Tablet(s) Oral two times a day  losartan 100 milliGRAM(s) Oral daily  melatonin 3 milliGRAM(s) Oral at bedtime  methylPREDNISolone sodium succinate Injectable 40 milliGRAM(s) IV Push every 12 hours  pantoprazole    Tablet 40 milliGRAM(s) Oral before breakfast  simvastatin 20 milliGRAM(s) Oral at bedtime    MEDICATIONS  (PRN):  acetaminophen     Tablet .. 650 milliGRAM(s) Oral every 6 hours PRN Temp greater or equal to 38C (100.4F), Mild Pain (1 - 3)  ALBUTerol    90 MICROgram(s) HFA Inhaler 2 Puff(s) Inhalation every 6 hours PRN Shortness of Breath and/or Wheezing  ALPRAZolam 0.25 milliGRAM(s) Oral every 12 hours PRN anxiety  aluminum hydroxide/magnesium hydroxide/simethicone Suspension 30 milliLiter(s) Oral every 6 hours PRN Dyspepsia  artificial  tears Solution 1 Drop(s) Both EYES every 8 hours PRN Dry Eyes  benzocaine 15 mG/menthol 3.6 mG Lozenge 1 Lozenge Oral three times a day PRN Sore Throat  dextrose Oral Gel 15 Gram(s) Oral once PRN Blood Glucose LESS THAN 70 milliGRAM(s)/deciliter  simethicone 80 milliGRAM(s) Chew every 6 hours PRN Gas      Allergies    codeine (Unknown)    Intolerances        REVIEW OF SYSTEMS:    CONSTITUTIONAL:  As per HPI.  HEENT:  Eyes:  No diplopia or blurred vision. ENT:  No earache, sore throat or runny nose.  CARDIOVASCULAR:  No pressure, squeezing, tightness, heaviness or aching about the chest, neck, axilla or epigastrium.  RESPIRATORY:  No cough, shortness of breath, PND or orthopnea.  GASTROINTESTINAL:  No nausea, vomiting or diarrhea.  GENITOURINARY:  No dysuria, frequency or urgency.  MUSCULOSKELETAL:  no joint pain, deformity, tenderness  EXTREMITIES: no clubbing cyanosis,edema  SKIN:  No change in skin, hair or nails.  NEUROLOGIC:  No paresthesias, fasciculations, seizures or weakness.  PSYCHIATRIC:  No disorder of thought or mood.  ENDOCRINE:  No heat or cold intolerance, polyuria or polydipsia.  HEMATOLOGICAL:  No easy bruising or bleedings:    Vital Signs Last 24 Hrs  T(C): 36.4 (07 Jun 2022 08:04), Max: 36.7 (06 Jun 2022 21:56)  T(F): 97.5 (07 Jun 2022 08:04), Max: 98 (06 Jun 2022 21:56)  HR: 57 (07 Jun 2022 08:04) (57 - 111)  BP: 128/80 (07 Jun 2022 08:04) (120/80 - 128/80)  BP(mean): --  RR: 18 (07 Jun 2022 08:04) (16 - 18)  SpO2: 94% (07 Jun 2022 08:04) (90% - 97%)    PHYSICAL EXAMINATION:  SKIN: no rashes  HEAD: NC/AT  EYES: PERRLA, EOMI  EARS: TM's intact  NOSE: no abnormalities  NECK:  Supple. No lymphadenopathy. Jugular venous pressure not elevated. Carotids equal.   HEART:   The cardiac impulse has a normal quality. Reg., Nl S1 and S2.  There are no murmurs, rubs or gallops noted  CHEST:  few fine bibasilar crackles  ABDOMEN:  Soft and nontender.   EXTREMITIES:  no C/C/E  NEURO: AAO x 3, no focal deficts       LABS:                        13.6   14.87 )-----------( 403      ( 06 Jun 2022 06:26 )             40.1     06-06    x   |  x   |  x   ----------------------------<  x   4.6   |  x   |  x     Ca    9.2      06 Jun 2022 06:26            RADIOLOGY & ADDITIONAL TESTS:

## 2022-06-08 NOTE — DIETITIAN NUTRITION RISK NOTIFICATION - ADDITIONAL COMMENTS/DIETITIAN RECOMMENDATIONS
nAdditional Recommendations  1) continue with consistent carb diet and maintain glycemic control 2) monitor weights track trends 3) Monitor BM if none x > 3 days initiate bowel meds 4) Monitor lytes and hydration replete prn 5) MVI w/minerals to meet RDI's 6) encourage protein enriched foods to optimize nutrition.

## 2022-06-08 NOTE — PROGRESS NOTE ADULT - ASSESSMENT
- cont O2  - O2 sat on room air 88-90%  - inflammatory markers significantly better; ESR 48 from 94; CRP 28 [id number]  - no lung biopsy  - cont iv steroids; BGM's elevated  - will repeat CT scan today  - dvt proph

## 2022-06-08 NOTE — PROGRESS NOTE ADULT - ASSESSMENT
1. Sepsis POA due to atypical PNA-  Acute hypoxic resp failure- very slow recovery   - Atypical PNA r/o ILD   - COVID negative   - failed oral Abx  - supplemental O2 as needed   - CTA chest; NO PE, atypical peripheral bilateral infiltrates   - iv rocephin and iv zithro changed to cefepime + doxy on 5/31- DC'd   - f/u blood cx: ngtd  - albuterol inh prn   - Mucinex   - pulmonary consult appreciated- sent for legionella/ ILD workup/ CMV and EBV serologies   - ?ILD- elevated CRP/ESR- very slightly elevated Rheumatoid factor- rheumatology consult  - repeat CT chest- showed extensive predominant airspace disease involving all lung   lobes with interval progression- unclear etiology infectious v inflammatory.  - Check COVID PCR- repeat negative   - possible lung biopsy vs bronchoscopy - CT surgery consulted  - ID consult appreciated- antibiotic dc'd and patient was started on IV Steroids. Methylprednisone changed to every 12 hours   - Rheumatology consult for possible connective tissue disease- rheum labs sent d/w Dr Roa  - leukocytosis- likely related to steroid administration - will monitor  - Plan for ct chest today - will titrate steroids.   - d/w Dr Harkins     # hyponatremia secondary to hypernatremia - corrected Na 135 - monitor       #hyperglycemia 2/2 steroids - continue ISS   adjust ISS and start Lantus - d/w patient  A1c 8.3- will need to be on oral hypoglycemic on DC   - diabetes education   - refused diabetes education   - stated that he does not have diabetes (as per patient report he had an a1c done with Dr Brooks and it was normal) I called Dr Brooks office and left message.     # HTN: cont home meds      *left knee pain 10/10- resolved   - patient requesting steroid shot on the left knee  - Ortho consult  - Left knee Xray results noted        # DVT PPX: heparin s/q  Plan for repeat CT chest tomorrow- likely dc in 24-48 hours. Will eval for Home O2 requirement.     Case d/w team on IDR. Plan was d/w patient.   Plan for repeat chest CT today and titrate steroid. Possible dc in 24-48 hours.        1. Sepsis POA due to atypical PNA-  Acute hypoxic resp failure- very slow recovery   - Atypical PNA r/o ILD   - COVID negative   - failed oral Abx  - supplemental O2 as needed   - CTA chest; NO PE, atypical peripheral bilateral infiltrates   - iv rocephin and iv zithro changed to cefepime + doxy on 5/31- DC'd   - f/u blood cx: ngtd  - albuterol inh prn   - Mucinex   - pulmonary consult appreciated- sent for legionella/ ILD workup/ CMV and EBV serologies   - ?ILD- elevated CRP/ESR- very slightly elevated Rheumatoid factor- rheumatology consult  - repeat CT chest- showed extensive predominant airspace disease involving all lung   lobes with interval progression- unclear etiology infectious v inflammatory.  - Check COVID PCR- repeat negative   - possible lung biopsy vs bronchoscopy - CT surgery consulted  - ID consult appreciated- antibiotic dc'd and patient was started on IV Steroids. Methylprednisone changed to every 12 hours   - Rheumatology consult for possible connective tissue disease- rheum labs sent d/w Dr Roa  - leukocytosis- likely related to steroid administration - will monitor  - Plan for ct chest today - will titrate steroids.   - d/w Dr Harkins     # hyponatremia secondary to hypernatremia - corrected Na 135 - monitor       #hyperglycemia 2/2 steroids - continue ISS   adjust ISS and start Lantus - d/w patient  A1c 8.3- will need to be on oral hypoglycemic on DC   - diabetes education   - refused diabetes education   - stated that he does not have diabetes (as per patient report he had an a1c done with Dr Brooks and it was normal) I called Dr Brooks office and left message.     # HTN: cont home meds      *left knee pain 10/10- resolved   - patient requesting steroid shot on the left knee  - Ortho consult  - Left knee Xray results noted        # DVT PPX: heparin s/q  Plan for repeat CT chest tomorrow- likely dc in 24-48 hours. Will eval for Home O2 requirement.     Case d/w team on IDR. Plan was d/w patient.   Plan for repeat chest CT today and titrate steroid. Possible dc in 24-48 hours.   6/8-D/W Dr Brooks  patient hospital course, which included possible ILD, DM and need for f/u after dc from inpatient admission .         1. Sepsis POA due to atypical PNA-  Acute hypoxic resp failure- very slow recovery   - Atypical PNA r/o ILD   - COVID negative   - failed oral Abx  - supplemental O2 as needed   - CTA chest; NO PE, atypical peripheral bilateral infiltrates   - iv rocephin and iv zithro changed to cefepime + doxy on 5/31- DC'd   - f/u blood cx: ngtd  - albuterol inh prn   - Mucinex   - pulmonary consult appreciated- sent for legionella/ ILD workup/ CMV and EBV serologies   - ?ILD- elevated CRP/ESR- very slightly elevated Rheumatoid factor- rheumatology consult  - repeat CT chest- showed extensive predominant airspace disease involving all lung   lobes with interval progression- unclear etiology infectious v inflammatory.  - Check COVID PCR- repeat negative   - possible lung biopsy vs bronchoscopy - CT surgery consulted  - ID consult appreciated- antibiotic dc'd and patient was started on IV Steroids. Methylprednisone changed to every 12 hours   - Rheumatology consult for possible connective tissue disease- rheum labs sent d/w Dr Roa  - leukocytosis- likely related to steroid administration - will monitor  - Plan for ct chest today - will titrate steroids.   - d/w Dr Harkins     # hyponatremia secondary to hyperglycemia- corrected Na 135 - monitor       #hyperglycemia 2/2 steroids - continue ISS   adjust ISS and start Lantus - d/w patient  A1c 8.3- will need to be on oral hypoglycemic on DC   - diabetes education   - refused diabetes education   - stated that he does not have diabetes (as per patient report he had an a1c done with Dr Brooks and it was normal) I called Dr Brooks office and left message.     # HTN: cont home meds      *left knee pain 10/10- resolved   - patient requesting steroid shot on the left knee  - Ortho consult  - Left knee Xray results noted        # DVT PPX: heparin s/q  Plan for repeat CT chest tomorrow- likely dc in 24-48 hours. Will eval for Home O2 requirement.     Case d/w team on IDR. Plan was d/w patient.   Plan for repeat chest CT today and titrate steroid. Possible dc in 24-48 hours.   6/8-D/W Dr Brooks  patient hospital course, which included possible ILD, DM and need for f/u after dc from inpatient admission .

## 2022-06-08 NOTE — PROGRESS NOTE ADULT - SUBJECTIVE AND OBJECTIVE BOX
56/M with PMHx of HTN, brought to the ED for c/o worsening SOB and cough along with a recent Dx of PNA. He also states that he has been feeling very weak and dizzy. He recently got 4th dose of COVID vaccine 3 weeks ago and Sx started 1 week thereafter.   Also HAs cough lizzie when inhales deeply.  Hypoxic on ra.  Patient admitted for possible PNA- started on IV antibiotic- very slow improvement. Inflammatory markers were sent and it was elevated. Repeat CT showed worsening airspace disease unclear etiology inflammatory vs infectious. Antibiotic was stopped, Rheum consulted- rheum panel was sent. PAtient was started on IV steroid. Concern for ILD.     6/5 pt very upset , says he wants to be be discharged today , still feels sob , explained to patient in detail plan of care and imaging and lab results   6/6- stated he feels better this morning. No acute overnight events. no new complaints thus far.  6/7-feels better today compared to yesterday. Worried about his a1c- stated that his A1c was drawn a couple of months ago and was borderline but not as high as the new reading which is 8.3. he denies SOB- stated that he feels he is responding to the steroids but the steroids is what made his a1c up to 8.3.      ROS:   All 10 systems reviewed and found to be negative with the exception of what has been described above.   Constitutional: NAD, laying in bed  HEENT: NC/AT  Back: no tenderness  Respiratory: respirations even and non labored, LCTA- diminished at the base  Cardiovascular: S1S2 regular, no murmurs  Abdomen: soft, not tender, not distended, positive BS  Genitourinary: voiding  Musculoskeletal: no muscle tenderness, no joint swelling or tenderness  Extremities: no pedal edema  Neurological: no focal deficits      PHYSICAL EXAM:    Vital Signs Last 24 Hrs  T(C): 37 (07 Jun 2022 20:36), Max: 37 (07 Jun 2022 20:36)  T(F): 98.6 (07 Jun 2022 20:36), Max: 98.6 (07 Jun 2022 20:36)  HR: 56 (07 Jun 2022 20:36) (56 - 56)  BP: 147/81 (07 Jun 2022 20:36) (147/81 - 147/81)  BP(mean): --  RR: 18 (07 Jun 2022 20:36) (18 - 18)  SpO2: 94% (07 Jun 2022 20:36) (94% - 94%)       MEDICATIONS  (STANDING):  budesonide 160 MICROgram(s)/formoterol 4.5 MICROgram(s) Inhaler 2 Puff(s) Inhalation two times a day  dextrose 5%. 1000 milliLiter(s) (50 mL/Hr) IV Continuous <Continuous>  dextrose 5%. 1000 milliLiter(s) (100 mL/Hr) IV Continuous <Continuous>  dextrose 50% Injectable 25 Gram(s) IV Push once  dextrose 50% Injectable 12.5 Gram(s) IV Push once  dextrose 50% Injectable 25 Gram(s) IV Push once  doxazosin 8 milliGRAM(s) Oral at bedtime  glucagon  Injectable 1 milliGRAM(s) IntraMuscular once  guaiFENesin ER 1200 milliGRAM(s) Oral every 12 hours  heparin   Injectable 5000 Unit(s) SubCutaneous every 8 hours  insulin glargine Injectable (LANTUS) 15 Unit(s) SubCutaneous every morning  insulin lispro (ADMELOG) corrective regimen sliding scale   SubCutaneous three times a day before meals  insulin lispro (ADMELOG) corrective regimen sliding scale   SubCutaneous at bedtime  insulin lispro Injectable (ADMELOG) 5 Unit(s) SubCutaneous three times a day before meals  lactobacillus acidophilus 1 Tablet(s) Oral two times a day  losartan 100 milliGRAM(s) Oral daily  melatonin 3 milliGRAM(s) Oral at bedtime  methylPREDNISolone sodium succinate Injectable 40 milliGRAM(s) IV Push every 12 hours  pantoprazole    Tablet 40 milliGRAM(s) Oral before breakfast  simvastatin 20 milliGRAM(s) Oral at bedtime    MEDICATIONS  (PRN):  acetaminophen     Tablet .. 650 milliGRAM(s) Oral every 6 hours PRN Temp greater or equal to 38C (100.4F), Mild Pain (1 - 3)  ALBUTerol    90 MICROgram(s) HFA Inhaler 2 Puff(s) Inhalation every 6 hours PRN Shortness of Breath and/or Wheezing  ALPRAZolam 0.25 milliGRAM(s) Oral every 12 hours PRN anxiety  aluminum hydroxide/magnesium hydroxide/simethicone Suspension 30 milliLiter(s) Oral every 6 hours PRN Dyspepsia  artificial  tears Solution 1 Drop(s) Both EYES every 8 hours PRN Dry Eyes  benzocaine 15 mG/menthol 3.6 mG Lozenge 1 Lozenge Oral three times a day PRN Sore Throat  dextrose Oral Gel 15 Gram(s) Oral once PRN Blood Glucose LESS THAN 70 milliGRAM(s)/deciliter  simethicone 80 milliGRAM(s) Chew every 6 hours PRN Gas        06-07    130<L>  |  98  |  36<H>  ----------------------------<  373<H>  5.1   |  23  |  1.01    Ca    9.1      07 Jun 2022 07:12                          14.3   15.75 )-----------( 418      ( 07 Jun 2022 07:12 )             42.3     06-06    131<L>  |  101  |  35<H>  ----------------------------<  365<H>  5.7<H>   |  25  |  1.07    Ca    9.2      06 Jun 2022 06:26                               13.6   14.87 )-----------( 403      ( 06 Jun 2022 06:26 )             40.1         06-05    133<L>  |  102  |  32<H>  ----------------------------<  360<H>  4.9   |  23  |  1.01    Ca    9.1      05 Jun 2022 05:44            ZORA 1:80 speckled pattern     NEgative - SSA SSB JO1    Rheumatoid Factor Quant, Serum or Plasma: 14 IU/mL (06.01.22 @ 13:01)     C-Reactive Protein, Serum: 183 mg/L (06.01.22 @ 13:01)  Sedimentation Rate, Erythrocyte: 94 mm/hr (06.01.22 @ 13:01)   Legionella Urine- negative     CT Angio Chest PE Protocol w/ IV Cont (05.27.22 @ 16:55) >  No pulmonary embolism from the main pulmonary artery up to and including   the segmental branches. Limited assessment of subsegmental branches.    Bilateral peripheral lung disease. Differential includes infection (COVID   in particular), organizing pneumonia, or eosinophilic pneumonia.    Incidental incompletely characterized left adrenal nodule. Further   evaluation with MRI is recommended, which can be done on a nonemergent   basis.       CT Chest No Cont (06.02.22 @ 10:20) >  CT CHEST                        PROCEDURE DATE:  06/02/2022    INTERPRETATION:  HISTORY: Pneumonia with slow improvement.  EXAMINATION: CT CHEST was performed without IV contrast.  COMPARISON: 5/27/2022.  FINDINGS:  AIRWAYS, LUNGS, PLEURA: Central airways clear. No pleural effusion.  Peripheral predominant ground-glass and consolidative opacities involving   all lung lobes have demonstrated interval progression compared to   5/27/2022. For example, region of consolidation in the posterior right   upper lobe (image 44, series 2) is new and right lower lobe superior   segment consolidation (image 59, series 2) has progressed.  MEDIASTINUM: Normal heart size. Coronary atherosclerosis. Small   pericardial effusion. Thoracic aorta normal caliber.  No large   mediastinal lymph nodes. Small amount of fluid within the lumen of the   lower esophagus.  IMAGED ABDOMEN: Unchanged 2 cm left adrenal nodule.  SOFT TISSUES: Unremarkable.  BONES: Unremarkable.  IMPRESSION:.  Extensive peripheral predominant airspace disease involving all lung   lobes with interval progression compared to 5/27/2022. The exact etiology   is unclear and diagnostic considerations include infectious etiology and   inflammatory etiology (such as organizing pneumonia).  CT chest follow-up in one month recommended to ensure clearing.

## 2022-06-08 NOTE — DIETITIAN INITIAL EVALUATION ADULT - ADD RECOMMEND
1) continue with consistent carb diet and maintain glycemic control 2) monitor weights track trends 3) Monitor BM if none x > 3 days initiate bowel meds 4) Monitor lytes and hydration replete prn 5) MVI w/minerals to meet RDI's 6) encourage protein enriched foods to optimize nutrition.

## 2022-06-09 LAB
ALBUMIN SERPL ELPH-MCNC: 2.5 G/DL — LOW (ref 3.3–5)
ALP SERPL-CCNC: 71 U/L — SIGNIFICANT CHANGE UP (ref 40–120)
ALT FLD-CCNC: 132 U/L — HIGH (ref 12–78)
ANION GAP SERPL CALC-SCNC: 11 MMOL/L — SIGNIFICANT CHANGE UP (ref 5–17)
AST SERPL-CCNC: 43 U/L — HIGH (ref 15–37)
BILIRUB SERPL-MCNC: 0.5 MG/DL — SIGNIFICANT CHANGE UP (ref 0.2–1.2)
BUN SERPL-MCNC: 34 MG/DL — HIGH (ref 7–23)
CALCIUM SERPL-MCNC: 8.6 MG/DL — SIGNIFICANT CHANGE UP (ref 8.5–10.1)
CHLORIDE SERPL-SCNC: 97 MMOL/L — SIGNIFICANT CHANGE UP (ref 96–108)
CO2 SERPL-SCNC: 20 MMOL/L — LOW (ref 22–31)
CREAT SERPL-MCNC: 0.96 MG/DL — SIGNIFICANT CHANGE UP (ref 0.5–1.3)
D DIMER BLD IA.RAPID-MCNC: 661 NG/ML DDU — HIGH
EGFR: 93 ML/MIN/1.73M2 — SIGNIFICANT CHANGE UP
GLUCOSE SERPL-MCNC: 259 MG/DL — HIGH (ref 70–99)
HCT VFR BLD CALC: 45.5 % — SIGNIFICANT CHANGE UP (ref 39–50)
HCT VFR BLD CALC: 45.6 % — SIGNIFICANT CHANGE UP (ref 39–50)
HGB BLD-MCNC: 15.2 G/DL — SIGNIFICANT CHANGE UP (ref 13–17)
HGB BLD-MCNC: 15.3 G/DL — SIGNIFICANT CHANGE UP (ref 13–17)
MCHC RBC-ENTMCNC: 28.7 PG — SIGNIFICANT CHANGE UP (ref 27–34)
MCHC RBC-ENTMCNC: 28.8 PG — SIGNIFICANT CHANGE UP (ref 27–34)
MCHC RBC-ENTMCNC: 33.4 GM/DL — SIGNIFICANT CHANGE UP (ref 32–36)
MCHC RBC-ENTMCNC: 33.6 GM/DL — SIGNIFICANT CHANGE UP (ref 32–36)
MCV RBC AUTO: 85.7 FL — SIGNIFICANT CHANGE UP (ref 80–100)
MCV RBC AUTO: 86 FL — SIGNIFICANT CHANGE UP (ref 80–100)
PLATELET # BLD AUTO: 367 K/UL — SIGNIFICANT CHANGE UP (ref 150–400)
PLATELET # BLD AUTO: 393 K/UL — SIGNIFICANT CHANGE UP (ref 150–400)
POTASSIUM SERPL-MCNC: 3.9 MMOL/L — SIGNIFICANT CHANGE UP (ref 3.5–5.3)
POTASSIUM SERPL-SCNC: 3.9 MMOL/L — SIGNIFICANT CHANGE UP (ref 3.5–5.3)
PROT SERPL-MCNC: 6.1 GM/DL — SIGNIFICANT CHANGE UP (ref 6–8.3)
RBC # BLD: 5.29 M/UL — SIGNIFICANT CHANGE UP (ref 4.2–5.8)
RBC # BLD: 5.32 M/UL — SIGNIFICANT CHANGE UP (ref 4.2–5.8)
RBC # FLD: 14.4 % — SIGNIFICANT CHANGE UP (ref 10.3–14.5)
RBC # FLD: 14.4 % — SIGNIFICANT CHANGE UP (ref 10.3–14.5)
SODIUM SERPL-SCNC: 128 MMOL/L — LOW (ref 135–145)
TROPONIN I, HIGH SENSITIVITY RESULT: 113.2 NG/L — HIGH
TROPONIN I, HIGH SENSITIVITY RESULT: 122.41 NG/L — HIGH
WBC # BLD: 17.5 K/UL — HIGH (ref 3.8–10.5)
WBC # BLD: 19.09 K/UL — HIGH (ref 3.8–10.5)
WBC # FLD AUTO: 17.5 K/UL — HIGH (ref 3.8–10.5)
WBC # FLD AUTO: 19.09 K/UL — HIGH (ref 3.8–10.5)

## 2022-06-09 PROCEDURE — 99232 SBSQ HOSP IP/OBS MODERATE 35: CPT

## 2022-06-09 PROCEDURE — 99233 SBSQ HOSP IP/OBS HIGH 50: CPT

## 2022-06-09 PROCEDURE — 93010 ELECTROCARDIOGRAM REPORT: CPT | Mod: 76

## 2022-06-09 PROCEDURE — 71275 CT ANGIOGRAPHY CHEST: CPT | Mod: 26

## 2022-06-09 PROCEDURE — 71045 X-RAY EXAM CHEST 1 VIEW: CPT | Mod: 26

## 2022-06-09 RX ORDER — LOSARTAN POTASSIUM 100 MG/1
50 TABLET, FILM COATED ORAL DAILY
Refills: 0 | Status: DISCONTINUED | OUTPATIENT
Start: 2022-06-09 | End: 2022-06-21

## 2022-06-09 RX ORDER — ALPRAZOLAM 0.25 MG
0.25 TABLET ORAL EVERY 12 HOURS
Refills: 0 | Status: DISCONTINUED | OUTPATIENT
Start: 2022-06-10 | End: 2022-06-14

## 2022-06-09 RX ADMIN — SIMETHICONE 80 MILLIGRAM(S): 80 TABLET, CHEWABLE ORAL at 22:11

## 2022-06-09 RX ADMIN — Medication 1200 MILLIGRAM(S): at 10:28

## 2022-06-09 RX ADMIN — Medication 8 UNIT(S): at 08:35

## 2022-06-09 RX ADMIN — Medication 8: at 18:11

## 2022-06-09 RX ADMIN — BUDESONIDE AND FORMOTEROL FUMARATE DIHYDRATE 2 PUFF(S): 160; 4.5 AEROSOL RESPIRATORY (INHALATION) at 09:00

## 2022-06-09 RX ADMIN — Medication 8 UNIT(S): at 14:32

## 2022-06-09 RX ADMIN — Medication 6: at 08:36

## 2022-06-09 RX ADMIN — Medication 8 MILLIGRAM(S): at 22:11

## 2022-06-09 RX ADMIN — PANTOPRAZOLE SODIUM 40 MILLIGRAM(S): 20 TABLET, DELAYED RELEASE ORAL at 06:38

## 2022-06-09 RX ADMIN — Medication 1 TABLET(S): at 10:28

## 2022-06-09 RX ADMIN — Medication 10: at 14:32

## 2022-06-09 RX ADMIN — ALBUTEROL 2 PUFF(S): 90 AEROSOL, METERED ORAL at 08:59

## 2022-06-09 RX ADMIN — HEPARIN SODIUM 5000 UNIT(S): 5000 INJECTION INTRAVENOUS; SUBCUTANEOUS at 16:16

## 2022-06-09 RX ADMIN — HEPARIN SODIUM 5000 UNIT(S): 5000 INJECTION INTRAVENOUS; SUBCUTANEOUS at 22:12

## 2022-06-09 RX ADMIN — SIMETHICONE 80 MILLIGRAM(S): 80 TABLET, CHEWABLE ORAL at 06:37

## 2022-06-09 RX ADMIN — BUDESONIDE AND FORMOTEROL FUMARATE DIHYDRATE 2 PUFF(S): 160; 4.5 AEROSOL RESPIRATORY (INHALATION) at 21:30

## 2022-06-09 RX ADMIN — Medication 1 TABLET(S): at 22:14

## 2022-06-09 RX ADMIN — INSULIN GLARGINE 20 UNIT(S): 100 INJECTION, SOLUTION SUBCUTANEOUS at 10:28

## 2022-06-09 RX ADMIN — Medication 8 UNIT(S): at 18:12

## 2022-06-09 RX ADMIN — Medication 1200 MILLIGRAM(S): at 22:15

## 2022-06-09 RX ADMIN — HEPARIN SODIUM 5000 UNIT(S): 5000 INJECTION INTRAVENOUS; SUBCUTANEOUS at 06:37

## 2022-06-09 RX ADMIN — Medication 40 MILLIGRAM(S): at 10:28

## 2022-06-09 NOTE — CONSULT NOTE ADULT - SUBJECTIVE AND OBJECTIVE BOX
Patient is a 56y old  Male who presents with a chief complaint of PNA (09 Jun 2022 09:15)    6/9/2022- Cardiology Consultation_ 56 year old man with chronic hypertension, hyperlipidemia and obesity presented with progressive shortness of breath on admission 5/27/22. The symptoms began about 10 days after a Covid 19 booster. Was found to have extensive bilateral multilobar infiltrates and persistent hypoxemia. Cultures and lab tests failed to reveal evidence of a specific organism including Covid and Legionella. He was treated with oxygen and empiric  antibiotics. He has very slowly improved. Early this AM he noted acute increase in breathlessness  without chest pain. Troponin was mildly elevated x 2 and D dimer elevated. CTA was neg for pulmonary embolism. The acute dyspnea resolved spontaneously and gradually.     HPI:  56/M with PMHx of HTN, brought to the ED for c/o worsening SOB and cough along with a recent Dx of PNA. He also states that he has been feeling very weak and dizzy. He recently got 4th dose of COVID vaccine 3 weeks ago and Sx started 1 week thereafter.     Also HAs cough lizzie when inhales deeply.    Also Hypoxic on ra.     He also, c/o  chest pain when coughing.     States he has not been eating much recently and has a 15 lb weight loss.     Also states he has been very fatigued and sleeping more.    No recent travel or periods of immobilization or surgeries.    COVID neg (27 May 2022 16:25)      PAST MEDICAL & SURGICAL HISTORY:  HTN (hypertension)            MEDICATIONS  (STANDING):  budesonide 160 MICROgram(s)/formoterol 4.5 MICROgram(s) Inhaler 2 Puff(s) Inhalation two times a day  dextrose 5%. 1000 milliLiter(s) (50 mL/Hr) IV Continuous <Continuous>  dextrose 5%. 1000 milliLiter(s) (100 mL/Hr) IV Continuous <Continuous>  dextrose 50% Injectable 25 Gram(s) IV Push once  dextrose 50% Injectable 12.5 Gram(s) IV Push once  dextrose 50% Injectable 25 Gram(s) IV Push once  doxazosin 8 milliGRAM(s) Oral at bedtime  glucagon  Injectable 1 milliGRAM(s) IntraMuscular once  guaiFENesin ER 1200 milliGRAM(s) Oral every 12 hours  heparin   Injectable 5000 Unit(s) SubCutaneous every 8 hours  insulin glargine Injectable (LANTUS) 20 Unit(s) SubCutaneous every morning  insulin lispro (ADMELOG) corrective regimen sliding scale   SubCutaneous three times a day before meals  insulin lispro (ADMELOG) corrective regimen sliding scale   SubCutaneous at bedtime  insulin lispro Injectable (ADMELOG) 8 Unit(s) SubCutaneous three times a day before meals  lactobacillus acidophilus 1 Tablet(s) Oral two times a day  losartan 100 milliGRAM(s) Oral daily  melatonin 3 milliGRAM(s) Oral at bedtime  methylPREDNISolone sodium succinate Injectable 40 milliGRAM(s) IV Push daily  pantoprazole    Tablet 40 milliGRAM(s) Oral before breakfast  simvastatin 20 milliGRAM(s) Oral at bedtime    MEDICATIONS  (PRN):  acetaminophen     Tablet .. 650 milliGRAM(s) Oral every 6 hours PRN Temp greater or equal to 38C (100.4F), Mild Pain (1 - 3)  ALBUTerol    90 MICROgram(s) HFA Inhaler 2 Puff(s) Inhalation every 6 hours PRN Shortness of Breath and/or Wheezing  ALPRAZolam 0.25 milliGRAM(s) Oral every 12 hours PRN anxiety  aluminum hydroxide/magnesium hydroxide/simethicone Suspension 30 milliLiter(s) Oral every 6 hours PRN Dyspepsia  artificial  tears Solution 1 Drop(s) Both EYES every 8 hours PRN Dry Eyes  benzocaine 15 mG/menthol 3.6 mG Lozenge 1 Lozenge Oral three times a day PRN Sore Throat  dextrose Oral Gel 15 Gram(s) Oral once PRN Blood Glucose LESS THAN 70 milliGRAM(s)/deciliter  simethicone 80 milliGRAM(s) Chew every 6 hours PRN Gas      FAMILY HISTORY:  No pertinent family history in first degree relatives        SOCIAL HISTORY:  Tobacco- no         Alcohol-              Illicit drugs-        no      Occupation-              Marital  status-  REVIEW OF SYSTEM:  Pertinent items are noted in HPI.    Vital Signs Last 24 Hrs  T(C): 36.7 (09 Jun 2022 16:38), Max: 36.8 (09 Jun 2022 04:45)  T(F): 98.1 (09 Jun 2022 16:38), Max: 98.3 (09 Jun 2022 04:45)  HR: 75 (09 Jun 2022 16:38) (67 - 75)  BP: 123/58 (09 Jun 2022 16:38) (90/55 - 123/58)  BP(mean): 85 (09 Jun 2022 14:22) (85 - 85)  RR: 19 (09 Jun 2022 16:38) (18 - 22)  SpO2: 98% (09 Jun 2022 16:38) (85% - 98%)    I&O's Summary    08 Jun 2022 07:01  -  09 Jun 2022 07:00  --------------------------------------------------------  IN: 0 mL / OUT: 740 mL / NET: -740 mL      PHYSICAL EXAM  General Appearance: comfortable  HEENT: PERRL, conjunctiva clear, EOM's intact, non injected pharynx, no exudate, TM   normal  Neck: Supple, , no adenopathy, thyroid: not enlarged, no carotid bruit or JVD  Back: Symmetric, no  tenderness,no soft tissue tenderness  Lungs: bilateral crackles about 1/3 up  Heart: Regular rate and rhythm, S1, S2 normal, no murmur, rub or gallop  Abdomen: Soft, non-tender, bowel sounds active , no hepatosplenomegaly  Extremities: no cyanosis or edema, no joint swelling  Skin: Skin color, texture normal, no rashes   Neurologic: Alert and oriented X3 , cranial nerves intact, sensory and motor normal,        INTERPRETATION OF TELEMETRY: rsr    ECG :sinus rhythm 67 BPM, normal tracing.        LABS: troponin 122, 113                          15.2   19.09 )-----------( 367      ( 09 Jun 2022 14:47 )             45.5     06-09    128<L>  |  97  |  34<H>  ----------------------------<  259<H>  3.9   |  20<L>  |  0.96    Ca    8.6      09 Jun 2022 06:55    TPro  6.1  /  Alb  2.5<L>  /  TBili  0.5  /  DBili  x   /  AST  43<H>  /  ALT  132<H>  /  AlkPhos  71  06-09          Pro BNP  -- 06-09 @ 06:13  D Dimer  661 06-09 @ 06:13              RADIOLOGY & ADDITIONAL STUDIES:      ACC: [id number] EXAM:  CT ANGIO CHEST PULM ART WAWIC                          PROCEDURE DATE:  06/09/2022        IMPRESSION:    No pulmonary embolism.    Stable peripheral lung disease. Differential includes organizing   pneumonia, eosinophilic lung disease, or atypical infection.    --- End of Report ---      JUAN SINGH M.D., ATTENDING RADIOGIST  This document has been electronically signed. Jun 9 2022 11:45AM                  IMPRESSION:  1. Elevated troponin due to possible oxygen supply demand mismatch. This is not an acute MI without chest pain or EKG changes. Will evaluate further for myocarditis with TTE. A cardiac MRI may be considered if needed to rule out myocarditis.  2. Bilateral pulmonary infiltrates.  3.Hypertension. BP running low at times. Will reduce losartan to 50 mg qd.  PLAN:  TTE. Will follow.

## 2022-06-09 NOTE — PROGRESS NOTE ADULT - SUBJECTIVE AND OBJECTIVE BOX
Subjective:    Awake, alert. Events of last PM noted. Improved now. No sputum.    MEDICATIONS  (STANDING):  budesonide 160 MICROgram(s)/formoterol 4.5 MICROgram(s) Inhaler 2 Puff(s) Inhalation two times a day  dextrose 5%. 1000 milliLiter(s) (50 mL/Hr) IV Continuous <Continuous>  dextrose 5%. 1000 milliLiter(s) (100 mL/Hr) IV Continuous <Continuous>  dextrose 50% Injectable 25 Gram(s) IV Push once  dextrose 50% Injectable 12.5 Gram(s) IV Push once  dextrose 50% Injectable 25 Gram(s) IV Push once  doxazosin 8 milliGRAM(s) Oral at bedtime  glucagon  Injectable 1 milliGRAM(s) IntraMuscular once  guaiFENesin ER 1200 milliGRAM(s) Oral every 12 hours  heparin   Injectable 5000 Unit(s) SubCutaneous every 8 hours  insulin glargine Injectable (LANTUS) 20 Unit(s) SubCutaneous every morning  insulin lispro (ADMELOG) corrective regimen sliding scale   SubCutaneous three times a day before meals  insulin lispro (ADMELOG) corrective regimen sliding scale   SubCutaneous at bedtime  insulin lispro Injectable (ADMELOG) 8 Unit(s) SubCutaneous three times a day before meals  lactobacillus acidophilus 1 Tablet(s) Oral two times a day  losartan 100 milliGRAM(s) Oral daily  melatonin 3 milliGRAM(s) Oral at bedtime  methylPREDNISolone sodium succinate Injectable 40 milliGRAM(s) IV Push daily  pantoprazole    Tablet 40 milliGRAM(s) Oral before breakfast  simvastatin 20 milliGRAM(s) Oral at bedtime    MEDICATIONS  (PRN):  acetaminophen     Tablet .. 650 milliGRAM(s) Oral every 6 hours PRN Temp greater or equal to 38C (100.4F), Mild Pain (1 - 3)  ALBUTerol    90 MICROgram(s) HFA Inhaler 2 Puff(s) Inhalation every 6 hours PRN Shortness of Breath and/or Wheezing  ALPRAZolam 0.25 milliGRAM(s) Oral every 12 hours PRN anxiety  aluminum hydroxide/magnesium hydroxide/simethicone Suspension 30 milliLiter(s) Oral every 6 hours PRN Dyspepsia  artificial  tears Solution 1 Drop(s) Both EYES every 8 hours PRN Dry Eyes  benzocaine 15 mG/menthol 3.6 mG Lozenge 1 Lozenge Oral three times a day PRN Sore Throat  dextrose Oral Gel 15 Gram(s) Oral once PRN Blood Glucose LESS THAN 70 milliGRAM(s)/deciliter  simethicone 80 milliGRAM(s) Chew every 6 hours PRN Gas      Allergies    codeine (Unknown)    Intolerances        Vital Signs Last 24 Hrs  T(C): 36.5 (09 Jun 2022 08:06), Max: 36.8 (09 Jun 2022 04:45)  T(F): 97.7 (09 Jun 2022 08:06), Max: 98.3 (09 Jun 2022 04:45)  HR: 72 (09 Jun 2022 08:06) (60 - 72)  BP: 103/57 (09 Jun 2022 08:06) (90/55 - 116/67)  BP(mean): --  RR: 18 (09 Jun 2022 08:06) (18 - 22)  SpO2: 93% (09 Jun 2022 08:06) (85% - 97%)    PHYSICAL EXAMINATION:    NECK:  Supple. No lymphadenopathy. Jugular venous pressure not elevated.   HEART:   The cardiac impulse has a normal quality. Reg., Nl S1 and S2.   CHEST:  Chest with few crackles on right in lower lung zone. Sl increased respiratory effort.  ABDOMEN:  Soft and nontender.   EXTREMITIES:  There is no edema.       LABS:                        15.3   17.50 )-----------( 393      ( 09 Jun 2022 06:12 )             45.6     06-09    128<L>  |  97  |  34<H>  ----------------------------<  259<H>  3.9   |  20<L>  |  0.96    Ca    8.6      09 Jun 2022 06:55    TPro  6.1  /  Alb  2.5<L>  /  TBili  0.5  /  DBili  x   /  AST  43<H>  /  ALT  132<H>  /  AlkPhos  71  06-09    D-Zjzuh=032      RADIOLOGY & ADDITIONAL TESTS:< from: CT Chest No Cont (06.08.22 @ 11:09) >    ACC: [id number] EXAM:  CT CHEST                          PROCEDURE DATE:  06/08/2022          INTERPRETATION:  INDICATION: Pneumonia, possible interstitial lung disease    TECHNIQUE: Volumetric images of the chest without intravenous contrast.   Maximum intensity projection images were generated.    COMPARISON: CT chest 6/2/2022.    FINDINGS:    LUNGS/AIRWAYS/PLEURA: No endobronchial lesion. Stable peripheral   groundglass and consolidation involving all lobes, and most severe in the   lower lobes. Unremarkable pleura.    LYMPH NODES/MEDIASTINUM: No enlarged lymph nodes.    HEART/VASCULATURE: Normal heart size. Stable small pericardial effusion.   Normal caliber aorta and pulmonary artery. Coronary artery calcifications.    UPPER ABDOMEN: Stable 2 cm left adrenal nodule.    BONES/SOFT TISSUES: Degenerative changes of the spine.      IMPRESSION:    Stable peripheral lung disease, differential for which includes   organizing pneumonia, eosinophilic pneumonia, or atypical infection.        JUAN SINGH M.D.,      Assessment and Plan:   · Assessment  	  - cont O2. Monitor O2 Sats  - inflammatory markers significantly better; ESR 48 from 94; CRP 28 [id number]  - no lung biopsy  - cont iv steroids; BGM's elevated  - will perform CTA today to R/O PE. Noted to have dyspnea, decreased O2 Sats, and elevated D-Dimer  - dvt proph  - Cardiology eval  - Repeat Covid swab    Problem/Plan - 1:  ·  Problem: Multilobar lung infiltrate.   Problem/Plan - 2:  ·  Problem: ILD (interstitial lung disease).   Problem/Plan - 3:  ·  Problem: Acute dyspnea.   Problem/Plan - 4:  ·  Problem: Paroxysmal cough.

## 2022-06-09 NOTE — CHART NOTE - NSCHARTNOTEFT_GEN_A_CORE
Called by RN to evaluate pt with SOB and pulse oximeter 89% on 3L NC. Pt reports that he woke up with SOB, feels like he cant breath from multiple site in his lungs. Has occasional cough. Denies CP, wheezing, palpitations         Gen: awake and alert tin NAD  Cardiac: S1 S2 regular  Lungs: non labored, no wheezing or crackles  Abd: + BS, soft, NT, ND  Ext: no edema    57 y/o male who was admitted on 5/27/2022 for SOB and found to have PNA. Pt now reports SOB     # SOB  # Hypotension  - STAT CXR  - Supplemental oxygen  - ABG, CBC, CMP, troponin, d dimer  - IV hydration   - EKG  - C/w with steroids and Albuterol PRN Called by RN to evaluate pt with SOB and pulse oximetry of 89% on 3L NC. Pt reports that he woke up with SOB, feels like he cant breath from multiple sites in his lungs. Has occasional cough. Denies CP, wheezing or palpitations     Vital Signs Last 24 Hrs  T(C): 36.8 (09 Jun 2022 04:45), Max: 36.8 (09 Jun 2022 04:45)  T(F): 98.3 (09 Jun 2022 04:45), Max: 98.3 (09 Jun 2022 04:45)  HR: 68 (09 Jun 2022 06:00) (60 - 72)  BP: 103/62 (09 Jun 2022 06:00) (90/55 - 116/67)  RR: 20 (09 Jun 2022 06:00) (18 - 22)  SpO2: 92% (09 Jun 2022 06:00) (85% - 97%)    Gen: awake and alert tin NAD  Cardiac: S1 S2 regular  Lungs: non labored, good air entry b/l, no wheezing or crackles  Abd: + BS, soft, NT, ND  Ext: no edema  Neuro: A & O x 3, no focal deficits     57 y/o male who was admitted on 5/27/2022 with SOB and found to have PNA. Pt now reports SOB     # Shortness of breath   - STAT CXR show no acute changes, (reviewed with Dr. Sotelo)  - Titrate oxygen to keep SPO2 > 94%   - ABG, CBC, CMP, troponin, d dimer  - EKG: NSR @ 62 bpm, no acute changes   - C/w with steroids and Albuterol PRN  - Pulmonary following    Pt reassessed, he is satting 96% on 3L. He is more comfortable now.   Labs pending     Patient seen and examined at bedside with Dr. Sotelo

## 2022-06-09 NOTE — PROGRESS NOTE ADULT - SUBJECTIVE AND OBJECTIVE BOX
56/M with PMHx of HTN, brought to the ED for c/o worsening SOB and cough along with a recent Dx of PNA. He also states that he has been feeling very weak and dizzy. He recently got 4th dose of COVID vaccine 3 weeks ago and Sx started 1 week thereafter.   Also HAs cough lizzie when inhales deeply.  Hypoxic on ra.  Patient admitted for possible PNA- started on IV antibiotic- very slow improvement. Inflammatory markers were sent and it was elevated. Repeat CT showed worsening airspace disease unclear etiology inflammatory vs infectious. Antibiotic was stopped, Rheum consulted- rheum panel was sent. PAtient was started on IV steroid. Concern for ILD.     6/9- RRT this am for chest pain, SOB and desaturation. EKG done. Troponin elevated. Requiring increased amount of supplemental O2.        ROS:   All 10 systems reviewed and found to be negative with the exception of what has been described above.   Constitutional: NAD, laying in bed  HEENT: NC/AT  Back: no tenderness  Respiratory: respirations even and non labored, LCTA- diminished at the base  Cardiovascular: S1S2 regular, no murmurs  Abdomen: soft, not tender, not distended, positive BS  Genitourinary: voiding  Musculoskeletal: no muscle tenderness, no joint swelling or tenderness  Extremities: no pedal edema  Neurological: no focal deficits      PHYSICAL EXAM:    Vital Signs Last 24 Hrs  T(C): 36.5 (09 Jun 2022 08:06), Max: 36.8 (09 Jun 2022 04:45)  T(F): 97.7 (09 Jun 2022 08:06), Max: 98.3 (09 Jun 2022 04:45)  HR: 72 (09 Jun 2022 08:06) (67 - 72)  BP: 103/57 (09 Jun 2022 08:06) (90/55 - 116/67)  BP(mean): --  RR: 18 (09 Jun 2022 08:06) (18 - 22)  SpO2: 93% (09 Jun 2022 08:06) (85% - 97%)       MEDICATIONS  (STANDING):  budesonide 160 MICROgram(s)/formoterol 4.5 MICROgram(s) Inhaler 2 Puff(s) Inhalation two times a day  dextrose 5%. 1000 milliLiter(s) (100 mL/Hr) IV Continuous <Continuous>  dextrose 5%. 1000 milliLiter(s) (50 mL/Hr) IV Continuous <Continuous>  dextrose 50% Injectable 25 Gram(s) IV Push once  dextrose 50% Injectable 12.5 Gram(s) IV Push once  dextrose 50% Injectable 25 Gram(s) IV Push once  doxazosin 8 milliGRAM(s) Oral at bedtime  glucagon  Injectable 1 milliGRAM(s) IntraMuscular once  guaiFENesin ER 1200 milliGRAM(s) Oral every 12 hours  heparin   Injectable 5000 Unit(s) SubCutaneous every 8 hours  insulin glargine Injectable (LANTUS) 20 Unit(s) SubCutaneous every morning  insulin lispro (ADMELOG) corrective regimen sliding scale   SubCutaneous three times a day before meals  insulin lispro (ADMELOG) corrective regimen sliding scale   SubCutaneous at bedtime  insulin lispro Injectable (ADMELOG) 8 Unit(s) SubCutaneous three times a day before meals  lactobacillus acidophilus 1 Tablet(s) Oral two times a day  losartan 100 milliGRAM(s) Oral daily  melatonin 3 milliGRAM(s) Oral at bedtime  methylPREDNISolone sodium succinate Injectable 40 milliGRAM(s) IV Push daily  pantoprazole    Tablet 40 milliGRAM(s) Oral before breakfast  simvastatin 20 milliGRAM(s) Oral at bedtime    MEDICATIONS  (PRN):  acetaminophen     Tablet .. 650 milliGRAM(s) Oral every 6 hours PRN Temp greater or equal to 38C (100.4F), Mild Pain (1 - 3)  ALBUTerol    90 MICROgram(s) HFA Inhaler 2 Puff(s) Inhalation every 6 hours PRN Shortness of Breath and/or Wheezing  ALPRAZolam 0.25 milliGRAM(s) Oral every 12 hours PRN anxiety  aluminum hydroxide/magnesium hydroxide/simethicone Suspension 30 milliLiter(s) Oral every 6 hours PRN Dyspepsia  artificial  tears Solution 1 Drop(s) Both EYES every 8 hours PRN Dry Eyes  benzocaine 15 mG/menthol 3.6 mG Lozenge 1 Lozenge Oral three times a day PRN Sore Throat  dextrose Oral Gel 15 Gram(s) Oral once PRN Blood Glucose LESS THAN 70 milliGRAM(s)/deciliter  simethicone 80 milliGRAM(s) Chew every 6 hours PRN Gas      06-09    128<L>  |  97  |  34<H>  ----------------------------<  259<H>  3.9   |  20<L>  |  0.96    Ca    8.6      09 Jun 2022 06:55    TPro  6.1  /  Alb  2.5<L>  /  TBili  0.5  /  DBili  x   /  AST  43<H>  /  ALT  132<H>  /  AlkPhos  71  06-09    Troponin I, High Sensitivity Result: 122.41  Ddimer- 661      ZORA 1:80 speckled pattern     NEgative - SSA SSB JO1    Rheumatoid Factor Quant, Serum or Plasma: 14 IU/mL (06.01.22 @ 13:01)     C-Reactive Protein, Serum: 183 mg/L (06.01.22 @ 13:01)  Sedimentation Rate, Erythrocyte: 94 mm/hr (06.01.22 @ 13:01)   Legionella Urine- negative     CT Angio Chest PE Protocol w/ IV Cont (05.27.22 @ 16:55) >  No pulmonary embolism from the main pulmonary artery up to and including   the segmental branches. Limited assessment of subsegmental branches.    Bilateral peripheral lung disease. Differential includes infection (COVID   in particular), organizing pneumonia, or eosinophilic pneumonia.    Incidental incompletely characterized left adrenal nodule. Further   evaluation with MRI is recommended, which can be done on a nonemergent   basis.       CT Chest No Cont (06.02.22 @ 10:20) >  CT CHEST                        PROCEDURE DATE:  06/02/2022    INTERPRETATION:  HISTORY: Pneumonia with slow improvement.  EXAMINATION: CT CHEST was performed without IV contrast.  COMPARISON: 5/27/2022.  FINDINGS:  AIRWAYS, LUNGS, PLEURA: Central airways clear. No pleural effusion.  Peripheral predominant ground-glass and consolidative opacities involving   all lung lobes have demonstrated interval progression compared to   5/27/2022. For example, region of consolidation in the posterior right   upper lobe (image 44, series 2) is new and right lower lobe superior   segment consolidation (image 59, series 2) has progressed.  MEDIASTINUM: Normal heart size. Coronary atherosclerosis. Small   pericardial effusion. Thoracic aorta normal caliber.  No large   mediastinal lymph nodes. Small amount of fluid within the lumen of the   lower esophagus.  IMAGED ABDOMEN: Unchanged 2 cm left adrenal nodule.  SOFT TISSUES: Unremarkable.  BONES: Unremarkable.  IMPRESSION:.  Extensive peripheral predominant airspace disease involving all lung   lobes with interval progression compared to 5/27/2022. The exact etiology   is unclear and diagnostic considerations include infectious etiology and   inflammatory etiology (such as organizing pneumonia).  CT chest follow-up in one month recommended to ensure clearing.       56/M with PMHx of HTN, brought to the ED for c/o worsening SOB and cough along with a recent Dx of PNA. He also states that he has been feeling very weak and dizzy. He recently got 4th dose of COVID vaccine 3 weeks ago and Sx started 1 week thereafter.   Also HAs cough lizzie when inhales deeply.  Hypoxic on ra.  Patient admitted for possible PNA- started on IV antibiotic- very slow improvement. Inflammatory markers were sent and it was elevated. Repeat CT showed worsening airspace disease unclear etiology inflammatory vs infectious. Antibiotic was stopped, Rheum consulted- rheum panel was sent. PAtient was started on IV steroid. Concern for ILD.     6/9- RRT this am for chest pain, SOB and desaturation. EKG done. Troponin elevated. Requiring increased amount of supplemental O2. Stating that he wokeup and he could not breathe- he thinks it the Xanax that made him worse.       ROS:   All 10 systems reviewed and found to be negative with the exception of what has been described above.   Constitutional: NAD, laying in bed  HEENT: NC/AT  Back: no tenderness  Respiratory: respirations even and non labored, LCTA- diminished at the base  Cardiovascular: S1S2 regular, no murmurs  Abdomen: soft, not tender, not distended, positive BS  Genitourinary: voiding  Musculoskeletal: no muscle tenderness, no joint swelling or tenderness  Extremities: no pedal edema  Neurological: no focal deficits      PHYSICAL EXAM:    Vital Signs Last 24 Hrs  T(C): 36.5 (09 Jun 2022 08:06), Max: 36.8 (09 Jun 2022 04:45)  T(F): 97.7 (09 Jun 2022 08:06), Max: 98.3 (09 Jun 2022 04:45)  HR: 72 (09 Jun 2022 08:06) (67 - 72)  BP: 103/57 (09 Jun 2022 08:06) (90/55 - 116/67)  BP(mean): --  RR: 18 (09 Jun 2022 08:06) (18 - 22)  SpO2: 93% (09 Jun 2022 08:06) (85% - 97%)       MEDICATIONS  (STANDING):  budesonide 160 MICROgram(s)/formoterol 4.5 MICROgram(s) Inhaler 2 Puff(s) Inhalation two times a day  dextrose 5%. 1000 milliLiter(s) (100 mL/Hr) IV Continuous <Continuous>  dextrose 5%. 1000 milliLiter(s) (50 mL/Hr) IV Continuous <Continuous>  dextrose 50% Injectable 25 Gram(s) IV Push once  dextrose 50% Injectable 12.5 Gram(s) IV Push once  dextrose 50% Injectable 25 Gram(s) IV Push once  doxazosin 8 milliGRAM(s) Oral at bedtime  glucagon  Injectable 1 milliGRAM(s) IntraMuscular once  guaiFENesin ER 1200 milliGRAM(s) Oral every 12 hours  heparin   Injectable 5000 Unit(s) SubCutaneous every 8 hours  insulin glargine Injectable (LANTUS) 20 Unit(s) SubCutaneous every morning  insulin lispro (ADMELOG) corrective regimen sliding scale   SubCutaneous three times a day before meals  insulin lispro (ADMELOG) corrective regimen sliding scale   SubCutaneous at bedtime  insulin lispro Injectable (ADMELOG) 8 Unit(s) SubCutaneous three times a day before meals  lactobacillus acidophilus 1 Tablet(s) Oral two times a day  losartan 100 milliGRAM(s) Oral daily  melatonin 3 milliGRAM(s) Oral at bedtime  methylPREDNISolone sodium succinate Injectable 40 milliGRAM(s) IV Push daily  pantoprazole    Tablet 40 milliGRAM(s) Oral before breakfast  simvastatin 20 milliGRAM(s) Oral at bedtime    MEDICATIONS  (PRN):  acetaminophen     Tablet .. 650 milliGRAM(s) Oral every 6 hours PRN Temp greater or equal to 38C (100.4F), Mild Pain (1 - 3)  ALBUTerol    90 MICROgram(s) HFA Inhaler 2 Puff(s) Inhalation every 6 hours PRN Shortness of Breath and/or Wheezing  ALPRAZolam 0.25 milliGRAM(s) Oral every 12 hours PRN anxiety  aluminum hydroxide/magnesium hydroxide/simethicone Suspension 30 milliLiter(s) Oral every 6 hours PRN Dyspepsia  artificial  tears Solution 1 Drop(s) Both EYES every 8 hours PRN Dry Eyes  benzocaine 15 mG/menthol 3.6 mG Lozenge 1 Lozenge Oral three times a day PRN Sore Throat  dextrose Oral Gel 15 Gram(s) Oral once PRN Blood Glucose LESS THAN 70 milliGRAM(s)/deciliter  simethicone 80 milliGRAM(s) Chew every 6 hours PRN Gas      06-09    128<L>  |  97  |  34<H>  ----------------------------<  259<H>  3.9   |  20<L>  |  0.96    Ca    8.6      09 Jun 2022 06:55    TPro  6.1  /  Alb  2.5<L>  /  TBili  0.5  /  DBili  x   /  AST  43<H>  /  ALT  132<H>  /  AlkPhos  71  06-09    Troponin I, High Sensitivity Result: 122.41  Ddimer- 661      ZORA 1:80 speckled pattern     NEgative - SSA SSB JO1    Rheumatoid Factor Quant, Serum or Plasma: 14 IU/mL (06.01.22 @ 13:01)     C-Reactive Protein, Serum: 183 mg/L (06.01.22 @ 13:01)  Sedimentation Rate, Erythrocyte: 94 mm/hr (06.01.22 @ 13:01)   Legionella Urine- negative     CT Angio Chest PE Protocol w/ IV Cont (05.27.22 @ 16:55) >  No pulmonary embolism from the main pulmonary artery up to and including   the segmental branches. Limited assessment of subsegmental branches.    Bilateral peripheral lung disease. Differential includes infection (COVID   in particular), organizing pneumonia, or eosinophilic pneumonia.    Incidental incompletely characterized left adrenal nodule. Further   evaluation with MRI is recommended, which can be done on a nonemergent   basis.       CT Chest No Cont (06.02.22 @ 10:20) >  CT CHEST                        PROCEDURE DATE:  06/02/2022    INTERPRETATION:  HISTORY: Pneumonia with slow improvement.  EXAMINATION: CT CHEST was performed without IV contrast.  COMPARISON: 5/27/2022.  FINDINGS:  AIRWAYS, LUNGS, PLEURA: Central airways clear. No pleural effusion.  Peripheral predominant ground-glass and consolidative opacities involving   all lung lobes have demonstrated interval progression compared to   5/27/2022. For example, region of consolidation in the posterior right   upper lobe (image 44, series 2) is new and right lower lobe superior   segment consolidation (image 59, series 2) has progressed.  MEDIASTINUM: Normal heart size. Coronary atherosclerosis. Small   pericardial effusion. Thoracic aorta normal caliber.  No large   mediastinal lymph nodes. Small amount of fluid within the lumen of the   lower esophagus.  IMAGED ABDOMEN: Unchanged 2 cm left adrenal nodule.  SOFT TISSUES: Unremarkable.  BONES: Unremarkable.  IMPRESSION:.  Extensive peripheral predominant airspace disease involving all lung   lobes with interval progression compared to 5/27/2022. The exact etiology   is unclear and diagnostic considerations include infectious etiology and   inflammatory etiology (such as organizing pneumonia).  CT chest follow-up in one month recommended to ensure clearing.       56/M with PMHx of HTN, brought to the ED for c/o worsening SOB and cough along with a recent Dx of PNA. He also states that he has been feeling very weak and dizzy. He recently got 4th dose of COVID vaccine 3 weeks ago and Sx started 1 week thereafter.   Also HAs cough lizzie when inhales deeply.  Hypoxic on ra.  Patient admitted for possible PNA- started on IV antibiotic- very slow improvement. Inflammatory markers were sent and it was elevated. Repeat CT showed worsening airspace disease unclear etiology inflammatory vs infectious. Antibiotic was stopped, Rheum consulted- rheum panel was sent. PAtient was started on IV steroid. Concern for ILD.     6/9- RRT this am for chest pain, SOB and desaturation. EKG done. Troponin elevated. Requiring increased amount of supplemental O2. Stating that he wokeup and he could not breathe- he thinks it the Xanax that made him worse. On exam- he denies chest pain, he is on increased O2 requirement, +SOB- he appears to be very anxious.       ROS:   All 10 systems reviewed and found to be negative with the exception of what has been described above.   Constitutional: NAD, laying in bed  HEENT: NC/AT  Back: no tenderness  Respiratory: respirations even and non labored, LCTA- diminished at the base  Cardiovascular: S1S2 regular, no murmurs  Abdomen: soft, not tender, not distended, positive BS  Genitourinary: voiding  Musculoskeletal: no muscle tenderness, no joint swelling or tenderness  Extremities: no pedal edema  Neurological: no focal deficits      PHYSICAL EXAM:    Vital Signs Last 24 Hrs  T(C): 36.5 (09 Jun 2022 08:06), Max: 36.8 (09 Jun 2022 04:45)  T(F): 97.7 (09 Jun 2022 08:06), Max: 98.3 (09 Jun 2022 04:45)  HR: 72 (09 Jun 2022 08:06) (67 - 72)  BP: 103/57 (09 Jun 2022 08:06) (90/55 - 116/67)  BP(mean): --  RR: 18 (09 Jun 2022 08:06) (18 - 22)  SpO2: 93% (09 Jun 2022 08:06) (85% - 97%)       MEDICATIONS  (STANDING):  budesonide 160 MICROgram(s)/formoterol 4.5 MICROgram(s) Inhaler 2 Puff(s) Inhalation two times a day  dextrose 5%. 1000 milliLiter(s) (100 mL/Hr) IV Continuous <Continuous>  dextrose 5%. 1000 milliLiter(s) (50 mL/Hr) IV Continuous <Continuous>  dextrose 50% Injectable 25 Gram(s) IV Push once  dextrose 50% Injectable 12.5 Gram(s) IV Push once  dextrose 50% Injectable 25 Gram(s) IV Push once  doxazosin 8 milliGRAM(s) Oral at bedtime  glucagon  Injectable 1 milliGRAM(s) IntraMuscular once  guaiFENesin ER 1200 milliGRAM(s) Oral every 12 hours  heparin   Injectable 5000 Unit(s) SubCutaneous every 8 hours  insulin glargine Injectable (LANTUS) 20 Unit(s) SubCutaneous every morning  insulin lispro (ADMELOG) corrective regimen sliding scale   SubCutaneous three times a day before meals  insulin lispro (ADMELOG) corrective regimen sliding scale   SubCutaneous at bedtime  insulin lispro Injectable (ADMELOG) 8 Unit(s) SubCutaneous three times a day before meals  lactobacillus acidophilus 1 Tablet(s) Oral two times a day  losartan 100 milliGRAM(s) Oral daily  melatonin 3 milliGRAM(s) Oral at bedtime  methylPREDNISolone sodium succinate Injectable 40 milliGRAM(s) IV Push daily  pantoprazole    Tablet 40 milliGRAM(s) Oral before breakfast  simvastatin 20 milliGRAM(s) Oral at bedtime    MEDICATIONS  (PRN):  acetaminophen     Tablet .. 650 milliGRAM(s) Oral every 6 hours PRN Temp greater or equal to 38C (100.4F), Mild Pain (1 - 3)  ALBUTerol    90 MICROgram(s) HFA Inhaler 2 Puff(s) Inhalation every 6 hours PRN Shortness of Breath and/or Wheezing  ALPRAZolam 0.25 milliGRAM(s) Oral every 12 hours PRN anxiety  aluminum hydroxide/magnesium hydroxide/simethicone Suspension 30 milliLiter(s) Oral every 6 hours PRN Dyspepsia  artificial  tears Solution 1 Drop(s) Both EYES every 8 hours PRN Dry Eyes  benzocaine 15 mG/menthol 3.6 mG Lozenge 1 Lozenge Oral three times a day PRN Sore Throat  dextrose Oral Gel 15 Gram(s) Oral once PRN Blood Glucose LESS THAN 70 milliGRAM(s)/deciliter  simethicone 80 milliGRAM(s) Chew every 6 hours PRN Gas      06-09    128<L>  |  97  |  34<H>  ----------------------------<  259<H>  3.9   |  20<L>  |  0.96    Ca    8.6      09 Jun 2022 06:55    TPro  6.1  /  Alb  2.5<L>  /  TBili  0.5  /  DBili  x   /  AST  43<H>  /  ALT  132<H>  /  AlkPhos  71  06-09    Troponin I, High Sensitivity Result: 122.41  Ddimer- 661      ZORA 1:80 speckled pattern     NEgative - SSA SSB JO1    Rheumatoid Factor Quant, Serum or Plasma: 14 IU/mL (06.01.22 @ 13:01)     C-Reactive Protein, Serum: 183 mg/L (06.01.22 @ 13:01)  Sedimentation Rate, Erythrocyte: 94 mm/hr (06.01.22 @ 13:01)   Legionella Urine- negative     CT Angio Chest PE Protocol w/ IV Cont (05.27.22 @ 16:55) >  No pulmonary embolism from the main pulmonary artery up to and including   the segmental branches. Limited assessment of subsegmental branches.    Bilateral peripheral lung disease. Differential includes infection (COVID   in particular), organizing pneumonia, or eosinophilic pneumonia.    Incidental incompletely characterized left adrenal nodule. Further   evaluation with MRI is recommended, which can be done on a nonemergent   basis.       CT Chest No Cont (06.02.22 @ 10:20) >  CT CHEST                        PROCEDURE DATE:  06/02/2022    INTERPRETATION:  HISTORY: Pneumonia with slow improvement.  EXAMINATION: CT CHEST was performed without IV contrast.  COMPARISON: 5/27/2022.  FINDINGS:  AIRWAYS, LUNGS, PLEURA: Central airways clear. No pleural effusion.  Peripheral predominant ground-glass and consolidative opacities involving   all lung lobes have demonstrated interval progression compared to   5/27/2022. For example, region of consolidation in the posterior right   upper lobe (image 44, series 2) is new and right lower lobe superior   segment consolidation (image 59, series 2) has progressed.  MEDIASTINUM: Normal heart size. Coronary atherosclerosis. Small   pericardial effusion. Thoracic aorta normal caliber.  No large   mediastinal lymph nodes. Small amount of fluid within the lumen of the   lower esophagus.  IMAGED ABDOMEN: Unchanged 2 cm left adrenal nodule.  SOFT TISSUES: Unremarkable.  BONES: Unremarkable.  IMPRESSION:.  Extensive peripheral predominant airspace disease involving all lung   lobes with interval progression compared to 5/27/2022. The exact etiology   is unclear and diagnostic considerations include infectious etiology and   inflammatory etiology (such as organizing pneumonia).  CT chest follow-up in one month recommended to ensure clearing.       56/M with PMHx of HTN, brought to the ED for c/o worsening SOB and cough along with a recent Dx of PNA. He also states that he has been feeling very weak and dizzy. He recently got 4th dose of COVID vaccine 3 weeks ago and Sx started 1 week thereafter.   Also HAs cough lizzie when inhales deeply.  Hypoxic on ra.  Patient admitted for possible PNA- started on IV antibiotic- very slow improvement. Inflammatory markers were sent and it was elevated. Repeat CT showed worsening airspace disease unclear etiology inflammatory vs infectious. Antibiotic was stopped, Rheum consulted- rheum panel was sent. PAtient was started on IV steroid. Concern for ILD.   Patient was started on IV steroids- planned for possible lung biopsy- CT surgery consulted- Procedure not done- patient improved- suggested may need lung biopsy in the future if antibiotic and steroids hernandez not clear infiltrative process.     6/9- RRT this am for chest pain, SOB and desaturation. EKG done. Troponin elevated. Requiring increased amount of supplemental O2. Stating that he wokeup and he could not breathe- he thinks it the Xanax that made him worse. On exam- he denies chest pain, he is on increased O2 requirement, +SOB- he appears to be very anxious.       ROS:   All 10 systems reviewed and found to be negative with the exception of what has been described above.   Constitutional: NAD, laying in bed  HEENT: NC/AT  Back: no tenderness  Respiratory: respirations even and non labored, LCTA- diminished at the base  Cardiovascular: S1S2 regular, no murmurs  Abdomen: soft, not tender, not distended, positive BS  Genitourinary: voiding  Musculoskeletal: no muscle tenderness, no joint swelling or tenderness  Extremities: no pedal edema  Neurological: no focal deficits      PHYSICAL EXAM:    Vital Signs Last 24 Hrs  T(C): 36.5 (09 Jun 2022 08:06), Max: 36.8 (09 Jun 2022 04:45)  T(F): 97.7 (09 Jun 2022 08:06), Max: 98.3 (09 Jun 2022 04:45)  HR: 72 (09 Jun 2022 08:06) (67 - 72)  BP: 103/57 (09 Jun 2022 08:06) (90/55 - 116/67)  BP(mean): --  RR: 18 (09 Jun 2022 08:06) (18 - 22)  SpO2: 93% (09 Jun 2022 08:06) (85% - 97%)       MEDICATIONS  (STANDING):  budesonide 160 MICROgram(s)/formoterol 4.5 MICROgram(s) Inhaler 2 Puff(s) Inhalation two times a day  dextrose 5%. 1000 milliLiter(s) (100 mL/Hr) IV Continuous <Continuous>  dextrose 5%. 1000 milliLiter(s) (50 mL/Hr) IV Continuous <Continuous>  dextrose 50% Injectable 25 Gram(s) IV Push once  dextrose 50% Injectable 12.5 Gram(s) IV Push once  dextrose 50% Injectable 25 Gram(s) IV Push once  doxazosin 8 milliGRAM(s) Oral at bedtime  glucagon  Injectable 1 milliGRAM(s) IntraMuscular once  guaiFENesin ER 1200 milliGRAM(s) Oral every 12 hours  heparin   Injectable 5000 Unit(s) SubCutaneous every 8 hours  insulin glargine Injectable (LANTUS) 20 Unit(s) SubCutaneous every morning  insulin lispro (ADMELOG) corrective regimen sliding scale   SubCutaneous three times a day before meals  insulin lispro (ADMELOG) corrective regimen sliding scale   SubCutaneous at bedtime  insulin lispro Injectable (ADMELOG) 8 Unit(s) SubCutaneous three times a day before meals  lactobacillus acidophilus 1 Tablet(s) Oral two times a day  losartan 100 milliGRAM(s) Oral daily  melatonin 3 milliGRAM(s) Oral at bedtime  methylPREDNISolone sodium succinate Injectable 40 milliGRAM(s) IV Push daily  pantoprazole    Tablet 40 milliGRAM(s) Oral before breakfast  simvastatin 20 milliGRAM(s) Oral at bedtime    MEDICATIONS  (PRN):  acetaminophen     Tablet .. 650 milliGRAM(s) Oral every 6 hours PRN Temp greater or equal to 38C (100.4F), Mild Pain (1 - 3)  ALBUTerol    90 MICROgram(s) HFA Inhaler 2 Puff(s) Inhalation every 6 hours PRN Shortness of Breath and/or Wheezing  ALPRAZolam 0.25 milliGRAM(s) Oral every 12 hours PRN anxiety  aluminum hydroxide/magnesium hydroxide/simethicone Suspension 30 milliLiter(s) Oral every 6 hours PRN Dyspepsia  artificial  tears Solution 1 Drop(s) Both EYES every 8 hours PRN Dry Eyes  benzocaine 15 mG/menthol 3.6 mG Lozenge 1 Lozenge Oral three times a day PRN Sore Throat  dextrose Oral Gel 15 Gram(s) Oral once PRN Blood Glucose LESS THAN 70 milliGRAM(s)/deciliter  simethicone 80 milliGRAM(s) Chew every 6 hours PRN Gas      06-09    128<L>  |  97  |  34<H>  ----------------------------<  259<H>  3.9   |  20<L>  |  0.96    Ca    8.6      09 Jun 2022 06:55    TPro  6.1  /  Alb  2.5<L>  /  TBili  0.5  /  DBili  x   /  AST  43<H>  /  ALT  132<H>  /  AlkPhos  71  06-09    Troponin I, High Sensitivity Result: 122.41  Ddimer- 661      ZORA 1:80 speckled pattern     NEgative - SSA SSB JO1    Rheumatoid Factor Quant, Serum or Plasma: 14 IU/mL (06.01.22 @ 13:01)     C-Reactive Protein, Serum: 183 mg/L (06.01.22 @ 13:01)  Sedimentation Rate, Erythrocyte: 94 mm/hr (06.01.22 @ 13:01)   Legionella Urine- negative     CT Angio Chest PE Protocol w/ IV Cont (05.27.22 @ 16:55) >  No pulmonary embolism from the main pulmonary artery up to and including   the segmental branches. Limited assessment of subsegmental branches.    Bilateral peripheral lung disease. Differential includes infection (COVID   in particular), organizing pneumonia, or eosinophilic pneumonia.    Incidental incompletely characterized left adrenal nodule. Further   evaluation with MRI is recommended, which can be done on a nonemergent   basis.       CT Chest No Cont (06.02.22 @ 10:20) >  CT CHEST                        PROCEDURE DATE:  06/02/2022    INTERPRETATION:  HISTORY: Pneumonia with slow improvement.  EXAMINATION: CT CHEST was performed without IV contrast.  COMPARISON: 5/27/2022.  FINDINGS:  AIRWAYS, LUNGS, PLEURA: Central airways clear. No pleural effusion.  Peripheral predominant ground-glass and consolidative opacities involving   all lung lobes have demonstrated interval progression compared to   5/27/2022. For example, region of consolidation in the posterior right   upper lobe (image 44, series 2) is new and right lower lobe superior   segment consolidation (image 59, series 2) has progressed.  MEDIASTINUM: Normal heart size. Coronary atherosclerosis. Small   pericardial effusion. Thoracic aorta normal caliber.  No large   mediastinal lymph nodes. Small amount of fluid within the lumen of the   lower esophagus.  IMAGED ABDOMEN: Unchanged 2 cm left adrenal nodule.  SOFT TISSUES: Unremarkable.  BONES: Unremarkable.  IMPRESSION:.  Extensive peripheral predominant airspace disease involving all lung   lobes with interval progression compared to 5/27/2022. The exact etiology   is unclear and diagnostic considerations include infectious etiology and   inflammatory etiology (such as organizing pneumonia).  CT chest follow-up in one month recommended to ensure clearing.

## 2022-06-09 NOTE — PROGRESS NOTE ADULT - ASSESSMENT
1. Sepsis POA due to atypical PNA-  Acute hypoxic resp failure- very slow recovery   - Atypical PNA r/o ILD   - COVID negative   - failed oral Abx  - supplemental O2 as needed   - CTA chest; NO PE, atypical peripheral bilateral infiltrates   - iv rocephin and iv zithro changed to cefepime + doxy on 5/31- DC'd   - f/u blood cx: ngtd  - albuterol inh prn   - Mucinex   - pulmonary consult appreciated- sent for legionella/ ILD workup/ CMV and EBV serologies   - ?ILD- elevated CRP/ESR- very slightly elevated Rheumatoid factor- rheumatology consult  - repeat CT chest- showed extensive predominant airspace disease involving all lung   lobes with interval progression- unclear etiology infectious v inflammatory.  - Check COVID PCR- repeat negative   - possible lung biopsy vs bronchoscopy - CT surgery consulted  - ID consult appreciated- antibiotic dc'd and patient was started on IV Steroids. Methylprednisone changed to every 12 hours   - Rheumatology consult for possible connective tissue disease- rheum labs sent d/w Dr Roa  - leukocytosis- likely related to steroid administration - will monitor  - Plan for ct chest today - will titrate steroids.   - d/w Dr Harkins     # hyponatremia secondary to hyperglycemia- corrected Na 135 - monitor       #hyperglycemia 2/2 steroids - continue ISS   adjust ISS and start Lantus - d/w patient  A1c 8.3- will need to be on oral hypoglycemic on DC   - diabetes education   - refused diabetes education   - stated that he does not have diabetes (as per patient report he had an a1c done with Dr Brooks and it was normal) I called Dr Brooks office and left message.     # HTN: cont home meds      *left knee pain 10/10- resolved   - patient requesting steroid shot on the left knee  - Ortho consult  - Left knee Xray results noted        # DVT PPX: heparin s/q  Plan for repeat CT chest tomorrow- likely dc in 24-48 hours. Will eval for Home O2 requirement.     Case d/w team on IDR. Plan was d/w patient.   Plan for repeat chest CT today and titrate steroid. Possible dc in 24-48 hours.   6/8-D/W Dr Brooks  patient hospital course, which included possible ILD, DM and need for f/u after dc from inpatient admission .         1. Sepsis POA due to atypical PNA-  Acute hypoxic resp failure- very slow recovery   - Atypical PNA r/o ILD   - COVID negative   - failed oral Abx  - supplemental O2 as needed   - CTA chest; NO PE, atypical peripheral bilateral infiltrates   - iv rocephin and iv zithro changed to cefepime + doxy on 5/31- DC'd   - f/u blood cx: ngtd  - albuterol inh prn   - Mucinex   - pulmonary consult appreciated- sent for legionella/ ILD workup/ CMV and EBV serologies   - ?ILD- elevated CRP/ESR- very slightly elevated Rheumatoid factor- rheumatology consult  - repeat CT chest- showed extensive predominant airspace disease involving all lung   lobes with interval progression- unclear etiology infectious v inflammatory.  - Check COVID PCR- repeat negative   - possible lung biopsy vs bronchoscopy - CT surgery consulted  - ID consult appreciated- antibiotic dc'd and patient was started on IV Steroids. Methylprednisone changed to every 12 hours   - Rheumatology consult for possible connective tissue disease- rheum labs sent d/w Dr Roa  - leukocytosis- likely related to steroid administration - will monitor  - Repeat CT chest showed stable peripheral lung disease.   - methypred titrated.   - 6/9 - elevated Trop- EKG unchaged- Will r/o PE- check CT PE protocol- cardiology consult- TRANSFER TO TELE.     # hyponatremia secondary to hyperglycemia- corrected Na 135 - monitor       #hyperglycemia 2/2 steroids - continue ISS   adjust ISS and start Lantus - d/w patient  A1c 8.3- will need to be on oral hypoglycemic on DC   - diabetes education   - refused diabetes education   - stated that he does not have diabetes (as per patient report he had an a1c done with Dr Brooks and it was normal) I called Dr Brooks office and left message.     # HTN: cont home meds      *left knee pain 10/10- resolved   - patient requesting steroid shot on the left knee  - Ortho consult  - Left knee Xray results noted        # DVT PPX: heparin s/q  Plan for repeat CT chest tomorrow- likely dc in 24-48 hours. Will eval for Home O2 requirement.     Case d/w team on IDR. Plan was d/w patient.   Plan for repeat chest CT today and titrate steroid. Possible dc in 24-48 hours.   6/8-D/W Dr Brooks  patient hospital course, which included possible ILD, DM and need for f/u after dc from inpatient admission .         1. Sepsis POA due to atypical PNA-  Acute hypoxic resp failure- very slow recovery   - Atypical PNA r/o ILD   - COVID negative   - failed oral Abx  - supplemental O2 as needed   - CTA chest; NO PE, atypical peripheral bilateral infiltrates   - iv rocephin and iv zithro changed to cefepime + doxy on 5/31- DC'd   - albuterol inh prn   - pulmonary consult appreciated  - ?ILD- elevated CRP/ESR- very slightly elevated Rheumatoid factor- rheumatology consult  - repeat CT chest- showed extensive predominant airspace disease involving all lung   lobes with interval progression- unclear etiology infectious v inflammatory.  - possible lung biopsy vs bronchoscopy - CT surgery consulted- no lung biopsy  - ID consult appreciated- antibiotic dc'd and patient was started on IV Steroids. Methylprednisone changed to every 12 hours - titrated down   - Rheumatology consult for possible connective tissue disease- rheum labs sent d/w Dr Roa  - leukocytosis- likely related to steroid administration - will monitor  - Repeat CT chest 6/8 - showed stable peripheral lung disease.   - methypred titrated down   - 6/9 - elevated Trop- EKG unchaged- Will r/o PE- check CT PE protocol- cardiology consult- TRANSFER TO TELE.     # hyponatremia secondary to hyperglycemia- corrected Na 135 - monitor       #DM- hyperglycemia 2/2 steroids - continue ISS   adjust ISS and start Lantus - d/w patient  A1c 8.3- will need to be on oral hypoglycemic on DC   - diabetes education   - refused diabetes education   - stated that he does not have diabetes (as per patient report he had an a1c done with Dr Brooks and it was normal) I called Dr Brooks office and left message.     # HTN: cont home meds          # DVT PPX: heparin s/q    Case d/w team on IDR. Plan was d/w patient.   6/8-D/W Dr Brooks  patient hospital course, which included possible ILD, DM and need for f/u after dc from inpatient admission.        1. Sepsis POA due to atypical PNA-  Acute hypoxic resp failure- very slow recovery   - Atypical PNA r/o ILD   - COVID negative   - failed oral Abx  - supplemental O2 as needed   - CTA chest; NO PE, atypical peripheral bilateral infiltrates   - iv rocephin and iv zithro changed to cefepime + doxy on 5/31- DC'd   - albuterol inh prn   - pulmonary consult appreciated  - ?ILD- elevated CRP/ESR- very slightly elevated Rheumatoid factor- rheumatology consult  - repeat CT chest- showed extensive predominant airspace disease involving all lung   lobes with interval progression- unclear etiology infectious v inflammatory.  - possible lung biopsy vs bronchoscopy - CT surgery consulted- no lung biopsy  - ID consult appreciated- antibiotic dc'd and patient was started on IV Steroids. Methylprednisone changed to every 12 hours - titrated down   - Rheumatology consult for possible connective tissue disease- rheum labs sent d/w Dr Roa  - leukocytosis- likely related to steroid administration - will monitor  - Repeat CT chest 6/8 - showed stable peripheral lung disease.   - methypred titrated down   - 6/9 - elevated Trop- EKG unchaged- Will r/o PE- check CT PE protocol- cardiology consult- d/w Dr Carson - TRANSFER TO TELE.     # hyponatremia secondary to hyperglycemia- corrected Na 135 - monitor       #DM- hyperglycemia 2/2 steroids - continue ISS   adjust ISS and start Lantus - d/w patient  A1c 8.3- will need to be on oral hypoglycemic on DC   - diabetes education   - refused diabetes education   - stated that he does not have diabetes (as per patient report he had an a1c done with Dr Brooks and it was normal) I called Dr Brooks office and left message.     # HTN: cont home meds          # DVT PPX: heparin s/q    Case d/w team on IDR. Plan was d/w patient.   6/8-D/W Dr Brooks  patient hospital course, which included possible ILD, DM and need for f/u after dc from inpatient admission.        1. Sepsis POA due to atypical PNA-  Acute hypoxic resp failure- very slow recovery   - Atypical PNA r/o ILD   - COVID negative   - failed oral Abx  - supplemental O2 as needed   - CTA chest; NO PE, atypical peripheral bilateral infiltrates   - iv rocephin and iv zithro changed to cefepime + doxy on 5/31- DC'd   - albuterol inh prn   - pulmonary consult appreciated  - ?ILD- elevated CRP/ESR- very slightly elevated Rheumatoid factor- rheumatology consult  - repeat CT chest- showed extensive predominant airspace disease involving all lung   lobes with interval progression- unclear etiology infectious v inflammatory.  - possible lung biopsy vs bronchoscopy - CT surgery consulted- no lung biopsy  - ID consult appreciated- antibiotic dc'd and patient was started on IV Steroids. Methylprednisone changed to every 12 hours - titrated down   - Rheumatology consult for possible connective tissue disease- rheum labs sent d/w Dr Roa  - leukocytosis- likely related to steroid administration - will monitor  - Repeat CT chest 6/8 - showed stable peripheral lung disease.   - methypred titrated down to daily 40mg   - 6/9 - elevated Trop- EKG unchaged- Will r/o PE- check CT PE protocol- cardiology consult- d/w Dr Carson - TRANSFER TO TELE.   - CTPE negative   - recheck troponin   - leukocytosis worse- afebrile- will monitor     # hyponatremia secondary to hyperglycemia- corrected Na 135 - monitor       #DM- hyperglycemia 2/2 steroids - continue ISS   adjust ISS and start Lantus - d/w patient  A1c 8.3- will need to be on oral hypoglycemic on DC   - diabetes education   - refused diabetes education   - stated that he does not have diabetes (as per patient report he had an a1c done with Dr Brooks and it was normal) I called Dr Brooks office and left message.     # HTN: cont home meds          # DVT PPX: heparin s/q    Case d/w team on IDR. Plan was d/w patient.   6/8-D/W Dr Brooks  patient hospital course, which included possible ILD, DM and need for f/u after dc from inpatient admission.

## 2022-06-10 LAB — NT-PROBNP SERPL-SCNC: 23 PG/ML — SIGNIFICANT CHANGE UP (ref 0–125)

## 2022-06-10 PROCEDURE — 99232 SBSQ HOSP IP/OBS MODERATE 35: CPT

## 2022-06-10 PROCEDURE — 93306 TTE W/DOPPLER COMPLETE: CPT | Mod: 26

## 2022-06-10 PROCEDURE — 99233 SBSQ HOSP IP/OBS HIGH 50: CPT

## 2022-06-10 RX ADMIN — Medication 8 UNIT(S): at 09:18

## 2022-06-10 RX ADMIN — Medication 8: at 16:48

## 2022-06-10 RX ADMIN — SIMETHICONE 80 MILLIGRAM(S): 80 TABLET, CHEWABLE ORAL at 21:17

## 2022-06-10 RX ADMIN — Medication 10 UNIT(S): at 16:48

## 2022-06-10 RX ADMIN — Medication 1 TABLET(S): at 21:18

## 2022-06-10 RX ADMIN — Medication 40 MILLIGRAM(S): at 11:10

## 2022-06-10 RX ADMIN — LOSARTAN POTASSIUM 50 MILLIGRAM(S): 100 TABLET, FILM COATED ORAL at 11:10

## 2022-06-10 RX ADMIN — Medication 1200 MILLIGRAM(S): at 11:10

## 2022-06-10 RX ADMIN — Medication 4: at 09:18

## 2022-06-10 RX ADMIN — BUDESONIDE AND FORMOTEROL FUMARATE DIHYDRATE 2 PUFF(S): 160; 4.5 AEROSOL RESPIRATORY (INHALATION) at 20:30

## 2022-06-10 RX ADMIN — HEPARIN SODIUM 5000 UNIT(S): 5000 INJECTION INTRAVENOUS; SUBCUTANEOUS at 21:18

## 2022-06-10 RX ADMIN — HEPARIN SODIUM 5000 UNIT(S): 5000 INJECTION INTRAVENOUS; SUBCUTANEOUS at 05:59

## 2022-06-10 RX ADMIN — INSULIN GLARGINE 20 UNIT(S): 100 INJECTION, SOLUTION SUBCUTANEOUS at 11:12

## 2022-06-10 RX ADMIN — Medication 4: at 11:12

## 2022-06-10 RX ADMIN — BUDESONIDE AND FORMOTEROL FUMARATE DIHYDRATE 2 PUFF(S): 160; 4.5 AEROSOL RESPIRATORY (INHALATION) at 08:30

## 2022-06-10 RX ADMIN — PANTOPRAZOLE SODIUM 40 MILLIGRAM(S): 20 TABLET, DELAYED RELEASE ORAL at 06:02

## 2022-06-10 RX ADMIN — HEPARIN SODIUM 5000 UNIT(S): 5000 INJECTION INTRAVENOUS; SUBCUTANEOUS at 16:49

## 2022-06-10 RX ADMIN — SIMVASTATIN 20 MILLIGRAM(S): 20 TABLET, FILM COATED ORAL at 21:18

## 2022-06-10 RX ADMIN — Medication 8 UNIT(S): at 11:13

## 2022-06-10 RX ADMIN — Medication 1 TABLET(S): at 11:10

## 2022-06-10 RX ADMIN — Medication 1200 MILLIGRAM(S): at 21:18

## 2022-06-10 RX ADMIN — Medication 8 MILLIGRAM(S): at 21:18

## 2022-06-10 NOTE — PROGRESS NOTE ADULT - SUBJECTIVE AND OBJECTIVE BOX
Patient is a 56y old  Male who presents with a chief complaint of PNA (09 Jun 2022 18:12)      6/9/2022- Cardiology Consultation_ 56 year old man with chronic hypertension, hyperlipidemia and obesity presented with progressive shortness of breath on admission 5/27/22. The symptoms began about 10 days after a Covid 19 booster. Was found to have extensive bilateral multilobar infiltrates and persistent hypoxemia. Cultures and lab tests failed to reveal evidence of a specific organism including Covid and Legionella. He was treated with oxygen and empiric  antibiotics. He has very slowly improved. Early this AM he noted acute increase in breathlessness  without chest pain. Troponin was mildly elevated x 2 and D dimer elevated. CTA was neg for pulmonary embolism. The acute dyspnea resolved spontaneously and gradually.     Followup HPI:  6/10- No complaints. Is focusing on the temperature control in his room and his unhappiness about how he is being treated by staff.     PAST MEDICAL & SURGICAL HISTORY:  HTN (hypertension)          Review of symptoms:  Negative except for as noted in today's HPI.      MEDICATIONS  (STANDING):  budesonide 160 MICROgram(s)/formoterol 4.5 MICROgram(s) Inhaler 2 Puff(s) Inhalation two times a day  dextrose 5%. 1000 milliLiter(s) (50 mL/Hr) IV Continuous <Continuous>  dextrose 5%. 1000 milliLiter(s) (100 mL/Hr) IV Continuous <Continuous>  dextrose 50% Injectable 25 Gram(s) IV Push once  dextrose 50% Injectable 12.5 Gram(s) IV Push once  dextrose 50% Injectable 25 Gram(s) IV Push once  doxazosin 8 milliGRAM(s) Oral at bedtime  glucagon  Injectable 1 milliGRAM(s) IntraMuscular once  guaiFENesin ER 1200 milliGRAM(s) Oral every 12 hours  heparin   Injectable 5000 Unit(s) SubCutaneous every 8 hours  insulin glargine Injectable (LANTUS) 20 Unit(s) SubCutaneous every morning  insulin lispro (ADMELOG) corrective regimen sliding scale   SubCutaneous three times a day before meals  insulin lispro (ADMELOG) corrective regimen sliding scale   SubCutaneous at bedtime  insulin lispro Injectable (ADMELOG) 8 Unit(s) SubCutaneous three times a day before meals  lactobacillus acidophilus 1 Tablet(s) Oral two times a day  losartan 50 milliGRAM(s) Oral daily  melatonin 3 milliGRAM(s) Oral at bedtime  methylPREDNISolone sodium succinate Injectable 40 milliGRAM(s) IV Push daily  pantoprazole    Tablet 40 milliGRAM(s) Oral before breakfast  simvastatin 20 milliGRAM(s) Oral at bedtime    MEDICATIONS  (PRN):  acetaminophen     Tablet .. 650 milliGRAM(s) Oral every 6 hours PRN Temp greater or equal to 38C (100.4F), Mild Pain (1 - 3)  ALBUTerol    90 MICROgram(s) HFA Inhaler 2 Puff(s) Inhalation every 6 hours PRN Shortness of Breath and/or Wheezing  ALPRAZolam 0.25 milliGRAM(s) Oral every 12 hours PRN anxiety  aluminum hydroxide/magnesium hydroxide/simethicone Suspension 30 milliLiter(s) Oral every 6 hours PRN Dyspepsia  artificial  tears Solution 1 Drop(s) Both EYES every 8 hours PRN Dry Eyes  benzocaine 15 mG/menthol 3.6 mG Lozenge 1 Lozenge Oral three times a day PRN Sore Throat  dextrose Oral Gel 15 Gram(s) Oral once PRN Blood Glucose LESS THAN 70 milliGRAM(s)/deciliter  simethicone 80 milliGRAM(s) Chew every 6 hours PRN Gas          Vital Signs Last 24 Hrs  T(C): 36.9 (09 Jun 2022 20:40), Max: 36.9 (09 Jun 2022 20:40)  T(F): 98.4 (09 Jun 2022 20:40), Max: 98.4 (09 Jun 2022 20:40)  HR: 76 (09 Jun 2022 20:40) (72 - 76)  BP: 131/71 (09 Jun 2022 20:40) (103/57 - 131/71)  BP(mean): 85 (09 Jun 2022 14:22) (85 - 85)  RR: 19 (09 Jun 2022 20:40) (18 - 19)  SpO2: 99% (09 Jun 2022 20:40) (93% - 99%)    I&O's Summary      PHYSICAL EXAM  General Appearance: comfortable  HEENT:   Neck:   Lungs: bibasilar crackles  Heart: no murmur or rub  Abdomen: nontender  Extremities: no edema  Neurologic:       INTERPRETATION OF TELEMETRY: RSR    ECG:    LABS:                          15.2   19.09 )-----------( 367      ( 09 Jun 2022 14:47 )             45.5     06-09    128<L>  |  97  |  34<H>  ----------------------------<  259<H>  3.9   |  20<L>  |  0.96    Ca    8.6      09 Jun 2022 06:55    TPro  6.1  /  Alb  2.5<L>  /  TBili  0.5  /  DBili  x   /  AST  43<H>  /  ALT  132<H>  /  AlkPhos  71  06-09          Pro BNP  -- 06-09 @ 06:13  D Dimer  661 06-09 @ 06:13              RADIOLOGY & ADDITIONAL STUDIES:    IMPRESSION:  1. Elevated troponin due to possible oxygen supply demand mismatch. This is not an acute MI without chest pain or EKG changes. Will evaluate further for myocarditis with TTE. A cardiac MRI may be considered if needed to rule out myocarditis. Obtain BNP level to exclude heart failure.  2. Bilateral pulmonary infiltrates.  3.Hypertension. BP stable.   PLAN:  BNP level. TTE. Continue losartan 50 mg qd, simvastatin. May be considered for discharge from a cardiac viewpoint.

## 2022-06-10 NOTE — PROGRESS NOTE ADULT - SUBJECTIVE AND OBJECTIVE BOX
Subjective:    Awake, more relaxed. Cardiology note appreciated. No sputum    MEDICATIONS  (STANDING):  budesonide 160 MICROgram(s)/formoterol 4.5 MICROgram(s) Inhaler 2 Puff(s) Inhalation two times a day  dextrose 5%. 1000 milliLiter(s) (50 mL/Hr) IV Continuous <Continuous>  dextrose 5%. 1000 milliLiter(s) (100 mL/Hr) IV Continuous <Continuous>  dextrose 50% Injectable 25 Gram(s) IV Push once  dextrose 50% Injectable 12.5 Gram(s) IV Push once  dextrose 50% Injectable 25 Gram(s) IV Push once  doxazosin 8 milliGRAM(s) Oral at bedtime  glucagon  Injectable 1 milliGRAM(s) IntraMuscular once  guaiFENesin ER 1200 milliGRAM(s) Oral every 12 hours  heparin   Injectable 5000 Unit(s) SubCutaneous every 8 hours  insulin glargine Injectable (LANTUS) 20 Unit(s) SubCutaneous every morning  insulin lispro (ADMELOG) corrective regimen sliding scale   SubCutaneous three times a day before meals  insulin lispro (ADMELOG) corrective regimen sliding scale   SubCutaneous at bedtime  insulin lispro Injectable (ADMELOG) 8 Unit(s) SubCutaneous three times a day before meals  lactobacillus acidophilus 1 Tablet(s) Oral two times a day  losartan 50 milliGRAM(s) Oral daily  melatonin 3 milliGRAM(s) Oral at bedtime  methylPREDNISolone sodium succinate Injectable 40 milliGRAM(s) IV Push daily  pantoprazole    Tablet 40 milliGRAM(s) Oral before breakfast  simvastatin 20 milliGRAM(s) Oral at bedtime    MEDICATIONS  (PRN):  acetaminophen     Tablet .. 650 milliGRAM(s) Oral every 6 hours PRN Temp greater or equal to 38C (100.4F), Mild Pain (1 - 3)  ALBUTerol    90 MICROgram(s) HFA Inhaler 2 Puff(s) Inhalation every 6 hours PRN Shortness of Breath and/or Wheezing  ALPRAZolam 0.25 milliGRAM(s) Oral every 12 hours PRN anxiety  aluminum hydroxide/magnesium hydroxide/simethicone Suspension 30 milliLiter(s) Oral every 6 hours PRN Dyspepsia  artificial  tears Solution 1 Drop(s) Both EYES every 8 hours PRN Dry Eyes  benzocaine 15 mG/menthol 3.6 mG Lozenge 1 Lozenge Oral three times a day PRN Sore Throat  dextrose Oral Gel 15 Gram(s) Oral once PRN Blood Glucose LESS THAN 70 milliGRAM(s)/deciliter  simethicone 80 milliGRAM(s) Chew every 6 hours PRN Gas      Allergies    codeine (Unknown)    Intolerances        Vital Signs Last 24 Hrs  T(C): 36.9 (09 Jun 2022 20:40), Max: 36.9 (09 Jun 2022 20:40)  T(F): 98.4 (09 Jun 2022 20:40), Max: 98.4 (09 Jun 2022 20:40)  HR: 92 (10 Forrest 2022 08:31) (75 - 92)  BP: 131/71 (09 Jun 2022 20:40) (116/72 - 131/71)  BP(mean): 85 (09 Jun 2022 14:22) (85 - 85)  RR: 19 (09 Jun 2022 20:40) (18 - 19)  SpO2: 99% (09 Jun 2022 20:40) (97% - 99%)    PHYSICAL EXAMINATION:    NECK:  Supple. No lymphadenopathy. Jugular venous pressure not elevated.    HEART:   The cardiac impulse has a normal quality. Reg., Nl S1 and S2.    CHEST:  Chest with bilateral crackles, R>L. Normal respiratory effort.  ABDOMEN:  Soft and nontender.   EXTREMITIES:  There is no edema.       LABS:                        15.2   19.09 )-----------( 367      ( 09 Jun 2022 14:47 )             45.5     06-09    128<L>  |  97  |  34<H>  ----------------------------<  259<H>  3.9   |  20<L>  |  0.96    Ca    8.6      09 Jun 2022 06:55    TPro  6.1  /  Alb  2.5<L>  /  TBili  0.5  /  DBili  x   /  AST  43<H>  /  ALT  132<H>  /  AlkPhos  71  06-09          RADIOLOGY & ADDITIONAL TESTS:< from: CT Angio Chest PE Protocol w/ IV Cont (06.09.22 @ 11:27) >  ACC: [id number] EXAM:  CT ANGIO CHEST PULM [facility]                          PROCEDURE DATE:  06/09/2022          INTERPRETATION:  INDICATION: Shortness of breath    TECHNIQUE: Volumetric images of the chest were obtained after the   administration of 90 mL of Omnipaque 350. Maximum intensity projection   images were generated.    COMPARISON: CT chest 6/8/2022.    FINDINGS:    PULMONARY ANGIOGRAM:  No pulmonary embolism. Normal caliber pulmonary   artery.    LUNGS/AIRWAYS/PLEURA: No endobronchial lesion. Stable bilateral   peripheral groundglass and consolidation involving all lobes,   preferential to the lower lobes. No pleural effusion.    LYMPH NODES/MEDIASTINUM: No enlarged lymph nodes.    HEART/VASCULATURE: Normal heart size. Stable small pericardial effusion.   Normal caliber aorta. Coronary artery calcifications.    UPPER ABDOMEN: Stable left adrenal nodule.    BONES/SOFT TISSUES: Degenerative changes of the spine.      IMPRESSION:    No pulmonary embolism.    Stable peripheral lung disease. Differential includes organizing   pneumonia, eosinophilic lung disease, or atypical infection.      JUAN SINGH M.D      Assessment and Plan:   · Assessment  	  - cont O2. Monitor O2 Sats-decrease Nasal O2 to 4L/M  - inflammatory markers significantly better; ESR 48 from 94; CRP 28 [id number]  - no lung biopsy  - cont iv steroids-now down to 40MG QD. Can switch to prednisone 40MG QD soon  - dvt proph  - Cardiology eval noted    Problem/Plan - 1:  ·  Problem: Multilobar lung infiltrate.   Problem/Plan - 2:  ·  Problem: ILD (interstitial lung disease).   Problem/Plan - 3:  ·  Problem: Acute dyspnea.   Problem/Plan - 4:  ·  Problem: Paroxysmal cough.

## 2022-06-10 NOTE — PROGRESS NOTE ADULT - SUBJECTIVE AND OBJECTIVE BOX
56/M with PMHx of HTN, brought to the ED for c/o worsening SOB and cough along with a recent Dx of PNA. He also states that he has been feeling very weak and dizzy. He recently got 4th dose of COVID vaccine 3 weeks ago and Sx started 1 week thereafter.   Also HAs cough lizzie when inhales deeply.  Hypoxic on ra.  Patient admitted for possible PNA- started on IV antibiotic- very slow improvement. Inflammatory markers were sent and it was elevated. Repeat CT showed worsening airspace disease unclear etiology inflammatory vs infectious. Antibiotic was stopped, Rheum consulted- rheum panel was sent. PAtient was started on IV steroid. Concern for ILD.   Patient was started on IV steroids- planned for possible lung biopsy- CT surgery consulted- Procedure not done- patient improved- suggested may need lung biopsy in the future if antibiotic and steroids hernandez not clear infiltrative process.     6/9- RRT this am for chest pain, SOB and desaturation. EKG done. Troponin elevated. Requiring increased amount of supplemental O2. Stating that he wokeup and he could not breathe- he thinks it the Xanax that made him worse. On exam- he denies chest pain, he is on increased O2 requirement, +SOB- he appears to be very anxious.     Patient was walked today -  with low O2 sats /  sinus tachy with ambulation.       ROS:     All 10 systems reviewed and found to be negative with the exception of what has been described above.     PHYSICAL EXAM:  GENERAL APPEARANCE:  NAD, hemodynamically stable  T(C): 36.7 (06-10-22 @ 10:58), Max: 36.9 (06-09-22 @ 20:40)  HR: 83 (06-10-22 @ 10:58) (75 - 92)  BP: 114/60 (06-10-22 @ 10:58) (114/60 - 131/71)  RR: 18 (06-10-22 @ 10:58) (18 - 19)  SpO2: 97% (06-10-22 @ 10:58) (97% - 99%)  Wt(kg): --  HEENT:  Head is normocephalic    Skin:  Warm and dry without any rash   NECK:  Supple without lymphadenopathy.   HEART:  Regular rate and rhythm. normal S1 and S2, No M/R/G  LUNGS:  Good ins/exp effort, no W/R/R/C  ABDOMEN:  Soft, nontender, nondistended with good bowel sounds heard  EXTREMITIES:  Without cyanosis, clubbing or edema.   NEUROLOGICAL:  Gross nonfocal     ECHO:    The left ventricle is normal in size, wall thickness, wall motion and   contractility.   Estimated left ventricular ejection fraction is 60-65 %.   Normal appearing left atrium.   Normal appearing right atrium.   Normal appearing right ventricle structure and function.   Normal aortic valve structure and function.   Normal appearing mitral valve structure and function.   Mild (1+) mitral regurgitation is present.   Normal appearing tricuspid valve structure and function.   Trace tricuspid valve regurgitation is present.   A small non hemodynamically significant pericardial effusion is present.   No evidence of pleural effusion.   The IVC appears normal.    CTA:   No pulmonary embolism.    Stable peripheral lung disease. Differential includes organizing   pneumonia, eosinophilic lung disease, or atypical infection.      # Acute hypoxic resp failure- very slow recovery secondary to ILD / multilobar pneumoinia  - s/p full course of antibiotics  - tele for continues pulse ox monitoring  - cont O2. Monitor O2 Sats-decrease Nasal O2 to 4L/M  - inflammatory markers significantly better; ESR 48 from 94; CRP 28 [id number]  - cont iv steroids-now down to 40MG QD. Can switch to prednisone 40MG QD soon  - care discussed with Dr. Carson    # Pseusohyponatremia in the setting of elevated glucose levels  - monitor Na     #DM- hyperglycemia 2/2 steroids - continue ISS   adjust ISS and start Lantus - d/w patient  A1c 8.3- will need to be on oral hypoglycemic on DC   - diabetes education   - refused diabetes education   - stated that he does not have diabetes (as per patient report he had an a1c done with Dr Brooks and it was normal) I called Dr Brooks office and left message.     # HTN: cont home meds    # DVT PPX: heparin s/q                56/M with PMHx of HTN, brought to the ED for c/o worsening SOB and cough along with a recent Dx of PNA. He also states that he has been feeling very weak and dizzy. He recently got 4th dose of COVID vaccine 3 weeks ago and Sx started 1 week thereafter.   Also HAs cough lizzie when inhales deeply.  Hypoxic on ra. Patient admitted for possible PNA- started on IV antibiotic- very slow improvement. Inflammatory markers were sent and it was elevated. Repeat CT showed worsening airspace disease unclear etiology inflammatory vs infectious. Antibiotic was stopped, Rheum consulted- rheum panel was sent. PAtient was started on IV steroid. Concern for ILD.   Patient was started on IV steroids- planned for possible lung biopsy- CT surgery consulted- Procedure not done- patient improved- suggested may need lung biopsy in the future if antibiotic and steroids hernandez not clear infiltrative process.     6/9- RRT this am for chest pain, SOB and desaturation. EKG done. Troponin elevated. Requiring increased amount of supplemental O2. Stating that he wokeup and he could not breathe- he thinks it the Xanax that made him worse. On exam- he denies chest pain, he is on increased O2 requirement, +SOB- he appears to be very anxious.     Patient was walked today -  with low O2 sats /  sinus tachy with ambulation. Patient is uncomfortable -  short of breath with exertion. feels too hot. multiple complaints.       ROS:     All 10 systems reviewed and found to be negative with the exception of what has been described above.     PHYSICAL EXAM:  GENERAL APPEARANCE:  NAD, hemodynamically stable  T(C): 36.7 (06-10-22 @ 10:58), Max: 36.9 (06-09-22 @ 20:40)  HR: 83 (06-10-22 @ 10:58) (75 - 92)  BP: 114/60 (06-10-22 @ 10:58) (114/60 - 131/71)  RR: 18 (06-10-22 @ 10:58) (18 - 19)  SpO2: 97% (06-10-22 @ 10:58) (97% - 99%)  Wt(kg): --  HEENT:  Head is normocephalic    Skin:  Warm and dry without any rash   NECK:  Supple without lymphadenopathy.   HEART:  Regular rate and rhythm. normal S1 and S2, No M/R/G  LUNGS:  Good ins/exp effort, no W/R/R/C  ABDOMEN:  Soft, nontender, nondistended with good bowel sounds heard  EXTREMITIES:  Without cyanosis, clubbing or edema.   NEUROLOGICAL:  Gross nonfocal     ECHO:    The left ventricle is normal in size, wall thickness, wall motion and   contractility.   Estimated left ventricular ejection fraction is 60-65 %.   Normal appearing left atrium.   Normal appearing right atrium.   Normal appearing right ventricle structure and function.   Normal aortic valve structure and function.   Normal appearing mitral valve structure and function.   Mild (1+) mitral regurgitation is present.   Normal appearing tricuspid valve structure and function.   Trace tricuspid valve regurgitation is present.   A small non hemodynamically significant pericardial effusion is present.   No evidence of pleural effusion.   The IVC appears normal.    CTA:   No pulmonary embolism.    Stable peripheral lung disease. Differential includes organizing   pneumonia, eosinophilic lung disease, or atypical infection.      # Acute hypoxic resp failure- very slow recovery secondary to ILD / multilobar pneumoinia  - s/p full course of antibiotics  - tele for continues pulse ox monitoring  - cont O2. Monitor O2 Sats-decrease Nasal O2 to 4L/M  - inflammatory markers significantly better; ESR 48 from 94; CRP 28 [id number]  - cont iv steroids-now down to 40MG QD. Can switch to prednisone 40MG QD soon  - care discussed with Dr. Carson    # Pseusohyponatremia in the setting of elevated glucose levels  - monitor Na     #DM- hyperglycemia 2/2 steroids - continue ISS   adjust ISS and start Lantus - d/w patient  A1c 8.3- will need to be on oral hypoglycemic on DC   - diabetes education   - refused diabetes education   - stated that he does not have diabetes (as per patient report he had an a1c done with Dr Brooks and it was normal) I called Dr Brooks office and left message.     # HTN: cont home meds    # DVT PPX: heparin s/q

## 2022-06-11 LAB
ANION GAP SERPL CALC-SCNC: 9 MMOL/L — SIGNIFICANT CHANGE UP (ref 5–17)
BUN SERPL-MCNC: 24 MG/DL — HIGH (ref 7–23)
CALCIUM SERPL-MCNC: 8.9 MG/DL — SIGNIFICANT CHANGE UP (ref 8.5–10.1)
CHLORIDE SERPL-SCNC: 97 MMOL/L — SIGNIFICANT CHANGE UP (ref 96–108)
CO2 SERPL-SCNC: 22 MMOL/L — SIGNIFICANT CHANGE UP (ref 22–31)
CREAT SERPL-MCNC: 0.81 MG/DL — SIGNIFICANT CHANGE UP (ref 0.5–1.3)
CRP SERPL-MCNC: 19 MG/L — HIGH
EGFR: 103 ML/MIN/1.73M2 — SIGNIFICANT CHANGE UP
ERYTHROCYTE [SEDIMENTATION RATE] IN BLOOD: 16 MM/HR — SIGNIFICANT CHANGE UP (ref 0–20)
GLUCOSE SERPL-MCNC: 191 MG/DL — HIGH (ref 70–99)
HCT VFR BLD CALC: 44.4 % — SIGNIFICANT CHANGE UP (ref 39–50)
HGB BLD-MCNC: 14.7 G/DL — SIGNIFICANT CHANGE UP (ref 13–17)
MCHC RBC-ENTMCNC: 28.4 PG — SIGNIFICANT CHANGE UP (ref 27–34)
MCHC RBC-ENTMCNC: 33.1 GM/DL — SIGNIFICANT CHANGE UP (ref 32–36)
MCV RBC AUTO: 85.9 FL — SIGNIFICANT CHANGE UP (ref 80–100)
PLATELET # BLD AUTO: 319 K/UL — SIGNIFICANT CHANGE UP (ref 150–400)
POTASSIUM SERPL-MCNC: 4.1 MMOL/L — SIGNIFICANT CHANGE UP (ref 3.5–5.3)
POTASSIUM SERPL-SCNC: 4.1 MMOL/L — SIGNIFICANT CHANGE UP (ref 3.5–5.3)
RBC # BLD: 5.17 M/UL — SIGNIFICANT CHANGE UP (ref 4.2–5.8)
RBC # FLD: 14.6 % — HIGH (ref 10.3–14.5)
SODIUM SERPL-SCNC: 128 MMOL/L — LOW (ref 135–145)
WBC # BLD: 15.53 K/UL — HIGH (ref 3.8–10.5)
WBC # FLD AUTO: 15.53 K/UL — HIGH (ref 3.8–10.5)

## 2022-06-11 PROCEDURE — 99233 SBSQ HOSP IP/OBS HIGH 50: CPT

## 2022-06-11 RX ORDER — DEXAMETHASONE 0.5 MG/5ML
6 ELIXIR ORAL DAILY
Refills: 0 | Status: DISCONTINUED | OUTPATIENT
Start: 2022-06-11 | End: 2022-06-15

## 2022-06-11 RX ADMIN — BUDESONIDE AND FORMOTEROL FUMARATE DIHYDRATE 2 PUFF(S): 160; 4.5 AEROSOL RESPIRATORY (INHALATION) at 09:42

## 2022-06-11 RX ADMIN — HEPARIN SODIUM 5000 UNIT(S): 5000 INJECTION INTRAVENOUS; SUBCUTANEOUS at 21:40

## 2022-06-11 RX ADMIN — LOSARTAN POTASSIUM 50 MILLIGRAM(S): 100 TABLET, FILM COATED ORAL at 09:56

## 2022-06-11 RX ADMIN — BUDESONIDE AND FORMOTEROL FUMARATE DIHYDRATE 2 PUFF(S): 160; 4.5 AEROSOL RESPIRATORY (INHALATION) at 20:53

## 2022-06-11 RX ADMIN — INSULIN GLARGINE 25 UNIT(S): 100 INJECTION, SOLUTION SUBCUTANEOUS at 09:54

## 2022-06-11 RX ADMIN — Medication 4: at 18:16

## 2022-06-11 RX ADMIN — Medication 10 UNIT(S): at 13:41

## 2022-06-11 RX ADMIN — PANTOPRAZOLE SODIUM 40 MILLIGRAM(S): 20 TABLET, DELAYED RELEASE ORAL at 05:33

## 2022-06-11 RX ADMIN — Medication 8 MILLIGRAM(S): at 21:41

## 2022-06-11 RX ADMIN — Medication 1 TABLET(S): at 09:55

## 2022-06-11 RX ADMIN — SIMVASTATIN 20 MILLIGRAM(S): 20 TABLET, FILM COATED ORAL at 21:41

## 2022-06-11 RX ADMIN — Medication 2: at 09:53

## 2022-06-11 RX ADMIN — Medication 6 MILLIGRAM(S): at 13:40

## 2022-06-11 RX ADMIN — SIMETHICONE 80 MILLIGRAM(S): 80 TABLET, CHEWABLE ORAL at 21:47

## 2022-06-11 RX ADMIN — Medication 10 UNIT(S): at 18:17

## 2022-06-11 RX ADMIN — Medication 1200 MILLIGRAM(S): at 09:55

## 2022-06-11 RX ADMIN — HEPARIN SODIUM 5000 UNIT(S): 5000 INJECTION INTRAVENOUS; SUBCUTANEOUS at 13:41

## 2022-06-11 RX ADMIN — Medication 10 UNIT(S): at 09:54

## 2022-06-11 RX ADMIN — Medication 3 MILLIGRAM(S): at 21:41

## 2022-06-11 RX ADMIN — Medication 40 MILLIGRAM(S): at 09:55

## 2022-06-11 RX ADMIN — Medication 1200 MILLIGRAM(S): at 21:42

## 2022-06-11 RX ADMIN — HEPARIN SODIUM 5000 UNIT(S): 5000 INJECTION INTRAVENOUS; SUBCUTANEOUS at 05:33

## 2022-06-11 RX ADMIN — Medication 4: at 13:41

## 2022-06-11 RX ADMIN — Medication 1 TABLET(S): at 21:41

## 2022-06-11 NOTE — PROGRESS NOTE ADULT - SUBJECTIVE AND OBJECTIVE BOX
Patient is a 56y old  Male who presents with a chief complaint of PNA (11 Jun 2022 10:13)  6/9/2022- Cardiology Consultation_ 56 year old man with chronic hypertension, hyperlipidemia and obesity presented with progressive shortness of breath on admission 5/27/22. The symptoms began about 10 days after a Covid 19 booster. Was found to have extensive bilateral multilobar infiltrates and persistent hypoxemia. Cultures and lab tests failed to reveal evidence of a specific organism including Covid and Legionella. He was treated with oxygen and empiric  antibiotics. He has very slowly improved. Early this AM he noted acute increase in breathlessness  without chest pain. Troponin was mildly elevated x 2 and D dimer elevated. CTA was neg for pulmonary embolism. The acute dyspnea resolved spontaneously and gradually.     Followup HPI:  6/10- No complaints. Is focusing on the temperature control in his room and his unhappiness about how he is being treated by staff.   6/11- No new complaints. Breathless with bowel movements. Remains on nasal oxygen.    PAST MEDICAL & SURGICAL HISTORY:  HTN (hypertension)          Review of symptoms:  Negative except for as noted in today's HPI.      MEDICATIONS  (STANDING):  budesonide 160 MICROgram(s)/formoterol 4.5 MICROgram(s) Inhaler 2 Puff(s) Inhalation two times a day  dextrose 5%. 1000 milliLiter(s) (100 mL/Hr) IV Continuous <Continuous>  dextrose 5%. 1000 milliLiter(s) (50 mL/Hr) IV Continuous <Continuous>  dextrose 50% Injectable 25 Gram(s) IV Push once  dextrose 50% Injectable 12.5 Gram(s) IV Push once  dextrose 50% Injectable 25 Gram(s) IV Push once  doxazosin 8 milliGRAM(s) Oral at bedtime  glucagon  Injectable 1 milliGRAM(s) IntraMuscular once  guaiFENesin ER 1200 milliGRAM(s) Oral every 12 hours  heparin   Injectable 5000 Unit(s) SubCutaneous every 8 hours  insulin glargine Injectable (LANTUS) 25 Unit(s) SubCutaneous every morning  insulin lispro (ADMELOG) corrective regimen sliding scale   SubCutaneous three times a day before meals  insulin lispro (ADMELOG) corrective regimen sliding scale   SubCutaneous at bedtime  insulin lispro Injectable (ADMELOG) 10 Unit(s) SubCutaneous three times a day before meals  lactobacillus acidophilus 1 Tablet(s) Oral two times a day  losartan 50 milliGRAM(s) Oral daily  melatonin 3 milliGRAM(s) Oral at bedtime  methylPREDNISolone sodium succinate Injectable 40 milliGRAM(s) IV Push daily  pantoprazole    Tablet 40 milliGRAM(s) Oral before breakfast  simvastatin 20 milliGRAM(s) Oral at bedtime    MEDICATIONS  (PRN):  acetaminophen     Tablet .. 650 milliGRAM(s) Oral every 6 hours PRN Temp greater or equal to 38C (100.4F), Mild Pain (1 - 3)  ALBUTerol    90 MICROgram(s) HFA Inhaler 2 Puff(s) Inhalation every 6 hours PRN Shortness of Breath and/or Wheezing  ALPRAZolam 0.25 milliGRAM(s) Oral every 12 hours PRN anxiety  aluminum hydroxide/magnesium hydroxide/simethicone Suspension 30 milliLiter(s) Oral every 6 hours PRN Dyspepsia  artificial  tears Solution 1 Drop(s) Both EYES every 8 hours PRN Dry Eyes  benzocaine 15 mG/menthol 3.6 mG Lozenge 1 Lozenge Oral three times a day PRN Sore Throat  dextrose Oral Gel 15 Gram(s) Oral once PRN Blood Glucose LESS THAN 70 milliGRAM(s)/deciliter  simethicone 80 milliGRAM(s) Chew every 6 hours PRN Gas          Vital Signs Last 24 Hrs  T(C): 36.7 (11 Jun 2022 07:37), Max: 36.9 (10 Forrest 2022 21:13)  T(F): 98.1 (11 Jun 2022 07:37), Max: 98.5 (10 Forrest 2022 21:13)  HR: 81 (11 Jun 2022 07:37) (72 - 83)  BP: 123/75 (11 Jun 2022 07:37) (114/60 - 126/64)  BP(mean): --  RR: 18 (11 Jun 2022 07:37) (18 - 18)  SpO2: 96% (11 Jun 2022 07:37) (96% - 97%)    I&O's Summary    10 Forrest 2022 07:01  -  11 Jun 2022 07:00  --------------------------------------------------------  IN: 0 mL / OUT: 900 mL / NET: -900 mL        PHYSICAL EXAM  General Appearance:comfortable  HEENT:   Neck:   Lungs: bibasilar crackles  Heart: no murmur or rub  Abdomen: non tender  Extremities: no edema  Neurologic:       INTERPRETATION OF TELEMETRY: RSR    ECG:    ACC: [id number] EXAM:  ECHO TTE WO CON COMP W DOPP                          PROCEDURE DATE:  06/10/2022         Summary     The left ventricle is normal in size, wall thickness, wall motion and   contractility.   Estimated left ventricular ejection fraction is 60-65 %.   Normal appearing left atrium.   Normal appearing right atrium.   Normal appearing right ventricle structure and function.   Normal aortic valve structure and function.   Normal appearing mitral valve structure and function.   Mild (1+) mitral regurgitation is present.   Normal appearing tricuspid valve structure and function.   Trace tricuspid valve regurgitation is present.   A small non hemodynamically significant pericardial effusion is present.   No evidence of pleural effusion.   The IVC appears normal.     Signature     ----------------------------------------------------------------   Electronically signed by Clarence Thompson MD(Interpreting physician)   on 06/10/2022 08:36 AM            LABS:                          14.7   15.53 )-----------( 319      ( 11 Jun 2022 07:41 )             44.4     06-11    128<L>  |  97  |  24<H>  ----------------------------<  191<H>  4.1   |  22  |  0.81    Ca    8.9      11 Jun 2022 07:41            Pro BNP  23 06-10 @ 11:41  D Dimer  -- 06-10 @ 11:41  Pro BNP  -- 06-09 @ 06:13  D Dimer  661 06-09 @ 06:13              RADIOLOGY & ADDITIONAL STUDIES:    IMPRESSION:    1. Elevated troponin due to possible oxygen supply demand mismatch. This is not an acute MI without chest pain or EKG changes. No evidence or myocarditis by TTE which shows normal LV EF and BNP is normal suggesting no CHF. There is a small pericardial effusion that may be inflammatory and related to the lung process.  2. Bilateral pulmonary infiltrates. Etiology undetermined.  3.Hypertension. BP stable.   PLAN:  Continue losartan and simvastatin. Management of pulmonary process per Hospitalist and pulmonologist. He is on steroids.

## 2022-06-11 NOTE — PROGRESS NOTE ADULT - SUBJECTIVE AND OBJECTIVE BOX
Patient is a 55 y/o/m PMHx of HTN, brought to the ED for c/o worsening SOB and cough along with a recent Dx of PNA. He also states that he has been feeling very weak and dizzy. He recently got 4th dose of COVID vaccine 3 weeks ago and Sx started 1 week thereafter. Hypoxic on ra. Patient admitted for possible PNA- started on IV antibiotic- very slow improvement. Inflammatory markers were sent and it was elevated. Repeat CT showed worsening airspace disease unclear etiology inflammatory vs infectious. Antibiotic was stopped, Rheum consulted- rheum panel was sent. PAtient was started on IV steroid. Concern for ILD.   Patient was started on IV steroids- planned for possible lung biopsy- CT surgery consulted- Procedure not done- patient improved- suggested may need lung biopsy in the future if antibiotic and steroids hernandez not clear infiltrative process.     6/9:  RRT this am for chest pain, SOB and desaturation. EKG done. Troponin elevated. Requiring increased amount of supplemental O2. Stating that he wokeup and he could not breathe- he thinks it the Xanax that made him worse. On exam- he denies chest pain, he is on increased O2 requirement, +SOB- he appears to be very anxious.     Patient seen and eval. hemodynamically stable. Denies any HA, CP. Patient still short of breath.    ROS:     All 10 systems reviewed and found to be negative with the exception of what has been described above.     PHYSICAL EXAM:  GENERAL APPEARANCE:  NAD, hemodynamically stable  ICU Vital Signs Last 24 Hrs  T(C): 36.7 (11 Jun 2022 07:37), Max: 36.9 (10 Forrest 2022 21:13)  T(F): 98.1 (11 Jun 2022 07:37), Max: 98.5 (10 Forrest 2022 21:13)  HR: 81 (11 Jun 2022 07:37) (72 - 83)  BP: 123/75 (11 Jun 2022 07:37) (114/60 - 126/64)  RR: 18 (11 Jun 2022 07:37) (18 - 18)  SpO2: 96% (11 Jun 2022 07:37) (96% - 97%)  HEENT:  Head is normocephalic    Skin:  Warm and dry without any rash   NECK:  Supple without lymphadenopathy.   HEART:  Regular rate and rhythm. normal S1 and S2, No M/R/G  LUNGS:  Good ins/exp effort, no W/R/R/C  ABDOMEN:  Soft, nontender, nondistended with good bowel sounds heard  EXTREMITIES:  Without cyanosis, clubbing or edema.   NEUROLOGICAL:  Gross nonfocal     ECHO:    The left ventricle is normal in size, wall thickness, wall motion and   contractility.   Estimated left ventricular ejection fraction is 60-65 %.   Normal appearing left atrium.   Normal appearing right atrium.   Normal appearing right ventricle structure and function.   Normal aortic valve structure and function.   Normal appearing mitral valve structure and function.   Mild (1+) mitral regurgitation is present.   Normal appearing tricuspid valve structure and function.   Trace tricuspid valve regurgitation is present.   A small non hemodynamically significant pericardial effusion is present.   No evidence of pleural effusion.   The IVC appears normal.    CTA:   No pulmonary embolism.    Stable peripheral lung disease. Differential includes organizing   pneumonia, eosinophilic lung disease, or atypical infection.      # Acute hypoxic resp failure- very slow recovery secondary to ILD / multilobar pneumoinia  - s/p full course of antibiotics  - tele for continues pulse ox monitoring  - cont O2. Monitor O2 Sats-decrease Nasal O2 to 4L/M  - inflammatory markers significantly better; ESR 48 from 94; CRP 28 [id number]  - cont iv steroids-now down to 40MG QD. Can switch to prednisone 40MG QD soon  - care discussed with Dr. Carson    # Pseusohyponatremia in the setting of elevated glucose levels  - monitor Na - 128    # DM- hyperglycemia 2/2 steroids    - adjust ISS and start Lantus - d/w patient  - A1c 8.3- will need to be on oral hypoglycemic on DC   - diabetes education   - refused diabetes education   - stated that he does not have diabetes (as per patient report he had an a1c done with Dr Brooks and it was normal) I called Dr Brooks office and left message.     # HTN:   - cont home meds    # DVT PPX:  - heparin s/q                  Patient is a 55 y/o/m PMHx of HTN, brought to the ED for c/o worsening SOB and cough along with a recent Dx of PNA. He also states that he has been feeling very weak and dizzy. He recently got 4th dose of COVID vaccine 3 weeks ago and Sx started 1 week thereafter. Hypoxic on ra. Patient admitted for possible PNA- started on IV antibiotic- very slow improvement. Inflammatory markers were sent and it was elevated. Repeat CT showed worsening airspace disease unclear etiology inflammatory vs infectious. Antibiotic was stopped, Rheum consulted- rheum panel was sent. PAtient was started on IV steroid. Concern for ILD.   Patient was started on IV steroids- planned for possible lung biopsy- CT surgery consulted- Procedure not done- patient improved- suggested may need lung biopsy in the future if antibiotic and steroids hernandez not clear infiltrative process.     6/9:  RRT this am for chest pain, SOB and desaturation. EKG done. Troponin elevated. Requiring increased amount of supplemental O2. Stating that he wokeup and he could not breathe- he thinks it the Xanax that made him worse. On exam- he denies chest pain, he is on increased O2 requirement, +SOB- he appears to be very anxious.     Patient seen and eval. hemodynamically stable. Denies any HA, CP. Patient still short of breath. Exertional dyspnea. Very agitated Wants to go home.     ROS:     All 10 systems reviewed and found to be negative with the exception of what has been described above.     PHYSICAL EXAM:  GENERAL APPEARANCE:  NAD, hemodynamically stable  ICU Vital Signs Last 24 Hrs  T(C): 36.7 (11 Jun 2022 07:37), Max: 36.9 (10 Forrest 2022 21:13)  T(F): 98.1 (11 Jun 2022 07:37), Max: 98.5 (10 Forrest 2022 21:13)  HR: 81 (11 Jun 2022 07:37) (72 - 81)  BP: 123/75 (11 Jun 2022 07:37) (123/75 - 126/64)  RR: 18 (11 Jun 2022 07:37) (18 - 18)  SpO2: 96% (11 Jun 2022 07:37) (96% - 96%)  HEENT:  Head is normocephalic    Skin:  Warm and dry without any rash   NECK:  Supple without lymphadenopathy.   HEART:  Regular rate and rhythm. normal S1 and S2, No M/R/G  LUNGS:  Good ins/exp effort, no W/R/R/C  ABDOMEN:  Soft, nontender, nondistended with good bowel sounds heard  EXTREMITIES:  Without cyanosis, clubbing or edema.   NEUROLOGICAL:  Gross nonfocal     ECHO:    The left ventricle is normal in size, wall thickness, wall motion and   contractility.   Estimated left ventricular ejection fraction is 60-65 %.   Normal appearing left atrium.   Normal appearing right atrium.   Normal appearing right ventricle structure and function.   Normal aortic valve structure and function.   Normal appearing mitral valve structure and function.   Mild (1+) mitral regurgitation is present.   Normal appearing tricuspid valve structure and function.   Trace tricuspid valve regurgitation is present.   A small non hemodynamically significant pericardial effusion is present.   No evidence of pleural effusion.   The IVC appears normal.    CTA:   No pulmonary embolism.    Stable peripheral lung disease. Differential includes organizing   pneumonia, eosinophilic lung disease, or atypical infection.      # Acute hypoxic resp failure- very slow recovery secondary to ILD / multilobar pneumonia this could be viral / inflammatory /  ? organizing pneumonia / vs eosinophilic  - s/p full course of antibiotics  - continues O2 monitoring  - now on 5 L of O2  - inflammatory markers significantly better; ESR 48 from 94; CRP 28 [id number]  - cont iv steroids-now down to 40MG QD. Can switch to prednisone 40MG QD soon  - care discussed with Dr. Carson    # Pseusohyponatremia in the setting of elevated glucose levels  - monitor Na - 128    # DM- hyperglycemia 2/2 steroids    - adjust ISS and start Lantus - d/w patient  - A1c 8.3- will need to be on oral hypoglycemic on DC   - diabetes education   - refused diabetes education   - stated that he does not have diabetes (as per patient report he had an a1c done with Dr Brooks and it was normal) I called Dr Brooks office and left message.     # HTN:   - cont home meds    # DVT PPX:  - heparin s/q                  Patient is a 57 y/o/m PMHx of HTN, brought to the ED for c/o worsening SOB and cough along with a recent Dx of PNA. He also states that he has been feeling very weak and dizzy. He recently got 4th dose of COVID vaccine 3 weeks ago and Sx started 1 week thereafter. Hypoxic on ra. Patient admitted for possible PNA- started on IV antibiotic- very slow improvement. Inflammatory markers were sent and it was elevated. Repeat CT showed worsening airspace disease unclear etiology inflammatory vs infectious. Antibiotic was stopped, Rheum consulted- rheum panel was sent. PAtient was started on IV steroid. Concern for ILD.   Patient was started on IV steroids- planned for possible lung biopsy- CT surgery consulted- Procedure not done- patient improved- suggested may need lung biopsy in the future if antibiotic and steroids hernandez not clear infiltrative process.     6/9:  RRT this am for chest pain, SOB and desaturation. EKG done. Troponin elevated. Requiring increased amount of supplemental O2. Stating that he wokeup and he could not breathe- he thinks it the Xanax that made him worse. On exam- he denies chest pain, he is on increased O2 requirement, +SOB- he appears to be very anxious.     Patient seen and eval. hemodynamically stable. Denies any HA, CP. Patient still short of breath. Exertional dyspnea. Very agitated Wants to go home.     ROS:     All 10 systems reviewed and found to be negative with the exception of what has been described above.     PHYSICAL EXAM:  GENERAL APPEARANCE:  NAD, hemodynamically stable  ICU Vital Signs Last 24 Hrs  T(C): 36.7 (11 Jun 2022 07:37), Max: 36.9 (10 Forrest 2022 21:13)  T(F): 98.1 (11 Jun 2022 07:37), Max: 98.5 (10 Forrest 2022 21:13)  HR: 81 (11 Jun 2022 07:37) (72 - 81)  BP: 123/75 (11 Jun 2022 07:37) (123/75 - 126/64)  RR: 18 (11 Jun 2022 07:37) (18 - 18)  SpO2: 96% (11 Jun 2022 07:37) (96% - 96%)  HEENT:  Head is normocephalic    Skin:  Warm and dry without any rash   NECK:  Supple without lymphadenopathy.   HEART:  Regular rate and rhythm. normal S1 and S2, No M/R/G  LUNGS:  Good ins/exp effort, no W/R/R/C  ABDOMEN:  Soft, nontender, nondistended with good bowel sounds heard  EXTREMITIES:  Without cyanosis, clubbing or edema.   NEUROLOGICAL:  Gross nonfocal     ECHO:    The left ventricle is normal in size, wall thickness, wall motion and   contractility.   Estimated left ventricular ejection fraction is 60-65 %.   Normal appearing left atrium.   Normal appearing right atrium.   Normal appearing right ventricle structure and function.   Normal aortic valve structure and function.   Normal appearing mitral valve structure and function.   Mild (1+) mitral regurgitation is present.   Normal appearing tricuspid valve structure and function.   Trace tricuspid valve regurgitation is present.   A small non hemodynamically significant pericardial effusion is present.   No evidence of pleural effusion.   The IVC appears normal.    CTA:   No pulmonary embolism.    Stable peripheral lung disease. Differential includes organizing   pneumonia, eosinophilic lung disease, or atypical infection.      # Acute hypoxic resp failure- very slow recovery secondary  multilobar pneumonia this could be viral COVID */ inflammatory /  ? organizing pneumonia / vs eosinophilic  - s/p full course of antibiotics  - continues O2 monitoring  - now on 5 L of O2  - inflammatory markers significantly better; ESR 48 from 94; CRP 28 [id number]  - IV decadron started instead of prednisone 6/11  - close pulmonary follow up    # Pseusohyponatremia in the setting of elevated glucose levels  - monitor Na - 128    # DM- hyperglycemia 2/2 steroids    - adjust ISS and start Lantus - d/w patient  - A1c 8.3- will need to be on oral hypoglycemic on DC   - diabetes education   - refused diabetes education   - stated that he does not have diabetes (as per patient report he had an a1c done with Dr Brooks and it was normal) I called Dr Brooks office and left message.     # HTN:   - cont home meds    # DVT PPX:  - heparin s/q

## 2022-06-12 LAB
ALBUMIN SERPL ELPH-MCNC: 2.6 G/DL — LOW (ref 3.3–5)
ALP SERPL-CCNC: 72 U/L — SIGNIFICANT CHANGE UP (ref 40–120)
ALT FLD-CCNC: 103 U/L — HIGH (ref 12–78)
ANION GAP SERPL CALC-SCNC: 9 MMOL/L — SIGNIFICANT CHANGE UP (ref 5–17)
AST SERPL-CCNC: 37 U/L — SIGNIFICANT CHANGE UP (ref 15–37)
BILIRUB SERPL-MCNC: 0.6 MG/DL — SIGNIFICANT CHANGE UP (ref 0.2–1.2)
BUN SERPL-MCNC: 23 MG/DL — SIGNIFICANT CHANGE UP (ref 7–23)
CALCIUM SERPL-MCNC: 9 MG/DL — SIGNIFICANT CHANGE UP (ref 8.5–10.1)
CHLORIDE SERPL-SCNC: 95 MMOL/L — LOW (ref 96–108)
CO2 SERPL-SCNC: 24 MMOL/L — SIGNIFICANT CHANGE UP (ref 22–31)
CREAT SERPL-MCNC: 0.82 MG/DL — SIGNIFICANT CHANGE UP (ref 0.5–1.3)
EGFR: 103 ML/MIN/1.73M2 — SIGNIFICANT CHANGE UP
GLUCOSE SERPL-MCNC: 190 MG/DL — HIGH (ref 70–99)
HCT VFR BLD CALC: 45.5 % — SIGNIFICANT CHANGE UP (ref 39–50)
HGB BLD-MCNC: 15.1 G/DL — SIGNIFICANT CHANGE UP (ref 13–17)
MCHC RBC-ENTMCNC: 28.7 PG — SIGNIFICANT CHANGE UP (ref 27–34)
MCHC RBC-ENTMCNC: 33.2 GM/DL — SIGNIFICANT CHANGE UP (ref 32–36)
MCV RBC AUTO: 86.3 FL — SIGNIFICANT CHANGE UP (ref 80–100)
PLATELET # BLD AUTO: 316 K/UL — SIGNIFICANT CHANGE UP (ref 150–400)
POTASSIUM SERPL-MCNC: 4.2 MMOL/L — SIGNIFICANT CHANGE UP (ref 3.5–5.3)
POTASSIUM SERPL-SCNC: 4.2 MMOL/L — SIGNIFICANT CHANGE UP (ref 3.5–5.3)
PROT SERPL-MCNC: 6.2 GM/DL — SIGNIFICANT CHANGE UP (ref 6–8.3)
RBC # BLD: 5.27 M/UL — SIGNIFICANT CHANGE UP (ref 4.2–5.8)
RBC # FLD: 14.6 % — HIGH (ref 10.3–14.5)
SODIUM SERPL-SCNC: 128 MMOL/L — LOW (ref 135–145)
WBC # BLD: 15.44 K/UL — HIGH (ref 3.8–10.5)
WBC # FLD AUTO: 15.44 K/UL — HIGH (ref 3.8–10.5)

## 2022-06-12 PROCEDURE — 99233 SBSQ HOSP IP/OBS HIGH 50: CPT

## 2022-06-12 RX ORDER — SODIUM CHLORIDE 0.65 %
1 AEROSOL, SPRAY (ML) NASAL
Refills: 0 | Status: DISCONTINUED | OUTPATIENT
Start: 2022-06-12 | End: 2022-06-21

## 2022-06-12 RX ADMIN — Medication 8 MILLIGRAM(S): at 21:19

## 2022-06-12 RX ADMIN — Medication 10 UNIT(S): at 13:04

## 2022-06-12 RX ADMIN — Medication 1200 MILLIGRAM(S): at 09:08

## 2022-06-12 RX ADMIN — BUDESONIDE AND FORMOTEROL FUMARATE DIHYDRATE 2 PUFF(S): 160; 4.5 AEROSOL RESPIRATORY (INHALATION) at 20:32

## 2022-06-12 RX ADMIN — Medication 10 UNIT(S): at 08:50

## 2022-06-12 RX ADMIN — Medication 4: at 13:03

## 2022-06-12 RX ADMIN — SIMETHICONE 80 MILLIGRAM(S): 80 TABLET, CHEWABLE ORAL at 21:19

## 2022-06-12 RX ADMIN — HEPARIN SODIUM 5000 UNIT(S): 5000 INJECTION INTRAVENOUS; SUBCUTANEOUS at 13:05

## 2022-06-12 RX ADMIN — Medication 40 MILLIGRAM(S): at 09:08

## 2022-06-12 RX ADMIN — Medication 10 UNIT(S): at 18:40

## 2022-06-12 RX ADMIN — INSULIN GLARGINE 25 UNIT(S): 100 INJECTION, SOLUTION SUBCUTANEOUS at 08:49

## 2022-06-12 RX ADMIN — LOSARTAN POTASSIUM 50 MILLIGRAM(S): 100 TABLET, FILM COATED ORAL at 09:08

## 2022-06-12 RX ADMIN — Medication 6: at 18:39

## 2022-06-12 RX ADMIN — Medication 2: at 08:49

## 2022-06-12 RX ADMIN — Medication 6 MILLIGRAM(S): at 10:44

## 2022-06-12 RX ADMIN — HEPARIN SODIUM 5000 UNIT(S): 5000 INJECTION INTRAVENOUS; SUBCUTANEOUS at 21:18

## 2022-06-12 RX ADMIN — Medication 1200 MILLIGRAM(S): at 21:19

## 2022-06-12 RX ADMIN — Medication 1 TABLET(S): at 09:08

## 2022-06-12 RX ADMIN — SIMVASTATIN 20 MILLIGRAM(S): 20 TABLET, FILM COATED ORAL at 21:19

## 2022-06-12 RX ADMIN — Medication 1 SPRAY(S): at 18:43

## 2022-06-12 RX ADMIN — BUDESONIDE AND FORMOTEROL FUMARATE DIHYDRATE 2 PUFF(S): 160; 4.5 AEROSOL RESPIRATORY (INHALATION) at 09:40

## 2022-06-12 RX ADMIN — Medication 1 TABLET(S): at 21:19

## 2022-06-12 RX ADMIN — PANTOPRAZOLE SODIUM 40 MILLIGRAM(S): 20 TABLET, DELAYED RELEASE ORAL at 13:04

## 2022-06-12 NOTE — PROGRESS NOTE ADULT - SUBJECTIVE AND OBJECTIVE BOX
Patient is a 57 y/o/m PMHx of HTN, brought to the ED for c/o worsening SOB and cough along with a recent Dx of PNA. He also states that he has been feeling very weak and dizzy. He recently got 4th dose of COVID vaccine 3 weeks ago and Sx started 1 week thereafter. Hypoxic on ra. Patient admitted for possible PNA- started on IV antibiotic- very slow improvement. Inflammatory markers were sent and it was elevated. Repeat CT showed worsening airspace disease unclear etiology inflammatory vs infectious. Antibiotic was stopped, Rheum consulted- rheum panel was sent. PAtient was started on IV steroid. Concern for ILD.   Patient was started on IV steroids- planned for possible lung biopsy- CT surgery consulted- Procedure not done- patient improved- suggested may need lung biopsy in the future if antibiotic and steroids hernandez not clear infiltrative process.     6/9:  RRT this am for chest pain, SOB and desaturation. EKG done. Troponin elevated. Requiring increased amount of supplemental O2. Stating that he wokeup and he could not breathe- he thinks it the Xanax that made him worse. On exam- he denies chest pain, he is on increased O2 requirement, +SOB- he appears to be very anxious.       Medical progress: Denies any HA, CP. Still shortness of breath requiring 4 L of NC. comfortable. reconsult with Dr. Fernández if lung biopsy is warranted.    Complaints: chronic shortness of breath  State of mind: normal    ROS:  All 10 systems reviewed and found to be negative with the exception of what has been described above.     PHYSICAL EXAM:  GENERAL APPEARANCE:  NAD, hemodynamically stable  ICU Vital Signs Last 24 Hrs  T(C): 36.7 (11 Jun 2022 07:37), Max: 36.9 (10 Forrest 2022 21:13)  T(F): 98.1 (11 Jun 2022 07:37), Max: 98.5 (10 Forrest 2022 21:13)  HR: 81 (11 Jun 2022 07:37) (72 - 81)  BP: 123/75 (11 Jun 2022 07:37) (123/75 - 126/64)  RR: 18 (11 Jun 2022 07:37) (18 - 18)  SpO2: 96% (11 Jun 2022 07:37) (96% - 96%)  HEENT:  Head is normocephalic    Skin:  Warm and dry without any rash   NECK:  Supple without lymphadenopathy.   HEART:  Regular rate and rhythm. normal S1 and S2, No M/R/G  LUNGS:  Good ins/exp effort, no W/R/R/C  ABDOMEN:  Soft, nontender, nondistended with good bowel sounds heard  EXTREMITIES:  Without cyanosis, clubbing or edema.   NEUROLOGICAL:  Gross nonfocal     ECHO:    The left ventricle is normal in size, wall thickness, wall motion and   contractility.   Estimated left ventricular ejection fraction is 60-65 %.   Normal appearing left atrium.   Normal appearing right atrium.   Normal appearing right ventricle structure and function.   Normal aortic valve structure and function.   Normal appearing mitral valve structure and function.   Mild (1+) mitral regurgitation is present.   Normal appearing tricuspid valve structure and function.   Trace tricuspid valve regurgitation is present.   A small non hemodynamically significant pericardial effusion is present.   No evidence of pleural effusion.   The IVC appears normal.    CTA:   No pulmonary embolism.    Stable peripheral lung disease. Differential includes organizing   pneumonia, eosinophilic lung disease, or atypical infection.      # Acute hypoxic resp failure- very slow recovery secondary  multilobar pneumonia this could be viral COVID */ inflammatory /  ? 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     # HTN:   - cont home meds    # DVT PPX:  - heparin s/q

## 2022-06-12 NOTE — PROGRESS NOTE ADULT - SUBJECTIVE AND OBJECTIVE BOX
Patient is a 56y old  Male who presents with a chief complaint of PNA (12 Jun 2022 10:11)  6/9/2022- Cardiology Consultation_ 56 year old man with chronic hypertension, hyperlipidemia and obesity presented with progressive shortness of breath on admission 5/27/22. The symptoms began about 10 days after a Covid 19 booster. Was found to have extensive bilateral multilobar infiltrates and persistent hypoxemia. Cultures and lab tests failed to reveal evidence of a specific organism including Covid and Legionella. He was treated with oxygen and empiric  antibiotics. He has very slowly improved. Early this AM he noted acute increase in breathlessness  without chest pain. Troponin was mildly elevated x 2 and D dimer elevated. CTA was neg for pulmonary embolism. The acute dyspnea resolved spontaneously and gradually.     Followup HPI:    6/10- No complaints. Is focusing on the temperature control in his room and his unhappiness about how he is being treated by staff.   6/11- No new complaints. Breathless with bowel movements. Remains on nasal oxygen.  6/12- remains on 6 L nasal oxygen. States he feels stronger.    PAST MEDICAL & SURGICAL HISTORY:  HTN (hypertension)          Review of symptoms:  Negative except for as noted in today's HPI.      MEDICATIONS  (STANDING):  budesonide 160 MICROgram(s)/formoterol 4.5 MICROgram(s) Inhaler 2 Puff(s) Inhalation two times a day  dexAMETHasone  Injectable 6 milliGRAM(s) IV Push daily  dextrose 5%. 1000 milliLiter(s) (50 mL/Hr) IV Continuous <Continuous>  dextrose 5%. 1000 milliLiter(s) (100 mL/Hr) IV Continuous <Continuous>  dextrose 50% Injectable 25 Gram(s) IV Push once  dextrose 50% Injectable 12.5 Gram(s) IV Push once  dextrose 50% Injectable 25 Gram(s) IV Push once  doxazosin 8 milliGRAM(s) Oral at bedtime  glucagon  Injectable 1 milliGRAM(s) IntraMuscular once  guaiFENesin ER 1200 milliGRAM(s) Oral every 12 hours  heparin   Injectable 5000 Unit(s) SubCutaneous every 8 hours  insulin glargine Injectable (LANTUS) 25 Unit(s) SubCutaneous every morning  insulin lispro (ADMELOG) corrective regimen sliding scale   SubCutaneous three times a day before meals  insulin lispro (ADMELOG) corrective regimen sliding scale   SubCutaneous at bedtime  insulin lispro Injectable (ADMELOG) 10 Unit(s) SubCutaneous three times a day before meals  lactobacillus acidophilus 1 Tablet(s) Oral two times a day  losartan 50 milliGRAM(s) Oral daily  melatonin 3 milliGRAM(s) Oral at bedtime  methylPREDNISolone sodium succinate Injectable 40 milliGRAM(s) IV Push daily  pantoprazole    Tablet 40 milliGRAM(s) Oral before breakfast  simvastatin 20 milliGRAM(s) Oral at bedtime    MEDICATIONS  (PRN):  acetaminophen     Tablet .. 650 milliGRAM(s) Oral every 6 hours PRN Temp greater or equal to 38C (100.4F), Mild Pain (1 - 3)  ALBUTerol    90 MICROgram(s) HFA Inhaler 2 Puff(s) Inhalation every 6 hours PRN Shortness of Breath and/or Wheezing  ALPRAZolam 0.25 milliGRAM(s) Oral every 12 hours PRN anxiety  aluminum hydroxide/magnesium hydroxide/simethicone Suspension 30 milliLiter(s) Oral every 6 hours PRN Dyspepsia  artificial  tears Solution 1 Drop(s) Both EYES every 8 hours PRN Dry Eyes  benzocaine 15 mG/menthol 3.6 mG Lozenge 1 Lozenge Oral three times a day PRN Sore Throat  dextrose Oral Gel 15 Gram(s) Oral once PRN Blood Glucose LESS THAN 70 milliGRAM(s)/deciliter  simethicone 80 milliGRAM(s) Chew every 6 hours PRN Gas          Vital Signs Last 24 Hrs  T(C): 36.7 (12 Jun 2022 08:47), Max: 36.8 (11 Jun 2022 20:40)  T(F): 98.1 (12 Jun 2022 08:47), Max: 98.2 (11 Jun 2022 20:40)  HR: 88 (12 Jun 2022 08:47) (70 - 89)  BP: 135/87 (12 Jun 2022 08:47) (121/62 - 135/87)  BP(mean): --  RR: 18 (12 Jun 2022 08:47) (18 - 18)  SpO2: 95% (12 Jun 2022 08:47) (95% - 100%)    I&O's Summary    11 Jun 2022 07:01  -  12 Jun 2022 07:00  --------------------------------------------------------  IN: 0 mL / OUT: 1000 mL / NET: -1000 mL        PHYSICAL EXAM  General Appearance:  HEENT:   Neck:   Lungs: bibasilar crackles  Heart: no murmur or rub  Abdomen:   Extremities: no edema  Neurologic:       INTERPRETATION OF TELEMETRY:     ECG:    LABS:                          15.1   15.44 )-----------( 316      ( 12 Jun 2022 08:11 )             45.5     06-12    128<L>  |  95<L>  |  23  ----------------------------<  190<H>  4.2   |  24  |  0.82    Ca    9.0      12 Jun 2022 08:11    TPro  6.2  /  Alb  2.6<L>  /  TBili  0.6  /  DBili  x   /  AST  37  /  ALT  103<H>  /  AlkPhos  72  06-12          Pro BNP  23 06-10 @ 11:41  D Dimer  -- 06-10 @ 11:41  Pro BNP  -- 06-09 @ 06:13  D Dimer  661 06-09 @ 06:13              RADIOLOGY & ADDITIONAL STUDIES:    IMPRESSION:  1. Elevated troponin due to possible oxygen supply demand mismatch. This is not an acute MI without chest pain or EKG changes. No evidence or myocarditis by TTE which shows normal LV EF and BNP is normal suggesting no CHF. There is a small pericardial effusion that may be inflammatory and related to the lung process.  2. Bilateral pulmonary infiltrates. Etiology undetermined.  3.Hypertension. BP stable.  PLAN:  Continue losartan and simvastatin.  Advised repeat TTE as outpatient to follow the pericardial effusion in 1-2 months. Management of pulmonary process per Hospitalist and pulmonologist. Will no longer follow.

## 2022-06-13 LAB
ALBUMIN SERPL ELPH-MCNC: 2.4 G/DL — LOW (ref 3.3–5)
ALP SERPL-CCNC: 67 U/L — SIGNIFICANT CHANGE UP (ref 40–120)
ALT FLD-CCNC: 101 U/L — HIGH (ref 12–78)
ANION GAP SERPL CALC-SCNC: 9 MMOL/L — SIGNIFICANT CHANGE UP (ref 5–17)
APPEARANCE UR: CLEAR — SIGNIFICANT CHANGE UP
AST SERPL-CCNC: 36 U/L — SIGNIFICANT CHANGE UP (ref 15–37)
BILIRUB SERPL-MCNC: 0.6 MG/DL — SIGNIFICANT CHANGE UP (ref 0.2–1.2)
BILIRUB UR-MCNC: NEGATIVE — SIGNIFICANT CHANGE UP
BUN SERPL-MCNC: 23 MG/DL — SIGNIFICANT CHANGE UP (ref 7–23)
CALCIUM SERPL-MCNC: 8.7 MG/DL — SIGNIFICANT CHANGE UP (ref 8.5–10.1)
CHLORIDE SERPL-SCNC: 95 MMOL/L — LOW (ref 96–108)
CO2 SERPL-SCNC: 23 MMOL/L — SIGNIFICANT CHANGE UP (ref 22–31)
COLOR SPEC: YELLOW — SIGNIFICANT CHANGE UP
CREAT SERPL-MCNC: 0.83 MG/DL — SIGNIFICANT CHANGE UP (ref 0.5–1.3)
DIFF PNL FLD: ABNORMAL
EGFR: 103 ML/MIN/1.73M2 — SIGNIFICANT CHANGE UP
GLUCOSE SERPL-MCNC: 184 MG/DL — HIGH (ref 70–99)
GLUCOSE UR QL: 100 MG/DL
HCT VFR BLD CALC: 42 % — SIGNIFICANT CHANGE UP (ref 39–50)
HGB BLD-MCNC: 13.9 G/DL — SIGNIFICANT CHANGE UP (ref 13–17)
KETONES UR-MCNC: NEGATIVE — SIGNIFICANT CHANGE UP
LEUKOCYTE ESTERASE UR-ACNC: NEGATIVE — SIGNIFICANT CHANGE UP
MCHC RBC-ENTMCNC: 28.5 PG — SIGNIFICANT CHANGE UP (ref 27–34)
MCHC RBC-ENTMCNC: 33.1 GM/DL — SIGNIFICANT CHANGE UP (ref 32–36)
MCV RBC AUTO: 86.1 FL — SIGNIFICANT CHANGE UP (ref 80–100)
NITRITE UR-MCNC: NEGATIVE — SIGNIFICANT CHANGE UP
PH UR: 6 — SIGNIFICANT CHANGE UP (ref 5–8)
PLATELET # BLD AUTO: 315 K/UL — SIGNIFICANT CHANGE UP (ref 150–400)
POTASSIUM SERPL-MCNC: 4.2 MMOL/L — SIGNIFICANT CHANGE UP (ref 3.5–5.3)
POTASSIUM SERPL-SCNC: 4.2 MMOL/L — SIGNIFICANT CHANGE UP (ref 3.5–5.3)
PROT SERPL-MCNC: 5.9 GM/DL — LOW (ref 6–8.3)
PROT UR-MCNC: NEGATIVE — SIGNIFICANT CHANGE UP
RBC # BLD: 4.88 M/UL — SIGNIFICANT CHANGE UP (ref 4.2–5.8)
RBC # FLD: 14.6 % — HIGH (ref 10.3–14.5)
SODIUM SERPL-SCNC: 127 MMOL/L — LOW (ref 135–145)
SODIUM UR-SCNC: 54 MMOL/L — SIGNIFICANT CHANGE UP
SP GR SPEC: 1.01 — SIGNIFICANT CHANGE UP (ref 1.01–1.02)
UROBILINOGEN FLD QL: NEGATIVE — SIGNIFICANT CHANGE UP
WBC # BLD: 15.66 K/UL — HIGH (ref 3.8–10.5)
WBC # FLD AUTO: 15.66 K/UL — HIGH (ref 3.8–10.5)

## 2022-06-13 PROCEDURE — 99233 SBSQ HOSP IP/OBS HIGH 50: CPT

## 2022-06-13 RX ADMIN — Medication 10 UNIT(S): at 09:12

## 2022-06-13 RX ADMIN — SIMETHICONE 80 MILLIGRAM(S): 80 TABLET, CHEWABLE ORAL at 03:50

## 2022-06-13 RX ADMIN — Medication 10 UNIT(S): at 15:21

## 2022-06-13 RX ADMIN — Medication 1 TABLET(S): at 21:11

## 2022-06-13 RX ADMIN — Medication 1 TABLET(S): at 09:57

## 2022-06-13 RX ADMIN — Medication 4: at 15:20

## 2022-06-13 RX ADMIN — HEPARIN SODIUM 5000 UNIT(S): 5000 INJECTION INTRAVENOUS; SUBCUTANEOUS at 14:50

## 2022-06-13 RX ADMIN — Medication 1200 MILLIGRAM(S): at 09:57

## 2022-06-13 RX ADMIN — Medication 6 MILLIGRAM(S): at 05:53

## 2022-06-13 RX ADMIN — Medication 2: at 18:16

## 2022-06-13 RX ADMIN — Medication 10 UNIT(S): at 18:16

## 2022-06-13 RX ADMIN — Medication 8 MILLIGRAM(S): at 21:14

## 2022-06-13 RX ADMIN — SIMETHICONE 80 MILLIGRAM(S): 80 TABLET, CHEWABLE ORAL at 22:21

## 2022-06-13 RX ADMIN — SIMVASTATIN 20 MILLIGRAM(S): 20 TABLET, FILM COATED ORAL at 21:11

## 2022-06-13 RX ADMIN — HEPARIN SODIUM 5000 UNIT(S): 5000 INJECTION INTRAVENOUS; SUBCUTANEOUS at 05:53

## 2022-06-13 RX ADMIN — BUDESONIDE AND FORMOTEROL FUMARATE DIHYDRATE 2 PUFF(S): 160; 4.5 AEROSOL RESPIRATORY (INHALATION) at 20:41

## 2022-06-13 RX ADMIN — PANTOPRAZOLE SODIUM 40 MILLIGRAM(S): 20 TABLET, DELAYED RELEASE ORAL at 06:02

## 2022-06-13 RX ADMIN — INSULIN GLARGINE 25 UNIT(S): 100 INJECTION, SOLUTION SUBCUTANEOUS at 09:12

## 2022-06-13 RX ADMIN — BUDESONIDE AND FORMOTEROL FUMARATE DIHYDRATE 2 PUFF(S): 160; 4.5 AEROSOL RESPIRATORY (INHALATION) at 11:02

## 2022-06-13 RX ADMIN — Medication 1200 MILLIGRAM(S): at 21:12

## 2022-06-13 RX ADMIN — LOSARTAN POTASSIUM 50 MILLIGRAM(S): 100 TABLET, FILM COATED ORAL at 09:57

## 2022-06-13 RX ADMIN — Medication 4: at 09:11

## 2022-06-13 RX ADMIN — Medication 3 MILLIGRAM(S): at 21:12

## 2022-06-13 RX ADMIN — HEPARIN SODIUM 5000 UNIT(S): 5000 INJECTION INTRAVENOUS; SUBCUTANEOUS at 21:12

## 2022-06-13 NOTE — CONSULT NOTE ADULT - ASSESSMENT
56/M with PMHx of HTN, admitted on 5/27 for evaluation of shortness of breath about one week after receiving second COVID-19 booster, which was given over 3 weeks ago; he tested multiple times on COVID-19 tests, has mild cough and was initially hypoxic on room air, which is resolved; has been started on zithromax and ceftriaxone since admission, with mild improvement. 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 Overall he does not fully feel himself. Had low grade fever overnight.    1. Patient admitted with pneumonia, which is not really improving with typical coverage for community acquired pneumonia  - does not work around cooling towers or swimming pools, however will send urine for Legionella  - all other cultures are no growth  - serial cbc and monitor temperature   - reviewed prior medical records to evaluate for resistant or atypical pathogens   - COVID-19 test negative  - will optimize antibiotics to doxycycline to cover atypical and resistant pathogens  - change ceftriaxone to cefepime for broader coverage  - iv hydration and supportive care   - oxygen and nebs as needed   - will check for viral serology for leptospirosis with sick dog  - also check for cmv and ebv serologies  2. other issues: per medicine
56/M with PMHx of HTN, brought to the ED for c/o worsening SOB and cough along with a recent Dx of PNA. Recently had 3rd COVID  booster shot.   CT scan with ground glass opacities and consolidative infiltrates.       Plan   Will hold off on any lung biopst at this time   remains covid negative   doubt infectious etiology. WBC normal w "pneumonia". Consider interstitial lung disease/inflammatory process   inflammatory markers extremely elevated   ESR 94, , RF 14  rheumatology/ID f/u noted. Agreed w d/c abx and serologic w/u  Cont Steroids   may need Lung biopsy in the future if ABX and Steroids do not clear infiltrative process   Will sign off case for now - Please reconsult as needed   Gave Patient Dr jiménez business card   DW Dr Jiménez   
- cont O2  - CT scan chest reviewed. No PE. has bilat ground glass and consolidative infiltrates'  - is covid negative  - had normal echo  - cont rocephine/ zithro  - dvt proph
57 yo man with HTN admitted with PNA of unclear etiology despite extensive evaluation.  --Hyponatremia appears multifactorial but pt does have high fluid intake.    Check urine studies to evaluate if concomitant SIADH due to pulmonary pathology.    Explained to adhere to moderate fluid restriction.    Hold off on adding NACL tabs.  --Continue Dexamethasone.    
56/M with PMHx of HTN, brought to the ED for c/o worsening SOB and cough along with a recent Dx of PNA.     #SOB, fatigue   admitted for possible PNA with sob, fatigue associated with very elevated inflammatory markers.  Initially not improving on outpatient oral regimen, he notes improvement in the last 24 hours.  He notes significant reduction in cough, improved SOB and less fatigue.  Rheum asked to evaluated for aiCTD.    -- currently markedly abnormal CT scan - though not specific for aiCTD.  improvement after abx change hopeful for infectious issue.   -- markedly elevated inflammatory markers not specific for aiCTD - pro-calcitonin may be  helpful to help distinguish between processes  -- patient actively denying all other s/s of aiCTD - PE unremarkable for vasculitis, myopathy or SLE type of condition.  Given inflammatory markers and duration of illness will rec aiCTD workup to include serologies and sub-serologies (ZORA< LEA< dsDNA, myomarker 3, CPK, complements, cryo, SPEP, IPEP, RF, CCP, SS.  -- patient was empirically started on steroids today - will continue to monitor on steroids - though initial improvement was prior to steroids  -- if patient's pulmonary status continues to decline without explanation - bx should be considered  -- appreciate id and pulmonary evaluations    #left knee and hip pain   unclear etiology though it is non-inflammatory in nature without heat, redness or effusion. knee self-resolved in 24 hours.  hip is more persistent but a bit better c/w yesterday   -- xray reviewed and c/w OA - now with from and nt - observe  -- rec xray of the left hip should pain continue -   -- patient noted improvement with repositioning of legs - continue for now and re-evaluated tomorrow     #OA knee  has had steroid shots in the past.  currently with normal rom and nt  -- IA depot not indicated at this time     d/w Yrn Baker  will continue to follow    (addendum - moved into the wrong field yesterday)

## 2022-06-13 NOTE — CONSULT NOTE ADULT - CONSULT REQUESTED DATE/TIME
13-Jun-2022 14:54
09-Jun-2022 18:28
31-May-2022 15:30
02-Jun-2022
03-Jun-2022 16:13
28-May-2022 11:46

## 2022-06-13 NOTE — PROGRESS NOTE ADULT - ASSESSMENT
56/M with PMHx of HTN admitted for:       # Acute hypoxic resp failure- due to  multilobar pneumonia   this could be viral or inflammatory inflammatory  - tested for COVID multiple times but negative  - RVP neg    - s/p full course of antibiotics with no significant improvement   -  Significantly elevated inflammatory markers ,  work upp for autoimmune Dz done and  neg   - s/p Solumedrol, now changed to IV Decadron  with some clinical improvement and ESR/CRP much improved   - continues O2 monitoring, supplement via NC on 5 L of O2  - HIV test neg   - repeat CT chest with some Improvement   - D/w Dr Reynaga         # Hyponatremia likely multifactorial,   - increased free water intake and possible SIADH 2/2 lung pathology   - monitor Na, trending down  - free water restriction   - will send urine studies   - d/w Dr matamoros    #  New DM- hyperglycemia 2/2 steroids    - adjust ISS and start Lantus   - A1c 8.3- will need to be on oral hypoglycemic on DC   - refused diabetes education   - stated that he does not have diabetes (as per patient report he had an a1c done with Dr Brooks and it was normal) .     # HTN:   - cont home meds    # DVT PPX:  - heparin s/q    OOB and immobilize as tolerated, Incentive spirometry. D/c planning when Pulse ox is stable with  basic exertion.   D/w Dr Reynaga

## 2022-06-13 NOTE — PROGRESS NOTE ADULT - SUBJECTIVE AND OBJECTIVE BOX
CC: PNA (2022 18:13)    HPI: 56/M with PMHx of HTN, brought to the ED for c/o worsening SOB and cough along with a recent Dx of PNA. He also reported  that he has been feeling very weak and dizzy. He recently got 4th dose of COVID vaccine 3 weeks ago and Sx started 1 week thereafter. + Dry Cough   In Ed found to be hypoxic on RA,   chest pain when coughing.   States he has not been eating much recently and has a 15 lb weight loss.   No recent travel or periods of immobilization or surgeries.  COVID neg     INTERVAL HPI/ OVERNIGHT EVENTS: Chart reviewed, Pt was seen and examined, reports that  noted small improvements since on decadron.  min exertion. POC discussed     Vital Signs Last 24 Hrs  T(C): 36.6 (2022 17:38), Max: 36.6 (2022 17:38)  T(F): 97.9 (2022 17:38), Max: 97.9 (2022 17:38)  HR: 94 (2022 17:38) (64 - 94)  BP: 120/77 (2022 17:38) (120/77 - 124/71)  RR: 20 (2022 17:38) (19 - 20)  SpO2: 98% (2022 17:38) (98% - 99%)      REVIEW OF SYSTEMS:  All other review of systems is negative unless indicated above.      PHYSICAL EXAM:  General: Well developed; well nourished; in no acute distress  Eyes: PERRLA, EOMI; conjunctiva and sclera clear  Head: Normocephalic; atraumatic  ENMT: No nasal discharge; airway clear  Neck: Supple; non tender; no masses  Respiratory: diminished BS at bases B/l, mild crepitations, No wheezes, rales or rhonchi  Cardiovascular: Regular rate and rhythm. S1 and S2 Normal;   Gastrointestinal: Soft non-tender non-distended; Normal bowel sounds  Genitourinary: No  suprapubic  tenderness  Extremities: No  edema  Vascular: Peripheral pulses palpable 2+ bilaterally  Neurological: Alert and oriented x3, non focal   Skin: Warm and dry. No acute rash  Lymph Nodes: No acute cervical adenopathy  Musculoskeletal: Normal muscle tone, without deformities  Psychiatric: Cooperative and anxious         LABS:                         13.9   15.66 )-----------( 315      ( 2022 07:18 )             42.0     2022 07:18    127    |  95     |  23     ----------------------------<  184    4.2     |  23     |  0.83     Ca    8.7        2022 07:18    TPro  5.9    /  Alb  2.4    /  TBili  0.6    /  DBili  x      /  AST  36     /  ALT  101    /  AlkPhos  67     2022 07:18      CAPILLARY BLOOD GLUCOSE:  POCT Blood Glucose.: 132 mg/dL (2022 22:19)  POCT Blood Glucose.: 174 mg/dL (2022 18:12)  POCT Blood Glucose.: 209 mg/dL (2022 14:29)  POCT Blood Glucose.: 209 mg/dL (2022 08:53)    LIVER FUNCTIONS - ( 2022 07:18 )  Alb: 2.4 g/dL / Pro: 5.9 gm/dL / ALK PHOS: 67 U/L / ALT: 101 U/L / AST: 36 U/L / GGT: x           Urinalysis Basic - ( 2022 14:35 )    Color: Yellow / Appearance: Clear / S.015 / pH: x  Gluc: x / Ketone: Negative  / Bili: Negative / Urobili: Negative   Blood: x / Protein: Negative / Nitrite: Negative   Leuk Esterase: Negative / RBC: 0-2 /HPF / WBC 0-2   Sq Epi: x / Non Sq Epi: x / Bacteria: Few        MEDICATIONS  (STANDING):  budesonide 160 MICROgram(s)/formoterol 4.5 MICROgram(s) Inhaler 2 Puff(s) Inhalation two times a day  dexAMETHasone  Injectable 6 milliGRAM(s) IV Push daily  dextrose 5%. 1000 milliLiter(s) (50 mL/Hr) IV Continuous <Continuous>  dextrose 5%. 1000 milliLiter(s) (100 mL/Hr) IV Continuous <Continuous>  dextrose 50% Injectable 25 Gram(s) IV Push once  dextrose 50% Injectable 12.5 Gram(s) IV Push once  dextrose 50% Injectable 25 Gram(s) IV Push once  doxazosin 8 milliGRAM(s) Oral at bedtime  glucagon  Injectable 1 milliGRAM(s) IntraMuscular once  guaiFENesin ER 1200 milliGRAM(s) Oral every 12 hours  heparin   Injectable 5000 Unit(s) SubCutaneous every 8 hours  insulin glargine Injectable (LANTUS) 30 Unit(s) SubCutaneous every morning  insulin lispro (ADMELOG) corrective regimen sliding scale   SubCutaneous three times a day before meals  insulin lispro (ADMELOG) corrective regimen sliding scale   SubCutaneous at bedtime  insulin lispro Injectable (ADMELOG) 10 Unit(s) SubCutaneous three times a day before meals  lactobacillus acidophilus 1 Tablet(s) Oral two times a day  losartan 50 milliGRAM(s) Oral daily  melatonin 3 milliGRAM(s) Oral at bedtime  pantoprazole    Tablet 40 milliGRAM(s) Oral before breakfast  simvastatin 20 milliGRAM(s) Oral at bedtime    MEDICATIONS  (PRN):  acetaminophen     Tablet .. 650 milliGRAM(s) Oral every 6 hours PRN Temp greater or equal to 38C (100.4F), Mild Pain (1 - 3)  ALBUTerol    90 MICROgram(s) HFA Inhaler 2 Puff(s) Inhalation every 6 hours PRN Shortness of Breath and/or Wheezing  ALPRAZolam 0.25 milliGRAM(s) Oral every 12 hours PRN anxiety  aluminum hydroxide/magnesium hydroxide/simethicone Suspension 30 milliLiter(s) Oral every 6 hours PRN Dyspepsia  artificial  tears Solution 1 Drop(s) Both EYES every 8 hours PRN Dry Eyes  benzocaine 15 mG/menthol 3.6 mG Lozenge 1 Lozenge Oral three times a day PRN Sore Throat  dextrose Oral Gel 15 Gram(s) Oral once PRN Blood Glucose LESS THAN 70 milliGRAM(s)/deciliter  simethicone 80 milliGRAM(s) Chew every 6 hours PRN Gas  sodium chloride 0.65% Nasal 1 Spray(s) Both Nostrils two times a day PRN Nasal Congestion      RADIOLOGY & ADDITIONAL TESTS:      < from: CT Angio Chest PE Protocol w/ IV Cont (22 @ 16:55) >  ACC: [id number] EXAM:  CT ANGIO CHEST PULM [facility]                          PROCEDURE DATE:  2022          INTERPRETATION:  INDICATION: Shortness of breath    TECHNIQUE: Volumetric images of the chest were obtained after the   administration of 90 mL of Omnipaque 350. Maximum intensity projection   images were generated.    COMPARISON: None.    FINDINGS:    PULMONARY ANGIOGRAM: No pulmonary embolism from the main pulmonary artery   up to and including the segmental branches. Limited assessment of   subsegmental branches in the lower lobes as they are not well opacified.   Normal caliber pulmonary artery.    LUNGS/AIRWAYS/PLEURA: Patent trachea and bronchi. Peripheral groundglass   and consolidative opacities in all lobes, preferentialto the lower   lobes. Unremarkable pleura.    LYMPH NODES/MEDIASTINUM: No enlarged lymph nodes.    HEART/VASCULATURE: Normal heart size. Small pericardial effusion. Normal   caliber aorta. Coronary artery calcifications.    UPPER ABDOMEN: Partially included incompletely characterized 2.0 cm left   adrenal nodule.    BONES/SOFT TISSUES: Degenerative changes of the spine.      IMPRESSION:    No pulmonary embolism from the main pulmonary artery up to and including   the segmental branches. Limited assessment of subsegmental branches.    Bilateral peripheral lung disease. Differential includes infection (COVID   in particular), organizing pneumonia, or eosinophilic pneumonia.    Incidental incompletely characterized left adrenal nodule. Further   evaluation with MRI is recommended, which can be done on a nonemergent   basis.        < from: CT Angio Chest PE Protocol w/ IV Cont (22 @ 11:27) >    ACC: [id number] EXAM:  CT ANGIO CHEST PULM [facility]                          PROCEDURE DATE:  2022          INTERPRETATION:  INDICATION: Shortness of breath    TECHNIQUE: Volumetric images of the chest were obtained after the   administration of 90 mL of Omnipaque 350. Maximum intensity projection   images were generated.    COMPARISON: CT chest 2022.    FINDINGS:    PULMONARY ANGIOGRAM:  No pulmonary embolism. Normal caliber pulmonary   artery.    LUNGS/AIRWAYS/PLEURA: No endobronchial lesion. Stable bilateral   peripheral groundglass and consolidation involving all lobes,   preferential to the lower lobes. No pleural effusion.    LYMPH NODES/MEDIASTINUM: No enlarged lymph nodes.    HEART/VASCULATURE: Normal heart size. Stable small pericardial effusion.   Normal caliber aorta. Coronary artery calcifications.    UPPER ABDOMEN: Stable left adrenal nodule.    BONES/SOFT TISSUES: Degenerative changes of the spine.      IMPRESSION:    No pulmonary embolism.    Stable peripheral lung disease. Differential includes organizing   pneumonia, eosinophilic lung disease, or atypical infection.

## 2022-06-13 NOTE — CONSULT NOTE ADULT - SUBJECTIVE AND OBJECTIVE BOX
Chief complaints. presented with worsening SOB and CARMONA.    HPI:  55 yo man with PMHX of HTN admitted on  with progressive SOB and extreme fatigue.  Noted fatigue post 1 week after second covid booster shot.  Initially had cardiac work up due to CARMONA.  SOB with hypoxemia worsened and on outpt CXR, found to have PNA with bilateral infiltrates.  Covid tested negative.  Symptoms did not improve with oral abtx and sent for admission.  Has had extensive work up but etiology of pulmonary infiltrates remain unclear.  COVID repeatedly negative.  CTA negative for PE with persistent bilateral infiltrates.  Serologies negative.  Pt states lung bx was advised but he has declined.  Notes some improvement in CARMONA for last 2 days after dexamethasone started.  Aware of low serum sodium level but has not followed fluid restriction due to dry mouth    PMHX and PSHX.  1.HTN    FAMILY HISTORY:  No pertinent family history in first degree relatives    SOCIAL HISTORY :  Remote former smoker.  No ETOH.    Allergies :  codeine (Unknown)    REVIEW OF SYSTEMS :  Slightly improved CARMONA  No SOB at rest.  No chest discomfort      MEDICATIONS  (STANDING):  budesonide 160 MICROgram(s)/formoterol 4.5 MICROgram(s) Inhaler 2 Puff(s) Inhalation two times a day  dexAMETHasone  Injectable 6 milliGRAM(s) IV Push daily  dextrose 5%. 1000 milliLiter(s) (50 mL/Hr) IV Continuous <Continuous>  dextrose 5%. 1000 milliLiter(s) (100 mL/Hr) IV Continuous <Continuous>  dextrose 50% Injectable 25 Gram(s) IV Push once  dextrose 50% Injectable 25 Gram(s) IV Push once  dextrose 50% Injectable 12.5 Gram(s) IV Push once  doxazosin 8 milliGRAM(s) Oral at bedtime  glucagon  Injectable 1 milliGRAM(s) IntraMuscular once  guaiFENesin ER 1200 milliGRAM(s) Oral every 12 hours  heparin   Injectable 5000 Unit(s) SubCutaneous every 8 hours  insulin glargine Injectable (LANTUS) 25 Unit(s) SubCutaneous every morning  insulin lispro (ADMELOG) corrective regimen sliding scale   SubCutaneous three times a day before meals  insulin lispro (ADMELOG) corrective regimen sliding scale   SubCutaneous at bedtime  insulin lispro Injectable (ADMELOG) 10 Unit(s) SubCutaneous three times a day before meals  lactobacillus acidophilus 1 Tablet(s) Oral two times a day  losartan 50 milliGRAM(s) Oral daily  melatonin 3 milliGRAM(s) Oral at bedtime  pantoprazole    Tablet 40 milliGRAM(s) Oral before breakfast  simvastatin 20 milliGRAM(s) Oral at bedtime    MEDICATIONS  (PRN):  acetaminophen     Tablet .. 650 milliGRAM(s) Oral every 6 hours PRN Temp greater or equal to 38C (100.4F), Mild Pain (1 - 3)  ALBUTerol    90 MICROgram(s) HFA Inhaler 2 Puff(s) Inhalation every 6 hours PRN Shortness of Breath and/or Wheezing  ALPRAZolam 0.25 milliGRAM(s) Oral every 12 hours PRN anxiety  aluminum hydroxide/magnesium hydroxide/simethicone Suspension 30 milliLiter(s) Oral every 6 hours PRN Dyspepsia  artificial  tears Solution 1 Drop(s) Both EYES every 8 hours PRN Dry Eyes  benzocaine 15 mG/menthol 3.6 mG Lozenge 1 Lozenge Oral three times a day PRN Sore Throat  dextrose Oral Gel 15 Gram(s) Oral once PRN Blood Glucose LESS THAN 70 milliGRAM(s)/deciliter  simethicone 80 milliGRAM(s) Chew every 6 hours PRN Gas  sodium chloride 0.65% Nasal 1 Spray(s) Both Nostrils two times a day PRN Nasal Congestion    Vital Signs Last 24 Hrs  T(C): 36.5 (2022 08:04), Max: 36.7 (2022 21:21)  T(F): 97.7 (2022 08:04), Max: 98.1 (2022 21:21)  HR: 64 (2022 08:04) (64 - 80)  BP: 124/71 (2022 08:04) (119/64 - 125/59)  BP(mean): --  RR: 19 (2022 08:04) (18 - 19)  SpO2: 99% (2022 08:04) (96% - 99%)  Daily     Daily Weight in k.3 (2022 19:05)    PHYSICAL EXAM:  GEN: comfortable.  No distress  HEENT: WNL  NECK :supple  CV: S1S2 RRR  LUNGS: Left base minimal crackles.  ABD: soft  EXT: no edema    LABS:                        13.9   15.66 )-----------( 315      ( 2022 07:18 )             42.0     06-13    127<L>  |  95<L>  |  23  ----------------------------<  184<H>  4.2   |  23  |  0.83    Ca    8.7      2022 07:18    TPro  5.9<L>  /  Alb  2.4<L>  /  TBili  0.6  /  DBili  x   /  AST  36  /  ALT  101<H>  /  AlkPhos  67  06-13      Urinalysis Basic - ( 2022 14:35 )    Color: Yellow / Appearance: Clear / S.015 / pH: x  Gluc: x / Ketone: Negative  / Bili: Negative / Urobili: Negative   Blood: x / Protein: Negative / Nitrite: Negative   Leuk Esterase: Negative / RBC: x / WBC x   Sq Epi: x / Non Sq Epi: x / Bacteria: x

## 2022-06-13 NOTE — PROGRESS NOTE ADULT - SUBJECTIVE AND OBJECTIVE BOX
Subjective:  no clinical change    MEDICATIONS  (STANDING):  budesonide 160 MICROgram(s)/formoterol 4.5 MICROgram(s) Inhaler 2 Puff(s) Inhalation two times a day  dexAMETHasone  Injectable 6 milliGRAM(s) IV Push daily  dextrose 5%. 1000 milliLiter(s) (100 mL/Hr) IV Continuous <Continuous>  dextrose 5%. 1000 milliLiter(s) (50 mL/Hr) IV Continuous <Continuous>  dextrose 50% Injectable 12.5 Gram(s) IV Push once  dextrose 50% Injectable 25 Gram(s) IV Push once  dextrose 50% Injectable 25 Gram(s) IV Push once  doxazosin 8 milliGRAM(s) Oral at bedtime  glucagon  Injectable 1 milliGRAM(s) IntraMuscular once  guaiFENesin ER 1200 milliGRAM(s) Oral every 12 hours  heparin   Injectable 5000 Unit(s) SubCutaneous every 8 hours  insulin glargine Injectable (LANTUS) 30 Unit(s) SubCutaneous every morning  insulin lispro (ADMELOG) corrective regimen sliding scale   SubCutaneous three times a day before meals  insulin lispro (ADMELOG) corrective regimen sliding scale   SubCutaneous at bedtime  insulin lispro Injectable (ADMELOG) 10 Unit(s) SubCutaneous three times a day before meals  lactobacillus acidophilus 1 Tablet(s) Oral two times a day  losartan 50 milliGRAM(s) Oral daily  melatonin 3 milliGRAM(s) Oral at bedtime  pantoprazole    Tablet 40 milliGRAM(s) Oral before breakfast  simvastatin 20 milliGRAM(s) Oral at bedtime    MEDICATIONS  (PRN):  acetaminophen     Tablet .. 650 milliGRAM(s) Oral every 6 hours PRN Temp greater or equal to 38C (100.4F), Mild Pain (1 - 3)  ALBUTerol    90 MICROgram(s) HFA Inhaler 2 Puff(s) Inhalation every 6 hours PRN Shortness of Breath and/or Wheezing  ALPRAZolam 0.25 milliGRAM(s) Oral every 12 hours PRN anxiety  aluminum hydroxide/magnesium hydroxide/simethicone Suspension 30 milliLiter(s) Oral every 6 hours PRN Dyspepsia  artificial  tears Solution 1 Drop(s) Both EYES every 8 hours PRN Dry Eyes  benzocaine 15 mG/menthol 3.6 mG Lozenge 1 Lozenge Oral three times a day PRN Sore Throat  dextrose Oral Gel 15 Gram(s) Oral once PRN Blood Glucose LESS THAN 70 milliGRAM(s)/deciliter  simethicone 80 milliGRAM(s) Chew every 6 hours PRN Gas  sodium chloride 0.65% Nasal 1 Spray(s) Both Nostrils two times a day PRN Nasal Congestion      Allergies    codeine (Unknown)    Intolerances        REVIEW OF SYSTEMS:    CONSTITUTIONAL:  As per HPI.  HEENT:  Eyes:  No diplopia or blurred vision. ENT:  No earache, sore throat or runny nose.  CARDIOVASCULAR:  No pressure, squeezing, tightness, heaviness or aching about the chest, neck, axilla or epigastrium.  RESPIRATORY:  shortness of breath  GASTROINTESTINAL:  No nausea, vomiting or diarrhea.  GENITOURINARY:  No dysuria, frequency or urgency.  MUSCULOSKELETAL:  no joint pain, deformity, tenderness  EXTREMITIES: no clubbing cyanosis,edema  SKIN:  No change in skin, hair or nails.  NEUROLOGIC:  No paresthesias, fasciculations, seizures or weakness.  PSYCHIATRIC:  No disorder of thought or mood.  ENDOCRINE:  No heat or cold intolerance, polyuria or polydipsia.  HEMATOLOGICAL:  No easy bruising or bleedings:    Vital Signs Last 24 Hrs  T(C): 36.6 (2022 17:38), Max: 36.7 (2022 21:21)  T(F): 97.9 (2022 17:38), Max: 98.1 (2022 21:21)  HR: 94 (2022 17:38) (64 - 94)  BP: 120/77 (2022 17:38) (119/64 - 124/71)  BP(mean): --  RR: 20 (2022 17:38) (19 - 20)  SpO2: 98% (2022 17:38) (98% - 99%)    PHYSICAL EXAMINATION:  SKIN: no rashes  HEAD: NC/AT  EYES: PERRLA, EOMI  EARS: TM's intact  NOSE: no abnormalities  NECK:  Supple. No lymphadenopathy. Jugular venous pressure not elevated. Carotids equal.   HEART:  regular S1 and S2  CHEST:  fine basilar crackles  ABDOMEN:  Soft and nontender.   EXTREMITIES:  no C/C/E  NEURO: AAO x 3, no focal deficts       LABS:                        13.9   15.66 )-----------( 315      ( 2022 07:18 )             42.0     06-13    127<L>  |  95<L>  |  23  ----------------------------<  184<H>  4.2   |  23  |  0.83    Ca    8.7      2022 07:18    TPro  5.9<L>  /  Alb  2.4<L>  /  TBili  0.6  /  DBili  x   /  AST  36  /  ALT  101<H>  /  AlkPhos  67  06-13      Urinalysis Basic - ( 2022 14:35 )    Color: Yellow / Appearance: Clear / S.015 / pH: x  Gluc: x / Ketone: Negative  / Bili: Negative / Urobili: Negative   Blood: x / Protein: Negative / Nitrite: Negative   Leuk Esterase: Negative / RBC: 0-2 /HPF / WBC 0-2   Sq Epi: x / Non Sq Epi: x / Bacteria: Few        RADIOLOGY & ADDITIONAL TESTS:

## 2022-06-13 NOTE — CONSULT NOTE ADULT - CONSULT REASON
Evaluation of hyponatremia
Lung consolidation
pneumonia
elevated troponin, d dimer and shortness of breath.
increased ESR
pneumonia

## 2022-06-14 LAB — OSMOLALITY UR: 504 MOSM/KG — SIGNIFICANT CHANGE UP (ref 50–1200)

## 2022-06-14 PROCEDURE — 99232 SBSQ HOSP IP/OBS MODERATE 35: CPT

## 2022-06-14 RX ORDER — SODIUM CHLORIDE 9 MG/ML
1 INJECTION INTRAMUSCULAR; INTRAVENOUS; SUBCUTANEOUS
Refills: 0 | Status: DISCONTINUED | OUTPATIENT
Start: 2022-06-14 | End: 2022-06-16

## 2022-06-14 RX ORDER — POLYETHYLENE GLYCOL 3350 17 G/17G
17 POWDER, FOR SOLUTION ORAL DAILY
Refills: 0 | Status: DISCONTINUED | OUTPATIENT
Start: 2022-06-14 | End: 2022-06-21

## 2022-06-14 RX ADMIN — INSULIN GLARGINE 30 UNIT(S): 100 INJECTION, SOLUTION SUBCUTANEOUS at 08:39

## 2022-06-14 RX ADMIN — Medication 30 MILLILITER(S): at 02:48

## 2022-06-14 RX ADMIN — Medication 10 UNIT(S): at 08:38

## 2022-06-14 RX ADMIN — Medication 8 MILLIGRAM(S): at 21:03

## 2022-06-14 RX ADMIN — BUDESONIDE AND FORMOTEROL FUMARATE DIHYDRATE 2 PUFF(S): 160; 4.5 AEROSOL RESPIRATORY (INHALATION) at 20:51

## 2022-06-14 RX ADMIN — Medication 6 MILLIGRAM(S): at 06:42

## 2022-06-14 RX ADMIN — Medication 1 TABLET(S): at 09:59

## 2022-06-14 RX ADMIN — PANTOPRAZOLE SODIUM 40 MILLIGRAM(S): 20 TABLET, DELAYED RELEASE ORAL at 06:41

## 2022-06-14 RX ADMIN — Medication 1200 MILLIGRAM(S): at 09:59

## 2022-06-14 RX ADMIN — Medication 10 UNIT(S): at 17:26

## 2022-06-14 RX ADMIN — SODIUM CHLORIDE 1 GRAM(S): 9 INJECTION INTRAMUSCULAR; INTRAVENOUS; SUBCUTANEOUS at 21:03

## 2022-06-14 RX ADMIN — Medication 10 UNIT(S): at 12:05

## 2022-06-14 RX ADMIN — SIMVASTATIN 20 MILLIGRAM(S): 20 TABLET, FILM COATED ORAL at 21:04

## 2022-06-14 RX ADMIN — Medication 2: at 17:25

## 2022-06-14 RX ADMIN — HEPARIN SODIUM 5000 UNIT(S): 5000 INJECTION INTRAVENOUS; SUBCUTANEOUS at 14:39

## 2022-06-14 RX ADMIN — Medication 1200 MILLIGRAM(S): at 21:04

## 2022-06-14 RX ADMIN — Medication 2: at 08:38

## 2022-06-14 RX ADMIN — LOSARTAN POTASSIUM 50 MILLIGRAM(S): 100 TABLET, FILM COATED ORAL at 09:59

## 2022-06-14 RX ADMIN — Medication 650 MILLIGRAM(S): at 07:04

## 2022-06-14 RX ADMIN — HEPARIN SODIUM 5000 UNIT(S): 5000 INJECTION INTRAVENOUS; SUBCUTANEOUS at 06:41

## 2022-06-14 RX ADMIN — ALBUTEROL 2 PUFF(S): 90 AEROSOL, METERED ORAL at 03:15

## 2022-06-14 RX ADMIN — SIMETHICONE 80 MILLIGRAM(S): 80 TABLET, CHEWABLE ORAL at 20:17

## 2022-06-14 RX ADMIN — BUDESONIDE AND FORMOTEROL FUMARATE DIHYDRATE 2 PUFF(S): 160; 4.5 AEROSOL RESPIRATORY (INHALATION) at 10:03

## 2022-06-14 RX ADMIN — Medication 1 TABLET(S): at 21:03

## 2022-06-14 RX ADMIN — HEPARIN SODIUM 5000 UNIT(S): 5000 INJECTION INTRAVENOUS; SUBCUTANEOUS at 21:03

## 2022-06-14 NOTE — PROGRESS NOTE ADULT - SUBJECTIVE AND OBJECTIVE BOX
NEPHROLOGY INTERVAL HPI/OVERNIGHT EVENTS:    Date of Service: 22 @ 14:38    --SOB slightly improved.  C/o burping excessively.  No clear relief with gas ex.  Refused blood work this morning.     HPI:  55 yo man with PMHX of HTN admitted on  with progressive SOB and extreme fatigue.  Noted fatigue post 1 week after second covid booster shot.  Initially had cardiac work up due to CARMONA.  SOB with hypoxemia worsened and on outpt CXR, found to have PNA with bilateral infiltrates.  Covid tested negative.  Symptoms did not improve with oral abtx and sent for admission.  Has had extensive work up but etiology of pulmonary infiltrates remain unclear.  COVID repeatedly negative.  CTA negative for PE with persistent bilateral infiltrates.  Serologies negative.  Pt states lung bx was advised but he has declined.  Notes some improvement in CARMONA for last 2 days after dexamethasone started.  Aware of low serum sodium level but has not followed fluid restriction due to dry mouth    PMHX and PSHX.  1.HTN    MEDICATIONS  (STANDING):  budesonide 160 MICROgram(s)/formoterol 4.5 MICROgram(s) Inhaler 2 Puff(s) Inhalation two times a day  dexAMETHasone  Injectable 6 milliGRAM(s) IV Push daily  dextrose 5%. 1000 milliLiter(s) (100 mL/Hr) IV Continuous <Continuous>  dextrose 5%. 1000 milliLiter(s) (50 mL/Hr) IV Continuous <Continuous>  dextrose 50% Injectable 25 Gram(s) IV Push once  dextrose 50% Injectable 12.5 Gram(s) IV Push once  dextrose 50% Injectable 25 Gram(s) IV Push once  doxazosin 8 milliGRAM(s) Oral at bedtime  glucagon  Injectable 1 milliGRAM(s) IntraMuscular once  guaiFENesin ER 1200 milliGRAM(s) Oral every 12 hours  heparin   Injectable 5000 Unit(s) SubCutaneous every 8 hours  insulin glargine Injectable (LANTUS) 30 Unit(s) SubCutaneous every morning  insulin lispro (ADMELOG) corrective regimen sliding scale   SubCutaneous three times a day before meals  insulin lispro (ADMELOG) corrective regimen sliding scale   SubCutaneous at bedtime  insulin lispro Injectable (ADMELOG) 10 Unit(s) SubCutaneous three times a day before meals  lactobacillus acidophilus 1 Tablet(s) Oral two times a day  losartan 50 milliGRAM(s) Oral daily  melatonin 3 milliGRAM(s) Oral at bedtime  pantoprazole    Tablet 40 milliGRAM(s) Oral before breakfast  simvastatin 20 milliGRAM(s) Oral at bedtime    MEDICATIONS  (PRN):  acetaminophen     Tablet .. 650 milliGRAM(s) Oral every 6 hours PRN Temp greater or equal to 38C (100.4F), Mild Pain (1 - 3)  ALBUTerol    90 MICROgram(s) HFA Inhaler 2 Puff(s) Inhalation every 6 hours PRN Shortness of Breath and/or Wheezing  aluminum hydroxide/magnesium hydroxide/simethicone Suspension 30 milliLiter(s) Oral every 6 hours PRN Dyspepsia  artificial  tears Solution 1 Drop(s) Both EYES every 8 hours PRN Dry Eyes  benzocaine 15 mG/menthol 3.6 mG Lozenge 1 Lozenge Oral three times a day PRN Sore Throat  dextrose Oral Gel 15 Gram(s) Oral once PRN Blood Glucose LESS THAN 70 milliGRAM(s)/deciliter  polyethylene glycol 3350 17 Gram(s) Oral daily PRN Constipation  simethicone 80 milliGRAM(s) Chew every 6 hours PRN Gas  sodium chloride 0.65% Nasal 1 Spray(s) Both Nostrils two times a day PRN Nasal Congestion      Vital Signs Last 24 Hrs  T(C): 36.7 (2022 08:55), Max: 36.7 (2022 08:55)  T(F): 98 (2022 08:55), Max: 98 (2022 08:55)  HR: 70 (2022 08:55) (70 - 94)  BP: 121/59 (2022 08:55) (120/77 - 121/59)  BP(mean): --  RR: 20 (2022 08:55) (20 - 20)  SpO2: 100% (2022 08:55) (98% - 100%)    PHYSICAL EXAM:  GENERAL: No distress.  CHEST/LUNG: fair air entry  HEART: S1S2 RRR  ABDOMEN: soft  EXTREMITIES: no edema  SKIN:     LABS:                        13.9   15.66 )-----------( 315      ( 2022 07:18 )             42.0     06-13    127<L>  |  95<L>  |  23  ----------------------------<  184<H>  4.2   |  23  |  0.83    Ca    8.7      2022 07:18    TPro  5.9<L>  /  Alb  2.4<L>  /  TBili  0.6  /  DBili  x   /  AST  36  /  ALT  101<H>  /  AlkPhos  67  06-13      Urinalysis Basic - ( 2022 14:35 )    Color: Yellow / Appearance: Clear / S.015 / pH: x  Gluc: x / Ketone: Negative  / Bili: Negative / Urobili: Negative   Blood: x / Protein: Negative / Nitrite: Negative   Leuk Esterase: Negative / RBC: 0-2 /HPF / WBC 0-2   Sq Epi: x / Non Sq Epi: x / Bacteria: Few              RADIOLOGY & ADDITIONAL TESTS:

## 2022-06-14 NOTE — PROGRESS NOTE ADULT - ASSESSMENT
56/M with PMHx of HTN admitted for:       # Acute hypoxic resp failure- due to  multilobar pneumonia   this could be viral or inflammatory inflammatory  - tested for COVID multiple times but negative  - RVP neg    - s/p full course of antibiotics with no significant improvement   -  Significantly elevated inflammatory markers initially ,  work up for autoimmune Dz done and  neg   - s/p Solumedrol, now changed to IV Decadron  with some clinical improvement and ESR/CRP much improved, continue   - continues O2 monitoring, supplement via NC on 4 L of O2  - incentive spirometry  - encourage OOB to chair  and ambulate   - HIV test neg   - repeat CT chest with some Improvement         # Hyponatremia likely multifactorial,   - increased free water intake and possible SIADH 2/2 lung pathology   - monitor Na, labs in am   - free water restriction   - will send urine studies, serum Osm   - d/w Dr matamoros    #  New DM- hyperglycemia 2/2 steroids    - adjust ISS and start Lantus   - A1c 8.3- will need to be on oral hypoglycemic on DC   - nutrition eval       # HTN:   - cont home meds    # DVT PPX:  - heparin s/q    OOB and immobilize as tolerated, Incentive spirometry. D/c planning when Pulse ox is stable with  basic exertion.

## 2022-06-14 NOTE — PROGRESS NOTE ADULT - ASSESSMENT
57 yo man with HTN admitted with PNA of unclear etiology despite extensive evaluation.  --Hyponatremia appears multifactorial but pt does have high fluid intake.    Check urine studies to evaluate if concomitant SIADH due to pulmonary pathology.    Explained to adhere to moderate fluid restriction.    Hold off on adding NACL tabs.  --Continue Dexamethasone.    6/14 SY  --Hyponatremia,  unclear if combination of increased water intake as well SIADH due to pulmonary pathology.    Urine NA and Osm  elevated --c/w SIADH.    Continue fluid restriction.    Start NACL tabs BID for now to prevent further worsening.  --Pt aware of need for BW in am.

## 2022-06-14 NOTE — PROGRESS NOTE ADULT - SUBJECTIVE AND OBJECTIVE BOX
CC: PNA (2022 18:13)    HPI: 56/M with PMHx of HTN, brought to the ED for c/o worsening SOB and cough along with a recent Dx of PNA. He also reported  that he has been feeling very weak and dizzy. He recently got 4th dose of COVID vaccine 3 weeks ago and Sx started 1 week thereafter. + Dry Cough   In Ed found to be hypoxic on RA,   chest pain when coughing.   States he has not been eating much recently and has a 15 lb weight loss.   No recent travel or periods of immobilization or surgeries.  COVID neg     INTERVAL HPI/ OVERNIGHT EVENTS:  Pt was seen and examined, reports didnt have a good night, was SOB, felt better in the chair. Also feels better now, will plan to get OOB to chair for lunch. Uses incentive spirometry. POC discussed. Pt refised labs today x2, discussed, agreed to have it in am       Vital Signs Last 24 Hrs  T(C): 36.6 (2022 21:01), Max: 36.7 (2022 08:55)  T(F): 97.8 (2022 21:01), Max: 98 (2022 08:55)  HR: 70 (2022 21:01) (64 - 70)  BP: 118/65 (2022 21:01) (99/52 - 121/59)  RR: 18 (2022 21:01) (18 - 20)  SpO2: 95% (2022 21:01) (95% - 100%)      REVIEW OF SYSTEMS:  All other review of systems is negative unless indicated above.      PHYSICAL EXAM:  General: Well developed; well nourished; in no acute distress  Eyes: EOMI; conjunctiva and sclera clear  Head: Normocephalic; atraumatic  ENMT: No nasal discharge; airway clear  Neck: Supple; non tender; no masses  Respiratory: improved aie entry at bases, + b/l  crepitations, No wheezes, rales or rhonchi  Cardiovascular: Regular rate and rhythm. S1 and S2 Normal;   Gastrointestinal: Soft non-tender non-distended; Normal bowel sounds  Genitourinary: No  suprapubic  tenderness  Extremities: No  edema  Vascular: Peripheral pulses palpable 2+ bilaterally  Neurological: Alert and oriented x3, non focal   Skin: Warm and dry. No acute rash  Lymph Nodes: No acute cervical adenopathy  Musculoskeletal: Normal muscle tone, without deformities  Psychiatric: Cooperative and anxious         LABS:                         13.9   15.66 )-----------( 315      ( 2022 07:18 )             42.0     06-13    127<L>  |  95<L>  |  23  ----------------------------<  184<H>  4.2   |  23  |  0.83    Ca    8.7      2022 07:18    TPro  5.9<L>  /  Alb  2.4<L>  /  TBili  0.6  /  DBili  x   /  AST  36  /  ALT  101<H>  /  AlkPhos  67  06-13        LIVER FUNCTIONS - ( 2022 07:18 )  Alb: 2.4 g/dL / Pro: 5.9 gm/dL / ALK PHOS: 67 U/L / ALT: 101 U/L / AST: 36 U/L / GGT: x             Urinalysis Basic - ( 2022 14:35 )    Color: Yellow / Appearance: Clear / S.015 / pH: x  Gluc: x / Ketone: Negative  / Bili: Negative / Urobili: Negative   Blood: x / Protein: Negative / Nitrite: Negative   Leuk Esterase: Negative / RBC: 0-2 /HPF / WBC 0-2   Sq Epi: x / Non Sq Epi: x / Bacteria: Few                          13.9   15.66 )-----------( 315      ( 2022 07:18 )             42.0     2022 07:18    127    |  95     |  23     ----------------------------<  184    4.2     |  23     |  0.83     Ca    8.7        2022 07:18    TPro  5.9    /  Alb  2.4    /  TBili  0.6    /  DBili  x      /  AST  36     /  ALT  101    /  AlkPhos  67     2022 07:18      CAPILLARY BLOOD GLUCOSE  POCT Blood Glucose.: 109 mg/dL (2022 20:55)  POCT Blood Glucose.: 166 mg/dL (2022 17:23)  POCT Blood Glucose.: 129 mg/dL (2022 11:49)  POCT Blood Glucose.: 152 mg/dL (2022 08:34)      LIVER FUNCTIONS - ( 2022 07:18 )  Alb: 2.4 g/dL / Pro: 5.9 gm/dL / ALK PHOS: 67 U/L / ALT: 101 U/L / AST: 36 U/L / GGT: x           Urinalysis Basic - ( 2022 14:35 )    Color: Yellow / Appearance: Clear / S.015 / pH: x  Gluc: x / Ketone: Negative  / Bili: Negative / Urobili: Negative   Blood: x / Protein: Negative / Nitrite: Negative   Leuk Esterase: Negative / RBC: 0-2 /HPF / WBC 0-2   Sq Epi: x / Non Sq Epi: x / Bacteria: Few        MEDICATIONS  (STANDING):  budesonide 160 MICROgram(s)/formoterol 4.5 MICROgram(s) Inhaler 2 Puff(s) Inhalation two times a day  dexAMETHasone  Injectable 6 milliGRAM(s) IV Push daily  dextrose 5%. 1000 milliLiter(s) (50 mL/Hr) IV Continuous <Continuous>  dextrose 5%. 1000 milliLiter(s) (100 mL/Hr) IV Continuous <Continuous>  dextrose 50% Injectable 25 Gram(s) IV Push once  dextrose 50% Injectable 12.5 Gram(s) IV Push once  dextrose 50% Injectable 25 Gram(s) IV Push once  doxazosin 8 milliGRAM(s) Oral at bedtime  glucagon  Injectable 1 milliGRAM(s) IntraMuscular once  guaiFENesin ER 1200 milliGRAM(s) Oral every 12 hours  heparin   Injectable 5000 Unit(s) SubCutaneous every 8 hours  insulin glargine Injectable (LANTUS) 30 Unit(s) SubCutaneous every morning  insulin lispro (ADMELOG) corrective regimen sliding scale   SubCutaneous three times a day before meals  insulin lispro (ADMELOG) corrective regimen sliding scale   SubCutaneous at bedtime  insulin lispro Injectable (ADMELOG) 10 Unit(s) SubCutaneous three times a day before meals  lactobacillus acidophilus 1 Tablet(s) Oral two times a day  losartan 50 milliGRAM(s) Oral daily  melatonin 3 milliGRAM(s) Oral at bedtime  pantoprazole    Tablet 40 milliGRAM(s) Oral before breakfast  simvastatin 20 milliGRAM(s) Oral at bedtime    MEDICATIONS  (PRN):  acetaminophen     Tablet .. 650 milliGRAM(s) Oral every 6 hours PRN Temp greater or equal to 38C (100.4F), Mild Pain (1 - 3)  ALBUTerol    90 MICROgram(s) HFA Inhaler 2 Puff(s) Inhalation every 6 hours PRN Shortness of Breath and/or Wheezing  ALPRAZolam 0.25 milliGRAM(s) Oral every 12 hours PRN anxiety  aluminum hydroxide/magnesium hydroxide/simethicone Suspension 30 milliLiter(s) Oral every 6 hours PRN Dyspepsia  artificial  tears Solution 1 Drop(s) Both EYES every 8 hours PRN Dry Eyes  benzocaine 15 mG/menthol 3.6 mG Lozenge 1 Lozenge Oral three times a day PRN Sore Throat  dextrose Oral Gel 15 Gram(s) Oral once PRN Blood Glucose LESS THAN 70 milliGRAM(s)/deciliter  simethicone 80 milliGRAM(s) Chew every 6 hours PRN Gas  sodium chloride 0.65% Nasal 1 Spray(s) Both Nostrils two times a day PRN Nasal Congestion      RADIOLOGY & ADDITIONAL TESTS:      < from: CT Angio Chest PE Protocol w/ IV Cont (22 @ 16:55) >  ACC: [id number] EXAM:  CT ANGIO CHEST PULM [facility]                          PROCEDURE DATE:  2022          INTERPRETATION:  INDICATION: Shortness of breath    TECHNIQUE: Volumetric images of the chest were obtained after the   administration of 90 mL of Omnipaque 350. Maximum intensity projection   images were generated.    COMPARISON: None.    FINDINGS:    PULMONARY ANGIOGRAM: No pulmonary embolism from the main pulmonary artery   up to and including the segmental branches. Limited assessment of   subsegmental branches in the lower lobes as they are not well opacified.   Normal caliber pulmonary artery.    LUNGS/AIRWAYS/PLEURA: Patent trachea and bronchi. Peripheral groundglass   and consolidative opacities in all lobes, preferentialto the lower   lobes. Unremarkable pleura.    LYMPH NODES/MEDIASTINUM: No enlarged lymph nodes.    HEART/VASCULATURE: Normal heart size. Small pericardial effusion. Normal   caliber aorta. Coronary artery calcifications.    UPPER ABDOMEN: Partially included incompletely characterized 2.0 cm left   adrenal nodule.    BONES/SOFT TISSUES: Degenerative changes of the spine.      IMPRESSION:    No pulmonary embolism from the main pulmonary artery up to and including   the segmental branches. Limited assessment of subsegmental branches.    Bilateral peripheral lung disease. Differential includes infection (COVID   in particular), organizing pneumonia, or eosinophilic pneumonia.    Incidental incompletely characterized left adrenal nodule. Further   evaluation with MRI is recommended, which can be done on a nonemergent   basis.        < from: CT Angio Chest PE Protocol w/ IV Cont (22 @ 11:27) >    ACC: [id number] EXAM:  CT ANGIO CHEST PULM [facility]                          PROCEDURE DATE:  2022          INTERPRETATION:  INDICATION: Shortness of breath    TECHNIQUE: Volumetric images of the chest were obtained after the   administration of 90 mL of Omnipaque 350. Maximum intensity projection   images were generated.    COMPARISON: CT chest 2022.    FINDINGS:    PULMONARY ANGIOGRAM:  No pulmonary embolism. Normal caliber pulmonary   artery.    LUNGS/AIRWAYS/PLEURA: No endobronchial lesion. Stable bilateral   peripheral groundglass and consolidation involving all lobes,   preferential to the lower lobes. No pleural effusion.    LYMPH NODES/MEDIASTINUM: No enlarged lymph nodes.    HEART/VASCULATURE: Normal heart size. Stable small pericardial effusion.   Normal caliber aorta. Coronary artery calcifications.    UPPER ABDOMEN: Stable left adrenal nodule.    BONES/SOFT TISSUES: Degenerative changes of the spine.      IMPRESSION:    No pulmonary embolism.    Stable peripheral lung disease. Differential includes organizing   pneumonia, eosinophilic lung disease, or atypical infection.

## 2022-06-15 LAB
ANION GAP SERPL CALC-SCNC: 7 MMOL/L — SIGNIFICANT CHANGE UP (ref 5–17)
BUN SERPL-MCNC: 22 MG/DL — SIGNIFICANT CHANGE UP (ref 7–23)
CALCIUM SERPL-MCNC: 8.8 MG/DL — SIGNIFICANT CHANGE UP (ref 8.5–10.1)
CHLORIDE SERPL-SCNC: 96 MMOL/L — SIGNIFICANT CHANGE UP (ref 96–108)
CO2 SERPL-SCNC: 25 MMOL/L — SIGNIFICANT CHANGE UP (ref 22–31)
CREAT SERPL-MCNC: 0.81 MG/DL — SIGNIFICANT CHANGE UP (ref 0.5–1.3)
EGFR: 103 ML/MIN/1.73M2 — SIGNIFICANT CHANGE UP
GLUCOSE SERPL-MCNC: 120 MG/DL — HIGH (ref 70–99)
HCT VFR BLD CALC: 41.8 % — SIGNIFICANT CHANGE UP (ref 39–50)
HGB BLD-MCNC: 13.6 G/DL — SIGNIFICANT CHANGE UP (ref 13–17)
MCHC RBC-ENTMCNC: 28.1 PG — SIGNIFICANT CHANGE UP (ref 27–34)
MCHC RBC-ENTMCNC: 32.5 GM/DL — SIGNIFICANT CHANGE UP (ref 32–36)
MCV RBC AUTO: 86.4 FL — SIGNIFICANT CHANGE UP (ref 80–100)
OSMOLALITY SERPL: 274 MOSMOL/KG — LOW (ref 275–300)
PLATELET # BLD AUTO: 292 K/UL — SIGNIFICANT CHANGE UP (ref 150–400)
POTASSIUM SERPL-MCNC: 4.1 MMOL/L — SIGNIFICANT CHANGE UP (ref 3.5–5.3)
POTASSIUM SERPL-SCNC: 4.1 MMOL/L — SIGNIFICANT CHANGE UP (ref 3.5–5.3)
RBC # BLD: 4.84 M/UL — SIGNIFICANT CHANGE UP (ref 4.2–5.8)
RBC # FLD: 14.9 % — HIGH (ref 10.3–14.5)
SARS-COV-2 RNA SPEC QL NAA+PROBE: SIGNIFICANT CHANGE UP
SODIUM SERPL-SCNC: 128 MMOL/L — LOW (ref 135–145)
TSH SERPL-MCNC: 1.44 UU/ML — SIGNIFICANT CHANGE UP (ref 0.34–4.82)
URATE SERPL-MCNC: 3 MG/DL — LOW (ref 3.4–8.8)
WBC # BLD: 12.46 K/UL — HIGH (ref 3.8–10.5)
WBC # FLD AUTO: 12.46 K/UL — HIGH (ref 3.8–10.5)

## 2022-06-15 PROCEDURE — 99233 SBSQ HOSP IP/OBS HIGH 50: CPT

## 2022-06-15 PROCEDURE — 99232 SBSQ HOSP IP/OBS MODERATE 35: CPT

## 2022-06-15 RX ORDER — DEXAMETHASONE 0.5 MG/5ML
6 ELIXIR ORAL
Refills: 0 | Status: DISCONTINUED | OUTPATIENT
Start: 2022-06-15 | End: 2022-06-17

## 2022-06-15 RX ADMIN — SIMETHICONE 80 MILLIGRAM(S): 80 TABLET, CHEWABLE ORAL at 14:50

## 2022-06-15 RX ADMIN — HEPARIN SODIUM 5000 UNIT(S): 5000 INJECTION INTRAVENOUS; SUBCUTANEOUS at 21:02

## 2022-06-15 RX ADMIN — Medication 10 UNIT(S): at 08:48

## 2022-06-15 RX ADMIN — BUDESONIDE AND FORMOTEROL FUMARATE DIHYDRATE 2 PUFF(S): 160; 4.5 AEROSOL RESPIRATORY (INHALATION) at 10:45

## 2022-06-15 RX ADMIN — Medication 1200 MILLIGRAM(S): at 21:02

## 2022-06-15 RX ADMIN — ALBUTEROL 2 PUFF(S): 90 AEROSOL, METERED ORAL at 20:20

## 2022-06-15 RX ADMIN — BUDESONIDE AND FORMOTEROL FUMARATE DIHYDRATE 2 PUFF(S): 160; 4.5 AEROSOL RESPIRATORY (INHALATION) at 20:20

## 2022-06-15 RX ADMIN — SODIUM CHLORIDE 1 GRAM(S): 9 INJECTION INTRAMUSCULAR; INTRAVENOUS; SUBCUTANEOUS at 21:03

## 2022-06-15 RX ADMIN — LOSARTAN POTASSIUM 50 MILLIGRAM(S): 100 TABLET, FILM COATED ORAL at 10:09

## 2022-06-15 RX ADMIN — HEPARIN SODIUM 5000 UNIT(S): 5000 INJECTION INTRAVENOUS; SUBCUTANEOUS at 14:49

## 2022-06-15 RX ADMIN — SIMVASTATIN 20 MILLIGRAM(S): 20 TABLET, FILM COATED ORAL at 21:03

## 2022-06-15 RX ADMIN — SIMETHICONE 80 MILLIGRAM(S): 80 TABLET, CHEWABLE ORAL at 20:17

## 2022-06-15 RX ADMIN — SODIUM CHLORIDE 1 GRAM(S): 9 INJECTION INTRAMUSCULAR; INTRAVENOUS; SUBCUTANEOUS at 10:08

## 2022-06-15 RX ADMIN — Medication 8 MILLIGRAM(S): at 21:02

## 2022-06-15 RX ADMIN — PANTOPRAZOLE SODIUM 40 MILLIGRAM(S): 20 TABLET, DELAYED RELEASE ORAL at 06:36

## 2022-06-15 RX ADMIN — SIMETHICONE 80 MILLIGRAM(S): 80 TABLET, CHEWABLE ORAL at 01:25

## 2022-06-15 RX ADMIN — Medication 10 UNIT(S): at 18:37

## 2022-06-15 RX ADMIN — Medication 2: at 18:37

## 2022-06-15 RX ADMIN — Medication 10 UNIT(S): at 12:07

## 2022-06-15 RX ADMIN — Medication 1 TABLET(S): at 10:09

## 2022-06-15 RX ADMIN — INSULIN GLARGINE 30 UNIT(S): 100 INJECTION, SOLUTION SUBCUTANEOUS at 08:49

## 2022-06-15 RX ADMIN — Medication 1 TABLET(S): at 21:03

## 2022-06-15 RX ADMIN — HEPARIN SODIUM 5000 UNIT(S): 5000 INJECTION INTRAVENOUS; SUBCUTANEOUS at 06:36

## 2022-06-15 RX ADMIN — Medication 1200 MILLIGRAM(S): at 10:09

## 2022-06-15 RX ADMIN — Medication 6 MILLIGRAM(S): at 06:37

## 2022-06-15 NOTE — PROGRESS NOTE ADULT - SUBJECTIVE AND OBJECTIVE BOX
NEPHROLOGY INTERVAL HPI/OVERNIGHT EVENTS:    Date of Service: 06-15-22 @ 09:51    6/15--Feeling somewhat improved.  Able to lay flatter on back and sleep better last night.  Appears comfortable today.  --SOB slightly improved.  C/o burping excessively.  No clear relief with gas ex.  Refused blood work this morning.     HPI:  57 yo man with PMHX of HTN admitted on  with progressive SOB and extreme fatigue.  Noted fatigue post 1 week after second covid booster shot.  Initially had cardiac work up due to CARMONA.  SOB with hypoxemia worsened and on outpt CXR, found to have PNA with bilateral infiltrates.  Covid tested negative.  Symptoms did not improve with oral abtx and sent for admission.  Has had extensive work up but etiology of pulmonary infiltrates remain unclear.  COVID repeatedly negative.  CTA negative for PE with persistent bilateral infiltrates.  Serologies negative.  Pt states lung bx was advised but he has declined.  Notes some improvement in CARMONA for last 2 days after dexamethasone started.  Aware of low serum sodium level but has not followed fluid restriction due to dry mouth    PMHX and PSHX.  1.HTN      MEDICATIONS  (STANDING):  budesonide 160 MICROgram(s)/formoterol 4.5 MICROgram(s) Inhaler 2 Puff(s) Inhalation two times a day  dexAMETHasone  Injectable 6 milliGRAM(s) IV Push daily  dextrose 5%. 1000 milliLiter(s) (100 mL/Hr) IV Continuous <Continuous>  dextrose 5%. 1000 milliLiter(s) (50 mL/Hr) IV Continuous <Continuous>  dextrose 50% Injectable 25 Gram(s) IV Push once  dextrose 50% Injectable 12.5 Gram(s) IV Push once  dextrose 50% Injectable 25 Gram(s) IV Push once  doxazosin 8 milliGRAM(s) Oral at bedtime  glucagon  Injectable 1 milliGRAM(s) IntraMuscular once  guaiFENesin ER 1200 milliGRAM(s) Oral every 12 hours  heparin   Injectable 5000 Unit(s) SubCutaneous every 8 hours  insulin glargine Injectable (LANTUS) 30 Unit(s) SubCutaneous every morning  insulin lispro (ADMELOG) corrective regimen sliding scale   SubCutaneous three times a day before meals  insulin lispro (ADMELOG) corrective regimen sliding scale   SubCutaneous at bedtime  insulin lispro Injectable (ADMELOG) 10 Unit(s) SubCutaneous three times a day before meals  lactobacillus acidophilus 1 Tablet(s) Oral two times a day  losartan 50 milliGRAM(s) Oral daily  melatonin 3 milliGRAM(s) Oral at bedtime  pantoprazole    Tablet 40 milliGRAM(s) Oral before breakfast  simvastatin 20 milliGRAM(s) Oral at bedtime  sodium chloride 1 Gram(s) Oral two times a day    MEDICATIONS  (PRN):  acetaminophen     Tablet .. 650 milliGRAM(s) Oral every 6 hours PRN Temp greater or equal to 38C (100.4F), Mild Pain (1 - 3)  ALBUTerol    90 MICROgram(s) HFA Inhaler 2 Puff(s) Inhalation every 6 hours PRN Shortness of Breath and/or Wheezing  aluminum hydroxide/magnesium hydroxide/simethicone Suspension 30 milliLiter(s) Oral every 6 hours PRN Dyspepsia  artificial  tears Solution 1 Drop(s) Both EYES every 8 hours PRN Dry Eyes  benzocaine 15 mG/menthol 3.6 mG Lozenge 1 Lozenge Oral three times a day PRN Sore Throat  dextrose Oral Gel 15 Gram(s) Oral once PRN Blood Glucose LESS THAN 70 milliGRAM(s)/deciliter  polyethylene glycol 3350 17 Gram(s) Oral daily PRN Constipation  simethicone 80 milliGRAM(s) Chew every 6 hours PRN Gas  sodium chloride 0.65% Nasal 1 Spray(s) Both Nostrils two times a day PRN Nasal Congestion    Vital Signs Last 24 Hrs  T(C): 36.6 (15 Forrest 2022 08:21), Max: 36.7 (2022 16:25)  T(F): 97.9 (15 Forrest 2022 08:21), Max: 98 (2022 16:25)  HR: 73 (15 Forrest 2022 08:21) (64 - 73)  BP: 119/56 (15 Forrest 2022 08:21) (99/52 - 119/56)  BP(mean): --  RR: 18 (15 Forrest 2022 08:21) (18 - 18)  SpO2: 100% (15 Forrest 2022 08:21) (95% - 100%)    -14 @ 07:01  -  06-15 @ 07:00  --------------------------------------------------------  IN: 0 mL / OUT: 1200 mL / NET: -1200 mL    PHYSICAL EXAM:  GENERAL: No distress  CHEST/LUNG: basilar crackles stable.  HEART: S1S2 RRR  ABDOMEN: soft  EXTREMITIES: no edema  SKIN:     LABS:                        13.6   12.46 )-----------( 292      ( 15 Forrest 2022 06:32 )             41.8     06-15    128<L>  |  96  |  22  ----------------------------<  120<H>  4.1   |  25  |  0.81    Ca    8.8      15 Forrest 2022 06:32        Urinalysis Basic - ( 2022 14:35 )    Color: Yellow / Appearance: Clear / S.015 / pH: x  Gluc: x / Ketone: Negative  / Bili: Negative / Urobili: Negative   Blood: x / Protein: Negative / Nitrite: Negative   Leuk Esterase: Negative / RBC: 0-2 /HPF / WBC 0-2   Sq Epi: x / Non Sq Epi: x / Bacteria: Few              RADIOLOGY & ADDITIONAL TESTS:

## 2022-06-15 NOTE — PROGRESS NOTE ADULT - SUBJECTIVE AND OBJECTIVE BOX
CC: PNA (2022 18:13)    HPI: 56/M with PMHx of HTN, brought to the ED for c/o worsening SOB and cough along with a recent Dx of PNA. He also reported  that he has been feeling very weak and dizzy. He recently got 4th dose of COVID vaccine 3 weeks ago and Sx started 1 week thereafter. + Dry Cough   In Ed found to be hypoxic on RA,   chest pain when coughing.   States he has not been eating much recently and has a 15 lb weight loss.   No recent travel or periods of immobilization or surgeries.  COVID neg     INTERVAL HPI/ OVERNIGHT EVENTS:  Pt was seen and examined, reports was able to sleep last night,  feeling better,  in good mood, looking forward to recovery. Reports that is better with incentive spirometer. Encouraged to get up and try ambulating more. Had BM     Vital Signs Last 24 Hrs  T(C): 36.6 (15 Forrest 2022 08:21), Max: 36.6 (2022 21:01)  T(F): 97.9 (15 Forrest 2022 08:21), Max: 97.9 (15 Forrest 2022 08:21)  HR: 73 (15 Forrest 2022 08:21) (70 - 73)  BP: 119/56 (15 Forrest 2022 08:21) (118/65 - 119/56)-  RR: 18 (15 Forrest 2022 08:21) (18 - 18)  SpO2: 100% (15 Forrest 2022 08:21) (95% - 100%)      REVIEW OF SYSTEMS:  All other review of systems is negative unless indicated above.      PHYSICAL EXAM:  General: Well developed; well nourished; in no acute distress  Eyes: EOMI; conjunctiva and sclera clear  Head: Normocephalic; atraumatic  ENMT: No nasal discharge; airway clear  Neck: Supple; non tender; no masses  Respiratory: improved air entry at bases, + b/l  crepitations, No wheezes, rales or rhonchi  Cardiovascular: Regular rate and rhythm. S1 and S2 Normal;   Gastrointestinal: Soft non-tender non-distended; Normal bowel sounds  Genitourinary: No  suprapubic  tenderness  Extremities: No  edema  Vascular: Peripheral pulses palpable 2+ bilaterally  Neurological: Alert and oriented x3, non focal   Skin: Warm and dry. No acute rash  Lymph Nodes: No acute cervical adenopathy  Musculoskeletal: Normal muscle tone, without deformities  Psychiatric: Cooperative and anxious         LABS:                         13.6   12.46 )-----------( 292      ( 15 Forrest 2022 06:32 )             41.8     06-15    128<L>  |  96  |  22  ----------------------------<  120<H>  4.1   |  25  |  0.81    Ca    8.8      15 Forrest 2022 06:32                              13.9   15.66 )-----------( 315      ( 2022 07:18 )             42.0     06-13    127<L>  |  95<L>  |  23  ----------------------------<  184<H>  4.2   |  23  |  0.83    Ca    8.7      2022 07:18    TPro  5.9<L>  /  Alb  2.4<L>  /  TBili  0.6  /  DBili  x   /  AST  36  /  ALT  101<H>  /  AlkPhos  67  06-13        LIVER FUNCTIONS - ( 2022 07:18 )  Alb: 2.4 g/dL / Pro: 5.9 gm/dL / ALK PHOS: 67 U/L / ALT: 101 U/L / AST: 36 U/L / GGT: x             Urinalysis Basic - ( 2022 14:35 )    Color: Yellow / Appearance: Clear / S.015 / pH: x  Gluc: x / Ketone: Negative  / Bili: Negative / Urobili: Negative   Blood: x / Protein: Negative / Nitrite: Negative   Leuk Esterase: Negative / RBC: 0-2 /HPF / WBC 0-2   Sq Epi: x / Non Sq Epi: x / Bacteria: Few                          13.9   15.66 )-----------( 315      ( 2022 07:18 )             42.0     2022 07:18    127    |  95     |  23     ----------------------------<  184    4.2     |  23     |  0.83     Ca    8.7        2022 07:18    TPro  5.9    /  Alb  2.4    /  TBili  0.6    /  DBili  x      /  AST  36     /  ALT  101    /  AlkPhos  67     2022 07:18      CAPILLARY BLOOD GLUCOSE:  POCT Blood Glucose.: 149 mg/dL (15 Forrest 2022 12:05)  POCT Blood Glucose.: 135 mg/dL (15 Forrest 2022 08:32)  POCT Blood Glucose.: 109 mg/dL (2022 20:55)  POCT Blood Glucose.: 166 mg/dL (2022 17:23)        LIVER FUNCTIONS - ( 2022 07:18 )  Alb: 2.4 g/dL / Pro: 5.9 gm/dL / ALK PHOS: 67 U/L / ALT: 101 U/L / AST: 36 U/L / GGT: x           Urinalysis Basic - ( 2022 14:35 )    Color: Yellow / Appearance: Clear / S.015 / pH: x  Gluc: x / Ketone: Negative  / Bili: Negative / Urobili: Negative   Blood: x / Protein: Negative / Nitrite: Negative   Leuk Esterase: Negative / RBC: 0-2 /HPF / WBC 0-2   Sq Epi: x / Non Sq Epi: x / Bacteria: Few        MEDICATIONS  (STANDING):  budesonide 160 MICROgram(s)/formoterol 4.5 MICROgram(s) Inhaler 2 Puff(s) Inhalation two times a day  dexAMETHasone  Injectable 6 milliGRAM(s) IV Push daily  dextrose 5%. 1000 milliLiter(s) (50 mL/Hr) IV Continuous <Continuous>  dextrose 5%. 1000 milliLiter(s) (100 mL/Hr) IV Continuous <Continuous>  dextrose 50% Injectable 25 Gram(s) IV Push once  dextrose 50% Injectable 12.5 Gram(s) IV Push once  dextrose 50% Injectable 25 Gram(s) IV Push once  doxazosin 8 milliGRAM(s) Oral at bedtime  glucagon  Injectable 1 milliGRAM(s) IntraMuscular once  guaiFENesin ER 1200 milliGRAM(s) Oral every 12 hours  heparin   Injectable 5000 Unit(s) SubCutaneous every 8 hours  insulin glargine Injectable (LANTUS) 30 Unit(s) SubCutaneous every morning  insulin lispro (ADMELOG) corrective regimen sliding scale   SubCutaneous three times a day before meals  insulin lispro (ADMELOG) corrective regimen sliding scale   SubCutaneous at bedtime  insulin lispro Injectable (ADMELOG) 10 Unit(s) SubCutaneous three times a day before meals  lactobacillus acidophilus 1 Tablet(s) Oral two times a day  losartan 50 milliGRAM(s) Oral daily  melatonin 3 milliGRAM(s) Oral at bedtime  pantoprazole    Tablet 40 milliGRAM(s) Oral before breakfast  simvastatin 20 milliGRAM(s) Oral at bedtime    MEDICATIONS  (PRN):  acetaminophen     Tablet .. 650 milliGRAM(s) Oral every 6 hours PRN Temp greater or equal to 38C (100.4F), Mild Pain (1 - 3)  ALBUTerol    90 MICROgram(s) HFA Inhaler 2 Puff(s) Inhalation every 6 hours PRN Shortness of Breath and/or Wheezing  ALPRAZolam 0.25 milliGRAM(s) Oral every 12 hours PRN anxiety  aluminum hydroxide/magnesium hydroxide/simethicone Suspension 30 milliLiter(s) Oral every 6 hours PRN Dyspepsia  artificial  tears Solution 1 Drop(s) Both EYES every 8 hours PRN Dry Eyes  benzocaine 15 mG/menthol 3.6 mG Lozenge 1 Lozenge Oral three times a day PRN Sore Throat  dextrose Oral Gel 15 Gram(s) Oral once PRN Blood Glucose LESS THAN 70 milliGRAM(s)/deciliter  simethicone 80 milliGRAM(s) Chew every 6 hours PRN Gas  sodium chloride 0.65% Nasal 1 Spray(s) Both Nostrils two times a day PRN Nasal Congestion      RADIOLOGY & ADDITIONAL TESTS:      < from: CT Angio Chest PE Protocol w/ IV Cont (22 @ 16:55) >  ACC: [id number] EXAM:  CT ANGIO CHEST PULM [facility]                          PROCEDURE DATE:  2022          INTERPRETATION:  INDICATION: Shortness of breath    TECHNIQUE: Volumetric images of the chest were obtained after the   administration of 90 mL of Omnipaque 350. Maximum intensity projection   images were generated.    COMPARISON: None.    FINDINGS:    PULMONARY ANGIOGRAM: No pulmonary embolism from the main pulmonary artery   up to and including the segmental branches. Limited assessment of   subsegmental branches in the lower lobes as they are not well opacified.   Normal caliber pulmonary artery.    LUNGS/AIRWAYS/PLEURA: Patent trachea and bronchi. Peripheral groundglass   and consolidative opacities in all lobes, preferentialto the lower   lobes. Unremarkable pleura.    LYMPH NODES/MEDIASTINUM: No enlarged lymph nodes.    HEART/VASCULATURE: Normal heart size. Small pericardial effusion. Normal   caliber aorta. Coronary artery calcifications.    UPPER ABDOMEN: Partially included incompletely characterized 2.0 cm left   adrenal nodule.    BONES/SOFT TISSUES: Degenerative changes of the spine.      IMPRESSION:    No pulmonary embolism from the main pulmonary artery up to and including   the segmental branches. Limited assessment of subsegmental branches.    Bilateral peripheral lung disease. Differential includes infection (COVID   in particular), organizing pneumonia, or eosinophilic pneumonia.    Incidental incompletely characterized left adrenal nodule. Further   evaluation with MRI is recommended, which can be done on a nonemergent   basis.        < from: CT Angio Chest PE Protocol w/ IV Cont (22 @ 11:27) >    ACC: [id number] EXAM:  CT ANGIO CHEST PULM [facility]                          PROCEDURE DATE:  2022          INTERPRETATION:  INDICATION: Shortness of breath    TECHNIQUE: Volumetric images of the chest were obtained after the   administration of 90 mL of Omnipaque 350. Maximum intensity projection   images were generated.    COMPARISON: CT chest 2022.    FINDINGS:    PULMONARY ANGIOGRAM:  No pulmonary embolism. Normal caliber pulmonary   artery.    LUNGS/AIRWAYS/PLEURA: No endobronchial lesion. Stable bilateral   peripheral groundglass and consolidation involving all lobes,   preferential to the lower lobes. No pleural effusion.    LYMPH NODES/MEDIASTINUM: No enlarged lymph nodes.    HEART/VASCULATURE: Normal heart size. Stable small pericardial effusion.   Normal caliber aorta. Coronary artery calcifications.    UPPER ABDOMEN: Stable left adrenal nodule.    BONES/SOFT TISSUES: Degenerative changes of the spine.      IMPRESSION:    No pulmonary embolism.    Stable peripheral lung disease. Differential includes organizing   pneumonia, eosinophilic lung disease, or atypical infection.

## 2022-06-15 NOTE — PROGRESS NOTE ADULT - ASSESSMENT
- cont O2  - seems to be improving on decadron  - still desats w minimal exertion  - cont symbicort  - dvt proph  - inflammatory markers significantly better; esr normal

## 2022-06-15 NOTE — PROGRESS NOTE ADULT - ASSESSMENT
57 yo man with HTN admitted with PNA of unclear etiology despite extensive evaluation.  --Hyponatremia appears multifactorial but pt does have high fluid intake.    Check urine studies to evaluate if concomitant SIADH due to pulmonary pathology.    Explained to adhere to moderate fluid restriction.    Hold off on adding NACL tabs.  --Continue Dexamethasone.    6/14 SY  --Hyponatremia,  unclear if combination of increased water intake as well SIADH due to pulmonary pathology.    Urine NA and Osm  elevated --c/w SIADH.    Continue fluid restriction.    Start NACL tabs BID for now to prevent further worsening.  --Pt aware of need for BW in am.    6/15 SY  --Hyponatremia : c/w SIADH based on urine studies.  Due to pulmonary pathology.      Continue NACL tabs BID     Continue moderate fluid restriction. --decrease to 1200cc

## 2022-06-15 NOTE — PROGRESS NOTE ADULT - ASSESSMENT
56/M with PMHx of HTN admitted for:       # Acute hypoxic resp failure- due to  multilobar pneumonia   this could be viral or inflammatory inflammatory  - tested for COVID multiple times but negative  - RVP neg    - s/p full course of antibiotics with no significant improvement   -  Significantly elevated inflammatory markers initially ,  work up for autoimmune Dz done and  neg , ESR wnl now   - s/p Solumedrol, now changed to IV Decadron  with some clinical improvement and ESR/CRP much improved, continue   - continues O2 monitoring, supplement via NC on 4 L of O2 now   - incentive spirometry  - encourage OOB to chair  and ambulate   - HIV test neg   - repeat CT chest with some Improvement   - D/w Dr Reynaga         # Hyponatremia likely multifactorial,   - increased free water intake and possible SIADH 2/2 lung pathology   - monitor Na  - free water restriction   - started on Po NaCl  - d/w Dr matamoros    #  New DM- hyperglycemia 2/2 steroids    - C/w Lantus on 30U Lantus now  -C/w ISS  - A1c 8.3- will need to be on oral hypoglycemic on DC   - nutrition eval       # HTN:   - cont home meds    # DVT PPX:  - heparin s/q    OOB and immobilize as tolerated, Incentive spirometry. D/c planning when Pulse ox is stable with  basic exertion.

## 2022-06-15 NOTE — PROGRESS NOTE ADULT - SUBJECTIVE AND OBJECTIVE BOX
Subjective:  feeling better on decadron  less dyspneic    MEDICATIONS  (STANDING):  budesonide 160 MICROgram(s)/formoterol 4.5 MICROgram(s) Inhaler 2 Puff(s) Inhalation two times a day  dexAMETHasone  Injectable 6 milliGRAM(s) IV Push daily  dextrose 5%. 1000 milliLiter(s) (100 mL/Hr) IV Continuous <Continuous>  dextrose 5%. 1000 milliLiter(s) (50 mL/Hr) IV Continuous <Continuous>  dextrose 50% Injectable 25 Gram(s) IV Push once  dextrose 50% Injectable 12.5 Gram(s) IV Push once  dextrose 50% Injectable 25 Gram(s) IV Push once  doxazosin 8 milliGRAM(s) Oral at bedtime  glucagon  Injectable 1 milliGRAM(s) IntraMuscular once  guaiFENesin ER 1200 milliGRAM(s) Oral every 12 hours  heparin   Injectable 5000 Unit(s) SubCutaneous every 8 hours  insulin glargine Injectable (LANTUS) 30 Unit(s) SubCutaneous every morning  insulin lispro (ADMELOG) corrective regimen sliding scale   SubCutaneous three times a day before meals  insulin lispro (ADMELOG) corrective regimen sliding scale   SubCutaneous at bedtime  insulin lispro Injectable (ADMELOG) 10 Unit(s) SubCutaneous three times a day before meals  lactobacillus acidophilus 1 Tablet(s) Oral two times a day  losartan 50 milliGRAM(s) Oral daily  melatonin 3 milliGRAM(s) Oral at bedtime  pantoprazole    Tablet 40 milliGRAM(s) Oral before breakfast  simvastatin 20 milliGRAM(s) Oral at bedtime  sodium chloride 1 Gram(s) Oral two times a day    MEDICATIONS  (PRN):  acetaminophen     Tablet .. 650 milliGRAM(s) Oral every 6 hours PRN Temp greater or equal to 38C (100.4F), Mild Pain (1 - 3)  ALBUTerol    90 MICROgram(s) HFA Inhaler 2 Puff(s) Inhalation every 6 hours PRN Shortness of Breath and/or Wheezing  aluminum hydroxide/magnesium hydroxide/simethicone Suspension 30 milliLiter(s) Oral every 6 hours PRN Dyspepsia  artificial  tears Solution 1 Drop(s) Both EYES every 8 hours PRN Dry Eyes  benzocaine 15 mG/menthol 3.6 mG Lozenge 1 Lozenge Oral three times a day PRN Sore Throat  dextrose Oral Gel 15 Gram(s) Oral once PRN Blood Glucose LESS THAN 70 milliGRAM(s)/deciliter  polyethylene glycol 3350 17 Gram(s) Oral daily PRN Constipation  simethicone 80 milliGRAM(s) Chew every 6 hours PRN Gas  sodium chloride 0.65% Nasal 1 Spray(s) Both Nostrils two times a day PRN Nasal Congestion      Allergies    codeine (Unknown)    Intolerances        REVIEW OF SYSTEMS:    CONSTITUTIONAL:  As per HPI.  HEENT:  Eyes:  No diplopia or blurred vision. ENT:  No earache, sore throat or runny nose.  CARDIOVASCULAR:  No pressure, squeezing, tightness, heaviness or aching about the chest, neck, axilla or epigastrium.  RESPIRATORY:  No cough, shortness of breath, PND or orthopnea.  GASTROINTESTINAL:  No nausea, vomiting or diarrhea.  GENITOURINARY:  No dysuria, frequency or urgency.  MUSCULOSKELETAL:  no joint pain, deformity, tenderness  EXTREMITIES: no clubbing cyanosis,edema  SKIN:  No change in skin, hair or nails.  NEUROLOGIC:  No paresthesias, fasciculations, seizures or weakness.  PSYCHIATRIC:  No disorder of thought or mood.  ENDOCRINE:  No heat or cold intolerance, polyuria or polydipsia.  HEMATOLOGICAL:  No easy bruising or bleedings:    Vital Signs Last 24 Hrs  T(C): 36.6 (15 Forrest 2022 08:21), Max: 36.7 (2022 16:25)  T(F): 97.9 (15 Forrest 2022 08:21), Max: 98 (2022 16:25)  HR: 73 (15 Forrest 2022 08:21) (64 - 73)  BP: 119/56 (15 Forrest 2022 08:21) (99/52 - 119/56)  BP(mean): --  RR: 18 (15 Forrest 2022 08:21) (18 - 18)  SpO2: 100% (15 Forrest 2022 08:21) (95% - 100%)    PHYSICAL EXAMINATION:  SKIN: no rashes  HEAD: NC/AT  EYES: PERRLA, EOMI  EARS: TM's intact  NOSE: no abnormalities  NECK:  Supple. No lymphadenopathy. Jugular venous pressure not elevated. Carotids equal.   HEART:   The cardiac impulse has a normal quality. Reg., Nl S1 and S2.  There are no murmurs, rubs or gallops noted  CHEST:  bibasilar crackles  ABDOMEN:  Soft and nontender.   EXTREMITIES:  no C/C/E  NEURO: AAO x 3, no focal deficts       LABS:                        13.6   12.46 )-----------( 292      ( 15 Forrest 2022 06:32 )             41.8     06-15    128<L>  |  96  |  22  ----------------------------<  120<H>  4.1   |  25  |  0.81    Ca    8.8      15 Forrest 2022 06:32        Urinalysis Basic - ( 2022 14:35 )    Color: Yellow / Appearance: Clear / S.015 / pH: x  Gluc: x / Ketone: Negative  / Bili: Negative / Urobili: Negative   Blood: x / Protein: Negative / Nitrite: Negative   Leuk Esterase: Negative / RBC: 0-2 /HPF / WBC 0-2   Sq Epi: x / Non Sq Epi: x / Bacteria: Few        RADIOLOGY & ADDITIONAL TESTS:

## 2022-06-16 LAB
ANION GAP SERPL CALC-SCNC: 7 MMOL/L — SIGNIFICANT CHANGE UP (ref 5–17)
BUN SERPL-MCNC: 21 MG/DL — SIGNIFICANT CHANGE UP (ref 7–23)
CALCIUM SERPL-MCNC: 8.7 MG/DL — SIGNIFICANT CHANGE UP (ref 8.5–10.1)
CHLORIDE SERPL-SCNC: 96 MMOL/L — SIGNIFICANT CHANGE UP (ref 96–108)
CO2 SERPL-SCNC: 25 MMOL/L — SIGNIFICANT CHANGE UP (ref 22–31)
CREAT SERPL-MCNC: 0.77 MG/DL — SIGNIFICANT CHANGE UP (ref 0.5–1.3)
EGFR: 105 ML/MIN/1.73M2 — SIGNIFICANT CHANGE UP
GLUCOSE SERPL-MCNC: 116 MG/DL — HIGH (ref 70–99)
POTASSIUM SERPL-MCNC: 3.8 MMOL/L — SIGNIFICANT CHANGE UP (ref 3.5–5.3)
POTASSIUM SERPL-SCNC: 3.8 MMOL/L — SIGNIFICANT CHANGE UP (ref 3.5–5.3)
SODIUM SERPL-SCNC: 128 MMOL/L — LOW (ref 135–145)

## 2022-06-16 PROCEDURE — 99232 SBSQ HOSP IP/OBS MODERATE 35: CPT

## 2022-06-16 RX ORDER — SENNA PLUS 8.6 MG/1
2 TABLET ORAL DAILY
Refills: 0 | Status: DISCONTINUED | OUTPATIENT
Start: 2022-06-16 | End: 2022-06-21

## 2022-06-16 RX ORDER — SODIUM CHLORIDE 9 MG/ML
1 INJECTION INTRAMUSCULAR; INTRAVENOUS; SUBCUTANEOUS THREE TIMES A DAY
Refills: 0 | Status: DISCONTINUED | OUTPATIENT
Start: 2022-06-16 | End: 2022-06-21

## 2022-06-16 RX ADMIN — PANTOPRAZOLE SODIUM 40 MILLIGRAM(S): 20 TABLET, DELAYED RELEASE ORAL at 05:15

## 2022-06-16 RX ADMIN — Medication 1 TABLET(S): at 21:08

## 2022-06-16 RX ADMIN — SENNA PLUS 2 TABLET(S): 8.6 TABLET ORAL at 11:52

## 2022-06-16 RX ADMIN — Medication 2: at 17:32

## 2022-06-16 RX ADMIN — BUDESONIDE AND FORMOTEROL FUMARATE DIHYDRATE 2 PUFF(S): 160; 4.5 AEROSOL RESPIRATORY (INHALATION) at 20:08

## 2022-06-16 RX ADMIN — LOSARTAN POTASSIUM 50 MILLIGRAM(S): 100 TABLET, FILM COATED ORAL at 09:54

## 2022-06-16 RX ADMIN — Medication 1 TABLET(S): at 09:54

## 2022-06-16 RX ADMIN — HEPARIN SODIUM 5000 UNIT(S): 5000 INJECTION INTRAVENOUS; SUBCUTANEOUS at 05:14

## 2022-06-16 RX ADMIN — Medication 10 UNIT(S): at 17:32

## 2022-06-16 RX ADMIN — Medication 3 MILLIGRAM(S): at 21:14

## 2022-06-16 RX ADMIN — SIMETHICONE 80 MILLIGRAM(S): 80 TABLET, CHEWABLE ORAL at 03:20

## 2022-06-16 RX ADMIN — INSULIN GLARGINE 30 UNIT(S): 100 INJECTION, SOLUTION SUBCUTANEOUS at 07:42

## 2022-06-16 RX ADMIN — SODIUM CHLORIDE 1 GRAM(S): 9 INJECTION INTRAMUSCULAR; INTRAVENOUS; SUBCUTANEOUS at 21:09

## 2022-06-16 RX ADMIN — SODIUM CHLORIDE 1 GRAM(S): 9 INJECTION INTRAMUSCULAR; INTRAVENOUS; SUBCUTANEOUS at 09:54

## 2022-06-16 RX ADMIN — Medication 8 MILLIGRAM(S): at 21:08

## 2022-06-16 RX ADMIN — Medication 5 MILLIGRAM(S): at 21:09

## 2022-06-16 RX ADMIN — HEPARIN SODIUM 5000 UNIT(S): 5000 INJECTION INTRAVENOUS; SUBCUTANEOUS at 21:14

## 2022-06-16 RX ADMIN — ALBUTEROL 2 PUFF(S): 90 AEROSOL, METERED ORAL at 03:20

## 2022-06-16 RX ADMIN — BUDESONIDE AND FORMOTEROL FUMARATE DIHYDRATE 2 PUFF(S): 160; 4.5 AEROSOL RESPIRATORY (INHALATION) at 09:36

## 2022-06-16 RX ADMIN — Medication 10 UNIT(S): at 07:42

## 2022-06-16 RX ADMIN — Medication 10 UNIT(S): at 11:56

## 2022-06-16 RX ADMIN — SIMVASTATIN 20 MILLIGRAM(S): 20 TABLET, FILM COATED ORAL at 21:09

## 2022-06-16 RX ADMIN — SIMETHICONE 80 MILLIGRAM(S): 80 TABLET, CHEWABLE ORAL at 15:36

## 2022-06-16 RX ADMIN — Medication 1200 MILLIGRAM(S): at 09:54

## 2022-06-16 RX ADMIN — HEPARIN SODIUM 5000 UNIT(S): 5000 INJECTION INTRAVENOUS; SUBCUTANEOUS at 14:08

## 2022-06-16 RX ADMIN — Medication 6 MILLIGRAM(S): at 11:52

## 2022-06-16 RX ADMIN — ALBUTEROL 2 PUFF(S): 90 AEROSOL, METERED ORAL at 09:37

## 2022-06-16 RX ADMIN — SODIUM CHLORIDE 1 GRAM(S): 9 INJECTION INTRAMUSCULAR; INTRAVENOUS; SUBCUTANEOUS at 14:08

## 2022-06-16 RX ADMIN — Medication 1200 MILLIGRAM(S): at 21:09

## 2022-06-16 NOTE — PROGRESS NOTE ADULT - SUBJECTIVE AND OBJECTIVE BOX
NEPHROLOGY INTERVAL HPI/OVERNIGHT EVENTS:    Date of Service: 06-16-22 @ 11:39    6/16--Attempting to adhere to fluid restriction but can't really  keep track.  Feels dehydrated.  wants humidifier  O2 with water on.       able to lay flat on his back but continues to desaturate with activity.  6/15--Feeling somewhat improved.  Able to lay flatter on back and sleep better last night.  Appears comfortable today.  6/14--SOB slightly improved.  C/o burping excessively.  No clear relief with gas ex.  Refused blood work this morning.     HPI:  55 yo man with PMHX of HTN admitted on 5/27 with progressive SOB and extreme fatigue.  Noted fatigue post 1 week after second covid booster shot.  Initially had cardiac work up due to CARMONA.  SOB with hypoxemia worsened and on outpt CXR, found to have PNA with bilateral infiltrates.  Covid tested negative.  Symptoms did not improve with oral abtx and sent for admission.  Has had extensive work up but etiology of pulmonary infiltrates remain unclear.  COVID repeatedly negative.  CTA negative for PE with persistent bilateral infiltrates.  Serologies negative.  Pt states lung bx was advised but he has declined.  Notes some improvement in CARMONA for last 2 days after dexamethasone started.  Aware of low serum sodium level but has not followed fluid restriction due to dry mouth    PMHX and PSHX.  1.HTN    MEDICATIONS  (STANDING):  budesonide 160 MICROgram(s)/formoterol 4.5 MICROgram(s) Inhaler 2 Puff(s) Inhalation two times a day  dexAMETHasone  Injectable 6 milliGRAM(s) IV Push <User Schedule>  dextrose 5%. 1000 milliLiter(s) (100 mL/Hr) IV Continuous <Continuous>  dextrose 5%. 1000 milliLiter(s) (50 mL/Hr) IV Continuous <Continuous>  dextrose 50% Injectable 25 Gram(s) IV Push once  dextrose 50% Injectable 12.5 Gram(s) IV Push once  dextrose 50% Injectable 25 Gram(s) IV Push once  doxazosin 8 milliGRAM(s) Oral at bedtime  glucagon  Injectable 1 milliGRAM(s) IntraMuscular once  guaiFENesin ER 1200 milliGRAM(s) Oral every 12 hours  heparin   Injectable 5000 Unit(s) SubCutaneous every 8 hours  insulin glargine Injectable (LANTUS) 30 Unit(s) SubCutaneous every morning  insulin lispro (ADMELOG) corrective regimen sliding scale   SubCutaneous three times a day before meals  insulin lispro (ADMELOG) corrective regimen sliding scale   SubCutaneous at bedtime  insulin lispro Injectable (ADMELOG) 10 Unit(s) SubCutaneous three times a day before meals  lactobacillus acidophilus 1 Tablet(s) Oral two times a day  losartan 50 milliGRAM(s) Oral daily  melatonin 3 milliGRAM(s) Oral at bedtime  pantoprazole    Tablet 40 milliGRAM(s) Oral before breakfast  senna 2 Tablet(s) Oral daily  simvastatin 20 milliGRAM(s) Oral at bedtime  sodium chloride 1 Gram(s) Oral three times a day    MEDICATIONS  (PRN):  acetaminophen     Tablet .. 650 milliGRAM(s) Oral every 6 hours PRN Temp greater or equal to 38C (100.4F), Mild Pain (1 - 3)  ALBUTerol    90 MICROgram(s) HFA Inhaler 2 Puff(s) Inhalation every 6 hours PRN Shortness of Breath and/or Wheezing  aluminum hydroxide/magnesium hydroxide/simethicone Suspension 30 milliLiter(s) Oral every 6 hours PRN Dyspepsia  artificial  tears Solution 1 Drop(s) Both EYES every 8 hours PRN Dry Eyes  artificial tears (preservative free) Ophthalmic Solution 1 Drop(s) Both EYES every 3 hours PRN Dry Eyes  benzocaine 15 mG/menthol 3.6 mG Lozenge 1 Lozenge Oral three times a day PRN Sore Throat  dextrose Oral Gel 15 Gram(s) Oral once PRN Blood Glucose LESS THAN 70 milliGRAM(s)/deciliter  polyethylene glycol 3350 17 Gram(s) Oral daily PRN Constipation  simethicone 80 milliGRAM(s) Chew every 6 hours PRN Gas  sodium chloride 0.65% Nasal 1 Spray(s) Both Nostrils two times a day PRN Nasal Congestion    Vital Signs Last 24 Hrs  T(C): 36.7 (16 Jun 2022 08:32), Max: 36.8 (15 Forrest 2022 21:04)  T(F): 98.1 (16 Jun 2022 08:32), Max: 98.2 (15 Forrest 2022 21:04)  HR: 72 (16 Jun 2022 08:32) (67 - 72)  BP: 129/73 (16 Jun 2022 08:32) (117/52 - 129/73)  BP(mean): --  RR: 18 (16 Jun 2022 08:32) (18 - 18)  SpO2: 95% (16 Jun 2022 08:32) (95% - 97%)    06-15 @ 07:01  -  06-16 @ 07:00  --------------------------------------------------------  IN: 0 mL / OUT: 900 mL / NET: -900 mL    PHYSICAL EXAM:  GENERAL: no distress.  CHEST/LUNG: Bibasilar rales.  HEART: S1S2 RRR  ABDOMEN: soft  EXTREMITIES: no edema  SKIN:     LABS:                        13.6   12.46 )-----------( 292      ( 15 Forrest 2022 06:32 )             41.8     06-16    128<L>  |  96  |  21  ----------------------------<  116<H>  3.8   |  25  |  0.77    Ca    8.7      16 Jun 2022 07:50                  RADIOLOGY & ADDITIONAL TESTS:

## 2022-06-16 NOTE — PROGRESS NOTE ADULT - SUBJECTIVE AND OBJECTIVE BOX
CC: PNA (2022 18:13)    HPI: 56/M with PMHx of HTN, brought to the ED for c/o worsening SOB and cough along with a recent Dx of PNA. He also reported  that he has been feeling very weak and dizzy. He recently got 4th dose of COVID vaccine 3 weeks ago and Sx started 1 week thereafter. + Dry Cough   In Ed found to be hypoxic on RA,   chest pain when coughing.   States he has not been eating much recently and has a 15 lb weight loss.   No recent travel or periods of immobilization or surgeries.  COVID neg     INTERVAL HPI/ OVERNIGHT EVENTS:  Pt was seen and examined, reports feeling better agreed to try walking after lunch. Was able to walk out of the room in the hallway and back, felt SOB, Pulse ox went down to 88% but recovered quickly to 94%. Pt is encouraged to  try again in pm.      Vital Signs Last 24 Hrs  T(C): 36.7 (2022 08:32), Max: 36.8 (15 Forrest 2022 21:04)  T(F): 98.1 (2022 08:32), Max: 98.2 (15 Forrest 2022 21:04)  HR: 76 (2022 09:46) (67 - 76)  BP: 129/73 (2022 08:32) (117/52 - 129/73)  RR: 18 (2022 08:32) (18 - 18)  SpO2: 95% (2022 08:32) (95% - 97%)    REVIEW OF SYSTEMS:  All other review of systems is negative unless indicated above.      PHYSICAL EXAM:  General: Well developed; well nourished; in no acute distress  Eyes: EOMI; conjunctiva and sclera clear  Head: Normocephalic; atraumatic  ENMT: No nasal discharge; airway clear  Neck: Supple; non tender; no masses  Respiratory: improved air entry, + b/l  crepitations at bases , No wheezes, rales   Cardiovascular: Regular rate and rhythm. S1 and S2 Normal;   Gastrointestinal: Soft non-tender non-distended; Normal bowel sounds  Genitourinary: No  suprapubic  tenderness  Extremities: No  edema  Vascular: Peripheral pulses palpable 2+ bilaterally  Neurological: Alert and oriented x3, non focal   Skin: Warm and dry. No acute rash  Lymph Nodes: No acute cervical adenopathy  Musculoskeletal: Normal muscle tone, without deformities  Psychiatric: Cooperative and anxious         LABS:                         13.6   12.46 )-----------( 292      ( 15 Forrest 2022 06:32 )             41.8     06-16    128<L>  |  96  |  21  ----------------------------<  116<H>  3.8   |  25  |  0.77    Ca    8.7      2022 07:50                            13.6   12.46 )-----------( 292      ( 15 Forrest 2022 06:32 )             41.8     06-15    128<L>  |  96  |  22  ----------------------------<  120<H>  4.1   |  25  |  0.81    Ca    8.8      15 Forrest 2022 06:32                              13.9   15.66 )-----------( 315      ( 2022 07:18 )             42.0     06-13    127<L>  |  95<L>  |  23  ----------------------------<  184<H>  4.2   |  23  |  0.83    Ca    8.7      2022 07:18    TPro  5.9<L>  /  Alb  2.4<L>  /  TBili  0.6  /  DBili  x   /  AST  36  /  ALT  101<H>  /  AlkPhos  67  06-13        LIVER FUNCTIONS - ( 2022 07:18 )  Alb: 2.4 g/dL / Pro: 5.9 gm/dL / ALK PHOS: 67 U/L / ALT: 101 U/L / AST: 36 U/L / GGT: x             Urinalysis Basic - ( 2022 14:35 )    Color: Yellow / Appearance: Clear / S.015 / pH: x  Gluc: x / Ketone: Negative  / Bili: Negative / Urobili: Negative   Blood: x / Protein: Negative / Nitrite: Negative   Leuk Esterase: Negative / RBC: 0-2 /HPF / WBC 0-2   Sq Epi: x / Non Sq Epi: x / Bacteria: Few                          13.9   15.66 )-----------( 315      ( 2022 07:18 )             42.0     2022 07:18    127    |  95     |  23     ----------------------------<  184    4.2     |  23     |  0.83     Ca    8.7        2022 07:18    TPro  5.9    /  Alb  2.4    /  TBili  0.6    /  DBili  x      /  AST  36     /  ALT  101    /  AlkPhos  67     2022 07:18      CAPILLARY BLOOD GLUCOSE  POCT Blood Glucose.: 186 mg/dL (2022 17:09)  POCT Blood Glucose.: 111 mg/dL (2022 11:55)  POCT Blood Glucose.: 108 mg/dL (2022 07:35)  POCT Blood Glucose.: 174 mg/dL (15 Forrest 2022 21:00)          LIVER FUNCTIONS - ( 2022 07:18 )  Alb: 2.4 g/dL / Pro: 5.9 gm/dL / ALK PHOS: 67 U/L / ALT: 101 U/L / AST: 36 U/L / GGT: x           Urinalysis Basic - ( 2022 14:35 )    Color: Yellow / Appearance: Clear / S.015 / pH: x  Gluc: x / Ketone: Negative  / Bili: Negative / Urobili: Negative   Blood: x / Protein: Negative / Nitrite: Negative   Leuk Esterase: Negative / RBC: 0-2 /HPF / WBC 0-2   Sq Epi: x / Non Sq Epi: x / Bacteria: Few        MEDICATIONS  (STANDING):  bisacodyl 5 milliGRAM(s) Oral at bedtime  budesonide 160 MICROgram(s)/formoterol 4.5 MICROgram(s) Inhaler 2 Puff(s) Inhalation two times a day  dexAMETHasone  Injectable 6 milliGRAM(s) IV Push <User Schedule>  dextrose 5%. 1000 milliLiter(s) (100 mL/Hr) IV Continuous <Continuous>  dextrose 5%. 1000 milliLiter(s) (50 mL/Hr) IV Continuous <Continuous>  dextrose 50% Injectable 25 Gram(s) IV Push once  dextrose 50% Injectable 12.5 Gram(s) IV Push once  dextrose 50% Injectable 25 Gram(s) IV Push once  doxazosin 8 milliGRAM(s) Oral at bedtime  glucagon  Injectable 1 milliGRAM(s) IntraMuscular once  guaiFENesin ER 1200 milliGRAM(s) Oral every 12 hours  heparin   Injectable 5000 Unit(s) SubCutaneous every 8 hours  insulin glargine Injectable (LANTUS) 30 Unit(s) SubCutaneous every morning  insulin lispro (ADMELOG) corrective regimen sliding scale   SubCutaneous three times a day before meals  insulin lispro (ADMELOG) corrective regimen sliding scale   SubCutaneous at bedtime  insulin lispro Injectable (ADMELOG) 10 Unit(s) SubCutaneous three times a day before meals  lactobacillus acidophilus 1 Tablet(s) Oral two times a day  losartan 50 milliGRAM(s) Oral daily  melatonin 3 milliGRAM(s) Oral at bedtime  pantoprazole    Tablet 40 milliGRAM(s) Oral before breakfast  senna 2 Tablet(s) Oral daily  simvastatin 20 milliGRAM(s) Oral at bedtime  sodium chloride 1 Gram(s) Oral three times a day    MEDICATIONS  (PRN):  acetaminophen     Tablet .. 650 milliGRAM(s) Oral every 6 hours PRN Temp greater or equal to 38C (100.4F), Mild Pain (1 - 3)  ALBUTerol    90 MICROgram(s) HFA Inhaler 2 Puff(s) Inhalation every 6 hours PRN Shortness of Breath and/or Wheezing  aluminum hydroxide/magnesium hydroxide/simethicone Suspension 30 milliLiter(s) Oral every 6 hours PRN Dyspepsia  artificial  tears Solution 1 Drop(s) Both EYES every 8 hours PRN Dry Eyes  artificial tears (preservative free) Ophthalmic Solution 1 Drop(s) Both EYES every 3 hours PRN Dry Eyes  benzocaine 15 mG/menthol 3.6 mG Lozenge 1 Lozenge Oral three times a day PRN Sore Throat  dextrose Oral Gel 15 Gram(s) Oral once PRN Blood Glucose LESS THAN 70 milliGRAM(s)/deciliter  polyethylene glycol 3350 17 Gram(s) Oral daily PRN Constipation  simethicone 80 milliGRAM(s) Chew every 6 hours PRN Gas  sodium chloride 0.65% Nasal 1 Spray(s) Both Nostrils two times a day PRN Nasal Congestion      RADIOLOGY & ADDITIONAL TESTS:      ACC: [id number] EXAM:  CT ANGIO CHEST PULM ART [facility]                          PROCEDURE DATE:  2022          INTERPRETATION:  INDICATION: Shortness of breath    TECHNIQUE: Volumetric images of the chest were obtained after the   administration of 90 mL of Omnipaque 350. Maximum intensity projection   images were generated.    COMPARISON: None.    FINDINGS:    PULMONARY ANGIOGRAM: No pulmonary embolism from the main pulmonary artery   up to and including the segmental branches. Limited assessment of   subsegmental branches in the lower lobes as they are not well opacified.   Normal caliber pulmonary artery.    LUNGS/AIRWAYS/PLEURA: Patent trachea and bronchi. Peripheral groundglass   and consolidative opacities in all lobes, preferentialto the lower   lobes. Unremarkable pleura.    LYMPH NODES/MEDIASTINUM: No enlarged lymph nodes.    HEART/VASCULATURE: Normal heart size. Small pericardial effusion. Normal   caliber aorta. Coronary artery calcifications.    UPPER ABDOMEN: Partially included incompletely characterized 2.0 cm left   adrenal nodule.    BONES/SOFT TISSUES: Degenerative changes of the spine.      IMPRESSION:    No pulmonary embolism from the main pulmonary artery up to and including   the segmental branches. Limited assessment of subsegmental branches.    Bilateral peripheral lung disease. Differential includes infection (COVID   in particular), organizing pneumonia, or eosinophilic pneumonia.    Incidental incompletely characterized left adrenal nodule. Further   evaluation with MRI is recommended, which can be done on a nonemergent   basis.        < from: CT Angio Chest PE Protocol w/ IV Cont (22 @ 11:27) >    ACC: [id number] EXAM:  [facility]                          PROCEDURE DATE:  2022          INTERPRETATION:  INDICATION: Shortness of breath    TECHNIQUE: Volumetric images of the chest were obtained after the   administration of 90 mL of Omnipaque 350. Maximum intensity projection   images were generated.    COMPARISON: CT chest 2022.    FINDINGS:    PULMONARY ANGIOGRAM:  No pulmonary embolism. Normal caliber pulmonary   artery.    LUNGS/AIRWAYS/PLEURA: No endobronchial lesion. Stable bilateral   peripheral groundglass and consolidation involving all lobes,   preferential to the lower lobes. No pleural effusion.    LYMPH NODES/MEDIASTINUM: No enlarged lymph nodes.    HEART/VASCULATURE: Normal heart size. Stable small pericardial effusion.   Normal caliber aorta. Coronary artery calcifications.    UPPER ABDOMEN: Stable left adrenal nodule.    BONES/SOFT TISSUES: Degenerative changes of the spine.      IMPRESSION:    No pulmonary embolism.    Stable peripheral lung disease. Differential includes organizing   pneumonia, eosinophilic lung disease, or atypical infection.

## 2022-06-16 NOTE — PROGRESS NOTE ADULT - ASSESSMENT
56/M with PMHx of HTN admitted for:       # Acute hypoxic resp failure- due to  multilobar pneumonia   this could be viral or inflammatory inflammatory  - tested for COVID multiple times but negative  - RVP neg    - s/p full course of antibiotics with no significant improvement   - Significantly elevated inflammatory markers initially, work up for autoimmune Dz done and  neg , ESR wnl now   - s/p Solumedrol, now changed to IV Decadron  with some clinical improvement and ESR/CRP much improved, continue   - continues O2 monitoring, supplement via NC on 4 L of O2 now   - incentive spirometry  - encourage OOB to chair  and ambulate   - HIV test neg   - repeat CT chest with some Improvement   - D/w Dr Reynaga         # Hyponatremia likely multifactorial,   - increased free water intake and possible SIADH 2/2 lung pathology   - monitor Na  - free water restriction   - started on Po NaCl, increased to TID  - d/w Dr matamoros    #  New DM- hyperglycemia 2/2 steroids    - C/w Lantus on 30U Lantus now  -C/w ISS  - A1c 8.3- will need to be on oral hypoglycemic on DC   - nutrition eval       # HTN:   - cont home meds    # DVT PPX:  - heparin s/q    OOB and mmobilize as tolerated, Incentive spirometry. D/c planning when Pulse ox is stable with  basic exertion.

## 2022-06-16 NOTE — PROGRESS NOTE ADULT - ASSESSMENT
57 yo man with HTN admitted with PNA of unclear etiology despite extensive evaluation.  --Hyponatremia appears multifactorial but pt does have high fluid intake.    Check urine studies to evaluate if concomitant SIADH due to pulmonary pathology.    Explained to adhere to moderate fluid restriction.    Hold off on adding NACL tabs.  --Continue Dexamethasone.    6/14 SY  --Hyponatremia,  unclear if combination of increased water intake as well SIADH due to pulmonary pathology.    Urine NA and Osm  elevated --c/w SIADH.    Continue fluid restriction.    Start NACL tabs BID for now to prevent further worsening.  --Pt aware of need for BW in am.    6/15 SY  --Hyponatremia : c/w SIADH based on urine studies.  Due to pulmonary pathology.      Continue NACL tabs BID     Continue moderate fluid restriction. --decrease to 1200cc    6/16 SY  --Hyponatremia --SIADH due to pulmonary pathology.    Increase NACL tabs to TID.     Continue fluid restriction.    Assured pt dry nasal and oral mucosa due to O2 rather than dehydration.

## 2022-06-17 LAB
ANION GAP SERPL CALC-SCNC: 8 MMOL/L — SIGNIFICANT CHANGE UP (ref 5–17)
BUN SERPL-MCNC: 24 MG/DL — HIGH (ref 7–23)
CALCIUM SERPL-MCNC: 8.6 MG/DL — SIGNIFICANT CHANGE UP (ref 8.5–10.1)
CHLORIDE SERPL-SCNC: 100 MMOL/L — SIGNIFICANT CHANGE UP (ref 96–108)
CO2 SERPL-SCNC: 23 MMOL/L — SIGNIFICANT CHANGE UP (ref 22–31)
CREAT SERPL-MCNC: 0.77 MG/DL — SIGNIFICANT CHANGE UP (ref 0.5–1.3)
EGFR: 105 ML/MIN/1.73M2 — SIGNIFICANT CHANGE UP
GLUCOSE SERPL-MCNC: 163 MG/DL — HIGH (ref 70–99)
HCT VFR BLD CALC: 40 % — SIGNIFICANT CHANGE UP (ref 39–50)
HGB BLD-MCNC: 13.3 G/DL — SIGNIFICANT CHANGE UP (ref 13–17)
MCHC RBC-ENTMCNC: 28.5 PG — SIGNIFICANT CHANGE UP (ref 27–34)
MCHC RBC-ENTMCNC: 33.3 GM/DL — SIGNIFICANT CHANGE UP (ref 32–36)
MCV RBC AUTO: 85.7 FL — SIGNIFICANT CHANGE UP (ref 80–100)
PLATELET # BLD AUTO: 256 K/UL — SIGNIFICANT CHANGE UP (ref 150–400)
POTASSIUM SERPL-MCNC: 4 MMOL/L — SIGNIFICANT CHANGE UP (ref 3.5–5.3)
POTASSIUM SERPL-SCNC: 4 MMOL/L — SIGNIFICANT CHANGE UP (ref 3.5–5.3)
RBC # BLD: 4.67 M/UL — SIGNIFICANT CHANGE UP (ref 4.2–5.8)
RBC # FLD: 15.2 % — HIGH (ref 10.3–14.5)
SODIUM SERPL-SCNC: 131 MMOL/L — LOW (ref 135–145)
WBC # BLD: 12.4 K/UL — HIGH (ref 3.8–10.5)
WBC # FLD AUTO: 12.4 K/UL — HIGH (ref 3.8–10.5)

## 2022-06-17 PROCEDURE — 99232 SBSQ HOSP IP/OBS MODERATE 35: CPT

## 2022-06-17 PROCEDURE — 99233 SBSQ HOSP IP/OBS HIGH 50: CPT

## 2022-06-17 RX ORDER — DEXAMETHASONE 0.5 MG/5ML
4 ELIXIR ORAL
Refills: 0 | Status: DISCONTINUED | OUTPATIENT
Start: 2022-06-17 | End: 2022-06-21

## 2022-06-17 RX ADMIN — Medication 10 UNIT(S): at 17:38

## 2022-06-17 RX ADMIN — Medication 1 TABLET(S): at 22:28

## 2022-06-17 RX ADMIN — Medication 4: at 17:37

## 2022-06-17 RX ADMIN — HEPARIN SODIUM 5000 UNIT(S): 5000 INJECTION INTRAVENOUS; SUBCUTANEOUS at 22:28

## 2022-06-17 RX ADMIN — SODIUM CHLORIDE 1 GRAM(S): 9 INJECTION INTRAMUSCULAR; INTRAVENOUS; SUBCUTANEOUS at 14:12

## 2022-06-17 RX ADMIN — SIMVASTATIN 20 MILLIGRAM(S): 20 TABLET, FILM COATED ORAL at 22:28

## 2022-06-17 RX ADMIN — Medication 1200 MILLIGRAM(S): at 09:58

## 2022-06-17 RX ADMIN — BUDESONIDE AND FORMOTEROL FUMARATE DIHYDRATE 2 PUFF(S): 160; 4.5 AEROSOL RESPIRATORY (INHALATION) at 09:26

## 2022-06-17 RX ADMIN — Medication 8 MILLIGRAM(S): at 22:27

## 2022-06-17 RX ADMIN — SIMETHICONE 80 MILLIGRAM(S): 80 TABLET, CHEWABLE ORAL at 09:58

## 2022-06-17 RX ADMIN — PANTOPRAZOLE SODIUM 40 MILLIGRAM(S): 20 TABLET, DELAYED RELEASE ORAL at 05:43

## 2022-06-17 RX ADMIN — Medication 10 UNIT(S): at 12:54

## 2022-06-17 RX ADMIN — SENNA PLUS 2 TABLET(S): 8.6 TABLET ORAL at 10:00

## 2022-06-17 RX ADMIN — BUDESONIDE AND FORMOTEROL FUMARATE DIHYDRATE 2 PUFF(S): 160; 4.5 AEROSOL RESPIRATORY (INHALATION) at 21:08

## 2022-06-17 RX ADMIN — Medication 1 TABLET(S): at 09:58

## 2022-06-17 RX ADMIN — HEPARIN SODIUM 5000 UNIT(S): 5000 INJECTION INTRAVENOUS; SUBCUTANEOUS at 05:34

## 2022-06-17 RX ADMIN — SIMETHICONE 80 MILLIGRAM(S): 80 TABLET, CHEWABLE ORAL at 19:53

## 2022-06-17 RX ADMIN — SODIUM CHLORIDE 1 GRAM(S): 9 INJECTION INTRAMUSCULAR; INTRAVENOUS; SUBCUTANEOUS at 22:29

## 2022-06-17 RX ADMIN — Medication 6 MILLIGRAM(S): at 12:54

## 2022-06-17 RX ADMIN — INSULIN GLARGINE 30 UNIT(S): 100 INJECTION, SOLUTION SUBCUTANEOUS at 08:26

## 2022-06-17 RX ADMIN — Medication 1200 MILLIGRAM(S): at 22:27

## 2022-06-17 RX ADMIN — HEPARIN SODIUM 5000 UNIT(S): 5000 INJECTION INTRAVENOUS; SUBCUTANEOUS at 14:13

## 2022-06-17 RX ADMIN — SODIUM CHLORIDE 1 GRAM(S): 9 INJECTION INTRAMUSCULAR; INTRAVENOUS; SUBCUTANEOUS at 05:40

## 2022-06-17 RX ADMIN — Medication 10 UNIT(S): at 08:25

## 2022-06-17 RX ADMIN — LOSARTAN POTASSIUM 50 MILLIGRAM(S): 100 TABLET, FILM COATED ORAL at 09:58

## 2022-06-17 NOTE — PROGRESS NOTE ADULT - SUBJECTIVE AND OBJECTIVE BOX
Subjective:  Feeling better; ambulated to the bathroom  Still desaturating with exertion.    MEDICATIONS  (STANDING):  bisacodyl 5 milliGRAM(s) Oral at bedtime  budesonide 160 MICROgram(s)/formoterol 4.5 MICROgram(s) Inhaler 2 Puff(s) Inhalation two times a day  dexAMETHasone  Injectable 6 milliGRAM(s) IV Push <User Schedule>  dextrose 5%. 1000 milliLiter(s) (50 mL/Hr) IV Continuous <Continuous>  dextrose 5%. 1000 milliLiter(s) (100 mL/Hr) IV Continuous <Continuous>  dextrose 50% Injectable 25 Gram(s) IV Push once  dextrose 50% Injectable 12.5 Gram(s) IV Push once  dextrose 50% Injectable 25 Gram(s) IV Push once  doxazosin 8 milliGRAM(s) Oral at bedtime  glucagon  Injectable 1 milliGRAM(s) IntraMuscular once  guaiFENesin ER 1200 milliGRAM(s) Oral every 12 hours  heparin   Injectable 5000 Unit(s) SubCutaneous every 8 hours  insulin glargine Injectable (LANTUS) 30 Unit(s) SubCutaneous every morning  insulin lispro (ADMELOG) corrective regimen sliding scale   SubCutaneous three times a day before meals  insulin lispro (ADMELOG) corrective regimen sliding scale   SubCutaneous at bedtime  insulin lispro Injectable (ADMELOG) 10 Unit(s) SubCutaneous three times a day before meals  lactobacillus acidophilus 1 Tablet(s) Oral two times a day  losartan 50 milliGRAM(s) Oral daily  melatonin 3 milliGRAM(s) Oral at bedtime  pantoprazole    Tablet 40 milliGRAM(s) Oral before breakfast  senna 2 Tablet(s) Oral daily  simvastatin 20 milliGRAM(s) Oral at bedtime  sodium chloride 1 Gram(s) Oral three times a day    MEDICATIONS  (PRN):  acetaminophen     Tablet .. 650 milliGRAM(s) Oral every 6 hours PRN Temp greater or equal to 38C (100.4F), Mild Pain (1 - 3)  ALBUTerol    90 MICROgram(s) HFA Inhaler 2 Puff(s) Inhalation every 6 hours PRN Shortness of Breath and/or Wheezing  aluminum hydroxide/magnesium hydroxide/simethicone Suspension 30 milliLiter(s) Oral every 6 hours PRN Dyspepsia  artificial  tears Solution 1 Drop(s) Both EYES every 8 hours PRN Dry Eyes  artificial tears (preservative free) Ophthalmic Solution 1 Drop(s) Both EYES every 3 hours PRN Dry Eyes  benzocaine 15 mG/menthol 3.6 mG Lozenge 1 Lozenge Oral three times a day PRN Sore Throat  dextrose Oral Gel 15 Gram(s) Oral once PRN Blood Glucose LESS THAN 70 milliGRAM(s)/deciliter  polyethylene glycol 3350 17 Gram(s) Oral daily PRN Constipation  simethicone 80 milliGRAM(s) Chew every 6 hours PRN Gas  sodium chloride 0.65% Nasal 1 Spray(s) Both Nostrils two times a day PRN Nasal Congestion      Allergies    codeine (Unknown)    Intolerances        REVIEW OF SYSTEMS:    CONSTITUTIONAL:  As per HPI.  HEENT:  Eyes:  No diplopia or blurred vision. ENT:  No earache, sore throat or runny nose.  CARDIOVASCULAR:  No pressure, squeezing, tightness, heaviness or aching about the chest, neck, axilla or epigastrium.  RESPIRATORY:  shortness of breath  GASTROINTESTINAL:  No nausea, vomiting or diarrhea.  GENITOURINARY:  No dysuria, frequency or urgency.  MUSCULOSKELETAL:  no joint pain, deformity, tenderness  EXTREMITIES: no clubbing cyanosis,edema  SKIN:  No change in skin, hair or nails.  NEUROLOGIC:  No paresthesias, fasciculations, seizures or weakness.  PSYCHIATRIC:  No disorder of thought or mood.  ENDOCRINE:  No heat or cold intolerance, polyuria or polydipsia.  HEMATOLOGICAL:  No easy bruising or bleedings:    Vital Signs Last 24 Hrs  T(C): 36.7 (17 Jun 2022 16:35), Max: 36.9 (16 Jun 2022 21:28)  T(F): 98 (17 Jun 2022 16:35), Max: 98.4 (16 Jun 2022 21:28)  HR: 59 (17 Jun 2022 16:35) (56 - 103)  BP: 128/55 (17 Jun 2022 16:35) (120/64 - 135/72)  BP(mean): --  RR: 18 (17 Jun 2022 16:35) (18 - 18)  SpO2: 97% (17 Jun 2022 16:35) (96% - 98%)    PHYSICAL EXAMINATION:  SKIN: no rashes  HEAD: NC/AT  EYES: PERRLA, EOMI  EARS: TM's intact  NOSE: no abnormalities  NECK:  Supple. No lymphadenopathy. Jugular venous pressure not elevated. Carotids equal.   HEART:   regular S1 and S2  CHEST:  Scattered crackles  ABDOMEN:  Soft and nontender.   EXTREMITIES:  no C/C/E  NEURO: AAO x 3, no focal deficts       LABS:                        13.3   12.40 )-----------( 256      ( 17 Jun 2022 07:16 )             40.0     06-17    131<L>  |  100  |  24<H>  ----------------------------<  163<H>  4.0   |  23  |  0.77    Ca    8.6      17 Jun 2022 07:16            RADIOLOGY & ADDITIONAL TESTS:

## 2022-06-17 NOTE — PROGRESS NOTE ADULT - ASSESSMENT
56/M with PMHx of HTN admitted for:       # Acute hypoxic resp failure- due to  multilobar pneumonia   this could be viral or inflammatory inflammatory  - tested for COVID multiple times but negative  - RVP neg    - s/p full course of antibiotics with no significant improvement   - Significantly elevated inflammatory markers initially, work up for autoimmune Dz done and  neg , ESR wnl now   - s/p Solumedrol, now changed to IV Decadron  with some clinical improvement and ESR/CRP much improved,  start taper off   - continues O2 monitoring, supplement via NC on 4 L of O2 now   - incentive spirometry  - encourage OOB to chair  and ambulate   - HIV test neg   - repeat CT chest with some Improvement   - D/w Dr Reynaga         # Hyponatremia likely multifactorial,   - increased free water intake and possible SIADH 2/2 lung pathology   - monitor Na, improving   - free water restriction   - sc/w  Po NaCl  TID  - d/w Dr NELSON     #  New DM- hyperglycemia 2/2 steroids    - C/w Lantus on 30U Lantus now  -C/w ISS  - A1c 8.3- will need to be on oral hypoglycemic on DC   - nutrition eval       # HTN:   - cont home meds    # DVT PPX:  - heparin s/q    OOB and mmobilize as tolerated, Incentive spirometry. D/c planning when Pulse ox is stable with  basic exertion.

## 2022-06-17 NOTE — PROGRESS NOTE ADULT - ASSESSMENT
55 yo man with HTN admitted with PNA of unclear etiology despite extensive evaluation.  --Hyponatremia appears multifactorial but pt does have high fluid intake.    Check urine studies to evaluate if concomitant SIADH due to pulmonary pathology.    Explained to adhere to moderate fluid restriction.    Hold off on adding NACL tabs.  --Continue Dexamethasone.    6/14 SY  --Hyponatremia,  unclear if combination of increased water intake as well SIADH due to pulmonary pathology.    Urine NA and Osm  elevated --c/w SIADH.    Continue fluid restriction.    Start NACL tabs BID for now to prevent further worsening.  --Pt aware of need for BW in am.    6/15 SY  --Hyponatremia : c/w SIADH based on urine studies.  Due to pulmonary pathology.      Continue NACL tabs BID     Continue moderate fluid restriction. --decrease to 1200cc    6/16 SY  --Hyponatremia --SIADH due to pulmonary pathology.    Increase NACL tabs to TID.     Continue fluid restriction.    Assured pt dry nasal and oral mucosa due to O2 rather than dehydration.    6/17 MK   - hyponatremia/ SIADH due to groundglass infiltrate on lung    improving with FR and nacl tabs  dw dr bashir hoyt

## 2022-06-17 NOTE — PROGRESS NOTE ADULT - SUBJECTIVE AND OBJECTIVE BOX
NEPHROLOGY INTERVAL HPI/OVERNIGHT EVENTS:  06-17-22 @ 14:39    6/17 complying with fluid restriction, ambulated today but is feeling weak after that  6/16--Attempting to adhere to fluid restriction but can't really  keep track.  Feels dehydrated.  wants humidifier  O2 with water on.       able to lay flat on his back but continues to desaturate with activity.  6/15--Feeling somewhat improved.  Able to lay flatter on back and sleep better last night.  Appears comfortable today.  6/14--SOB slightly improved.  C/o burping excessively.  No clear relief with gas ex.  Refused blood work this morning.     HPI:  57 yo man with PMHX of HTN admitted on 5/27 with progressive SOB and extreme fatigue.  Noted fatigue post 1 week after second covid booster shot.  Initially had cardiac work up due to CARMONA.  SOB with hypoxemia worsened and on outpt CXR, found to have PNA with bilateral infiltrates.  Covid tested negative.  Symptoms did not improve with oral abtx and sent for admission.  Has had extensive work up but etiology of pulmonary infiltrates remain unclear.  COVID repeatedly negative.  CTA negative for PE with persistent bilateral infiltrates.  Serologies negative.  Pt states lung bx was advised but he has declined.  Notes some improvement in CARMONA for last 2 days after dexamethasone started.  Aware of low serum sodium level but has not followed fluid restriction due to dry mouth    PMHX and PSHX.  1.HTN      MEDICATIONS  (STANDING):  bisacodyl 5 milliGRAM(s) Oral at bedtime  budesonide 160 MICROgram(s)/formoterol 4.5 MICROgram(s) Inhaler 2 Puff(s) Inhalation two times a day  dexAMETHasone  Injectable 6 milliGRAM(s) IV Push <User Schedule>  dextrose 5%. 1000 milliLiter(s) (50 mL/Hr) IV Continuous <Continuous>  dextrose 5%. 1000 milliLiter(s) (100 mL/Hr) IV Continuous <Continuous>  dextrose 50% Injectable 25 Gram(s) IV Push once  dextrose 50% Injectable 12.5 Gram(s) IV Push once  dextrose 50% Injectable 25 Gram(s) IV Push once  doxazosin 8 milliGRAM(s) Oral at bedtime  glucagon  Injectable 1 milliGRAM(s) IntraMuscular once  guaiFENesin ER 1200 milliGRAM(s) Oral every 12 hours  heparin   Injectable 5000 Unit(s) SubCutaneous every 8 hours  insulin glargine Injectable (LANTUS) 30 Unit(s) SubCutaneous every morning  insulin lispro (ADMELOG) corrective regimen sliding scale   SubCutaneous three times a day before meals  insulin lispro (ADMELOG) corrective regimen sliding scale   SubCutaneous at bedtime  insulin lispro Injectable (ADMELOG) 10 Unit(s) SubCutaneous three times a day before meals  lactobacillus acidophilus 1 Tablet(s) Oral two times a day  losartan 50 milliGRAM(s) Oral daily  melatonin 3 milliGRAM(s) Oral at bedtime  pantoprazole    Tablet 40 milliGRAM(s) Oral before breakfast  senna 2 Tablet(s) Oral daily  simvastatin 20 milliGRAM(s) Oral at bedtime  sodium chloride 1 Gram(s) Oral three times a day    MEDICATIONS  (PRN):  acetaminophen     Tablet .. 650 milliGRAM(s) Oral every 6 hours PRN Temp greater or equal to 38C (100.4F), Mild Pain (1 - 3)  ALBUTerol    90 MICROgram(s) HFA Inhaler 2 Puff(s) Inhalation every 6 hours PRN Shortness of Breath and/or Wheezing  aluminum hydroxide/magnesium hydroxide/simethicone Suspension 30 milliLiter(s) Oral every 6 hours PRN Dyspepsia  artificial  tears Solution 1 Drop(s) Both EYES every 8 hours PRN Dry Eyes  artificial tears (preservative free) Ophthalmic Solution 1 Drop(s) Both EYES every 3 hours PRN Dry Eyes  benzocaine 15 mG/menthol 3.6 mG Lozenge 1 Lozenge Oral three times a day PRN Sore Throat  dextrose Oral Gel 15 Gram(s) Oral once PRN Blood Glucose LESS THAN 70 milliGRAM(s)/deciliter  polyethylene glycol 3350 17 Gram(s) Oral daily PRN Constipation  simethicone 80 milliGRAM(s) Chew every 6 hours PRN Gas  sodium chloride 0.65% Nasal 1 Spray(s) Both Nostrils two times a day PRN Nasal Congestion      Allergies    codeine (Unknown)    Intolerances        I&O's Detail    Vital Signs Last 24 Hrs  T(C): 36.5 (17 Jun 2022 08:06), Max: 36.9 (16 Jun 2022 21:28)  T(F): 97.7 (17 Jun 2022 08:06), Max: 98.4 (16 Jun 2022 21:28)  HR: 76 (17 Jun 2022 09:44) (56 - 103)  BP: 135/72 (17 Jun 2022 08:06) (120/64 - 135/72)  BP(mean): --  RR: 18 (17 Jun 2022 08:06) (18 - 18)  SpO2: 98% (17 Jun 2022 08:06) (96% - 98%)  Daily     Daily     PHYSICAL EXAM:  General: alert. awake NAD  HEENT: MMM  CV: s1s2 rrr  LUNGS: B/L crackles   EXT: no edema    LABS:                        13.3   12.40 )-----------( 256      ( 17 Jun 2022 07:16 )             40.0     06-17    131<L>  |  100  |  24<H>  ----------------------------<  163<H>  4.0   |  23  |  0.77    Ca    8.6      17 Jun 2022 07:16

## 2022-06-17 NOTE — PROGRESS NOTE ADULT - SUBJECTIVE AND OBJECTIVE BOX
CC: PNA (2022 18:13)    HPI: 56/M with PMHx of HTN, brought to the ED for c/o worsening SOB and cough along with a recent Dx of PNA. He also reported  that he has been feeling very weak and dizzy. He recently got 4th dose of COVID vaccine 3 weeks ago and Sx started 1 week thereafter. + Dry Cough   In Ed found to be hypoxic on RA,   chest pain when coughing.   States he has not been eating much recently and has a 15 lb weight loss.   No recent travel or periods of immobilization or surgeries.  COVID neg     INTERVAL HPI/ OVERNIGHT EVENTS:  Pt was seen and examined, was able to ambulate 2 times today, felt SOB and chest tightness on the back. Overall feeling better and hopeful for soon recovery        Vital Signs Last 24 Hrs  T(C): 36.7 (2022 16:35), Max: 36.9 (2022 21:28)  T(F): 98 (2022 16:35), Max: 98.4 (2022 21:28)  HR: 59 (2022 16:35) (56 - 103)  BP: 128/55 (2022 16:35) (120/64 - 135/72)  RR: 18 (2022 16:35) (18 - 18)  SpO2: 97% (2022 16:35) (96% - 98%)      REVIEW OF SYSTEMS:  All other review of systems is negative unless indicated above.      PHYSICAL EXAM:  General: Well developed; well nourished; in no acute distress  Eyes: EOMI; conjunctiva and sclera clear  Head: Normocephalic; atraumatic  ENMT: No nasal discharge; airway clear  Neck: Supple; non tender; no masses  Respiratory: improved air entry, + b/l  crepitations at bases , No wheezes, rales   Cardiovascular: Regular rate and rhythm. S1 and S2 Normal;   Gastrointestinal: Soft non-tender non-distended; Normal bowel sounds  Genitourinary: No  suprapubic  tenderness  Extremities: No  edema  Vascular: Peripheral pulses palpable 2+ bilaterally  Neurological: Alert and oriented x3, non focal   Skin: Warm and dry. No acute rash  Lymph Nodes: No acute cervical adenopathy  Musculoskeletal: Normal muscle tone, without deformities  Psychiatric: Cooperative and anxious         LABS:                                    13.3   12.40 )-----------( 256      ( 2022 07:16 )             40.0     06-17    131<L>  |  100  |  24<H>  ----------------------------<  163<H>  4.0   |  23  |  0.77    Ca    8.6      2022 07:16               13.6   12.46 )-----------( 292      ( 15 Forrest 2022 06:32 )             41.8     06-16    128<L>  |  96  |  21  ----------------------------<  116<H>  3.8   |  25  |  0.77    Ca    8.7      2022 07:50                            13.6   12.46 )-----------( 292      ( 15 Forrest 2022 06:32 )             41.8     06-15    128<L>  |  96  |  22  ----------------------------<  120<H>  4.1   |  25  |  0.81    Ca    8.8      15 Forrest 2022 06:32                              13.9   15.66 )-----------( 315      ( 2022 07:18 )             42.0     06-13    127<L>  |  95<L>  |  23  ----------------------------<  184<H>  4.2   |  23  |  0.83    Ca    8.7      2022 07:18    TPro  5.9<L>  /  Alb  2.4<L>  /  TBili  0.6  /  DBili  x   /  AST  36  /  ALT  101<H>  /  AlkPhos  67  06-13        LIVER FUNCTIONS - ( 2022 07:18 )  Alb: 2.4 g/dL / Pro: 5.9 gm/dL / ALK PHOS: 67 U/L / ALT: 101 U/L / AST: 36 U/L / GGT: x             Urinalysis Basic - ( 2022 14:35 )    Color: Yellow / Appearance: Clear / S.015 / pH: x  Gluc: x / Ketone: Negative  / Bili: Negative / Urobili: Negative   Blood: x / Protein: Negative / Nitrite: Negative   Leuk Esterase: Negative / RBC: 0-2 /HPF / WBC 0-2   Sq Epi: x / Non Sq Epi: x / Bacteria: Few                          13.9   15.66 )-----------( 315      ( 2022 07:18 )             42.0     2022 07:18    127    |  95     |  23     ----------------------------<  184    4.2     |  23     |  0.83     Ca    8.7        2022 07:18    TPro  5.9    /  Alb  2.4    /  TBili  0.6    /  DBili  x      /  AST  36     /  ALT  101    /  AlkPhos  67     2022 07:18    CAPILLARY BLOOD GLUCOSE  POCT Blood Glucose.: 209 mg/dL (2022 17:25)  POCT Blood Glucose.: 134 mg/dL (2022 12:52)  POCT Blood Glucose.: 147 mg/dL (2022 07:52)  POCT Blood Glucose.: 189 mg/dL (2022 21:02)        LIVER FUNCTIONS - ( 2022 07:18 )  Alb: 2.4 g/dL / Pro: 5.9 gm/dL / ALK PHOS: 67 U/L / ALT: 101 U/L / AST: 36 U/L / GGT: x           Urinalysis Basic - ( 2022 14:35 )    Color: Yellow / Appearance: Clear / S.015 / pH: x  Gluc: x / Ketone: Negative  / Bili: Negative / Urobili: Negative   Blood: x / Protein: Negative / Nitrite: Negative   Leuk Esterase: Negative / RBC: 0-2 /HPF / WBC 0-2   Sq Epi: x / Non Sq Epi: x / Bacteria: Few        MEDICATIONS  (STANDING):  bisacodyl 5 milliGRAM(s) Oral at bedtime  budesonide 160 MICROgram(s)/formoterol 4.5 MICROgram(s) Inhaler 2 Puff(s) Inhalation two times a day  dexAMETHasone  Injectable 6 milliGRAM(s) IV Push <User Schedule>  dextrose 5%. 1000 milliLiter(s) (100 mL/Hr) IV Continuous <Continuous>  dextrose 5%. 1000 milliLiter(s) (50 mL/Hr) IV Continuous <Continuous>  dextrose 50% Injectable 25 Gram(s) IV Push once  dextrose 50% Injectable 12.5 Gram(s) IV Push once  dextrose 50% Injectable 25 Gram(s) IV Push once  doxazosin 8 milliGRAM(s) Oral at bedtime  glucagon  Injectable 1 milliGRAM(s) IntraMuscular once  guaiFENesin ER 1200 milliGRAM(s) Oral every 12 hours  heparin   Injectable 5000 Unit(s) SubCutaneous every 8 hours  insulin glargine Injectable (LANTUS) 30 Unit(s) SubCutaneous every morning  insulin lispro (ADMELOG) corrective regimen sliding scale   SubCutaneous three times a day before meals  insulin lispro (ADMELOG) corrective regimen sliding scale   SubCutaneous at bedtime  insulin lispro Injectable (ADMELOG) 10 Unit(s) SubCutaneous three times a day before meals  lactobacillus acidophilus 1 Tablet(s) Oral two times a day  losartan 50 milliGRAM(s) Oral daily  melatonin 3 milliGRAM(s) Oral at bedtime  pantoprazole    Tablet 40 milliGRAM(s) Oral before breakfast  senna 2 Tablet(s) Oral daily  simvastatin 20 milliGRAM(s) Oral at bedtime  sodium chloride 1 Gram(s) Oral three times a day    MEDICATIONS  (PRN):  acetaminophen     Tablet .. 650 milliGRAM(s) Oral every 6 hours PRN Temp greater or equal to 38C (100.4F), Mild Pain (1 - 3)  ALBUTerol    90 MICROgram(s) HFA Inhaler 2 Puff(s) Inhalation every 6 hours PRN Shortness of Breath and/or Wheezing  aluminum hydroxide/magnesium hydroxide/simethicone Suspension 30 milliLiter(s) Oral every 6 hours PRN Dyspepsia  artificial  tears Solution 1 Drop(s) Both EYES every 8 hours PRN Dry Eyes  artificial tears (preservative free) Ophthalmic Solution 1 Drop(s) Both EYES every 3 hours PRN Dry Eyes  benzocaine 15 mG/menthol 3.6 mG Lozenge 1 Lozenge Oral three times a day PRN Sore Throat  dextrose Oral Gel 15 Gram(s) Oral once PRN Blood Glucose LESS THAN 70 milliGRAM(s)/deciliter  polyethylene glycol 3350 17 Gram(s) Oral daily PRN Constipation  simethicone 80 milliGRAM(s) Chew every 6 hours PRN Gas  sodium chloride 0.65% Nasal 1 Spray(s) Both Nostrils two times a day PRN Nasal Congestion      RADIOLOGY & ADDITIONAL TESTS:      ACC: [id number] EXAM:  CT ANGIO CHEST PULM ART [facility]AWIC                          PROCEDURE DATE:  2022          INTERPRETATION:  INDICATION: Shortness of breath    TECHNIQUE: Volumetric images of the chest were obtained after the   administration of 90 mL of Omnipaque 350. Maximum intensity projection   images were generated.    COMPARISON: None.    FINDINGS:    PULMONARY ANGIOGRAM: No pulmonary embolism from the main pulmonary artery   up to and including the segmental branches. Limited assessment of   subsegmental branches in the lower lobes as they are not well opacified.   Normal caliber pulmonary artery.    LUNGS/AIRWAYS/PLEURA: Patent trachea and bronchi. Peripheral groundglass   and consolidative opacities in all lobes, preferentialto the lower   lobes. Unremarkable pleura.    LYMPH NODES/MEDIASTINUM: No enlarged lymph nodes.    HEART/VASCULATURE: Normal heart size. Small pericardial effusion. Normal   caliber aorta. Coronary artery calcifications.    UPPER ABDOMEN: Partially included incompletely characterized 2.0 cm left   adrenal nodule.    BONES/SOFT TISSUES: Degenerative changes of the spine.      IMPRESSION:    No pulmonary embolism from the main pulmonary artery up to and including   the segmental branches. Limited assessment of subsegmental branches.    Bilateral peripheral lung disease. Differential includes infection (COVID   in particular), organizing pneumonia, or eosinophilic pneumonia.    Incidental incompletely characterized left adrenal nodule. Further   evaluation with MRI is recommended, which can be done on a nonemergent   basis.        < from: CT Angio Chest PE Protocol w/ IV Cont (22 @ 11:27) >    ACC: [id number] EXAM:  CT ANGIO CHEST PULM ART [facility]AWIC                          PROCEDURE DATE:  2022          INTERPRETATION:  INDICATION: Shortness of breath    TECHNIQUE: Volumetric images of the chest were obtained after the   administration of 90 mL of Omnipaque 350. Maximum intensity projection   images were generated.    COMPARISON: CT chest 2022.    FINDINGS:    PULMONARY ANGIOGRAM:  No pulmonary embolism. Normal caliber pulmonary   artery.    LUNGS/AIRWAYS/PLEURA: No endobronchial lesion. Stable bilateral   peripheral groundglass and consolidation involving all lobes,   preferential to the lower lobes. No pleural effusion.    LYMPH NODES/MEDIASTINUM: No enlarged lymph nodes.    HEART/VASCULATURE: Normal heart size. Stable small pericardial effusion.   Normal caliber aorta. Coronary artery calcifications.    UPPER ABDOMEN: Stable left adrenal nodule.    BONES/SOFT TISSUES: Degenerative changes of the spine.      IMPRESSION:    No pulmonary embolism.    Stable peripheral lung disease. Differential includes organizing   pneumonia, eosinophilic lung disease, or atypical infection.

## 2022-06-18 PROCEDURE — 99232 SBSQ HOSP IP/OBS MODERATE 35: CPT

## 2022-06-18 PROCEDURE — 99233 SBSQ HOSP IP/OBS HIGH 50: CPT

## 2022-06-18 RX ADMIN — HEPARIN SODIUM 5000 UNIT(S): 5000 INJECTION INTRAVENOUS; SUBCUTANEOUS at 12:52

## 2022-06-18 RX ADMIN — SIMETHICONE 80 MILLIGRAM(S): 80 TABLET, CHEWABLE ORAL at 12:51

## 2022-06-18 RX ADMIN — SODIUM CHLORIDE 1 GRAM(S): 9 INJECTION INTRAMUSCULAR; INTRAVENOUS; SUBCUTANEOUS at 21:55

## 2022-06-18 RX ADMIN — SIMETHICONE 80 MILLIGRAM(S): 80 TABLET, CHEWABLE ORAL at 17:39

## 2022-06-18 RX ADMIN — SIMETHICONE 80 MILLIGRAM(S): 80 TABLET, CHEWABLE ORAL at 23:18

## 2022-06-18 RX ADMIN — Medication 10 UNIT(S): at 17:38

## 2022-06-18 RX ADMIN — SODIUM CHLORIDE 1 GRAM(S): 9 INJECTION INTRAMUSCULAR; INTRAVENOUS; SUBCUTANEOUS at 16:24

## 2022-06-18 RX ADMIN — INSULIN GLARGINE 30 UNIT(S): 100 INJECTION, SOLUTION SUBCUTANEOUS at 08:15

## 2022-06-18 RX ADMIN — Medication 1 TABLET(S): at 21:57

## 2022-06-18 RX ADMIN — Medication 1200 MILLIGRAM(S): at 11:00

## 2022-06-18 RX ADMIN — PANTOPRAZOLE SODIUM 40 MILLIGRAM(S): 20 TABLET, DELAYED RELEASE ORAL at 06:14

## 2022-06-18 RX ADMIN — Medication 8 MILLIGRAM(S): at 21:54

## 2022-06-18 RX ADMIN — Medication 1200 MILLIGRAM(S): at 21:54

## 2022-06-18 RX ADMIN — Medication 4: at 17:37

## 2022-06-18 RX ADMIN — BUDESONIDE AND FORMOTEROL FUMARATE DIHYDRATE 2 PUFF(S): 160; 4.5 AEROSOL RESPIRATORY (INHALATION) at 20:55

## 2022-06-18 RX ADMIN — BUDESONIDE AND FORMOTEROL FUMARATE DIHYDRATE 2 PUFF(S): 160; 4.5 AEROSOL RESPIRATORY (INHALATION) at 09:10

## 2022-06-18 RX ADMIN — Medication 10 UNIT(S): at 08:15

## 2022-06-18 RX ADMIN — SIMVASTATIN 20 MILLIGRAM(S): 20 TABLET, FILM COATED ORAL at 21:55

## 2022-06-18 RX ADMIN — Medication 10 UNIT(S): at 12:49

## 2022-06-18 RX ADMIN — SODIUM CHLORIDE 1 GRAM(S): 9 INJECTION INTRAMUSCULAR; INTRAVENOUS; SUBCUTANEOUS at 06:13

## 2022-06-18 RX ADMIN — LOSARTAN POTASSIUM 50 MILLIGRAM(S): 100 TABLET, FILM COATED ORAL at 10:59

## 2022-06-18 RX ADMIN — HEPARIN SODIUM 5000 UNIT(S): 5000 INJECTION INTRAVENOUS; SUBCUTANEOUS at 21:53

## 2022-06-18 RX ADMIN — Medication 1 TABLET(S): at 10:59

## 2022-06-18 RX ADMIN — Medication 4 MILLIGRAM(S): at 11:01

## 2022-06-18 RX ADMIN — SENNA PLUS 2 TABLET(S): 8.6 TABLET ORAL at 11:01

## 2022-06-18 RX ADMIN — HEPARIN SODIUM 5000 UNIT(S): 5000 INJECTION INTRAVENOUS; SUBCUTANEOUS at 06:14

## 2022-06-18 NOTE — PROGRESS NOTE ADULT - SUBJECTIVE AND OBJECTIVE BOX
CC: PNA (2022 18:13)    HPI: 56/M with PMHx of HTN, brought to the ED for c/o worsening SOB and cough along with a recent Dx of PNA. He also reported  that he has been feeling very weak and dizzy. He recently got 4th dose of COVID vaccine 3 weeks ago and Sx started 1 week thereafter. + Dry Cough   In Ed found to be hypoxic on RA,   chest pain when coughing.   States he has not been eating much recently and has a 15 lb weight loss.   No recent travel or periods of immobilization or surgeries.  COVID neg     INTERVAL HPI/ OVERNIGHT EVENTS:  Pt was seen and examined,  in good spirits, feels better, can ambulate with less SOB. Plan discussed     Vital Signs Last 24 Hrs  T(C): 36.6 (2022 17:31), Max: 36.8 (2022 22:35)  T(F): 97.9 (2022 17:31), Max: 98.3 (2022 22:35)  HR: 76 (2022 17:31) (56 - 76)  BP: 135/80 (2022 17:31) (114/64 - 135/80)  BP(mean): 74 (2022 23:12) (74 - 74)  RR: 18 (2022 17:31) (18 - 18)  SpO2: 91% (2022 17:31) (91% - 96%)      REVIEW OF SYSTEMS:  All other review of systems is negative unless indicated above.      PHYSICAL EXAM:  General: Well developed; well nourished; in no acute distress  Eyes: EOMI; conjunctiva and sclera clear  Head: Normocephalic; atraumatic  ENMT: No nasal discharge; airway clear  Neck: Supple; non tender; no masses  Respiratory: improved air entry, + b/l  crepitations at bases , No wheezes, rales   Cardiovascular: Regular rate and rhythm. S1 and S2 Normal;   Gastrointestinal: Soft non-tender non-distended; Normal bowel sounds  Genitourinary: No  suprapubic  tenderness  Extremities: No  edema  Vascular: Peripheral pulses palpable 2+ bilaterally  Neurological: Alert and oriented x3, non focal   Skin: Warm and dry. No acute rash  Lymph Nodes: No acute cervical adenopathy  Musculoskeletal: Normal muscle tone, without deformities  Psychiatric: Cooperative and anxious         LABS:                                    13.3   12.40 )-----------( 256      ( 2022 07:16 )             40.0     06-17    131<L>  |  100  |  24<H>  ----------------------------<  163<H>  4.0   |  23  |  0.77    Ca    8.6      2022 07:16               13.6   12.46 )-----------( 292      ( 15 Forrest 2022 06:32 )             41.8     06-16    128<L>  |  96  |  21  ----------------------------<  116<H>  3.8   |  25  |  0.77    Ca    8.7      2022 07:50                            13.6   12.46 )-----------( 292      ( 15 Forrest 2022 06:32 )             41.8     06-15    128<L>  |  96  |  22  ----------------------------<  120<H>  4.1   |  25  |  0.81    Ca    8.8      15 Forrest 2022 06:32                              13.9   15.66 )-----------( 315      ( 2022 07:18 )             42.0     06-13    127<L>  |  95<L>  |  23  ----------------------------<  184<H>  4.2   |  23  |  0.83    Ca    8.7      2022 07:18    TPro  5.9<L>  /  Alb  2.4<L>  /  TBili  0.6  /  DBili  x   /  AST  36  /  ALT  101<H>  /  AlkPhos  67  06-13        LIVER FUNCTIONS - ( 2022 07:18 )  Alb: 2.4 g/dL / Pro: 5.9 gm/dL / ALK PHOS: 67 U/L / ALT: 101 U/L / AST: 36 U/L / GGT: x             Urinalysis Basic - ( 2022 14:35 )    Color: Yellow / Appearance: Clear / S.015 / pH: x  Gluc: x / Ketone: Negative  / Bili: Negative / Urobili: Negative   Blood: x / Protein: Negative / Nitrite: Negative   Leuk Esterase: Negative / RBC: 0-2 /HPF / WBC 0-2   Sq Epi: x / Non Sq Epi: x / Bacteria: Few                          13.9   15.66 )-----------( 315      ( 2022 07:18 )             42.0     2022 07:18    127    |  95     |  23     ----------------------------<  184    4.2     |  23     |  0.83     Ca    8.7        2022 07:18    TPro  5.9    /  Alb  2.4    /  TBili  0.6    /  DBili  x      /  AST  36     /  ALT  101    /  AlkPhos  67     2022 07:18    CAPILLARY BLOOD GLUCOSE  POCT Blood Glucose.: 209 mg/dL (2022 17:25)  POCT Blood Glucose.: 134 mg/dL (2022 12:52)  POCT Blood Glucose.: 147 mg/dL (2022 07:52)  POCT Blood Glucose.: 189 mg/dL (2022 21:02)        LIVER FUNCTIONS - ( 2022 07:18 )  Alb: 2.4 g/dL / Pro: 5.9 gm/dL / ALK PHOS: 67 U/L / ALT: 101 U/L / AST: 36 U/L / GGT: x           Urinalysis Basic - ( 2022 14:35 )    Color: Yellow / Appearance: Clear / S.015 / pH: x  Gluc: x / Ketone: Negative  / Bili: Negative / Urobili: Negative   Blood: x / Protein: Negative / Nitrite: Negative   Leuk Esterase: Negative / RBC: 0-2 /HPF / WBC 0-2   Sq Epi: x / Non Sq Epi: x / Bacteria: Few        MEDICATIONS  (STANDING):  bisacodyl 5 milliGRAM(s) Oral at bedtime  budesonide 160 MICROgram(s)/formoterol 4.5 MICROgram(s) Inhaler 2 Puff(s) Inhalation two times a day  dexAMETHasone  Injectable 6 milliGRAM(s) IV Push <User Schedule>  dextrose 5%. 1000 milliLiter(s) (100 mL/Hr) IV Continuous <Continuous>  dextrose 5%. 1000 milliLiter(s) (50 mL/Hr) IV Continuous <Continuous>  dextrose 50% Injectable 25 Gram(s) IV Push once  dextrose 50% Injectable 12.5 Gram(s) IV Push once  dextrose 50% Injectable 25 Gram(s) IV Push once  doxazosin 8 milliGRAM(s) Oral at bedtime  glucagon  Injectable 1 milliGRAM(s) IntraMuscular once  guaiFENesin ER 1200 milliGRAM(s) Oral every 12 hours  heparin   Injectable 5000 Unit(s) SubCutaneous every 8 hours  insulin glargine Injectable (LANTUS) 30 Unit(s) SubCutaneous every morning  insulin lispro (ADMELOG) corrective regimen sliding scale   SubCutaneous three times a day before meals  insulin lispro (ADMELOG) corrective regimen sliding scale   SubCutaneous at bedtime  insulin lispro Injectable (ADMELOG) 10 Unit(s) SubCutaneous three times a day before meals  lactobacillus acidophilus 1 Tablet(s) Oral two times a day  losartan 50 milliGRAM(s) Oral daily  melatonin 3 milliGRAM(s) Oral at bedtime  pantoprazole    Tablet 40 milliGRAM(s) Oral before breakfast  senna 2 Tablet(s) Oral daily  simvastatin 20 milliGRAM(s) Oral at bedtime  sodium chloride 1 Gram(s) Oral three times a day    MEDICATIONS  (PRN):  acetaminophen     Tablet .. 650 milliGRAM(s) Oral every 6 hours PRN Temp greater or equal to 38C (100.4F), Mild Pain (1 - 3)  ALBUTerol    90 MICROgram(s) HFA Inhaler 2 Puff(s) Inhalation every 6 hours PRN Shortness of Breath and/or Wheezing  aluminum hydroxide/magnesium hydroxide/simethicone Suspension 30 milliLiter(s) Oral every 6 hours PRN Dyspepsia  artificial  tears Solution 1 Drop(s) Both EYES every 8 hours PRN Dry Eyes  artificial tears (preservative free) Ophthalmic Solution 1 Drop(s) Both EYES every 3 hours PRN Dry Eyes  benzocaine 15 mG/menthol 3.6 mG Lozenge 1 Lozenge Oral three times a day PRN Sore Throat  dextrose Oral Gel 15 Gram(s) Oral once PRN Blood Glucose LESS THAN 70 milliGRAM(s)/deciliter  polyethylene glycol 3350 17 Gram(s) Oral daily PRN Constipation  simethicone 80 milliGRAM(s) Chew every 6 hours PRN Gas  sodium chloride 0.65% Nasal 1 Spray(s) Both Nostrils two times a day PRN Nasal Congestion      RADIOLOGY & ADDITIONAL TESTS:      ACC: [id number] [facility]                          PROCEDURE DATE:  2022          INTERPRETATION:  INDICATION: Shortness of breath    TECHNIQUE: Volumetric images of the chest were obtained after the   administration of 90 mL of Omnipaque 350. Maximum intensity projection   images were generated.    COMPARISON: None.    FINDINGS:    PULMONARY ANGIOGRAM: No pulmonary embolism from the main pulmonary artery   up to and including the segmental branches. Limited assessment of   subsegmental branches in the lower lobes as they are not well opacified.   Normal caliber pulmonary artery.    LUNGS/AIRWAYS/PLEURA: Patent trachea and bronchi. Peripheral groundglass   and consolidative opacities in all lobes, preferentialto the lower   lobes. Unremarkable pleura.    LYMPH NODES/MEDIASTINUM: No enlarged lymph nodes.    HEART/VASCULATURE: Normal heart size. Small pericardial effusion. Normal   caliber aorta. Coronary artery calcifications.    UPPER ABDOMEN: Partially included incompletely characterized 2.0 cm left   adrenal nodule.    BONES/SOFT TISSUES: Degenerative changes of the spine.      IMPRESSION:    No pulmonary embolism from the main pulmonary artery up to and including   the segmental branches. Limited assessment of subsegmental branches.    Bilateral peripheral lung disease. Differential includes infection (COVID   in particular), organizing pneumonia, or eosinophilic pneumonia.    Incidental incompletely characterized left adrenal nodule. Further   evaluation with MRI is recommended, which can be done on a nonemergent   basis.        < from: CT Angio Chest PE Protocol w/ IV Cont (22 @ 11:27) >    ACC: [id number] [facility]                          PROCEDURE DATE:  2022          INTERPRETATION:  INDICATION: Shortness of breath    TECHNIQUE: Volumetric images of the chest were obtained after the   administration of 90 mL of Omnipaque 350. Maximum intensity projection   images were generated.    COMPARISON: CT chest 2022.    FINDINGS:    PULMONARY ANGIOGRAM:  No pulmonary embolism. Normal caliber pulmonary   artery.    LUNGS/AIRWAYS/PLEURA: No endobronchial lesion. Stable bilateral   peripheral groundglass and consolidation involving all lobes,   preferential to the lower lobes. No pleural effusion.    LYMPH NODES/MEDIASTINUM: No enlarged lymph nodes.    HEART/VASCULATURE: Normal heart size. Stable small pericardial effusion.   Normal caliber aorta. Coronary artery calcifications.    UPPER ABDOMEN: Stable left adrenal nodule.    BONES/SOFT TISSUES: Degenerative changes of the spine.      IMPRESSION:    No pulmonary embolism.    Stable peripheral lung disease. Differential includes organizing   pneumonia, eosinophilic lung disease, or atypical infection.

## 2022-06-18 NOTE — PROGRESS NOTE ADULT - PROBLEM SELECTOR PROBLEM 2
ILD (interstitial lung disease)
ILD (interstitial lung disease)
Acute dyspnea
ILD (interstitial lung disease)
ILD (interstitial lung disease)
Acute dyspnea
ILD (interstitial lung disease)
Acute dyspnea
Acute dyspnea
ILD (interstitial lung disease)
ILD (interstitial lung disease)

## 2022-06-18 NOTE — PROGRESS NOTE ADULT - PROBLEM SELECTOR PROBLEM 4
Paroxysmal cough
H/O fatigue
Paroxysmal cough
H/O fatigue
Paroxysmal cough

## 2022-06-18 NOTE — PROGRESS NOTE ADULT - ASSESSMENT
- cont O2  - seems to be improving on decadron; now on 4mg  - still desats w minimal exertion but able to go to bathroom  - cont symbicort  - inflammatory markers significantly better; esr normal  - has ILD of unknown etiology. Patient refused lung biopsy and rheumatology w/u negative  - will go home on O2 and slow steroid taper  - dvt proph

## 2022-06-18 NOTE — PROGRESS NOTE ADULT - SUBJECTIVE AND OBJECTIVE BOX
Subjective:  making progress  had 5 BM's yesterday!  less sob  still on O2 5L    MEDICATIONS  (STANDING):  bisacodyl 5 milliGRAM(s) Oral at bedtime  budesonide 160 MICROgram(s)/formoterol 4.5 MICROgram(s) Inhaler 2 Puff(s) Inhalation two times a day  dexAMETHasone  Injectable 4 milliGRAM(s) IV Push <User Schedule>  dextrose 5%. 1000 milliLiter(s) (100 mL/Hr) IV Continuous <Continuous>  dextrose 5%. 1000 milliLiter(s) (50 mL/Hr) IV Continuous <Continuous>  dextrose 50% Injectable 25 Gram(s) IV Push once  dextrose 50% Injectable 12.5 Gram(s) IV Push once  dextrose 50% Injectable 25 Gram(s) IV Push once  doxazosin 8 milliGRAM(s) Oral at bedtime  glucagon  Injectable 1 milliGRAM(s) IntraMuscular once  guaiFENesin ER 1200 milliGRAM(s) Oral every 12 hours  heparin   Injectable 5000 Unit(s) SubCutaneous every 8 hours  insulin glargine Injectable (LANTUS) 30 Unit(s) SubCutaneous every morning  insulin lispro (ADMELOG) corrective regimen sliding scale   SubCutaneous three times a day before meals  insulin lispro (ADMELOG) corrective regimen sliding scale   SubCutaneous at bedtime  insulin lispro Injectable (ADMELOG) 10 Unit(s) SubCutaneous three times a day before meals  lactobacillus acidophilus 1 Tablet(s) Oral two times a day  losartan 50 milliGRAM(s) Oral daily  melatonin 3 milliGRAM(s) Oral at bedtime  pantoprazole    Tablet 40 milliGRAM(s) Oral before breakfast  senna 2 Tablet(s) Oral daily  simvastatin 20 milliGRAM(s) Oral at bedtime  sodium chloride 1 Gram(s) Oral three times a day    MEDICATIONS  (PRN):  acetaminophen     Tablet .. 650 milliGRAM(s) Oral every 6 hours PRN Temp greater or equal to 38C (100.4F), Mild Pain (1 - 3)  ALBUTerol    90 MICROgram(s) HFA Inhaler 2 Puff(s) Inhalation every 6 hours PRN Shortness of Breath and/or Wheezing  aluminum hydroxide/magnesium hydroxide/simethicone Suspension 30 milliLiter(s) Oral every 6 hours PRN Dyspepsia  artificial  tears Solution 1 Drop(s) Both EYES every 8 hours PRN Dry Eyes  artificial tears (preservative free) Ophthalmic Solution 1 Drop(s) Both EYES every 3 hours PRN Dry Eyes  benzocaine 15 mG/menthol 3.6 mG Lozenge 1 Lozenge Oral three times a day PRN Sore Throat  dextrose Oral Gel 15 Gram(s) Oral once PRN Blood Glucose LESS THAN 70 milliGRAM(s)/deciliter  polyethylene glycol 3350 17 Gram(s) Oral daily PRN Constipation  simethicone 80 milliGRAM(s) Chew every 6 hours PRN Gas  sodium chloride 0.65% Nasal 1 Spray(s) Both Nostrils two times a day PRN Nasal Congestion      Allergies    codeine (Unknown)    Intolerances        REVIEW OF SYSTEMS:    CONSTITUTIONAL:  As per HPI.  HEENT:  Eyes:  No diplopia or blurred vision. ENT:  No earache, sore throat or runny nose.  CARDIOVASCULAR:  No pressure, squeezing, tightness, heaviness or aching about the chest, neck, axilla or epigastrium.  RESPIRATORY: sob  GASTROINTESTINAL:  No nausea, vomiting or diarrhea.  GENITOURINARY:  No dysuria, frequency or urgency.  MUSCULOSKELETAL:  no joint pain, deformity, tenderness  EXTREMITIES: no clubbing cyanosis,edema  SKIN:  No change in skin, hair or nails.  NEUROLOGIC:  No paresthesias, fasciculations, seizures or weakness.  PSYCHIATRIC:  No disorder of thought or mood.  ENDOCRINE:  No heat or cold intolerance, polyuria or polydipsia.  HEMATOLOGICAL:  No easy bruising or bleedings:    Vital Signs Last 24 Hrs  T(C): 36.7 (18 Jun 2022 08:29), Max: 36.8 (17 Jun 2022 22:35)  T(F): 98.1 (18 Jun 2022 08:29), Max: 98.3 (17 Jun 2022 22:35)  HR: 64 (18 Jun 2022 08:29) (56 - 72)  BP: 134/64 (18 Jun 2022 08:29) (114/64 - 134/64)  BP(mean): 74 (17 Jun 2022 23:12) (74 - 74)  RR: 18 (18 Jun 2022 08:29) (18 - 18)  SpO2: 94% (18 Jun 2022 08:29) (94% - 97%)    PHYSICAL EXAMINATION:  SKIN: no rashes  HEAD: NC/AT  EYES: PERRLA, EOMI  EARS: TM's intact  NOSE: no abnormalities  NECK:  Supple. No lymphadenopathy. Jugular venous pressure not elevated. Carotids equal.   HEART:   S1S2 reg  CHEST:  bibasilar crackles  ABDOMEN:  Soft and nontender.   EXTREMITIES:  no C/C/E  NEURO: AAO x 3, no focal deficts       LABS:                        13.3   12.40 )-----------( 256      ( 17 Jun 2022 07:16 )             40.0     06-17    131<L>  |  100  |  24<H>  ----------------------------<  163<H>  4.0   |  23  |  0.77    Ca    8.6      17 Jun 2022 07:16            RADIOLOGY & ADDITIONAL TESTS:

## 2022-06-18 NOTE — PROGRESS NOTE ADULT - PROBLEM SELECTOR PROBLEM 1
Multilobar lung infiltrate

## 2022-06-18 NOTE — PROGRESS NOTE ADULT - ASSESSMENT
55 yo man with HTN admitted with PNA of unclear etiology despite extensive evaluation.  --Hyponatremia appears multifactorial but pt does have high fluid intake.    Check urine studies to evaluate if concomitant SIADH due to pulmonary pathology.    Explained to adhere to moderate fluid restriction.    Hold off on adding NACL tabs.  --Continue Dexamethasone.    6/14 SY  --Hyponatremia,  unclear if combination of increased water intake as well SIADH due to pulmonary pathology.    Urine NA and Osm  elevated --c/w SIADH.    Continue fluid restriction.    Start NACL tabs BID for now to prevent further worsening.  --Pt aware of need for BW in am.    6/15 SY  --Hyponatremia : c/w SIADH based on urine studies.  Due to pulmonary pathology.      Continue NACL tabs BID     Continue moderate fluid restriction. --decrease to 1200cc    6/16 SY  --Hyponatremia --SIADH due to pulmonary pathology.    Increase NACL tabs to TID.     Continue fluid restriction.    Assured pt dry nasal and oral mucosa due to O2 rather than dehydration.    6/17 MK   - hyponatremia/ SIADH due to groundglass infiltrate on lung    improving with FR and nacl tabs  dw dr bashir hoyt     6/18 SY  --Hyponatremia/SIADH : expect sodium level to stabilize on fluid restriction and NACL tabs.   Continue current rx.    Plan to taper NACL tabs as sodium level normalizes.  --Labs in am.  --D/c planning with continued O2 therapy.

## 2022-06-18 NOTE — PROGRESS NOTE ADULT - SUBJECTIVE AND OBJECTIVE BOX
NEPHROLOGY INTERVAL HPI/OVERNIGHT EVENTS:    Date of Service: 06-18-22 @ 12:32    6/18--Reports feeling improved.  able to walk to bathroom several times.  6/17 complying with fluid restriction, ambulated today but is feeling weak after that  6/16--Attempting to adhere to fluid restriction but can't really  keep track.  Feels dehydrated.  wants humidifier  O2 with water on.       able to lay flat on his back but continues to desaturate with activity.  6/15--Feeling somewhat improved.  Able to lay flatter on back and sleep better last night.  Appears comfortable today.  6/14--SOB slightly improved.  C/o burping excessively.  No clear relief with gas ex.  Refused blood work this morning.     HPI:  55 yo man with PMHX of HTN admitted on 5/27 with progressive SOB and extreme fatigue.  Noted fatigue post 1 week after second covid booster shot.  Initially had cardiac work up due to CARMONA.  SOB with hypoxemia worsened and on outpt CXR, found to have PNA with bilateral infiltrates.  Covid tested negative.  Symptoms did not improve with oral abtx and sent for admission.  Has had extensive work up but etiology of pulmonary infiltrates remain unclear.  COVID repeatedly negative.  CTA negative for PE with persistent bilateral infiltrates.  Serologies negative.  Pt states lung bx was advised but he has declined.  Notes some improvement in CARMONA for last 2 days after dexamethasone started.  Aware of low serum sodium level but has not followed fluid restriction due to dry mouth    PMHX and PSHX.  1.HTN    MEDICATIONS  (STANDING):  bisacodyl 5 milliGRAM(s) Oral at bedtime  budesonide 160 MICROgram(s)/formoterol 4.5 MICROgram(s) Inhaler 2 Puff(s) Inhalation two times a day  dexAMETHasone  Injectable 4 milliGRAM(s) IV Push <User Schedule>  dextrose 5%. 1000 milliLiter(s) (100 mL/Hr) IV Continuous <Continuous>  dextrose 5%. 1000 milliLiter(s) (50 mL/Hr) IV Continuous <Continuous>  dextrose 50% Injectable 25 Gram(s) IV Push once  dextrose 50% Injectable 12.5 Gram(s) IV Push once  dextrose 50% Injectable 25 Gram(s) IV Push once  doxazosin 8 milliGRAM(s) Oral at bedtime  glucagon  Injectable 1 milliGRAM(s) IntraMuscular once  guaiFENesin ER 1200 milliGRAM(s) Oral every 12 hours  heparin   Injectable 5000 Unit(s) SubCutaneous every 8 hours  insulin glargine Injectable (LANTUS) 30 Unit(s) SubCutaneous every morning  insulin lispro (ADMELOG) corrective regimen sliding scale   SubCutaneous three times a day before meals  insulin lispro (ADMELOG) corrective regimen sliding scale   SubCutaneous at bedtime  insulin lispro Injectable (ADMELOG) 10 Unit(s) SubCutaneous three times a day before meals  lactobacillus acidophilus 1 Tablet(s) Oral two times a day  losartan 50 milliGRAM(s) Oral daily  melatonin 3 milliGRAM(s) Oral at bedtime  pantoprazole    Tablet 40 milliGRAM(s) Oral before breakfast  senna 2 Tablet(s) Oral daily  simvastatin 20 milliGRAM(s) Oral at bedtime  sodium chloride 1 Gram(s) Oral three times a day    MEDICATIONS  (PRN):  acetaminophen     Tablet .. 650 milliGRAM(s) Oral every 6 hours PRN Temp greater or equal to 38C (100.4F), Mild Pain (1 - 3)  ALBUTerol    90 MICROgram(s) HFA Inhaler 2 Puff(s) Inhalation every 6 hours PRN Shortness of Breath and/or Wheezing  aluminum hydroxide/magnesium hydroxide/simethicone Suspension 30 milliLiter(s) Oral every 6 hours PRN Dyspepsia  artificial  tears Solution 1 Drop(s) Both EYES every 8 hours PRN Dry Eyes  artificial tears (preservative free) Ophthalmic Solution 1 Drop(s) Both EYES every 3 hours PRN Dry Eyes  benzocaine 15 mG/menthol 3.6 mG Lozenge 1 Lozenge Oral three times a day PRN Sore Throat  dextrose Oral Gel 15 Gram(s) Oral once PRN Blood Glucose LESS THAN 70 milliGRAM(s)/deciliter  polyethylene glycol 3350 17 Gram(s) Oral daily PRN Constipation  simethicone 80 milliGRAM(s) Chew every 6 hours PRN Gas  sodium chloride 0.65% Nasal 1 Spray(s) Both Nostrils two times a day PRN Nasal Congestion    Vital Signs Last 24 Hrs  T(C): 36.7 (18 Jun 2022 08:29), Max: 36.8 (17 Jun 2022 22:35)  T(F): 98.1 (18 Jun 2022 08:29), Max: 98.3 (17 Jun 2022 22:35)  HR: 64 (18 Jun 2022 08:29) (56 - 72)  BP: 134/64 (18 Jun 2022 08:29) (114/64 - 134/64)  BP(mean): 74 (17 Jun 2022 23:12) (74 - 74)  RR: 18 (18 Jun 2022 08:29) (18 - 18)  SpO2: 94% (18 Jun 2022 08:29) (94% - 97%)    06-17 @ 07:01  -  06-18 @ 07:00  --------------------------------------------------------  IN: 0 mL / OUT: 1400 mL / NET: -1400 mL    PHYSICAL EXAM:  GENERAL: Comfortable.  CHEST/LUNG: basilar rales  HEART: S1S2 RRR  ABDOMEN: soft  EXTREMITIES: no edema  SKIN:     LABS:                        13.3   12.40 )-----------( 256      ( 17 Jun 2022 07:16 )             40.0     06-17    131<L>  |  100  |  24<H>  ----------------------------<  163<H>  4.0   |  23  |  0.77    Ca    8.6      17 Jun 2022 07:16                  RADIOLOGY & ADDITIONAL TESTS:

## 2022-06-18 NOTE — PROGRESS NOTE ADULT - ASSESSMENT
56/M with PMHx of HTN admitted for:       # Acute hypoxic resp failure- due to  multilobar pneumonia   this could be viral or inflammatory inflammatory  - tested for COVID multiple times but negative  - RVP neg    - s/p full course of antibiotics with no significant improvement   - Significantly elevated inflammatory markers initially, work up for autoimmune Dz done and  neg , ESR wnl now   - s/p Solumedrol, now changed to IV Decadron  with some clinical improvement and ESR/CRP much improved,  start taper off   - continues O2 monitoring, supplement via NC on 4 L of O2 now, titrate down as tolerated    - incentive spirometry  - encourage OOB to chair  and ambulate   - HIV test neg   - repeat CT chest with some Improvement   - D/w Dr Reynaga         # Hyponatremia likely multifactorial,   - increased free water intake and possible SIADH 2/2 lung pathology   - monitor Na, improving   - free water restriction   - sc/w  Po NaCl  TID  - d/w Dr Meza, labs in am     #  New DM- hyperglycemia 2/2 steroids    - C/w Lantus on 30U Lantus now  -C/w ISS  - A1c 8.3- will need to be on oral hypoglycemic on DC   - nutrition eval       # HTN:   - cont home meds    # DVT PPX:  - heparin s/q    OOB and mmobilize as tolerated, Incentive spirometry. D/c planning when Pulse ox is stable with  basic exertion.

## 2022-06-18 NOTE — PROGRESS NOTE ADULT - PROBLEM SELECTOR PROBLEM 3
Acute dyspnea
Paroxysmal cough
Paroxysmal cough
Acute dyspnea
Paroxysmal cough
Paroxysmal cough
Acute dyspnea
Acute dyspnea

## 2022-06-19 LAB
ANION GAP SERPL CALC-SCNC: 5 MMOL/L — SIGNIFICANT CHANGE UP (ref 5–17)
APPEARANCE UR: CLEAR — SIGNIFICANT CHANGE UP
BILIRUB UR-MCNC: NEGATIVE — SIGNIFICANT CHANGE UP
BUN SERPL-MCNC: 25 MG/DL — HIGH (ref 7–23)
CALCIUM SERPL-MCNC: 8.7 MG/DL — SIGNIFICANT CHANGE UP (ref 8.5–10.1)
CHLORIDE SERPL-SCNC: 100 MMOL/L — SIGNIFICANT CHANGE UP (ref 96–108)
CO2 SERPL-SCNC: 26 MMOL/L — SIGNIFICANT CHANGE UP (ref 22–31)
COLOR SPEC: YELLOW — SIGNIFICANT CHANGE UP
CREAT SERPL-MCNC: 0.86 MG/DL — SIGNIFICANT CHANGE UP (ref 0.5–1.3)
DIFF PNL FLD: ABNORMAL
EGFR: 102 ML/MIN/1.73M2 — SIGNIFICANT CHANGE UP
GLUCOSE SERPL-MCNC: 143 MG/DL — HIGH (ref 70–99)
GLUCOSE UR QL: NEGATIVE — SIGNIFICANT CHANGE UP
HCT VFR BLD CALC: 40.7 % — SIGNIFICANT CHANGE UP (ref 39–50)
HGB BLD-MCNC: 13.3 G/DL — SIGNIFICANT CHANGE UP (ref 13–17)
KETONES UR-MCNC: NEGATIVE — SIGNIFICANT CHANGE UP
LEUKOCYTE ESTERASE UR-ACNC: NEGATIVE — SIGNIFICANT CHANGE UP
MCHC RBC-ENTMCNC: 28.6 PG — SIGNIFICANT CHANGE UP (ref 27–34)
MCHC RBC-ENTMCNC: 32.7 GM/DL — SIGNIFICANT CHANGE UP (ref 32–36)
MCV RBC AUTO: 87.5 FL — SIGNIFICANT CHANGE UP (ref 80–100)
NITRITE UR-MCNC: NEGATIVE — SIGNIFICANT CHANGE UP
PH UR: 6 — SIGNIFICANT CHANGE UP (ref 5–8)
PLATELET # BLD AUTO: 234 K/UL — SIGNIFICANT CHANGE UP (ref 150–400)
POTASSIUM SERPL-MCNC: 4.3 MMOL/L — SIGNIFICANT CHANGE UP (ref 3.5–5.3)
POTASSIUM SERPL-SCNC: 4.3 MMOL/L — SIGNIFICANT CHANGE UP (ref 3.5–5.3)
PROT UR-MCNC: NEGATIVE — SIGNIFICANT CHANGE UP
RBC # BLD: 4.65 M/UL — SIGNIFICANT CHANGE UP (ref 4.2–5.8)
RBC # FLD: 15.4 % — HIGH (ref 10.3–14.5)
SODIUM SERPL-SCNC: 131 MMOL/L — LOW (ref 135–145)
SP GR SPEC: 1.01 — SIGNIFICANT CHANGE UP (ref 1.01–1.02)
UROBILINOGEN FLD QL: NEGATIVE — SIGNIFICANT CHANGE UP
WBC # BLD: 11.75 K/UL — HIGH (ref 3.8–10.5)
WBC # FLD AUTO: 11.75 K/UL — HIGH (ref 3.8–10.5)

## 2022-06-19 PROCEDURE — 99233 SBSQ HOSP IP/OBS HIGH 50: CPT

## 2022-06-19 RX ORDER — INSULIN GLARGINE 100 [IU]/ML
25 INJECTION, SOLUTION SUBCUTANEOUS AT BEDTIME
Refills: 0 | Status: DISCONTINUED | OUTPATIENT
Start: 2022-06-19 | End: 2022-06-21

## 2022-06-19 RX ADMIN — BUDESONIDE AND FORMOTEROL FUMARATE DIHYDRATE 2 PUFF(S): 160; 4.5 AEROSOL RESPIRATORY (INHALATION) at 10:15

## 2022-06-19 RX ADMIN — Medication 10 UNIT(S): at 17:49

## 2022-06-19 RX ADMIN — INSULIN GLARGINE 25 UNIT(S): 100 INJECTION, SOLUTION SUBCUTANEOUS at 21:08

## 2022-06-19 RX ADMIN — HEPARIN SODIUM 5000 UNIT(S): 5000 INJECTION INTRAVENOUS; SUBCUTANEOUS at 17:48

## 2022-06-19 RX ADMIN — INSULIN GLARGINE 30 UNIT(S): 100 INJECTION, SOLUTION SUBCUTANEOUS at 08:49

## 2022-06-19 RX ADMIN — BUDESONIDE AND FORMOTEROL FUMARATE DIHYDRATE 2 PUFF(S): 160; 4.5 AEROSOL RESPIRATORY (INHALATION) at 21:16

## 2022-06-19 RX ADMIN — Medication 1 TABLET(S): at 11:19

## 2022-06-19 RX ADMIN — Medication 650 MILLIGRAM(S): at 21:23

## 2022-06-19 RX ADMIN — SODIUM CHLORIDE 1 GRAM(S): 9 INJECTION INTRAMUSCULAR; INTRAVENOUS; SUBCUTANEOUS at 21:09

## 2022-06-19 RX ADMIN — SIMETHICONE 80 MILLIGRAM(S): 80 TABLET, CHEWABLE ORAL at 19:55

## 2022-06-19 RX ADMIN — Medication 2: at 17:45

## 2022-06-19 RX ADMIN — BENZOCAINE AND MENTHOL 1 LOZENGE: 5; 1 LIQUID ORAL at 23:32

## 2022-06-19 RX ADMIN — PANTOPRAZOLE SODIUM 40 MILLIGRAM(S): 20 TABLET, DELAYED RELEASE ORAL at 06:25

## 2022-06-19 RX ADMIN — HEPARIN SODIUM 5000 UNIT(S): 5000 INJECTION INTRAVENOUS; SUBCUTANEOUS at 21:08

## 2022-06-19 RX ADMIN — Medication 4 MILLIGRAM(S): at 11:20

## 2022-06-19 RX ADMIN — SODIUM CHLORIDE 1 GRAM(S): 9 INJECTION INTRAMUSCULAR; INTRAVENOUS; SUBCUTANEOUS at 13:24

## 2022-06-19 RX ADMIN — HEPARIN SODIUM 5000 UNIT(S): 5000 INJECTION INTRAVENOUS; SUBCUTANEOUS at 06:24

## 2022-06-19 RX ADMIN — Medication 1200 MILLIGRAM(S): at 11:20

## 2022-06-19 RX ADMIN — Medication 10 UNIT(S): at 08:50

## 2022-06-19 RX ADMIN — SIMETHICONE 80 MILLIGRAM(S): 80 TABLET, CHEWABLE ORAL at 13:24

## 2022-06-19 RX ADMIN — Medication 1 TABLET(S): at 21:09

## 2022-06-19 RX ADMIN — Medication 650 MILLIGRAM(S): at 20:20

## 2022-06-19 RX ADMIN — LOSARTAN POTASSIUM 50 MILLIGRAM(S): 100 TABLET, FILM COATED ORAL at 11:45

## 2022-06-19 RX ADMIN — Medication 10 UNIT(S): at 13:25

## 2022-06-19 RX ADMIN — Medication 8 MILLIGRAM(S): at 21:08

## 2022-06-19 RX ADMIN — Medication 1200 MILLIGRAM(S): at 21:08

## 2022-06-19 RX ADMIN — SIMETHICONE 80 MILLIGRAM(S): 80 TABLET, CHEWABLE ORAL at 06:25

## 2022-06-19 RX ADMIN — SIMVASTATIN 20 MILLIGRAM(S): 20 TABLET, FILM COATED ORAL at 21:09

## 2022-06-19 RX ADMIN — SODIUM CHLORIDE 1 GRAM(S): 9 INJECTION INTRAMUSCULAR; INTRAVENOUS; SUBCUTANEOUS at 07:29

## 2022-06-19 NOTE — PROGRESS NOTE ADULT - ASSESSMENT
56/M with PMHx of HTN admitted for:       # Acute hypoxic resp failure- due to  multilobar pneumonia   this could be viral or inflammatory inflammatory  - tested for COVID multiple times but negative  - RVP neg    - s/p full course of antibiotics with no significant improvement   - Significantly elevated inflammatory markers initially, work up for autoimmune Dz done and  neg , ESR wnl now   - s/p Solumedrol, now changed to IV Decadron  with some clinical improvement and ESR/CRP much improved,  stared to  taper off   - continues O2 monitoring, supplement via NC on 2-3 L of O2 now, titrate down as tolerated    - incentive spirometry  - encourage OOB to chair  and ambulate   - HIV test neg   - repeat CT chest with some Improvement   - will evaluate for Home O2   - D/w Dr Reynaga       # Hyponatremia likely multifactorial,   - increased free water intake and possible SIADH 2/2 lung pathology   - monitor Na, improving   - free water restriction   - sc/w  Po NaCl  TID  - d/w Dr Meza, will need to f/u outPt     #  New DM- hyperglycemia 2/2 steroids    - C/w Lantus decrease to 25U Lantus   -C/w ISS, c/w pre meal coverage   - A1c 8.3- will need to be on oral hypoglycemic on DC   - nutrition eval       # HTN:   - cont home meds    # DVT PPX:  - heparin s/q    OOB and mobilize  as tolerated, Incentive spirometry. D/c planning .

## 2022-06-19 NOTE — PROGRESS NOTE ADULT - SUBJECTIVE AND OBJECTIVE BOX
CC: PNA (2022 18:13)    HPI: 56/M with PMHx of HTN, brought to the ED for c/o worsening SOB and cough along with a recent Dx of PNA. He also reported  that he has been feeling very weak and dizzy. He recently got 4th dose of COVID vaccine 3 weeks ago and Sx started 1 week thereafter. + Dry Cough   In Ed found to be hypoxic on RA,   chest pain when coughing.   States he has not been eating much recently and has a 15 lb weight loss.   No recent travel or periods of immobilization or surgeries.  COVID neg     INTERVAL HPI/ OVERNIGHT EVENTS:  Pt was seen and examined,  c/o getting up  at night to urinate multiple times, no burning or pain. Otherwise doing better, SOB on ambulation. POC discussed. Dr Meza at bedside.      Vital Signs Last 24 Hrs  T(C): 36.8 (2022 16:49), Max: 36.8 (2022 16:49)  T(F): 98.2 (2022 16:49), Max: 98.2 (2022 16:49)  HR: 67 (2022 16:49) (57 - 76)  BP: 104/53 (2022 16:49) (104/53 - 135/80)  RR: 18 (2022 16:49) (16 - 18)  SpO2: 97% (2022 16:49) (91% - 97%)    REVIEW OF SYSTEMS:  All other review of systems is negative unless indicated above.      PHYSICAL EXAM:  General: Well developed; well nourished; in no acute distress  Eyes: EOMI; conjunctiva and sclera clear  Head: Normocephalic; atraumatic  ENMT: No nasal discharge; airway clear  Neck: Supple; non tender; no masses  Respiratory: improved air entry, + b/l  crepitations at bases , No wheezes, rales   Cardiovascular: Regular rate and rhythm. S1 and S2 Normal;   Gastrointestinal: Soft non-tender non-distended; Normal bowel sounds  Genitourinary: No  suprapubic  tenderness  Extremities: No  edema  Vascular: Peripheral pulses palpable 2+ bilaterally  Neurological: Alert and oriented x3, non focal   Skin: Warm and dry. No acute rash  Lymph Nodes: No acute cervical adenopathy  Musculoskeletal: Normal muscle tone, without deformities  Psychiatric: Cooperative and anxious         LABS:                         13.3   11.75 )-----------( 234      ( 2022 08:26 )             40.7     06-19    131<L>  |  100  |  25<H>  ----------------------------<  143<H>  4.3   |  26  |  0.86    Ca    8.7      2022 08:26                                       13.3   12.40 )-----------( 256      ( 2022 07:16 )             40.0     06-17    131<L>  |  100  |  24<H>  ----------------------------<  163<H>  4.0   |  23  |  0.77    Ca    8.6      2022 07:16               13.6   12.46 )-----------( 292      ( 15 Forrest 2022 06:32 )             41.8     06-16    128<L>  |  96  |  21  ----------------------------<  116<H>  3.8   |  25  |  0.77    Ca    8.7      2022 07:50                            13.6   12.46 )-----------( 292      ( 15 Forrest 2022 06:32 )             41.8     06-15    128<L>  |  96  |  22  ----------------------------<  120<H>  4.1   |  25  |  0.81    Ca    8.8      15 Forrest 2022 06:32                              13.9   15.66 )-----------( 315      ( 2022 07:18 )             42.0     06-13    127<L>  |  95<L>  |  23  ----------------------------<  184<H>  4.2   |  23  |  0.83    Ca    8.7      2022 07:18    TPro  5.9<L>  /  Alb  2.4<L>  /  TBili  0.6  /  DBili  x   /  AST  36  /  ALT  101<H>  /  AlkPhos  67  06-13        LIVER FUNCTIONS - ( 2022 07:18 )  Alb: 2.4 g/dL / Pro: 5.9 gm/dL / ALK PHOS: 67 U/L / ALT: 101 U/L / AST: 36 U/L / GGT: x             Urinalysis Basic - ( 2022 14:35 )    Color: Yellow / Appearance: Clear / S.015 / pH: x  Gluc: x / Ketone: Negative  / Bili: Negative / Urobili: Negative   Blood: x / Protein: Negative / Nitrite: Negative   Leuk Esterase: Negative / RBC: 0-2 /HPF / WBC 0-2   Sq Epi: x / Non Sq Epi: x / Bacteria: Few                          13.9   15.66 )-----------( 315      ( 2022 07:18 )             42.0     2022 07:18    127    |  95     |  23     ----------------------------<  184    4.2     |  23     |  0.83     Ca    8.7        2022 07:18    TPro  5.9    /  Alb  2.4    /  TBili  0.6    /  DBili  x      /  AST  36     /  ALT  101    /  AlkPhos  67     2022 07:18    Urinalysis Basic - ( 2022 14:35 )    Color: Yellow / Appearance: Clear / S.015 / pH: x  Gluc: x / Ketone: Negative  / Bili: Negative / Urobili: Negative   Blood: x / Protein: Negative / Nitrite: Negative   Leuk Esterase: Negative / RBC: 0-2 /HPF / WBC 0-2   Sq Epi: x / Non Sq Epi: x / Bacteria: Few    CAPILLARY BLOOD GLUCOSE:  POCT Blood Glucose.: 113 mg/dL (2022 13:13)  POCT Blood Glucose.: 125 mg/dL (2022 08:13)  POCT Blood Glucose.: 225 mg/dL (2022 22:01)  POCT Blood Glucose.: 209 mg/dL (2022 17:34)      MEDICATIONS  (STANDING):  bisacodyl 5 milliGRAM(s) Oral at bedtime  budesonide 160 MICROgram(s)/formoterol 4.5 MICROgram(s) Inhaler 2 Puff(s) Inhalation two times a day  dexAMETHasone  Injectable 6 milliGRAM(s) IV Push <User Schedule>  dextrose 5%. 1000 milliLiter(s) (100 mL/Hr) IV Continuous <Continuous>  dextrose 5%. 1000 milliLiter(s) (50 mL/Hr) IV Continuous <Continuous>  dextrose 50% Injectable 25 Gram(s) IV Push once  dextrose 50% Injectable 12.5 Gram(s) IV Push once  dextrose 50% Injectable 25 Gram(s) IV Push once  doxazosin 8 milliGRAM(s) Oral at bedtime  glucagon  Injectable 1 milliGRAM(s) IntraMuscular once  guaiFENesin ER 1200 milliGRAM(s) Oral every 12 hours  heparin   Injectable 5000 Unit(s) SubCutaneous every 8 hours  insulin glargine Injectable (LANTUS) 30 Unit(s) SubCutaneous every morning  insulin lispro (ADMELOG) corrective regimen sliding scale   SubCutaneous three times a day before meals  insulin lispro (ADMELOG) corrective regimen sliding scale   SubCutaneous at bedtime  insulin lispro Injectable (ADMELOG) 10 Unit(s) SubCutaneous three times a day before meals  lactobacillus acidophilus 1 Tablet(s) Oral two times a day  losartan 50 milliGRAM(s) Oral daily  melatonin 3 milliGRAM(s) Oral at bedtime  pantoprazole    Tablet 40 milliGRAM(s) Oral before breakfast  senna 2 Tablet(s) Oral daily  simvastatin 20 milliGRAM(s) Oral at bedtime  sodium chloride 1 Gram(s) Oral three times a day    MEDICATIONS  (PRN):  acetaminophen     Tablet .. 650 milliGRAM(s) Oral every 6 hours PRN Temp greater or equal to 38C (100.4F), Mild Pain (1 - 3)  ALBUTerol    90 MICROgram(s) HFA Inhaler 2 Puff(s) Inhalation every 6 hours PRN Shortness of Breath and/or Wheezing  aluminum hydroxide/magnesium hydroxide/simethicone Suspension 30 milliLiter(s) Oral every 6 hours PRN Dyspepsia  artificial  tears Solution 1 Drop(s) Both EYES every 8 hours PRN Dry Eyes  artificial tears (preservative free) Ophthalmic Solution 1 Drop(s) Both EYES every 3 hours PRN Dry Eyes  benzocaine 15 mG/menthol 3.6 mG Lozenge 1 Lozenge Oral three times a day PRN Sore Throat  dextrose Oral Gel 15 Gram(s) Oral once PRN Blood Glucose LESS THAN 70 milliGRAM(s)/deciliter  polyethylene glycol 3350 17 Gram(s) Oral daily PRN Constipation  simethicone 80 milliGRAM(s) Chew every 6 hours PRN Gas  sodium chloride 0.65% Nasal 1 Spray(s) Both Nostrils two times a day PRN Nasal Congestion      RADIOLOGY & ADDITIONAL TESTS:      ACC: [id number] EXAM:  CT ANGIO CHEST PULM ART WAWIC                          PROCEDURE DATE:  2022          INTERPRETATION:  INDICATION: Shortness of breath    TECHNIQUE: Volumetric images of the chest were obtained after the   administration of 90 mL of Omnipaque 350. Maximum intensity projection   images were generated.    COMPARISON: None.    FINDINGS:    PULMONARY ANGIOGRAM: No pulmonary embolism from the main pulmonary artery   up to and including the segmental branches. Limited assessment of   subsegmental branches in the lower lobes as they are not well opacified.   Normal caliber pulmonary artery.    LUNGS/AIRWAYS/PLEURA: Patent trachea and bronchi. Peripheral groundglass   and consolidative opacities in all lobes, preferentialto the lower   lobes. Unremarkable pleura.    LYMPH NODES/MEDIASTINUM: No enlarged lymph nodes.    HEART/VASCULATURE: Normal heart size. Small pericardial effusion. Normal   caliber aorta. Coronary artery calcifications.    UPPER ABDOMEN: Partially included incompletely characterized 2.0 cm left   adrenal nodule.    BONES/SOFT TISSUES: Degenerative changes of the spine.      IMPRESSION:    No pulmonary embolism from the main pulmonary artery up to and including   the segmental branches. Limited assessment of subsegmental branches.    Bilateral peripheral lung disease. Differential includes infection (COVID   in particular), organizing pneumonia, or eosinophilic pneumonia.    Incidental incompletely characterized left adrenal nodule. Further   evaluation with MRI is recommended, which can be done on a nonemergent   basis.        < from: CT Angio Chest PE Protocol w/ IV Cont (22 @ 11:27) >    ACC: [id number] EXAM:  CT ANGIO CHEST PULM ART WAWIC                          PROCEDURE DATE:  2022          INTERPRETATION:  INDICATION: Shortness of breath    TECHNIQUE: Volumetric images of the chest were obtained after the   administration of 90 mL of Omnipaque 350. Maximum intensity projection   images were generated.    COMPARISON: CT chest 2022.    FINDINGS:    PULMONARY ANGIOGRAM:  No pulmonary embolism. Normal caliber pulmonary   artery.    LUNGS/AIRWAYS/PLEURA: No endobronchial lesion. Stable bilateral   peripheral groundglass and consolidation involving all lobes,   preferential to the lower lobes. No pleural effusion.    LYMPH NODES/MEDIASTINUM: No enlarged lymph nodes.    HEART/VASCULATURE: Normal heart size. Stable small pericardial effusion.   Normal caliber aorta. Coronary artery calcifications.    UPPER ABDOMEN: Stable left adrenal nodule.    BONES/SOFT TISSUES: Degenerative changes of the spine.      IMPRESSION:    No pulmonary embolism.    Stable peripheral lung disease. Differential includes organizing   pneumonia, eosinophilic lung disease, or atypical infection.

## 2022-06-19 NOTE — PROGRESS NOTE ADULT - SUBJECTIVE AND OBJECTIVE BOX
NEPHROLOGY INTERVAL HPI/OVERNIGHT EVENTS:    Date of Service: 06-19-22 @ 13:01    6/19--Now complains of urinary frequency. ? unable to void completely.  No dysuria.  6/18--Reports feeling improved.  able to walk to bathroom several times.  6/17 complying with fluid restriction, ambulated today but is feeling weak after that  6/16--Attempting to adhere to fluid restriction but can't really  keep track.  Feels dehydrated.  wants humidifier  O2 with water on.       able to lay flat on his back but continues to desaturate with activity.  6/15--Feeling somewhat improved.  Able to lay flatter on back and sleep better last night.  Appears comfortable today.  6/14--SOB slightly improved.  C/o burping excessively.  No clear relief with gas ex.  Refused blood work this morning.     HPI:  57 yo man with PMHX of HTN admitted on 5/27 with progressive SOB and extreme fatigue.  Noted fatigue post 1 week after second covid booster shot.  Initially had cardiac work up due to CARMONA.  SOB with hypoxemia worsened and on outpt CXR, found to have PNA with bilateral infiltrates.  Covid tested negative.  Symptoms did not improve with oral abtx and sent for admission.  Has had extensive work up but etiology of pulmonary infiltrates remain unclear.  COVID repeatedly negative.  CTA negative for PE with persistent bilateral infiltrates.  Serologies negative.  Pt states lung bx was advised but he has declined.  Notes some improvement in CARMONA for last 2 days after dexamethasone started.  Aware of low serum sodium level but has not followed fluid restriction due to dry mouth    PMHX and PSHX.  1.HTN    MEDICATIONS  (STANDING):  bisacodyl 5 milliGRAM(s) Oral at bedtime  budesonide 160 MICROgram(s)/formoterol 4.5 MICROgram(s) Inhaler 2 Puff(s) Inhalation two times a day  dexAMETHasone  Injectable 4 milliGRAM(s) IV Push <User Schedule>  dextrose 5%. 1000 milliLiter(s) (50 mL/Hr) IV Continuous <Continuous>  dextrose 5%. 1000 milliLiter(s) (100 mL/Hr) IV Continuous <Continuous>  dextrose 50% Injectable 25 Gram(s) IV Push once  dextrose 50% Injectable 12.5 Gram(s) IV Push once  dextrose 50% Injectable 25 Gram(s) IV Push once  doxazosin 8 milliGRAM(s) Oral at bedtime  glucagon  Injectable 1 milliGRAM(s) IntraMuscular once  guaiFENesin ER 1200 milliGRAM(s) Oral every 12 hours  heparin   Injectable 5000 Unit(s) SubCutaneous every 8 hours  insulin glargine Injectable (LANTUS) 30 Unit(s) SubCutaneous every morning  insulin lispro (ADMELOG) corrective regimen sliding scale   SubCutaneous three times a day before meals  insulin lispro (ADMELOG) corrective regimen sliding scale   SubCutaneous at bedtime  insulin lispro Injectable (ADMELOG) 10 Unit(s) SubCutaneous three times a day before meals  lactobacillus acidophilus 1 Tablet(s) Oral two times a day  losartan 50 milliGRAM(s) Oral daily  melatonin 3 milliGRAM(s) Oral at bedtime  pantoprazole    Tablet 40 milliGRAM(s) Oral before breakfast  senna 2 Tablet(s) Oral daily  simvastatin 20 milliGRAM(s) Oral at bedtime  sodium chloride 1 Gram(s) Oral three times a day    MEDICATIONS  (PRN):  acetaminophen     Tablet .. 650 milliGRAM(s) Oral every 6 hours PRN Temp greater or equal to 38C (100.4F), Mild Pain (1 - 3)  ALBUTerol    90 MICROgram(s) HFA Inhaler 2 Puff(s) Inhalation every 6 hours PRN Shortness of Breath and/or Wheezing  aluminum hydroxide/magnesium hydroxide/simethicone Suspension 30 milliLiter(s) Oral every 6 hours PRN Dyspepsia  artificial  tears Solution 1 Drop(s) Both EYES every 8 hours PRN Dry Eyes  artificial tears (preservative free) Ophthalmic Solution 1 Drop(s) Both EYES every 3 hours PRN Dry Eyes  benzocaine 15 mG/menthol 3.6 mG Lozenge 1 Lozenge Oral three times a day PRN Sore Throat  dextrose Oral Gel 15 Gram(s) Oral once PRN Blood Glucose LESS THAN 70 milliGRAM(s)/deciliter  polyethylene glycol 3350 17 Gram(s) Oral daily PRN Constipation  simethicone 80 milliGRAM(s) Chew every 6 hours PRN Gas  sodium chloride 0.65% Nasal 1 Spray(s) Both Nostrils two times a day PRN Nasal Congestion    Vital Signs Last 24 Hrs  T(C): 36.5 (19 Jun 2022 08:04), Max: 36.6 (18 Jun 2022 17:31)  T(F): 97.7 (19 Jun 2022 08:04), Max: 97.9 (18 Jun 2022 17:31)  HR: 76 (19 Jun 2022 08:54) (57 - 76)  BP: 125/64 (19 Jun 2022 08:04) (120/64 - 135/80)  BP(mean): --  RR: 16 (19 Jun 2022 08:04) (16 - 18)  SpO2: 95% (19 Jun 2022 08:04) (91% - 96%)    06-18 @ 07:01  -  06-19 @ 07:00  --------------------------------------------------------  IN: 0 mL / OUT: 500 mL / NET: -500 mL    PHYSICAL EXAM:  GENERAL: comfortable  CHEST/LUNG: bibasilar crackles.  HEART: S1S2 RRR  ABDOMEN: soft  EXTREMITIES: no edema  SKIN:     LABS:                        13.3   11.75 )-----------( 234      ( 19 Jun 2022 08:26 )             40.7     06-19    131<L>  |  100  |  25<H>  ----------------------------<  143<H>  4.3   |  26  |  0.86    Ca    8.7      19 Jun 2022 08:26                  RADIOLOGY & ADDITIONAL TESTS:

## 2022-06-19 NOTE — PROVIDER CONTACT NOTE (OTHER) - ASSESSMENT
Pt report excess urine output despite adhering to 1500 ml fluid restriction  Reports concern for uti  WBC noted, prednisone induced DM  Prior urinalysis noted  Nephro on board

## 2022-06-19 NOTE — PROGRESS NOTE ADULT - ASSESSMENT
57 yo man with HTN admitted with PNA of unclear etiology despite extensive evaluation.  --Hyponatremia appears multifactorial but pt does have high fluid intake.    Check urine studies to evaluate if concomitant SIADH due to pulmonary pathology.    Explained to adhere to moderate fluid restriction.    Hold off on adding NACL tabs.  --Continue Dexamethasone.    6/14 SY  --Hyponatremia,  unclear if combination of increased water intake as well SIADH due to pulmonary pathology.    Urine NA and Osm  elevated --c/w SIADH.    Continue fluid restriction.    Start NACL tabs BID for now to prevent further worsening.  --Pt aware of need for BW in am.    6/15 SY  --Hyponatremia : c/w SIADH based on urine studies.  Due to pulmonary pathology.      Continue NACL tabs BID     Continue moderate fluid restriction. --decrease to 1200cc    6/16 SY  --Hyponatremia --SIADH due to pulmonary pathology.    Increase NACL tabs to TID.     Continue fluid restriction.    Assured pt dry nasal and oral mucosa due to O2 rather than dehydration.    6/17 MK   - hyponatremia/ SIADH due to groundglass infiltrate on lung    improving with FR and nacl tabs  dw dr bashir hoyt     6/18 SY  --Hyponatremia/SIADH : expect sodium level to stabilize on fluid restriction and NACL tabs.   Continue current rx.    Plan to taper NACL tabs as sodium level normalizes.  --Labs in am.  --D/c planning with continued O2 therapy.    6/19 SY  --Hyponatremia /SIADH : continue fluid restriction with TID salt tabs  --Check bladder scan though no reason for urinary retention.  --RESP : desaturation with activities slowly improving.

## 2022-06-20 LAB
ANION GAP SERPL CALC-SCNC: 7 MMOL/L — SIGNIFICANT CHANGE UP (ref 5–17)
BUN SERPL-MCNC: 24 MG/DL — HIGH (ref 7–23)
CALCIUM SERPL-MCNC: 8.8 MG/DL — SIGNIFICANT CHANGE UP (ref 8.5–10.1)
CHLORIDE SERPL-SCNC: 101 MMOL/L — SIGNIFICANT CHANGE UP (ref 96–108)
CO2 SERPL-SCNC: 25 MMOL/L — SIGNIFICANT CHANGE UP (ref 22–31)
CREAT SERPL-MCNC: 0.82 MG/DL — SIGNIFICANT CHANGE UP (ref 0.5–1.3)
EGFR: 103 ML/MIN/1.73M2 — SIGNIFICANT CHANGE UP
GLUCOSE SERPL-MCNC: 110 MG/DL — HIGH (ref 70–99)
HCT VFR BLD CALC: 42.6 % — SIGNIFICANT CHANGE UP (ref 39–50)
HGB BLD-MCNC: 14 G/DL — SIGNIFICANT CHANGE UP (ref 13–17)
MCHC RBC-ENTMCNC: 28.7 PG — SIGNIFICANT CHANGE UP (ref 27–34)
MCHC RBC-ENTMCNC: 32.9 GM/DL — SIGNIFICANT CHANGE UP (ref 32–36)
MCV RBC AUTO: 87.5 FL — SIGNIFICANT CHANGE UP (ref 80–100)
PLATELET # BLD AUTO: 226 K/UL — SIGNIFICANT CHANGE UP (ref 150–400)
POTASSIUM SERPL-MCNC: 4.2 MMOL/L — SIGNIFICANT CHANGE UP (ref 3.5–5.3)
POTASSIUM SERPL-SCNC: 4.2 MMOL/L — SIGNIFICANT CHANGE UP (ref 3.5–5.3)
RBC # BLD: 4.87 M/UL — SIGNIFICANT CHANGE UP (ref 4.2–5.8)
RBC # FLD: 15.9 % — HIGH (ref 10.3–14.5)
SODIUM SERPL-SCNC: 133 MMOL/L — LOW (ref 135–145)
WBC # BLD: 11.02 K/UL — HIGH (ref 3.8–10.5)
WBC # FLD AUTO: 11.02 K/UL — HIGH (ref 3.8–10.5)

## 2022-06-20 PROCEDURE — 99232 SBSQ HOSP IP/OBS MODERATE 35: CPT

## 2022-06-20 PROCEDURE — 99233 SBSQ HOSP IP/OBS HIGH 50: CPT

## 2022-06-20 RX ADMIN — SIMETHICONE 80 MILLIGRAM(S): 80 TABLET, CHEWABLE ORAL at 22:18

## 2022-06-20 RX ADMIN — Medication 1 TABLET(S): at 10:40

## 2022-06-20 RX ADMIN — LOSARTAN POTASSIUM 50 MILLIGRAM(S): 100 TABLET, FILM COATED ORAL at 10:38

## 2022-06-20 RX ADMIN — SODIUM CHLORIDE 1 GRAM(S): 9 INJECTION INTRAMUSCULAR; INTRAVENOUS; SUBCUTANEOUS at 05:40

## 2022-06-20 RX ADMIN — HEPARIN SODIUM 5000 UNIT(S): 5000 INJECTION INTRAVENOUS; SUBCUTANEOUS at 21:50

## 2022-06-20 RX ADMIN — Medication 1200 MILLIGRAM(S): at 10:38

## 2022-06-20 RX ADMIN — Medication 10 UNIT(S): at 13:33

## 2022-06-20 RX ADMIN — SIMVASTATIN 20 MILLIGRAM(S): 20 TABLET, FILM COATED ORAL at 21:50

## 2022-06-20 RX ADMIN — Medication 8 MILLIGRAM(S): at 22:17

## 2022-06-20 RX ADMIN — SODIUM CHLORIDE 1 GRAM(S): 9 INJECTION INTRAMUSCULAR; INTRAVENOUS; SUBCUTANEOUS at 14:20

## 2022-06-20 RX ADMIN — INSULIN GLARGINE 25 UNIT(S): 100 INJECTION, SOLUTION SUBCUTANEOUS at 21:51

## 2022-06-20 RX ADMIN — Medication 10 UNIT(S): at 18:49

## 2022-06-20 RX ADMIN — Medication 4 MILLIGRAM(S): at 12:10

## 2022-06-20 RX ADMIN — HEPARIN SODIUM 5000 UNIT(S): 5000 INJECTION INTRAVENOUS; SUBCUTANEOUS at 05:40

## 2022-06-20 RX ADMIN — Medication 2: at 18:48

## 2022-06-20 RX ADMIN — Medication 1200 MILLIGRAM(S): at 21:50

## 2022-06-20 RX ADMIN — Medication 1 TABLET(S): at 21:50

## 2022-06-20 RX ADMIN — Medication 10 UNIT(S): at 08:29

## 2022-06-20 RX ADMIN — SIMETHICONE 80 MILLIGRAM(S): 80 TABLET, CHEWABLE ORAL at 15:25

## 2022-06-20 RX ADMIN — SIMETHICONE 80 MILLIGRAM(S): 80 TABLET, CHEWABLE ORAL at 08:19

## 2022-06-20 RX ADMIN — HEPARIN SODIUM 5000 UNIT(S): 5000 INJECTION INTRAVENOUS; SUBCUTANEOUS at 14:20

## 2022-06-20 RX ADMIN — BUDESONIDE AND FORMOTEROL FUMARATE DIHYDRATE 2 PUFF(S): 160; 4.5 AEROSOL RESPIRATORY (INHALATION) at 20:43

## 2022-06-20 RX ADMIN — PANTOPRAZOLE SODIUM 40 MILLIGRAM(S): 20 TABLET, DELAYED RELEASE ORAL at 05:39

## 2022-06-20 NOTE — PROGRESS NOTE ADULT - SUBJECTIVE AND OBJECTIVE BOX
NEPHROLOGY INTERVAL HPI/OVERNIGHT EVENTS:    Date of Service: 22 @ 14:30    --No distress.   Sitting up and feeling better.  --Now complains of urinary frequency. ? unable to void completely.  No dysuria.  --Reports feeling improved.  able to walk to bathroom several times.   complying with fluid restriction, ambulated today but is feeling weak after that  --Attempting to adhere to fluid restriction but can't really  keep track.  Feels dehydrated.  wants humidifier  O2 with water on.       able to lay flat on his back but continues to desaturate with activity.  6/15--Feeling somewhat improved.  Able to lay flatter on back and sleep better last night.  Appears comfortable today.  --SOB slightly improved.  C/o burping excessively.  No clear relief with gas ex.  Refused blood work this morning.     HPI:  55 yo man with PMHX of HTN admitted on  with progressive SOB and extreme fatigue.  Noted fatigue post 1 week after second covid booster shot.  Initially had cardiac work up due to CARMONA.  SOB with hypoxemia worsened and on outpt CXR, found to have PNA with bilateral infiltrates.  Covid tested negative.  Symptoms did not improve with oral abtx and sent for admission.  Has had extensive work up but etiology of pulmonary infiltrates remain unclear.  COVID repeatedly negative.  CTA negative for PE with persistent bilateral infiltrates.  Serologies negative.  Pt states lung bx was advised but he has declined.  Notes some improvement in CARMONA for last 2 days after dexamethasone started.  Aware of low serum sodium level but has not followed fluid restriction due to dry mouth    PMHX and PSHX.    MEDICATIONS  (STANDING):  bisacodyl 5 milliGRAM(s) Oral at bedtime  budesonide 160 MICROgram(s)/formoterol 4.5 MICROgram(s) Inhaler 2 Puff(s) Inhalation two times a day  dexAMETHasone  Injectable 4 milliGRAM(s) IV Push <User Schedule>  dextrose 5%. 1000 milliLiter(s) (100 mL/Hr) IV Continuous <Continuous>  dextrose 5%. 1000 milliLiter(s) (50 mL/Hr) IV Continuous <Continuous>  dextrose 50% Injectable 25 Gram(s) IV Push once  dextrose 50% Injectable 12.5 Gram(s) IV Push once  dextrose 50% Injectable 25 Gram(s) IV Push once  doxazosin 8 milliGRAM(s) Oral at bedtime  glucagon  Injectable 1 milliGRAM(s) IntraMuscular once  guaiFENesin ER 1200 milliGRAM(s) Oral every 12 hours  heparin   Injectable 5000 Unit(s) SubCutaneous every 8 hours  insulin glargine Injectable (LANTUS) 25 Unit(s) SubCutaneous at bedtime  insulin lispro (ADMELOG) corrective regimen sliding scale   SubCutaneous three times a day before meals  insulin lispro (ADMELOG) corrective regimen sliding scale   SubCutaneous at bedtime  insulin lispro Injectable (ADMELOG) 10 Unit(s) SubCutaneous three times a day before meals  lactobacillus acidophilus 1 Tablet(s) Oral two times a day  losartan 50 milliGRAM(s) Oral daily  melatonin 3 milliGRAM(s) Oral at bedtime  pantoprazole    Tablet 40 milliGRAM(s) Oral before breakfast  senna 2 Tablet(s) Oral daily  simvastatin 20 milliGRAM(s) Oral at bedtime  sodium chloride 1 Gram(s) Oral three times a day    MEDICATIONS  (PRN):  acetaminophen     Tablet .. 650 milliGRAM(s) Oral every 6 hours PRN Temp greater or equal to 38C (100.4F), Mild Pain (1 - 3)  ALBUTerol    90 MICROgram(s) HFA Inhaler 2 Puff(s) Inhalation every 6 hours PRN Shortness of Breath and/or Wheezing  aluminum hydroxide/magnesium hydroxide/simethicone Suspension 30 milliLiter(s) Oral every 6 hours PRN Dyspepsia  artificial  tears Solution 1 Drop(s) Both EYES every 8 hours PRN Dry Eyes  artificial tears (preservative free) Ophthalmic Solution 1 Drop(s) Both EYES every 3 hours PRN Dry Eyes  benzocaine 15 mG/menthol 3.6 mG Lozenge 1 Lozenge Oral three times a day PRN Sore Throat  dextrose Oral Gel 15 Gram(s) Oral once PRN Blood Glucose LESS THAN 70 milliGRAM(s)/deciliter  polyethylene glycol 3350 17 Gram(s) Oral daily PRN Constipation  simethicone 80 milliGRAM(s) Chew every 6 hours PRN Gas  sodium chloride 0.65% Nasal 1 Spray(s) Both Nostrils two times a day PRN Nasal Congestion    Vital Signs Last 24 Hrs  T(C): 36.7 (2022 08:44), Max: 36.8 (2022 16:49)  T(F): 98.1 (2022 08:44), Max: 98.2 (2022 16:49)  HR: 71 (2022 08:44) (67 - 71)  BP: 123/59 (2022 08:44) (104/53 - 123/59)  BP(mean): --  RR: 18 (2022 08:44) (18 - 18)  SpO2: 98% (2022 08:44) (97% - 98%)    06-19 @ 07:01  -  06-20 @ 07:00  --------------------------------------------------------  IN: 0 mL / OUT: 1300 mL / NET: -1300 mL    PHYSICAL EXAM:  GENERAL: comfortable.  CHEST/LUNG: fair air entry  HEART: S1S2 RRR  ABDOMEN: soft  EXTREMITIES: no edema  SKIN:     LABS:                        14.0   11.02 )-----------( 226      ( 2022 08:09 )             42.6     06-20    133<L>  |  101  |  24<H>  ----------------------------<  110<H>  4.2   |  25  |  0.82    Ca    8.8      2022 08:09        Urinalysis Basic - ( 2022 17:30 )    Color: Yellow / Appearance: Clear / S.015 / pH: x  Gluc: x / Ketone: Negative  / Bili: Negative / Urobili: Negative   Blood: x / Protein: Negative / Nitrite: Negative   Leuk Esterase: Negative / RBC: 0-2 /HPF / WBC 0-2   Sq Epi: x / Non Sq Epi: Occasional / Bacteria: x              RADIOLOGY & ADDITIONAL TESTS:

## 2022-06-20 NOTE — PROGRESS NOTE ADULT - NUTRITIONAL ASSESSMENT
This patient has been assessed with a concern for Malnutrition and has been determined to have a diagnosis/diagnoses of Severe protein-calorie malnutrition.    This patient is being managed with:   Diet Consistent Carbohydrate/No Snacks-  1500mL Fluid Restriction ([id number])  Entered: Jun 13 2022  1:13PM    
This patient has been assessed with a concern for Malnutrition and has been determined to have a diagnosis/diagnoses of Severe protein-calorie malnutrition.    This patient is being managed with:   Diet Consistent Carbohydrate/No Snacks-  Entered: Jun 8 2022  9:00AM    
This patient has been assessed with a concern for Malnutrition and has been determined to have a diagnosis/diagnoses of Severe protein-calorie malnutrition.    This patient is being managed with:   Diet Consistent Carbohydrate/No Snacks-  1500mL Fluid Restriction ([id number])  Entered: Jun 13 2022  1:13PM    
This patient has been assessed with a concern for Malnutrition and has been determined to have a diagnosis/diagnoses of Severe protein-calorie malnutrition.    This patient is being managed with:   Diet Consistent Carbohydrate/No Snacks-  Entered: Jun 8 2022  9:00AM    
This patient has been assessed with a concern for Malnutrition and has been determined to have a diagnosis/diagnoses of Severe protein-calorie malnutrition.    This patient is being managed with:   Diet Consistent Carbohydrate/No Snacks-  1500mL Fluid Restriction ([id number])  Entered: Jun 13 2022  1:13PM    
This patient has been assessed with a concern for Malnutrition and has been determined to have a diagnosis/diagnoses of Severe protein-calorie malnutrition.    This patient is being managed with:   Diet Consistent Carbohydrate/No Snacks-  1500mL Fluid Restriction ([id number])  Entered: Jun 13 2022  1:13PM    
This patient has been assessed with a concern for Malnutrition and has been determined to have a diagnosis/diagnoses of Severe protein-calorie malnutrition.    This patient is being managed with:   Diet Consistent Carbohydrate/No Snacks-  1500mL Fluid Restriction ([id number])  Entered: Jun 13 2022  1:13PM    
This patient has been assessed with a concern for Malnutrition and has been determined to have a diagnosis/diagnoses of Severe protein-calorie malnutrition.    This patient is being managed with:   Diet Consistent Carbohydrate/No Snacks-  1500mL Fluid Restriction ([id number])  Entered: Jun 13 2022  1:13PM    
This patient has been assessed with a concern for Malnutrition and has been determined to have a diagnosis/diagnoses of Severe protein-calorie malnutrition.    This patient is being managed with:   Diet Consistent Carbohydrate/No Snacks-  1500mL Fluid Restriction ([id number])  Entered: Jun 13 2022  1:13PM    
This patient has been assessed with a concern for Malnutrition and has been determined to have a diagnosis/diagnoses of Severe protein-calorie malnutrition.    This patient is being managed with:   Diet Consistent Carbohydrate/No Snacks-  1500mL Fluid Restriction ([id number])  Entered: Jun 13 2022  1:13PM

## 2022-06-20 NOTE — PROGRESS NOTE ADULT - SUBJECTIVE AND OBJECTIVE BOX
CC: PNA (2022 18:13)    HPI: 56/M with PMHx of HTN, brought to the ED for c/o worsening SOB and cough along with a recent Dx of PNA. He also reported  that he has been feeling very weak and dizzy. He recently got 4th dose of COVID vaccine 3 weeks ago and Sx started 1 week thereafter. + Dry Cough   In Ed found to be hypoxic on RA,   chest pain when coughing.   States he has not been eating much recently and has a 15 lb weight loss.   No recent travel or periods of immobilization or surgeries.  COVID neg       Medical progress: Denies any HA, CP. Still will exertional shortness of breath.   Complaints: no new complaints  State of mind: maintains normal conversation    Despite IV steroids and bronchodilators patient desaturates.  Pulse ox (SpO2) on room air at rest:	94 %  Pulse ox (SpO2) on room air while ambulatin %  Pulse ox (SpO2) on	2 L/min  O2 while ambulatin %      Patient will require home O2 for discharge.  Patient is aware and agreeable to home O2.  Patient is in a chronic stable state of COPD.      Patient treated for pneumonia: YES/NO    Pneumonia treated and resolved: YES/NO      REVIEW OF SYSTEMS:  All other review of systems is negative unless indicated above.    PHYSICAL EXAM:  T(C): 36.7 (2022 08:44), Max: 36.8 (2022 16:49)  T(F): 98.1 (2022 08:44), Max: 98.2 (2022 16:49)  HR: 71 (2022 08:44) (67 - 71)  BP: 123/59 (2022 08:44) (104/53 - 123/59)  RR: 18 (2022 08:44) (18 - 18)  SpO2: 98% (2022 08:44) (97% - 98%)  General: well nourished; in no acute distress  Eyes: EOMI; conjunctiva and sclera clear  Head: Normocephalic; atraumatic  ENMT: No nasal discharge; airway clear  Neck: Supple; non tender; no masses  Respiratory: improved air entry, + b/l  crepitations at bases , No wheezes, rales   Cardiovascular: Regular rate and rhythm. S1 and S2 Normal;   Gastrointestinal: Soft non-tender non-distended; Normal bowel sounds  Genitourinary: No  suprapubic  tenderness  Extremities: No  edema  Vascular: Peripheral pulses palpable 2+ bilaterally  Neurological: Alert and oriented x3, non focal   Skin: Warm and dry. No acute rash  Lymph Nodes: No acute cervical adenopathy  Musculoskeletal: Normal muscle tone, without deformities  Psychiatric: Cooperative and anxious     LABS:        CBC Full  -  ( 2022 08:09 )  WBC Count : 11.02 K/uL  RBC Count : 4.87 M/uL  Hemoglobin : 14.0 g/dL  Hematocrit : 42.6 %  Platelet Count - Automated : 226 K/uL  Mean Cell Volume : 87.5 fl  Mean Cell Hemoglobin : 28.7 pg  Mean Cell Hemoglobin Concentration : 32.9 gm/dL  Auto Neutrophil # : x  Auto Lymphocyte # : x  Auto Monocyte # : x  Auto Eosinophil # : x  Auto Basophil # : x  Auto Neutrophil % : x  Auto Lymphocyte % : x  Auto Monocyte % : x  Auto Eosinophil % : x  Auto Basophil % : x      Urinalysis Basic - ( 2022 17:30 )    Color: Yellow / Appearance: Clear / S.015 / pH: x  Gluc: x / Ketone: Negative  / Bili: Negative / Urobili: Negative   Blood: x / Protein: Negative / Nitrite: Negative   Leuk Esterase: Negative / RBC: 0-2 /HPF / WBC 0-2   Sq Epi: x / Non Sq Epi: Occasional / Bacteria: x      06-20    133<L>  |  101  |  24<H>  ----------------------------<  110<H>  4.2   |  25  |  0.82    Ca    8.8      2022 08:09

## 2022-06-20 NOTE — PROGRESS NOTE ADULT - REASON FOR ADMISSION
PNA

## 2022-06-20 NOTE — PROGRESS NOTE ADULT - ASSESSMENT
57 yo man with HTN admitted with PNA of unclear etiology despite extensive evaluation.  --Hyponatremia appears multifactorial but pt does have high fluid intake.    Check urine studies to evaluate if concomitant SIADH due to pulmonary pathology.    Explained to adhere to moderate fluid restriction.    Hold off on adding NACL tabs.  --Continue Dexamethasone.    6/14 SY  --Hyponatremia,  unclear if combination of increased water intake as well SIADH due to pulmonary pathology.    Urine NA and Osm  elevated --c/w SIADH.    Continue fluid restriction.    Start NACL tabs BID for now to prevent further worsening.  --Pt aware of need for BW in am.    6/15 SY  --Hyponatremia : c/w SIADH based on urine studies.  Due to pulmonary pathology.      Continue NACL tabs BID     Continue moderate fluid restriction. --decrease to 1200cc    6/16 SY  --Hyponatremia --SIADH due to pulmonary pathology.    Increase NACL tabs to TID.     Continue fluid restriction.    Assured pt dry nasal and oral mucosa due to O2 rather than dehydration.    6/17 MK   - hyponatremia/ SIADH due to groundglass infiltrate on lung    improving with FR and nacl tabs  dw dr bashir hoyt     6/18 SY  --Hyponatremia/SIADH : expect sodium level to stabilize on fluid restriction and NACL tabs.   Continue current rx.    Plan to taper NACL tabs as sodium level normalizes.  --Labs in am.  --D/c planning with continued O2 therapy.    6/19 SY  --Hyponatremia /SIADH : continue fluid restriction with TID salt tabs  --Check bladder scan though no reason for urinary retention.  --RESP : desaturation with activities slowly improving.    6/20 SY  --Hyponatremia/SIADH : continue current rx on d/c  --Ok for d/c from renal standpoint,  Follow up with repeat labs as outpt  --Resp  : slightly improved  d/c planning with home O2.

## 2022-06-20 NOTE — PROGRESS NOTE ADULT - PROVIDER SPECIALTY LIST ADULT
Nephrology
Rheumatology
Cardiology
Hospitalist
Infectious Disease
Infectious Disease
Pulmonology
Cardiology
Cardiology
Hospitalist
Nephrology
Pulmonology
Hospitalist
Nephrology
Pulmonology
Pulmonology
Rheumatology
Pulmonology

## 2022-06-20 NOTE — PROGRESS NOTE ADULT - SUBJECTIVE AND OBJECTIVE BOX
Subjective:    Awake, alert. Less breathlessness. No sputum production.    MEDICATIONS  (STANDING):  bisacodyl 5 milliGRAM(s) Oral at bedtime  budesonide 160 MICROgram(s)/formoterol 4.5 MICROgram(s) Inhaler 2 Puff(s) Inhalation two times a day  dexAMETHasone  Injectable 4 milliGRAM(s) IV Push <User Schedule>  dextrose 5%. 1000 milliLiter(s) (50 mL/Hr) IV Continuous <Continuous>  dextrose 5%. 1000 milliLiter(s) (100 mL/Hr) IV Continuous <Continuous>  dextrose 50% Injectable 25 Gram(s) IV Push once  dextrose 50% Injectable 12.5 Gram(s) IV Push once  dextrose 50% Injectable 25 Gram(s) IV Push once  doxazosin 8 milliGRAM(s) Oral at bedtime  glucagon  Injectable 1 milliGRAM(s) IntraMuscular once  guaiFENesin ER 1200 milliGRAM(s) Oral every 12 hours  heparin   Injectable 5000 Unit(s) SubCutaneous every 8 hours  insulin glargine Injectable (LANTUS) 25 Unit(s) SubCutaneous at bedtime  insulin lispro (ADMELOG) corrective regimen sliding scale   SubCutaneous three times a day before meals  insulin lispro (ADMELOG) corrective regimen sliding scale   SubCutaneous at bedtime  insulin lispro Injectable (ADMELOG) 10 Unit(s) SubCutaneous three times a day before meals  lactobacillus acidophilus 1 Tablet(s) Oral two times a day  losartan 50 milliGRAM(s) Oral daily  melatonin 3 milliGRAM(s) Oral at bedtime  pantoprazole    Tablet 40 milliGRAM(s) Oral before breakfast  senna 2 Tablet(s) Oral daily  simvastatin 20 milliGRAM(s) Oral at bedtime  sodium chloride 1 Gram(s) Oral three times a day    MEDICATIONS  (PRN):  acetaminophen     Tablet .. 650 milliGRAM(s) Oral every 6 hours PRN Temp greater or equal to 38C (100.4F), Mild Pain (1 - 3)  ALBUTerol    90 MICROgram(s) HFA Inhaler 2 Puff(s) Inhalation every 6 hours PRN Shortness of Breath and/or Wheezing  aluminum hydroxide/magnesium hydroxide/simethicone Suspension 30 milliLiter(s) Oral every 6 hours PRN Dyspepsia  artificial  tears Solution 1 Drop(s) Both EYES every 8 hours PRN Dry Eyes  artificial tears (preservative free) Ophthalmic Solution 1 Drop(s) Both EYES every 3 hours PRN Dry Eyes  benzocaine 15 mG/menthol 3.6 mG Lozenge 1 Lozenge Oral three times a day PRN Sore Throat  dextrose Oral Gel 15 Gram(s) Oral once PRN Blood Glucose LESS THAN 70 milliGRAM(s)/deciliter  polyethylene glycol 3350 17 Gram(s) Oral daily PRN Constipation  simethicone 80 milliGRAM(s) Chew every 6 hours PRN Gas  sodium chloride 0.65% Nasal 1 Spray(s) Both Nostrils two times a day PRN Nasal Congestion      Allergies    codeine (Unknown)    Intolerances        Vital Signs Last 24 Hrs  T(C): 36.7 (2022 08:44), Max: 36.8 (2022 16:49)  T(F): 98.1 (2022 08:44), Max: 98.2 (2022 16:49)  HR: 71 (2022 08:44) (67 - 71)  BP: 123/59 (2022 08:44) (104/53 - 123/59)  BP(mean): --  RR: 18 (2022 08:44) (18 - 18)  SpO2: 98% (2022 08:44) (97% - 98%)    PHYSICAL EXAMINATION:    NECK:  Supple. No lymphadenopathy. Jugular venous pressure not elevated.   HEART:   The cardiac impulse has a normal quality. Reg., Nl S1 and S2.    CHEST:  Chest with bilateral crackles approx. 1/2 up. Normal respiratory effort.  ABDOMEN:  Soft and nontender.   EXTREMITIES:  There is no edema.       LABS:                        14.0   11.02 )-----------( 226      ( 2022 08:09 )             42.6     06-20    133<L>  |  101  |  24<H>  ----------------------------<  110<H>  4.2   |  25  |  0.82    Ca    8.8      2022 08:09        Urinalysis Basic - ( 2022 17:30 )    Color: Yellow / Appearance: Clear / S.015 / pH: x  Gluc: x / Ketone: Negative  / Bili: Negative / Urobili: Negative   Blood: x / Protein: Negative / Nitrite: Negative   Leuk Esterase: Negative / RBC: 0-2 /HPF / WBC 0-2   Sq Epi: x / Non Sq Epi: Occasional / Bacteria: x        RADIOLOGY & ADDITIONAL TESTS:    Assessment and Plan:   · Assessment  	  - cont O2  - seems to be improving on decadron; now on 4mg  - still desats w minimal exertion but able to go to bathroom-told to increase O2 to 3-4L/M with exertion  - cont symbicort  - has ILD of unknown etiology. Patient refused lung biopsy and rheumatology w/u negative  - will go home on O2 and slow steroid taper  - dvt proph    Problem/Plan - 1:  ·  Problem: Multilobar lung infiltrate.   Problem/Plan - 2:  ·  Problem: ILD (interstitial lung disease).   Problem/Plan - 3:  ·  Problem: Acute dyspnea.   Problem/Plan - 4:  ·  Problem: Paroxysmal cough.

## 2022-06-20 NOTE — PROGRESS NOTE ADULT - ASSESSMENT
56/M with PMHx of HTN admitted for:       # Acute hypoxic resp failure- due to  multilobar pneumonia this could be viral or inflammatory inflammatory  - tested for COVID multiple times but negative  - RVP neg    - s/p full course of antibiotics with no significant improvement   - Significantly elevated inflammatory markers initially, work up for autoimmune Dz done and  neg , ESR wnl now   - s/p Solumedrol, now changed to IV Decadron  with some clinical improvement and ESR/CRP much improved,  stared to  taper off   - continues O2 monitoring, supplement via NC on 2-3 L of O2 now, titrate down as tolerated    - incentive spirometry  - encourage OOB to chair  and ambulate   - HIV test neg   - repeat CT chest with some Improvement   - will evaluate for Home O2   - D/w Dr Reynaga     # Hyponatremia likely multifactorial,  increased free water intake and possible SIADH 2/2 lung pathology   - monitor Na, improving   - free water restriction   - sc/w  Po NaCl  TID  - d/w Dr Meza, will need to f/u outPt     #  New DM- hyperglycemia 2/2 steroids    - C/w Lantus decrease to 25U Lantus   - C/w ISS, c/w pre meal coverage   - A1c 8.3- will need to be on oral hypoglycemic on DC   - nutrition eval     # HTN:   - cont home meds    # DVT PPX:  - heparin s/q    OOB and mobilize  as tolerated, Incentive spirometry. D/c planning .

## 2022-06-20 NOTE — CHART NOTE - NSCHARTNOTEFT_GEN_A_CORE
Clinical Nutrition Follow Up Note:    *  56/M with PMHx of HTN, brought to the ED for c/o worsening SOB and cough along with a recent Dx of PNA    *current status: Pt currently on consistent carb diet w/ fluid restriction. SIADH continuing with salt tabs TID. Appetite and intake slightly improving as per patient. Will continue to monitor and follow up prn.       *Pertinent Meds:   simvastatin 20 milliGRAM(s) Oral at bedtime  bisacodyl 5 milliGRAM(s) Oral at bedtime  lactobacillus acidophilus 1 Tablet(s) Oral two times a day  pantoprazole    Tablet 40 milliGRAM(s) Oral before breakfast    *Labs Reviewed:  06-20    133<L>  |  101  |  24<H>  ----------------------------<  110<H>  4.2   |  25  |  0.82    Ca    8.8      20 Jun 2022 08:09    HbA1c: A1C with Estimated Average Glucose Result: 8.3 % (06-06-22 @ 06:26)    Glucose: POCT Blood Glucose.: 105 mg/dL (06-20-22 @ 08:21)    BP: 123/59 (06-20-22 @ 08:44) (104/53 - 135/80)      POCT Blood Glucose.: 105 mg/dL (20 Jun 2022 08:21)  POCT Blood Glucose.: 183 mg/dL (19 Jun 2022 21:03)  POCT Blood Glucose.: 168 mg/dL (19 Jun 2022 17:33)  POCT Blood Glucose.: 113 mg/dL (19 Jun 2022 13:13)        *I and O's:    06-19-22 @ 07:01  -  06-20-22 @ 07:00  --------------------------------------------------------  IN:  Total IN: 0 mL    OUT:    Voided (mL): 1300 mL  Total OUT: 1300 mL    Total NET: -1300 mL          *(+) BM on  6/19  ; pt on bowel regimen meds: senna 2 Tablet(s) Oral daily  polyethylene glycol 3350 17 Gram(s) Oral daily PRN Constipation    *baltazar score of 21  : no skin breakdown documented.   L/R ankle +1 edema documented.    *PO intake, meeting ~ 50% of estimated nutr needs.    *Malnutrition dx: Severe protein/calorie malnutrition in context of acute illness R/T inability to meet nutritional needs 2/2 PNA significant weight loss x 3 weeks (10%), <50% of ENN x > 5 days         Recommendations:  1) continue with current therapeutic diet and encourage adherence to fluid restriction  2) monitor weights track trends   3) monitor lytes and replete prn  4) MVI w/minerals to meet RDI's         Diet, Consistent Carbohydrate/No Snacks:   1500mL Fluid Restriction ([id number]) (06-13-22 @ 13:12) [Active]      Estimated Needs: Based on 108Kg (wt from 6/20 bed scale weight taken by RD)  Calories: 9987-2963 Kcal (22-25 Kcal/Kg)  Protein: 151-173 g (1.4-1.6 g/Kg)  Fluids:  2599-0811 mL (22-25 mL/Kg)    *Wt Hx:  6/20- 238#  UBW x 1 month ago 275#  **13.5% significant weight loss x 1 month     *will continue to monitor and follow up with adjustments to treatment plan prn*    *Laly Garcia RD, CDN   Cell# [phone]   Office# [phone]

## 2022-06-21 ENCOUNTER — TRANSCRIPTION ENCOUNTER (OUTPATIENT)
Age: 57
End: 2022-06-21

## 2022-06-21 VITALS
OXYGEN SATURATION: 92 % | TEMPERATURE: 98 F | HEART RATE: 80 BPM | DIASTOLIC BLOOD PRESSURE: 60 MMHG | SYSTOLIC BLOOD PRESSURE: 120 MMHG | RESPIRATION RATE: 18 BRPM

## 2022-06-21 PROBLEM — I10 ESSENTIAL (PRIMARY) HYPERTENSION: Chronic | Status: ACTIVE | Noted: 2022-05-27

## 2022-06-21 LAB
ANION GAP SERPL CALC-SCNC: 7 MMOL/L — SIGNIFICANT CHANGE UP (ref 5–17)
BUN SERPL-MCNC: 22 MG/DL — SIGNIFICANT CHANGE UP (ref 7–23)
CALCIUM SERPL-MCNC: 8.5 MG/DL — SIGNIFICANT CHANGE UP (ref 8.5–10.1)
CHLORIDE SERPL-SCNC: 100 MMOL/L — SIGNIFICANT CHANGE UP (ref 96–108)
CO2 SERPL-SCNC: 25 MMOL/L — SIGNIFICANT CHANGE UP (ref 22–31)
CREAT SERPL-MCNC: 0.72 MG/DL — SIGNIFICANT CHANGE UP (ref 0.5–1.3)
EGFR: 107 ML/MIN/1.73M2 — SIGNIFICANT CHANGE UP
GLUCOSE SERPL-MCNC: 104 MG/DL — HIGH (ref 70–99)
HCT VFR BLD CALC: 41.2 % — SIGNIFICANT CHANGE UP (ref 39–50)
HGB BLD-MCNC: 13.4 G/DL — SIGNIFICANT CHANGE UP (ref 13–17)
MCHC RBC-ENTMCNC: 28.2 PG — SIGNIFICANT CHANGE UP (ref 27–34)
MCHC RBC-ENTMCNC: 32.5 GM/DL — SIGNIFICANT CHANGE UP (ref 32–36)
MCV RBC AUTO: 86.7 FL — SIGNIFICANT CHANGE UP (ref 80–100)
PLATELET # BLD AUTO: 191 K/UL — SIGNIFICANT CHANGE UP (ref 150–400)
POTASSIUM SERPL-MCNC: 3.9 MMOL/L — SIGNIFICANT CHANGE UP (ref 3.5–5.3)
POTASSIUM SERPL-SCNC: 3.9 MMOL/L — SIGNIFICANT CHANGE UP (ref 3.5–5.3)
RBC # BLD: 4.75 M/UL — SIGNIFICANT CHANGE UP (ref 4.2–5.8)
RBC # FLD: 15.9 % — HIGH (ref 10.3–14.5)
SODIUM SERPL-SCNC: 132 MMOL/L — LOW (ref 135–145)
WBC # BLD: 9.53 K/UL — SIGNIFICANT CHANGE UP (ref 3.8–10.5)
WBC # FLD AUTO: 9.53 K/UL — SIGNIFICANT CHANGE UP (ref 3.8–10.5)

## 2022-06-21 PROCEDURE — 99239 HOSP IP/OBS DSCHRG MGMT >30: CPT

## 2022-06-21 RX ORDER — LOSARTAN POTASSIUM 100 MG/1
1 TABLET, FILM COATED ORAL
Qty: 30 | Refills: 0
Start: 2022-06-21 | End: 2022-07-20

## 2022-06-21 RX ORDER — DEXAMETHASONE 0.5 MG/5ML
1 ELIXIR ORAL
Qty: 7 | Refills: 0
Start: 2022-06-21 | End: 2022-06-27

## 2022-06-21 RX ORDER — METFORMIN HYDROCHLORIDE 850 MG/1
1 TABLET ORAL
Qty: 30 | Refills: 0
Start: 2022-06-21 | End: 2022-07-20

## 2022-06-21 RX ORDER — SIMVASTATIN 20 MG/1
1 TABLET, FILM COATED ORAL
Qty: 0 | Refills: 0 | DISCHARGE

## 2022-06-21 RX ORDER — DEXAMETHASONE 0.5 MG/5ML
1 ELIXIR ORAL
Qty: 6 | Refills: 0
Start: 2022-06-21 | End: 2022-06-26

## 2022-06-21 RX ORDER — BUDESONIDE AND FORMOTEROL FUMARATE DIHYDRATE 160; 4.5 UG/1; UG/1
2 AEROSOL RESPIRATORY (INHALATION)
Qty: 1 | Refills: 0
Start: 2022-06-21 | End: 2022-07-20

## 2022-06-21 RX ORDER — ALBUTEROL 90 UG/1
2 AEROSOL, METERED ORAL
Qty: 1 | Refills: 0
Start: 2022-06-21 | End: 2022-07-20

## 2022-06-21 RX ORDER — SODIUM CHLORIDE 9 MG/ML
1 INJECTION INTRAMUSCULAR; INTRAVENOUS; SUBCUTANEOUS
Qty: 14 | Refills: 0
Start: 2022-06-21 | End: 2022-06-27

## 2022-06-21 RX ORDER — SIMVASTATIN 20 MG/1
1 TABLET, FILM COATED ORAL
Qty: 30 | Refills: 0
Start: 2022-06-21 | End: 2022-07-20

## 2022-06-21 RX ORDER — DOXAZOSIN MESYLATE 4 MG
1 TABLET ORAL
Qty: 30 | Refills: 0
Start: 2022-06-21 | End: 2022-07-20

## 2022-06-21 RX ADMIN — PANTOPRAZOLE SODIUM 40 MILLIGRAM(S): 20 TABLET, DELAYED RELEASE ORAL at 05:04

## 2022-06-21 RX ADMIN — HEPARIN SODIUM 5000 UNIT(S): 5000 INJECTION INTRAVENOUS; SUBCUTANEOUS at 05:04

## 2022-06-21 RX ADMIN — Medication 1 TABLET(S): at 11:45

## 2022-06-21 RX ADMIN — SODIUM CHLORIDE 1 GRAM(S): 9 INJECTION INTRAMUSCULAR; INTRAVENOUS; SUBCUTANEOUS at 00:00

## 2022-06-21 RX ADMIN — LOSARTAN POTASSIUM 50 MILLIGRAM(S): 100 TABLET, FILM COATED ORAL at 11:45

## 2022-06-21 RX ADMIN — Medication 10 UNIT(S): at 08:45

## 2022-06-21 RX ADMIN — Medication 1200 MILLIGRAM(S): at 11:45

## 2022-06-21 RX ADMIN — SODIUM CHLORIDE 1 GRAM(S): 9 INJECTION INTRAMUSCULAR; INTRAVENOUS; SUBCUTANEOUS at 05:04

## 2022-06-21 NOTE — DISCHARGE NOTE PROVIDER - NSDCCAREPROVSEEN_GEN_ALL_CORE_FT
"   07/10/18 1000   Maternal Infant Assessment   Breast Shape Bilateral:;pendulous   Breast Density Bilateral:;soft   Areola Bilateral:;elastic   Nipple(s) Bilateral:;everted   Infant Assessment   Sucking Reflex present   Rooting Reflex present   Swallow Reflex present   LATCH Score   Latch 2-->grasps breast, tongue down, lips flanged, rhythmic sucking   Audible Swallowing 2-->spontaneous and intermittent (24 hrs old)   Type Of Nipple 2-->everted (after stimulation)   Comfort (Breast/Nipple) 2-->soft/nontender   Hold (Positioning) 1-->minimal assist, teach one side: mother does other, staff holds   Score (less than 7 for 2/more consecutive times, consult Lactation Consultant) 9   Pain/Comfort Assessments   Pain Assessment Performed Yes       Number Scale   Presence of Pain denies   Pain Rating: Rest 0   Pain Rating: Activity 0   Maternal Infant Feeding   Maternal Emotional State relaxed   Infant Positioning clutch/"football"   Signs of Milk Transfer audible swallow   Time Spent (min) 15-30 min   Latch Assistance yes   Breastfeeding History   Currently Breastfeeding yes   Feeding Infant   Effective Latch During Feeding yes   Audible Swallow yes   Suck/Swallow Coordination present   Skin-to-Skin Contact During Feeding no   Lactation Referrals   Lactation Consult Breastfeeding assessment   Lactation Interventions   Breastfeeding Assistance infant latch-on verified;infant suck/swallow verified;feeding session observed;feeding cue recognition promoted;support offered   Maternal Breastfeeding Support lactation counseling provided   discharge breastfeeding education given. Questions answered. Pt has lactation contact number.  "
Visited mother in room, holding sleeping baby in arms.  Provided basic breastfeeding instructions, requested that mother call for observation and assistance at next feeding, contact number provided.  
Dr. Bergman

## 2022-06-21 NOTE — DISCHARGE NOTE PROVIDER - NSDCFUADDAPPT_GEN_ALL_CORE_FT
Dr. Yuriy Reynaga -  close follow up in 2-3 weeks    Dr. Meza - close follow up with low sodium levels Dr. Yuriy Reynaga -  close follow up in 2-3 weeks    Dr. Meza - close follow up with low sodium levels    Please follow up with your primary care physician

## 2022-06-21 NOTE — DISCHARGE NOTE PROVIDER - HOSPITAL COURSE
CC: JESSEE (13 Jun 2022 18:13)    HPI: 56/M with PMHx of HTN, brought to the ED for c/o worsening SOB and cough along with a recent Dx of PNA. He also reported  that he has been feeling very weak and dizzy. He recently got 4th dose of COVID vaccine 3 weeks ago and Sx started 1 week thereafter. + Dry Cough   In Ed found to be hypoxic on RA,   chest pain when coughing.   States he has not been eating much recently and has a 15 lb weight loss.   No recent travel or periods of immobilization or surgeries.  COVID neg       Medical progress: Denies any HA, CP. Still will exertional shortness of breath.   Complaints: no new complaints  State of mind: maintains normal conversation     CC: PNA (13 Jun 2022 18:13)    HPI: 56/M with PMHx of HTN, brought to the ED for c/o worsening SOB and cough along with a recent Dx of PNA. He also reported  that he has been feeling very weak and dizzy. He recently got 4th dose of COVID vaccine 3 weeks ago and Sx started 1 week thereafter. + Dry Cough   In Ed found to be hypoxic on RA,   chest pain when coughing.   States he has not been eating much recently and has a 15 lb weight loss.   No recent travel or periods of immobilization or surgeries.  COVID neg       Medical progress: Denies any HA, CP. Still will exertional shortness of breath, where patient walks patient's O2 sats goes to 80s on 2 L NC. Patient is in good spirits. He understands that  his recovery will take long time. He also understands that he needs to closely follow up with primary care physician and  a lung doctor.   Complaints: no new complaints  State of mind: maintains normal conversation    1Physical Exam:   GENERAL APPEARANCE:  NAD, hemodynamically stable  T(C): 36.6 (06-21-22 @ 09:03), Max: 36.8 (06-20-22 @ 18:47)  HR: 80 (06-21-22 @ 09:03) (59 - 87)  BP: 120/60 (06-21-22 @ 09:03) (120/60 - 147/71)  RR: 18 (06-21-22 @ 09:03) (18 - 18)  SpO2: 92% (06-21-22 @ 09:03) (91% - 98%)  HEENT:  Head is normocephalic    Skin:  Warm and dry without any rash   NECK:  Supple without lymphadenopathy.   HEART:  Regular rate and rhythm. normal S1 and S2, No M/R/G  LUNGS: + crackles around the basis  ABDOMEN:  Soft, nontender, nondistended with good bowel sounds heard  EXTREMITIES:  Without cyanosis, clubbing or edema.   NEUROLOGICAL:  Gross nonfocal

## 2022-06-21 NOTE — DISCHARGE NOTE PROVIDER - NSDCMRMEDTOKEN_GEN_ALL_CORE_FT
acetaminophen 325 mg oral tablet: 2 tab(s) orally every 6 hours, As needed, Temp greater or equal to 38C (100.4F), Mild Pain (1 - 3)  ascorbic acid 500 mg oral tablet: 1 tab(s) orally once a day  Centrum Silver oral tablet: 1 tab(s) orally once a day  cyanocobalamin 500 mcg oral tablet: 1 tab(s) orally once a day  dexamethasone 2 mg oral tablet: 1 tab(s) orally once a day     - start 6/27  dexamethasone 4 mg oral tablet: 1 tab(s) orally once a day     - start 6/21- 6/27  doxazosin 8 mg oral tablet: 1 tab(s) orally once a day (at bedtime)  guaiFENesin 1200 mg oral tablet, extended release: 1 tab(s) orally every 12 hours  lactobacillus acidophilus oral capsule: 1 cap(s) orally once a day  losartan 100 mg oral tablet: 1 tab(s) orally once a day  melatonin 3 mg oral tablet: 1 tab(s) orally once a day (at bedtime)  ocular lubricant ophthalmic solution: 1 drop(s) to each affected eye every 8 hours, As needed, Dry Eyes  simvastatin 20 mg oral tablet: 1 tab(s) orally once a day (at bedtime)  Vitamin D3 25 mcg (1000 intl units) oral tablet: 1 tab(s) orally once a day   acetaminophen 325 mg oral tablet: 2 tab(s) orally every 6 hours, As needed, Temp greater or equal to 38C (100.4F), Mild Pain (1 - 3)  ascorbic acid 500 mg oral tablet: 1 tab(s) orally once a day  Centrum Silver oral tablet: 1 tab(s) orally once a day  cyanocobalamin 500 mcg oral tablet: 1 tab(s) orally once a day  dexamethasone 2 mg oral tablet: 1 tab(s) orally once a day     - start 6/27  dexamethasone 4 mg oral tablet: 1 tab(s) orally once a day     - start 6/21- 6/27  doxazosin 8 mg oral tablet: 1 tab(s) orally once a day (at bedtime)  guaiFENesin 1200 mg oral tablet, extended release: 1 tab(s) orally every 12 hours  lactobacillus acidophilus oral capsule: 1 cap(s) orally once a day  losartan 100 mg oral tablet: 1 tab(s) orally once a day  melatonin 3 mg oral tablet: 1 tab(s) orally once a day (at bedtime)  ocular lubricant ophthalmic solution: 1 drop(s) to each affected eye every 8 hours, As needed, Dry Eyes  simvastatin 20 mg oral tablet: 1 tab(s) orally once a day (at bedtime)  sodium chloride 1 g oral tablet: 1 tab(s) orally 2 times a day   Vitamin D3 25 mcg (1000 intl units) oral tablet: 1 tab(s) orally once a day   acetaminophen 325 mg oral tablet: 2 tab(s) orally every 6 hours, As needed, Temp greater or equal to 38C (100.4F), Mild Pain (1 - 3)  ascorbic acid 500 mg oral tablet: 1 tab(s) orally once a day  Centrum Silver oral tablet: 1 tab(s) orally once a day  cyanocobalamin 500 mcg oral tablet: 1 tab(s) orally once a day  dexamethasone 2 mg oral tablet: 1 tab(s) orally once a day     - start 6/27/22 and take 7 days  dexamethasone 4 mg oral tablet: 1 tab(s) orally once a day     - start 6/21- 6/27  doxazosin 8 mg oral tablet: 1 tab(s) orally once a day (at bedtime)  guaiFENesin 1200 mg oral tablet, extended release: 1 tab(s) orally every 12 hours  lactobacillus acidophilus oral capsule: 1 cap(s) orally once a day  losartan 100 mg oral tablet: 1 tab(s) orally once a day  melatonin 3 mg oral tablet: 1 tab(s) orally once a day (at bedtime)  metFORMIN 500 mg oral tablet: 1 tab(s) orally once a day   ocular lubricant ophthalmic solution: 1 drop(s) to each affected eye every 8 hours, As needed, Dry Eyes  simvastatin 20 mg oral tablet: 1 tab(s) orally once a day (at bedtime)  sodium chloride 1 g oral tablet: 1 tab(s) orally 2 times a day   Vitamin D3 25 mcg (1000 intl units) oral tablet: 1 tab(s) orally once a day   acetaminophen 325 mg oral tablet: 2 tab(s) orally every 6 hours, As needed, Temp greater or equal to 38C (100.4F), Mild Pain (1 - 3)  albuterol 90 mcg/inh inhalation aerosol: 2 puff(s) inhaled every 6 hours, As needed, Shortness of Breath and/or Wheezing  ascorbic acid 500 mg oral tablet: 1 tab(s) orally once a day  budesonide-formoterol 160 mcg-4.5 mcg/inh inhalation aerosol: 2 puff(s) inhaled 2 times a day   Centrum Silver oral tablet: 1 tab(s) orally once a day  cyanocobalamin 500 mcg oral tablet: 1 tab(s) orally once a day  dexamethasone 2 mg oral tablet: 1 tab(s) orally once a day     - start 6/27/22 and take 7 days  dexamethasone 4 mg oral tablet: 1 tab(s) orally once a day     - start 6/21- 6/27  doxazosin 8 mg oral tablet: 1 tab(s) orally once a day (at bedtime)  guaiFENesin 1200 mg oral tablet, extended release: 1 tab(s) orally every 12 hours  lactobacillus acidophilus oral capsule: 1 cap(s) orally once a day  losartan 100 mg oral tablet: 1 tab(s) orally once a day  melatonin 3 mg oral tablet: 1 tab(s) orally once a day (at bedtime)  metFORMIN 500 mg oral tablet: 1 tab(s) orally once a day   ocular lubricant ophthalmic solution: 1 drop(s) to each affected eye every 8 hours, As needed, Dry Eyes  simvastatin 20 mg oral tablet: 1 tab(s) orally once a day (at bedtime)  sodium chloride 1 g oral tablet: 1 tab(s) orally 2 times a day   Vitamin D3 25 mcg (1000 intl units) oral tablet: 1 tab(s) orally once a day

## 2022-06-21 NOTE — DISCHARGE NOTE NURSING/CASE MANAGEMENT/SOCIAL WORK - NSDCFUADDAPPT_GEN_ALL_CORE_FT
Dr. Yuriy Reynaga -  close follow up in 2-3 weeks    Dr. Meza - close follow up with low sodium levels Follow-up appointment with Dr. Carson    Day: Thursday  Date: 6/30/22  Time: 10:00am  [phone]  Location: [address] [place] [address]     Dr. Meza - close follow up with low sodium levels

## 2022-06-21 NOTE — DISCHARGE NOTE NURSING/CASE MANAGEMENT/SOCIAL WORK - PATIENT PORTAL LINK FT
You can access the FollowMyHealth Patient Portal offered by [facility] by registering at the following website: [email or web address]. By joining [employer]’s FollowMyHealth portal, you will also be able to view your health information using other applications (apps) compatible with our system.

## 2022-06-21 NOTE — DISCHARGE NOTE PROVIDER - CARE PROVIDER_API CALL
Yuriy Reynaga)  Internal Medicine; Pulmonary Disease  [address], [address]  [place] [address]  Phone: [phone]  Fax: [phone]  Follow Up Time:     Yun, SukHyeon (MD)  Nephrology  [address], [address]  [place] [address]  Phone: [phone]  Fax: [phone]  Follow Up Time:

## 2022-06-21 NOTE — DISCHARGE NOTE PROVIDER - NSDCCPCAREPLAN_GEN_ALL_CORE_FT
PRINCIPAL DISCHARGE DIAGNOSIS  Diagnosis: Pneumonia  Assessment and Plan of Treatment: # Acute hypoxic resp failure- due to  multilobar pneumonia this could be viral or inflammatory inflammatory  - you were tested negative for covid in the hospital setting  - you have received full course of antibiotics in the hospital  - you will be discharged on low dose steroid taper  - maintain physical therapy as tolerated  - you will continue on home O2  - incentive spirometry  - encourage OOB to chair  and ambulate   - please closely monitor your home O2 levels      SECONDARY DISCHARGE DIAGNOSES  Diagnosis: Hyponatremia  Assessment and Plan of Treatment: # Hyponatremia likely multifactorial,  increased free water intake and possible SIADH 2/2 lung pathology   - your discharge Na -  132  - free water restriction   - you will be on salt tabs  - you need a close monitoring for sodium levels     PRINCIPAL DISCHARGE DIAGNOSIS  Diagnosis: Pneumonia  Assessment and Plan of Treatment: # Acute hypoxic resp failure- due to  multilobar pneumonia this could be viral or inflammatory inflammatory  - you were tested negative for covid in the hospital setting  - you have received full course of antibiotics in the hospital  - you will be discharged on low dose steroid taper  - maintain physical therapy as tolerated  - you will continue on home O2  - incentive spirometry  - encourage OOB to chair  and ambulate   - please closely monitor your home O2 levels      SECONDARY DISCHARGE DIAGNOSES  Diagnosis: Hyponatremia  Assessment and Plan of Treatment: # Hyponatremia likely multifactorial,  increased free water intake and possible SIADH 2/2 lung pathology   - your discharge Na -  132  - free water restriction   - you will be on salt tabs  - you need a close monitoring for sodium levels    Diagnosis: Elevated blood sugar  Assessment and Plan of Treatment: - your elevated blood sugars most likely secondary to steroid use  - as you are on steroids we will give you oral blood sugar meds -  metformin 500 mg oral daily

## 2022-06-21 NOTE — DISCHARGE NOTE NURSING/CASE MANAGEMENT/SOCIAL WORK - NSDCPEFALRISK_GEN_ALL_CORE
For information on Fall & Injury Prevention, visit: [email or web address] OR  [email or web address] OR  https://www.cdc.gov/steadi/patient.html

## 2022-06-21 NOTE — DISCHARGE NOTE PROVIDER - NSDCFUSCHEDAPPT_GEN_ALL_CORE_FT
Yuriy Reynaga  [facility]  [facility] [address]  Scheduled Appointment: 06/30/2022

## 2022-06-21 NOTE — DISCHARGE NOTE PROVIDER - DETAILS OF MALNUTRITION DIAGNOSIS/DIAGNOSES
This patient has been assessed with a concern for Malnutrition and was treated during this hospitalization for the following Nutrition diagnosis/diagnoses:     -  06/08/2022: Severe protein-calorie malnutrition

## 2022-06-24 ENCOUNTER — EMERGENCY (EMERGENCY)
Facility: HOSPITAL | Age: 57
LOS: 0 days | Discharge: HOME | End: 2022-06-24
Attending: EMERGENCY MEDICINE
Payer: COMMERCIAL

## 2022-06-24 VITALS — WEIGHT: 265 LBS

## 2022-06-24 VITALS
TEMPERATURE: 98 F | OXYGEN SATURATION: 96 % | SYSTOLIC BLOOD PRESSURE: 134 MMHG | HEART RATE: 75 BPM | DIASTOLIC BLOOD PRESSURE: 71 MMHG | RESPIRATION RATE: 18 BRPM

## 2022-06-24 DIAGNOSIS — I10 ESSENTIAL (PRIMARY) HYPERTENSION: ICD-10-CM

## 2022-06-24 DIAGNOSIS — Z88.5 ALLERGY STATUS TO NARCOTIC AGENT: ICD-10-CM

## 2022-06-24 DIAGNOSIS — Z88.6 ALLERGY STATUS TO ANALGESIC AGENT: ICD-10-CM

## 2022-06-24 DIAGNOSIS — Z79.84 LONG TERM (CURRENT) USE OF ORAL HYPOGLYCEMIC DRUGS: ICD-10-CM

## 2022-06-24 DIAGNOSIS — Z00.00 ENCOUNTER FOR GENERAL ADULT MEDICAL EXAMINATION WITHOUT ABNORMAL FINDINGS: ICD-10-CM

## 2022-06-24 LAB
ANTI PM-SCL-100 PLUS: <20 UNITS — SIGNIFICANT CHANGE UP
ANTI-SAE 1 IGG: <20 UNITS — SIGNIFICANT CHANGE UP
ANTI-SS-A 52 KD AB, IGG PLUS: <20 UNITS — SIGNIFICANT CHANGE UP
ANTI-U1-RNP AB PLUS: <20 UNITS — SIGNIFICANT CHANGE UP
EJ MYOMARKER3 PLUS: NEGATIVE — SIGNIFICANT CHANGE UP
ENA JO1 AB SER IA-ACNC: <20 UNITS — SIGNIFICANT CHANGE UP
FIBRILLARIN (U3 RNP) PLUS: NEGATIVE — SIGNIFICANT CHANGE UP
KU MYOMARKER3 PLUS: NEGATIVE — SIGNIFICANT CHANGE UP
MDA5 (P140)(CADM-140) PLUS: <20 UNITS — SIGNIFICANT CHANGE UP
MI-2 PLUS: NEGATIVE — SIGNIFICANT CHANGE UP
NXP-2 (P140) MYOPLUS: <20 UNITS — SIGNIFICANT CHANGE UP
OJ MYOMARKER3 PLUS: ABNORMAL
PL-12 PLUS: NEGATIVE — SIGNIFICANT CHANGE UP
PL-7 PLUS: NEGATIVE — SIGNIFICANT CHANGE UP
SRP MYOMARKER3 PLUS: NEGATIVE — SIGNIFICANT CHANGE UP
TIF GAMMA (P155/140) PLUS: <20 UNITS — SIGNIFICANT CHANGE UP
U2 SNRNP PLUS: NEGATIVE — SIGNIFICANT CHANGE UP

## 2022-06-24 PROCEDURE — 99282 EMERGENCY DEPT VISIT SF MDM: CPT

## 2022-06-24 PROCEDURE — 99283 EMERGENCY DEPT VISIT LOW MDM: CPT

## 2022-06-24 PROCEDURE — 82962 GLUCOSE BLOOD TEST: CPT

## 2022-06-24 NOTE — ED PROVIDER NOTE - OBJECTIVE STATEMENT
at my evalution pt voices no complaints he denies fever. denies HA or neck pain. no chest pain or sob. no abd pain. no n/v/d. no urinary f/u/d. no back pain. no motor or sensory deficits. denies illicit drug use. no recent travel. no rash. no other acute issues symptoms or concerns

## 2022-06-24 NOTE — ED ADULT TRIAGE NOTE - CHIEF COMPLAINT QUOTE
Pt c/o multiple medical complaints. Endorses diarrhea, abd. pain, and an episode of blurry vision that resolved earlier this morning. Discharged from  on Tuesday with pneumonia. As per pt, was instructed to come in by "Dr. Fagan" with concern of his sugar. . On 2L O2 on NC at baseline. Denies chest pain, SOB, N/V and fever. No noted neuro deficits.

## 2022-06-24 NOTE — CHART NOTE - NSCHARTNOTEFT_GEN_A_CORE
Patient asked a call back.     When I called patient patient notes ot me that he is wiped after having developed a diarrhea. I strongly recommended to the patient to come in the hospital - patient is refusing. Patient states that if diarrhea does not improved by the morning -  he will come in the ED. I told patient to do the right thing to come in again, patient stated that he does not wants to be re-admitted in the hospital and will wait.

## 2022-06-24 NOTE — ED PROVIDER NOTE - PROGRESS NOTE DETAILS
I  had a long talk with pt and pts wife at bedside. pt has no complaints. he recent month long hospital stay was revieqwed . he spoke on phione with drs concerned about his diarrhea and blood sugar . he is having no diarrhea and blood sugar is 167. return precautions were reviewed. offered to check electrolytes pt states will do it outpt . he is taking is discharge medication as prescribed

## 2022-06-24 NOTE — ED PROVIDER NOTE - PATIENT PORTAL LINK FT
You can access the FollowMyHealth Patient Portal offered by [facility] by registering at the following website: [email or web address]. By joining [facility]’s FollowMyHealth portal, you will also be able to view your health information using other applications (apps) compatible with our system.

## 2022-06-27 DIAGNOSIS — R19.7 DIARRHEA, UNSPECIFIED: ICD-10-CM
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
## 2022-06-27 DIAGNOSIS — E87.1 HYPO-OSMOLALITY AND HYPONATREMIA: ICD-10-CM

## 2022-06-27 DIAGNOSIS — A41.9 SEPSIS, UNSPECIFIED ORGANISM: ICD-10-CM

## 2022-06-27 DIAGNOSIS — Z88.5 ALLERGY STATUS TO NARCOTIC AGENT: ICD-10-CM

## 2022-06-27 DIAGNOSIS — J96.01 ACUTE RESPIRATORY FAILURE WITH HYPOXIA: ICD-10-CM

## 2022-06-27 DIAGNOSIS — R77.8 OTHER SPECIFIED ABNORMALITIES OF PLASMA PROTEINS: ICD-10-CM

## 2022-06-27 DIAGNOSIS — E78.5 HYPERLIPIDEMIA, UNSPECIFIED: ICD-10-CM

## 2022-06-27 DIAGNOSIS — J18.9 PNEUMONIA, UNSPECIFIED ORGANISM: ICD-10-CM

## 2022-06-27 DIAGNOSIS — M17.12 UNILATERAL PRIMARY OSTEOARTHRITIS, LEFT KNEE: ICD-10-CM

## 2022-06-27 DIAGNOSIS — I10 ESSENTIAL (PRIMARY) HYPERTENSION: ICD-10-CM
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
## 2022-06-27 DIAGNOSIS — E43 UNSPECIFIED SEVERE PROTEIN-CALORIE MALNUTRITION: ICD-10-CM

## 2022-06-27 DIAGNOSIS — E22.2 SYNDROME OF INAPPROPRIATE SECRETION OF ANTIDIURETIC HORMONE: ICD-10-CM

## 2022-06-27 DIAGNOSIS — J84.9 INTERSTITIAL PULMONARY DISEASE, UNSPECIFIED: ICD-10-CM

## 2022-06-27 DIAGNOSIS — T38.0X5A ADVERSE EFFECT OF GLUCOCORTICOIDS AND SYNTHETIC ANALOGUES, INITIAL ENCOUNTER: ICD-10-CM

## 2022-06-27 DIAGNOSIS — E11.65 TYPE 2 DIABETES MELLITUS WITH HYPERGLYCEMIA: ICD-10-CM

## 2022-06-27 DIAGNOSIS — E87.6 HYPOKALEMIA: ICD-10-CM

## 2022-06-27 DIAGNOSIS — E66.9 OBESITY, UNSPECIFIED: ICD-10-CM

## 2022-06-30 ENCOUNTER — NON-APPOINTMENT (OUTPATIENT)
Age: 57
End: 2022-06-30

## 2022-06-30 ENCOUNTER — APPOINTMENT (OUTPATIENT)
Dept: INTERNAL MEDICINE | Facility: CLINIC | Age: 57
End: 2022-06-30

## 2022-06-30 VITALS
TEMPERATURE: 98.3 F | HEART RATE: 90 BPM | OXYGEN SATURATION: 95 % | RESPIRATION RATE: 18 BRPM | WEIGHT: 242 LBS | BODY MASS INDEX: 31.06 KG/M2 | DIASTOLIC BLOOD PRESSURE: 50 MMHG | SYSTOLIC BLOOD PRESSURE: 100 MMHG | HEIGHT: 74 IN

## 2022-06-30 PROCEDURE — 99215 OFFICE O/P EST HI 40 MIN: CPT | Mod: 25

## 2022-06-30 PROCEDURE — 94060 EVALUATION OF WHEEZING: CPT

## 2022-06-30 RX ORDER — SIMVASTATIN 80 MG/1
TABLET, FILM COATED ORAL
Refills: 0 | Status: DISCONTINUED | COMMUNITY
End: 2022-06-30

## 2022-06-30 NOTE — REASON FOR VISIT
[Follow-Up - From Hospitalization] : a follow-up visit after a recent hospitalization [Abnormal CXR/ Chest CT] : an abnormal CXR/ chest CT [Shortness of Breath] : shortness of breath [ILD] : ILD

## 2022-06-30 NOTE — PHYSICAL EXAM
[No Acute Distress] : no acute distress [Normal Oropharynx] : normal oropharynx [Normal Appearance] : normal appearance [No Neck Mass] : no neck mass [Normal Rate/Rhythm] : normal rate/rhythm [Normal S1, S2] : normal s1, s2 [No Murmurs] : no murmurs [No Resp Distress] : no resp distress [No Abnormalities] : no abnormalities [Benign] : benign [Normal Gait] : normal gait [No Clubbing] : no clubbing [No Cyanosis] : no cyanosis [No Edema] : no edema [FROM] : FROM [Normal Color/ Pigmentation] : normal color/ pigmentation [No Focal Deficits] : no focal deficits [Oriented x3] : oriented x3 [Normal Affect] : normal affect [TextBox_68] : Bilateral respiratory crackles

## 2022-06-30 NOTE — DISCUSSION/SUMMARY
[FreeTextEntry1] : Mr. Garcia presents for followup evaluation. He is accompanied by his significant other. He continues on supplemental oxygen therapy. He is in the process of weaning Decadron. He is currently on 2 mg of Decadron for the next 3-4 days and then he will discontinue. Patient has bilateral interstitial and fibrotic changes which are peripheral and more prominent in the lower lobes. I began stress the importance of having a lung biopsy to the patient, however, he continues to resist having a lung biopsy. He was see if he will improve with the prednisone taper. He will followup in 4 weeks with complete pulmonary function tests.The patient will be sent for repeat CT scan of chest without contrast to determine if there's been any change inground glass opacity/fibrotic changes.

## 2022-06-30 NOTE — HISTORY OF PRESENT ILLNESS
[TextBox_4] : Mr. Garcia presents for followup evaluation. He was admitted to [facility] 5/27/22 with cough, shortness of breath and significant fatigue. His symptoms started one week after receiving second Corona virus booster which was given 3 weeks prior to admission. He took multiple Corona virus tests which were negative. Initial CAT scan showed bilateral bibasilar opacities which may be consistent with Corona virus. Patient was initially put on intravenous antibiotics with no significant improvement. Repeat CT scan of chest showed more fibrotic lung changes. Antibiotics were discontinued as the patient had no clinical signs of pneumonia. Extensive serologies were drawn to rule out rheumatologic cause for interstitial lung disease. Serologies were all negative except for markedly elevated sedimentation rate of 95 and a markedly elevated CRP. Patient was initially treated with intravenous IV Medrol which was then converted to Decadron 6 mg IV daily. Thoracic surgery saw the patient and it was recommended that he have a lung biopsy, however, patient refused. During his hospital admission the patient was significantly hypoxic requiring up to 5 L per minute nasal cannula. He was discharged on Decadron 2 mg daily and oxygen at 2 L per minute nasal cannula. He now presents for followup evaluation. He states he is feeling stronger. Patient states that his oxygen saturation at rest will sometimes be 90%. He did have a repeat chest x-ray which continues to show a low densities. His initial CT scan of the chest on 5/27 showed peripheral groundglass and consolidative opacities in the lobes with preference to the lower lobes.

## 2022-06-30 NOTE — PROCEDURE
[FreeTextEntry1] : Spirometry pre-and postbronchodilator therapy shows moderate restrictive lung disease. FEV1 is 2.57 L which is 61% predicted. FVC is 3.25 L which is 59% predicted. FEV1/FVC ratio 79%. There is no significant bronchodilator response.

## 2022-07-06 RX ORDER — ESOMEPRAZOLE MAGNESIUM 10 MG/1
10 GRANULE, DELAYED RELEASE ORAL
Refills: 0 | Status: ACTIVE | COMMUNITY

## 2022-07-13 ENCOUNTER — APPOINTMENT (OUTPATIENT)
Dept: CT IMAGING | Facility: CLINIC | Age: 57
End: 2022-07-13

## 2022-07-13 ENCOUNTER — OUTPATIENT (OUTPATIENT)
Dept: OUTPATIENT SERVICES | Facility: HOSPITAL | Age: 57
LOS: 1 days | End: 2022-07-13
Payer: MEDICAID

## 2022-07-13 DIAGNOSIS — E11.9 TYPE 2 DIABETES MELLITUS WITHOUT COMPLICATIONS: ICD-10-CM

## 2022-07-13 DIAGNOSIS — J84.9 INTERSTITIAL PULMONARY DISEASE, UNSPECIFIED: ICD-10-CM

## 2022-07-13 PROCEDURE — 71250 CT THORAX DX C-: CPT

## 2022-07-13 PROCEDURE — 71250 CT THORAX DX C-: CPT | Mod: 26

## 2022-07-15 ENCOUNTER — NON-APPOINTMENT (OUTPATIENT)
Age: 57
End: 2022-07-15

## 2022-07-19 ENCOUNTER — NON-APPOINTMENT (OUTPATIENT)
Age: 57
End: 2022-07-19

## 2022-07-27 ENCOUNTER — APPOINTMENT (OUTPATIENT)
Dept: THORACIC SURGERY | Facility: CLINIC | Age: 57
End: 2022-07-27

## 2022-07-27 VITALS
BODY MASS INDEX: 34.65 KG/M2 | WEIGHT: 270 LBS | DIASTOLIC BLOOD PRESSURE: 60 MMHG | SYSTOLIC BLOOD PRESSURE: 88 MMHG | HEART RATE: 117 BPM | OXYGEN SATURATION: 95 % | RESPIRATION RATE: 16 BRPM | HEIGHT: 74 IN

## 2022-07-27 PROCEDURE — 99215 OFFICE O/P EST HI 40 MIN: CPT

## 2022-07-27 RX ORDER — METFORMIN HYDROCHLORIDE 625 MG/1
TABLET ORAL
Refills: 0 | Status: DISCONTINUED | COMMUNITY
End: 2022-07-27

## 2022-07-27 RX ORDER — DOXAZOSIN 8 MG/1
TABLET ORAL
Refills: 0 | Status: DISCONTINUED | COMMUNITY
End: 2022-07-27

## 2022-07-27 RX ORDER — LOSARTAN POTASSIUM 100 MG/1
TABLET, FILM COATED ORAL
Refills: 0 | Status: DISCONTINUED | COMMUNITY
End: 2022-07-27

## 2022-07-27 NOTE — PHYSICAL EXAM
[General Appearance - Alert] : alert [General Appearance - In No Acute Distress] : in no acute distress [Sclera] : the sclera and conjunctiva were normal [PERRL With Normal Accommodation] : pupils were equal in size, round, and reactive to light [Hearing Threshold Finger Rub Not [place]] : hearing was normal [Neck Appearance] : the appearance of the neck was normal [] : no respiratory distress [Respiration, Rhythm And Depth] : normal respiratory rhythm and effort [Auscultation Breath Sounds / Voice Sounds] : lungs were clear to auscultation bilaterally [Heart Rate And Rhythm] : heart rate was normal and rhythm regular [Heart Sounds] : normal S1 and S2 [Examination Of The Chest] : the chest was normal in appearance [Bowel Sounds] : normal bowel sounds [Abdomen Soft] : soft [Abnormal Walk] : normal gait [Nail Clubbing] : no clubbing  or cyanosis of the fingernails [Skin Color & Pigmentation] : normal skin color and pigmentation [Skin Turgor] : normal skin turgor [Motor Exam] : the motor exam was normal [No Focal Deficits] : no focal deficits [Oriented To Time, Place, And Person] : oriented to person, place, and time [Impaired Insight] : insight and judgment were intact [Affect] : the affect was normal [Mood] : the mood was normal

## 2022-07-29 NOTE — REVIEW OF SYSTEMS
[Feeling Tired] : feeling tired [Eye Pain] : eye pain [SOB on Exertion] : shortness of breath during exertion [Dizziness] : dizziness [Negative] : Heme/Lymph [Fever] : no fever [Chills] : no chills [Chest Pain] : no chest pain [Palpitations] : no palpitations [Wheezing] : no wheezing [Cough] : no cough [Orthopnea] : no orthopnea [PND] : no PND [Fainting] : no fainting

## 2022-07-29 NOTE — HISTORY OF PRESENT ILLNESS
[FreeTextEntry1] : Mr. MISA MCCURDY is a 56 year old male, referred by Dr. Reynaga, who presents for consultation regarding lung biopsy. His past medical history is significant for HTN.\par \par He was hospitalized ([facility]) from 5/27- 6/21/22. Patient was initially put on IV antibiotics with no significant improvement. Repeat CT scan of chest showed more fibrotic lung changes. Antibiotics subsequently discontinued. Extensive serologies were drawn to rule out rheumatologic cause for interstitial lung disease. Serologies were all negative except for markedly elevated sedimentation rate (95) and a markedly elevated CRP (19). Patient was initially treated with intravenous IV Medrol which was then converted to Decadron 6 mg IV daily. Thoracic surgery consulted and recommended a lung biopsy; however, patient refused. He followed up with Dr. Reynaga upon discharge where repeat CT was recommended and biopsy was further discussed. \par \par Of note: During his hospital admission the patient was significantly hypoxic requiring up to 5 L per minute nasal cannula. He was discharged on Decadron 2 mg daily and oxygen at 2 L per minute nasal cannula. Currently on Dexamethasone taper; 1 mg every other day. Home 02 PRN.\par \par Today, he reports he has been off steroids for the past 2 days (while taking he developed ankle swelling, elevated blood glucose now on insulin). He states he was healthy but became very ill after his second Covid booster. He developed profound fatigue followed by SOB/orthopnea/PND. He has discontinued oxygen but is using at 2L/min for this visit due to excessive walking. He denies any cough/wheeze. He is able to lie flat. \par \par \par \par \par \par \par

## 2022-07-29 NOTE — CONSULT LETTER
[Dear  ___] : Dear  [unfilled], [Consult Letter:] : I had the pleasure of evaluating your patient, [unfilled]. [Please see my note below.] : Please see my note below. [Sincerely,] : Sincerely, [FreeTextEntry2] : Dr. Yuriy Reynaga (Pulm/Ref) [FreeTextEntry3] : Scotty Loyola MD, FACS \par Vice Chairperson, Thoracic Surgery, [facility] Department of Cardiovascular & Thoracic Surgery \par , [facility] [facility] \par \par

## 2022-07-29 NOTE — ASSESSMENT
[FreeTextEntry1] : Mr. MISA MCCURDY is a 56 year old male with no significant past medical history who developed severe SOB/hypoxia requiring hospitalization (4 weeks) for suspected PNA. CT Chest demonstrated pulmonary fibrosis, treated with steroids and supplemental oxygen. No improvement  on CT imaging. He is referred by Dr. Reynaga regarding lung biopsy.\par \par I have independently reviewed the medical records and imaging at the time of this office consultation. Most recent CT Chest reveals bilateral lower lobe predominant peripheral groundglass and consolidative opacities, slightly increased compared to prior. I recommend lung biopsy to help determine the etiology of lung fibrosis given no treatment has shown improvement on imaging.\par \par The procedure, hospital stay and recovery was discussed in detail. All risks, benefits, and alternatives discussed at length with patient. All questions addressed. Patient would like to proceed with surgical intervention as discussed.\par \par \par PLAN:\par - Lung biopsy at [facility] (will plan for left lung as the patient prefers to sleep on his right side)\par \par \par I, Sarai Charles NP, am scribing for and in the presence of Dr. Loyola the following sections HISTORY OF PRESENT ILLNESS, PAST MEDICAL/FAMILY/SOCIAL HISTORY; REVIEW OF SYSTEMS, VITAL SIGNS, PHYSICAL EXAM, ASSESSMENT, PLAN AND DISPOSITION.\par \par  \par \par "I personally performed the services described in the documentation and reviewed the documentation recorded by the scribe in my presence which accurately and completely recorded my words and actions."\par \par  \par \par Sarai Charles, TERENCE-BC\par \par Cardiothoracic Surgery\par \par [facility]\par \par [phone]\par \par \par

## 2022-07-29 NOTE — DATA REVIEWED
[FreeTextEntry1] :  CT Chest on 7/13/22: \par - Bilateral lower lobe predominant peripheral groundglass and consolidative opacities, slightly increased compared to prior. For example, there is new groundglass in the left apex and increased consolidation along the peripheral aspect of the left major fissure. \par -  Stable left adrenal gland 1.8 cm adenoma.\par \par Spirometry 6/30/22: Pre- Bronchodilator: FVC: 59%; FEV1: 61%; Post: FVC: 54%,; FEV1: 60%

## 2022-08-03 ENCOUNTER — NON-APPOINTMENT (OUTPATIENT)
Age: 57
End: 2022-08-03

## 2022-08-03 ENCOUNTER — APPOINTMENT (OUTPATIENT)
Dept: INTERNAL MEDICINE | Facility: CLINIC | Age: 57
End: 2022-08-03

## 2022-08-03 VITALS
BODY MASS INDEX: 34.65 KG/M2 | DIASTOLIC BLOOD PRESSURE: 60 MMHG | HEIGHT: 74 IN | TEMPERATURE: 98.7 F | OXYGEN SATURATION: 96 % | HEART RATE: 102 BPM | RESPIRATION RATE: 16 BRPM | SYSTOLIC BLOOD PRESSURE: 110 MMHG | WEIGHT: 270 LBS

## 2022-08-03 PROCEDURE — 94060 EVALUATION OF WHEEZING: CPT

## 2022-08-03 PROCEDURE — 99215 OFFICE O/P EST HI 40 MIN: CPT | Mod: 25

## 2022-08-03 RX ORDER — ALBUTEROL SULFATE 90 UG/1
108 (90 BASE) INHALANT RESPIRATORY (INHALATION)
Qty: 7 | Refills: 0 | Status: ACTIVE | COMMUNITY
Start: 2022-06-21

## 2022-08-03 RX ORDER — PEN NEEDLE, DIABETIC 29 G X1/2"
31G X 8 MM NEEDLE, DISPOSABLE MISCELLANEOUS
Qty: 100 | Refills: 0 | Status: ACTIVE | COMMUNITY
Start: 2022-07-17

## 2022-08-03 RX ORDER — METFORMIN HYDROCHLORIDE 500 MG/1
500 TABLET, COATED ORAL
Qty: 90 | Refills: 0 | Status: ACTIVE | COMMUNITY

## 2022-08-03 RX ORDER — BLOOD SUGAR DIAGNOSTIC
STRIP MISCELLANEOUS
Qty: 100 | Refills: 0 | Status: ACTIVE | COMMUNITY
Start: 2022-06-28

## 2022-08-03 RX ORDER — DEXAMETHASONE 1 MG/1
1 TABLET ORAL DAILY
Qty: 15 | Refills: 0 | Status: DISCONTINUED | COMMUNITY
Start: 2022-07-06 | End: 2022-08-03

## 2022-08-03 RX ORDER — DOXAZOSIN 8 MG/1
8 TABLET ORAL DAILY
Refills: 0 | Status: ACTIVE | COMMUNITY

## 2022-08-03 RX ORDER — NORMAL SALT TABLETS 1 G/G
1 TABLET ORAL
Qty: 14 | Refills: 0 | Status: ACTIVE | COMMUNITY
Start: 2022-06-21

## 2022-08-03 RX ORDER — LANCETS 33 GAUGE
EACH MISCELLANEOUS
Qty: 100 | Refills: 0 | Status: ACTIVE | COMMUNITY
Start: 2022-07-28

## 2022-08-03 RX ORDER — INSULIN ASPART 100 [IU]/ML
100 INJECTION, SOLUTION INTRAVENOUS; SUBCUTANEOUS
Qty: 15 | Refills: 0 | Status: ACTIVE | COMMUNITY
Start: 2022-06-28

## 2022-08-03 RX ORDER — LOSARTAN POTASSIUM 100 MG/1
100 TABLET, FILM COATED ORAL
Refills: 2 | Status: ACTIVE | COMMUNITY

## 2022-08-03 RX ORDER — BLOOD-GLUCOSE METER
W/DEVICE EACH MISCELLANEOUS
Qty: 1 | Refills: 0 | Status: ACTIVE | COMMUNITY
Start: 2022-06-29

## 2022-08-03 RX ORDER — INSULIN GLARGINE 100 [IU]/ML
100 INJECTION, SOLUTION SUBCUTANEOUS
Qty: 15 | Refills: 0 | Status: ACTIVE | COMMUNITY
Start: 2022-06-30

## 2022-08-03 NOTE — PHYSICAL EXAM
[No Acute Distress] : no acute distress [Normal Oropharynx] : normal oropharynx [Normal Appearance] : normal appearance [No Neck Mass] : no neck mass [Normal Rate/Rhythm] : normal rate/rhythm [Normal S1, S2] : normal s1, s2 [No Murmurs] : no murmurs [No Resp Distress] : no resp distress [No Abnormalities] : no abnormalities [Benign] : benign [Normal Gait] : normal gait [No Clubbing] : no clubbing [No Cyanosis] : no cyanosis [No Edema] : no edema [FROM] : FROM [Normal Color/ Pigmentation] : normal color/ pigmentation [No Focal Deficits] : no focal deficits [Oriented x3] : oriented x3 [Normal Affect] : normal affect [TextBox_68] : Bilateral inspiratory crackles one third right greater than left

## 2022-08-03 NOTE — DISCUSSION/SUMMARY
Wound care orders faxed to [facility] #[phone]   [FreeTextEntry1] : Mr. Garcia presents for a followup evaluation.\par \par Number one. Patient has interstitial disease of unknown etiology. He is currently off of oral steroids. He has had a thoracic surgery evaluation by Dr. Loyola and is scheduled for lung biopsy on 8/19/22. He will have complete pulmonary function tests done on 8/4/22. These will serve as a baseline study. There is no contraindication to planned lung biopsy with sedation/anesthesia.

## 2022-08-03 NOTE — PROCEDURE
[FreeTextEntry1] : Spirometry pre-and postbronchodilator therapy shows significant restrictive lung disease. FEV1 is 2.17 L which is 52% predicted. FVC is 2.47 L which is 45% addicted. FEV1 percent is 80%.

## 2022-08-04 ENCOUNTER — APPOINTMENT (OUTPATIENT)
Dept: INTERNAL MEDICINE | Facility: CLINIC | Age: 57
End: 2022-08-04

## 2022-08-04 VITALS
OXYGEN SATURATION: 96 % | WEIGHT: 270 LBS | RESPIRATION RATE: 18 BRPM | DIASTOLIC BLOOD PRESSURE: 84 MMHG | BODY MASS INDEX: 35.78 KG/M2 | HEART RATE: 111 BPM | SYSTOLIC BLOOD PRESSURE: 122 MMHG | TEMPERATURE: 97.9 F | HEIGHT: 73 IN

## 2022-08-04 PROCEDURE — 94060 EVALUATION OF WHEEZING: CPT

## 2022-08-04 PROCEDURE — 94729 DIFFUSING CAPACITY: CPT

## 2022-08-04 PROCEDURE — 94727 GAS DIL/WSHOT DETER LNG VOL: CPT

## 2022-08-10 ENCOUNTER — OUTPATIENT (OUTPATIENT)
Dept: OUTPATIENT SERVICES | Facility: HOSPITAL | Age: 57
LOS: 1 days | End: 2022-08-10

## 2022-08-10 VITALS
DIASTOLIC BLOOD PRESSURE: 85 MMHG | TEMPERATURE: 98 F | SYSTOLIC BLOOD PRESSURE: 128 MMHG | WEIGHT: 268.08 LBS | HEART RATE: 98 BPM | OXYGEN SATURATION: 98 % | RESPIRATION RATE: 16 BRPM | HEIGHT: 74 IN

## 2022-08-10 DIAGNOSIS — R06.09 OTHER FORMS OF DYSPNEA: ICD-10-CM

## 2022-08-10 DIAGNOSIS — J84.9 INTERSTITIAL PULMONARY DISEASE, UNSPECIFIED: ICD-10-CM

## 2022-08-10 DIAGNOSIS — Z98.890 OTHER SPECIFIED POSTPROCEDURAL STATES: Chronic | ICD-10-CM

## 2022-08-10 DIAGNOSIS — E11.9 TYPE 2 DIABETES MELLITUS WITHOUT COMPLICATIONS: ICD-10-CM

## 2022-08-10 DIAGNOSIS — I10 ESSENTIAL (PRIMARY) HYPERTENSION: ICD-10-CM

## 2022-08-10 DIAGNOSIS — K21.9 GASTRO-ESOPHAGEAL REFLUX DISEASE WITHOUT ESOPHAGITIS: ICD-10-CM

## 2022-08-10 LAB
A1C WITH ESTIMATED AVERAGE GLUCOSE RESULT: 6.4 % — HIGH (ref 4–5.6)
ANION GAP SERPL CALC-SCNC: 13 MMOL/L — SIGNIFICANT CHANGE UP (ref 7–14)
BLD GP AB SCN SERPL QL: NEGATIVE — SIGNIFICANT CHANGE UP
BUN SERPL-MCNC: 12 MG/DL — SIGNIFICANT CHANGE UP (ref 7–23)
CALCIUM SERPL-MCNC: 10.1 MG/DL — SIGNIFICANT CHANGE UP (ref 8.4–10.5)
CHLORIDE SERPL-SCNC: 105 MMOL/L — SIGNIFICANT CHANGE UP (ref 98–107)
CO2 SERPL-SCNC: 23 MMOL/L — SIGNIFICANT CHANGE UP (ref 22–31)
CREAT SERPL-MCNC: 0.89 MG/DL — SIGNIFICANT CHANGE UP (ref 0.5–1.3)
EGFR: 101 ML/MIN/1.73M2 — SIGNIFICANT CHANGE UP
ESTIMATED AVERAGE GLUCOSE: 137 — SIGNIFICANT CHANGE UP
GLUCOSE SERPL-MCNC: 117 MG/DL — HIGH (ref 70–99)
HCT VFR BLD CALC: 39.1 % — SIGNIFICANT CHANGE UP (ref 39–50)
HGB BLD-MCNC: 12.7 G/DL — LOW (ref 13–17)
MCHC RBC-ENTMCNC: 29.1 PG — SIGNIFICANT CHANGE UP (ref 27–34)
MCHC RBC-ENTMCNC: 32.5 GM/DL — SIGNIFICANT CHANGE UP (ref 32–36)
MCV RBC AUTO: 89.5 FL — SIGNIFICANT CHANGE UP (ref 80–100)
NRBC # BLD: 0 /100 WBCS — SIGNIFICANT CHANGE UP
NRBC # FLD: 0 K/UL — SIGNIFICANT CHANGE UP
PLATELET # BLD AUTO: 349 K/UL — SIGNIFICANT CHANGE UP (ref 150–400)
POTASSIUM SERPL-MCNC: 4.2 MMOL/L — SIGNIFICANT CHANGE UP (ref 3.5–5.3)
POTASSIUM SERPL-SCNC: 4.2 MMOL/L — SIGNIFICANT CHANGE UP (ref 3.5–5.3)
RBC # BLD: 4.37 M/UL — SIGNIFICANT CHANGE UP (ref 4.2–5.8)
RBC # FLD: 17.7 % — HIGH (ref 10.3–14.5)
RH IG SCN BLD-IMP: POSITIVE — SIGNIFICANT CHANGE UP
SODIUM SERPL-SCNC: 141 MMOL/L — SIGNIFICANT CHANGE UP (ref 135–145)
WBC # BLD: 8.16 K/UL — SIGNIFICANT CHANGE UP (ref 3.8–10.5)
WBC # FLD AUTO: 8.16 K/UL — SIGNIFICANT CHANGE UP (ref 3.8–10.5)

## 2022-08-10 NOTE — H&P PST ADULT - PROBLEM SELECTOR PLAN 1
-Scheduled for left video assisted thoracoscopy, robotic assisted, left lung biopsy with Dr. Loyola on 8/19/2022.  -Verbal and written pre-op instructions provided to patient. Patient verbalized understanding and will call surgeons office for revised instructions if surgery is rescheduled.   -Patient given verbal and written instruction with teach back on chlorhexidine shampoo, and the patient verbalized understanding with return demonstration.  -Patient aware of need for COVID testing prior to  procedure at a [facility], given list of locations and advised to get tested within 3 to 5 days of procedure.  Pulmonary eval printed in chart.

## 2022-08-10 NOTE — H&P PST ADULT - ASSESSMENT
55 yo male presents to PST unit with pre-op diagnosis of interstitial pulmonary disease scheduled for left video assisted thoracoscopy, robotic assisted, left lung biopsy with Dr. Loyola.

## 2022-08-10 NOTE — H&P PST ADULT - NSICDXPASTMEDICALHX_GEN_ALL_CORE_FT
PAST MEDICAL HISTORY:  HTN (hypertension)     Interstitial pulmonary disease     Type 2 diabetes mellitus

## 2022-08-10 NOTE — H&P PST ADULT - HISTORY OF PRESENT ILLNESS
55 yo male presents to PST unit with pre-op diagnosis of interstitial pulmonary disease scheduled for left video assisted thoracoscopy, robotic assisted, left lung biopsy with Dr. Loyola. He reports developing extreme SOB & fatigue 1 week after receiving Pfizer vaccine and was diagnosed with interstitial pneumonia.

## 2022-08-10 NOTE — H&P PST ADULT - PROBLEM SELECTOR PLAN 2
Pt. instructed to hold Metformin the morning of procedure. Accucheck DOS.   Reduce Basaglar to 8 units night before procedure.

## 2022-08-10 NOTE — H&P PST ADULT - NSICDXPASTSURGICALHX_GEN_ALL_CORE_FT
PAST SURGICAL HISTORY:  H/O umbilical hernia repair     History of bladder surgery removal of polyps

## 2022-08-10 NOTE — H&P PST ADULT - RESPIRATORY
Per 12/20/18 office note:    1. Will trial Gabapentin 100mg 1 tab po TID x 5 days, then increase to 2 tabs po BID #120.   1 refill.  LFTs 4/2018 WNL. Side effects reviewed.  Recheck LFTs on f/u.     1 month supply sent to pharmacy.  
normal/clear to auscultation bilaterally/no wheezes/no rales/no rhonchi

## 2022-08-11 RX ORDER — BUDESONIDE AND FORMOTEROL FUMARATE DIHYDRATE 160; 4.5 UG/1; UG/1
160-4.5 AEROSOL RESPIRATORY (INHALATION) TWICE DAILY
Qty: 1 | Refills: 5 | Status: ACTIVE | COMMUNITY
Start: 2022-06-21 | End: 1900-01-01

## 2022-08-16 ENCOUNTER — NON-APPOINTMENT (OUTPATIENT)
Age: 57
End: 2022-08-16

## 2022-08-17 LAB — SARS-COV-2 N GENE NPH QL NAA+PROBE: NOT DETECTED

## 2022-08-18 NOTE — ASU PATIENT PROFILE, ADULT - NSICDXPASTSURGICALHX_GEN_ALL_CORE_FT
PAST SURGICAL HISTORY:  H/O umbilical hernia repair     History of bladder surgery removal of polyps    S/P appendectomy

## 2022-08-18 NOTE — ASU PATIENT PROFILE, ADULT - FALL HARM RISK - UNIVERSAL INTERVENTIONS
Bed in lowest position, wheels locked, appropriate side rails in place/Call bell, personal items and telephone in reach/Instruct patient to call for assistance before getting out of bed or chair/Non-slip footwear when patient is out of bed/[place] to call system/Physically safe environment - no spills, clutter or unnecessary equipment/Purposeful Proactive Rounding/Room/bathroom lighting operational, light cord in reach

## 2022-08-19 ENCOUNTER — APPOINTMENT (OUTPATIENT)
Dept: THORACIC SURGERY | Facility: HOSPITAL | Age: 57
End: 2022-08-19

## 2022-08-19 ENCOUNTER — RESULT REVIEW (OUTPATIENT)
Age: 57
End: 2022-08-19

## 2022-08-19 ENCOUNTER — INPATIENT (INPATIENT)
Facility: HOSPITAL | Age: 57
LOS: 0 days | Discharge: HOME | End: 2022-08-20
Attending: THORACIC SURGERY (CARDIOTHORACIC VASCULAR SURGERY) | Admitting: THORACIC SURGERY (CARDIOTHORACIC VASCULAR SURGERY)

## 2022-08-19 ENCOUNTER — TRANSCRIPTION ENCOUNTER (OUTPATIENT)
Age: 57
End: 2022-08-19

## 2022-08-19 VITALS
TEMPERATURE: 99 F | HEIGHT: 74 IN | SYSTOLIC BLOOD PRESSURE: 131 MMHG | RESPIRATION RATE: 16 BRPM | HEART RATE: 101 BPM | OXYGEN SATURATION: 96 % | DIASTOLIC BLOOD PRESSURE: 75 MMHG | WEIGHT: 268.08 LBS

## 2022-08-19 DIAGNOSIS — J84.9 INTERSTITIAL PULMONARY DISEASE, UNSPECIFIED: ICD-10-CM

## 2022-08-19 DIAGNOSIS — Z98.890 OTHER SPECIFIED POSTPROCEDURAL STATES: Chronic | ICD-10-CM

## 2022-08-19 DIAGNOSIS — Z90.49 ACQUIRED ABSENCE OF OTHER SPECIFIED PARTS OF DIGESTIVE TRACT: Chronic | ICD-10-CM

## 2022-08-19 LAB
GLUCOSE BLDC GLUCOMTR-MCNC: 158 MG/DL — HIGH (ref 70–99)
GLUCOSE BLDC GLUCOMTR-MCNC: 196 MG/DL — HIGH (ref 70–99)
GLUCOSE BLDC GLUCOMTR-MCNC: 207 MG/DL — HIGH (ref 70–99)
GLUCOSE BLDC GLUCOMTR-MCNC: 226 MG/DL — HIGH (ref 70–99)
GRAM STN FLD: SIGNIFICANT CHANGE UP
NIGHT BLUE STAIN TISS: SIGNIFICANT CHANGE UP
SPECIMEN SOURCE: SIGNIFICANT CHANGE UP
SPECIMEN SOURCE: SIGNIFICANT CHANGE UP

## 2022-08-19 PROCEDURE — 32607 THORACOSCOPY W/BX INFILTRATE: CPT

## 2022-08-19 PROCEDURE — 88305 TISSUE EXAM BY PATHOLOGIST: CPT | Mod: 26

## 2022-08-19 PROCEDURE — 71045 X-RAY EXAM CHEST 1 VIEW: CPT | Mod: 26

## 2022-08-19 PROCEDURE — 71045 X-RAY EXAM CHEST 1 VIEW: CPT | Mod: 26,77

## 2022-08-19 PROCEDURE — S2900 ROBOTIC SURGICAL SYSTEM: CPT | Mod: NC

## 2022-08-19 PROCEDURE — 99232 SBSQ HOSP IP/OBS MODERATE 35: CPT

## 2022-08-19 DEVICE — CHEST DRAIN THORACIC ARGYLE PVC 20FR STRAIGHT: Type: IMPLANTABLE DEVICE | Site: LEFT | Status: FUNCTIONAL

## 2022-08-19 DEVICE — XI STAPLER ENDOWRIST RELOAD 45MM BLUE: Type: IMPLANTABLE DEVICE | Site: LEFT | Status: FUNCTIONAL

## 2022-08-19 RX ORDER — DEXTROSE 50 % IN WATER 50 %
12.5 SYRINGE (ML) INTRAVENOUS ONCE
Refills: 0 | Status: DISCONTINUED | OUTPATIENT
Start: 2022-08-19 | End: 2022-08-20

## 2022-08-19 RX ORDER — HYDROMORPHONE HYDROCHLORIDE 2 MG/ML
30 INJECTION INTRAMUSCULAR; INTRAVENOUS; SUBCUTANEOUS
Refills: 0 | Status: DISCONTINUED | OUTPATIENT
Start: 2022-08-19 | End: 2022-08-20

## 2022-08-19 RX ORDER — ALBUTEROL 90 UG/1
2 AEROSOL, METERED ORAL EVERY 6 HOURS
Refills: 0 | Status: DISCONTINUED | OUTPATIENT
Start: 2022-08-19 | End: 2022-08-20

## 2022-08-19 RX ORDER — INSULIN LISPRO 100/ML
3 VIAL (ML) SUBCUTANEOUS
Refills: 0 | Status: DISCONTINUED | OUTPATIENT
Start: 2022-08-19 | End: 2022-08-20

## 2022-08-19 RX ORDER — INSULIN LISPRO 100/ML
VIAL (ML) SUBCUTANEOUS
Refills: 0 | Status: DISCONTINUED | OUTPATIENT
Start: 2022-08-19 | End: 2022-08-19

## 2022-08-19 RX ORDER — ACETAMINOPHEN 500 MG
975 TABLET ORAL ONCE
Refills: 0 | Status: COMPLETED | OUTPATIENT
Start: 2022-08-19 | End: 2022-08-19

## 2022-08-19 RX ORDER — ESOMEPRAZOLE MAGNESIUM 40 MG/1
1 CAPSULE, DELAYED RELEASE ORAL
Qty: 0 | Refills: 0 | DISCHARGE

## 2022-08-19 RX ORDER — SODIUM CHLORIDE 9 MG/ML
1000 INJECTION, SOLUTION INTRAVENOUS
Refills: 0 | Status: DISCONTINUED | OUTPATIENT
Start: 2022-08-19 | End: 2022-08-19

## 2022-08-19 RX ORDER — METOCLOPRAMIDE HCL 10 MG
10 TABLET ORAL EVERY 8 HOURS
Refills: 0 | Status: DISCONTINUED | OUTPATIENT
Start: 2022-08-19 | End: 2022-08-20

## 2022-08-19 RX ORDER — ONDANSETRON 8 MG/1
4 TABLET, FILM COATED ORAL EVERY 6 HOURS
Refills: 0 | Status: DISCONTINUED | OUTPATIENT
Start: 2022-08-19 | End: 2022-08-20

## 2022-08-19 RX ORDER — DEXTROSE 50 % IN WATER 50 %
25 SYRINGE (ML) INTRAVENOUS ONCE
Refills: 0 | Status: DISCONTINUED | OUTPATIENT
Start: 2022-08-19 | End: 2022-08-20

## 2022-08-19 RX ORDER — LANOLIN ALCOHOL/MO/W.PET/CERES
3 CREAM (GRAM) TOPICAL AT BEDTIME
Refills: 0 | Status: DISCONTINUED | OUTPATIENT
Start: 2022-08-19 | End: 2022-08-20

## 2022-08-19 RX ORDER — INSULIN ASPART 100 [IU]/ML
3 INJECTION, SOLUTION SUBCUTANEOUS
Qty: 0 | Refills: 0 | DISCHARGE

## 2022-08-19 RX ORDER — LIDOCAINE 4 G/100G
1 CREAM TOPICAL ONCE
Refills: 0 | Status: COMPLETED | OUTPATIENT
Start: 2022-08-19 | End: 2022-08-19

## 2022-08-19 RX ORDER — HYDROMORPHONE HYDROCHLORIDE 2 MG/ML
0.5 INJECTION INTRAMUSCULAR; INTRAVENOUS; SUBCUTANEOUS
Refills: 0 | Status: DISCONTINUED | OUTPATIENT
Start: 2022-08-19 | End: 2022-08-20

## 2022-08-19 RX ORDER — SODIUM CHLORIDE 9 MG/ML
1000 INJECTION, SOLUTION INTRAVENOUS
Refills: 0 | Status: DISCONTINUED | OUTPATIENT
Start: 2022-08-19 | End: 2022-08-20

## 2022-08-19 RX ORDER — INSULIN GLARGINE 100 [IU]/ML
10 INJECTION, SOLUTION SUBCUTANEOUS
Qty: 0 | Refills: 0 | DISCHARGE

## 2022-08-19 RX ORDER — DEXTROSE 50 % IN WATER 50 %
15 SYRINGE (ML) INTRAVENOUS ONCE
Refills: 0 | Status: DISCONTINUED | OUTPATIENT
Start: 2022-08-19 | End: 2022-08-20

## 2022-08-19 RX ORDER — ACETAMINOPHEN 500 MG
1000 TABLET ORAL ONCE
Refills: 0 | Status: COMPLETED | OUTPATIENT
Start: 2022-08-19 | End: 2022-08-19

## 2022-08-19 RX ORDER — DOXAZOSIN MESYLATE 4 MG
8 TABLET ORAL AT BEDTIME
Refills: 0 | Status: DISCONTINUED | OUTPATIENT
Start: 2022-08-19 | End: 2022-08-20

## 2022-08-19 RX ORDER — BUDESONIDE AND FORMOTEROL FUMARATE DIHYDRATE 160; 4.5 UG/1; UG/1
2 AEROSOL RESPIRATORY (INHALATION)
Refills: 0 | Status: DISCONTINUED | OUTPATIENT
Start: 2022-08-19 | End: 2022-08-20

## 2022-08-19 RX ORDER — ACETAMINOPHEN 500 MG
975 TABLET ORAL EVERY 6 HOURS
Refills: 0 | Status: DISCONTINUED | OUTPATIENT
Start: 2022-08-19 | End: 2022-08-19

## 2022-08-19 RX ORDER — KETOROLAC TROMETHAMINE 30 MG/ML
15 SYRINGE (ML) INJECTION EVERY 6 HOURS
Refills: 0 | Status: DISCONTINUED | OUTPATIENT
Start: 2022-08-19 | End: 2022-08-19

## 2022-08-19 RX ORDER — POLYETHYLENE GLYCOL 3350 17 G/17G
17 POWDER, FOR SOLUTION ORAL DAILY
Refills: 0 | Status: DISCONTINUED | OUTPATIENT
Start: 2022-08-19 | End: 2022-08-20

## 2022-08-19 RX ORDER — HEPARIN SODIUM 5000 [USP'U]/ML
7500 INJECTION INTRAVENOUS; SUBCUTANEOUS EVERY 8 HOURS
Refills: 0 | Status: DISCONTINUED | OUTPATIENT
Start: 2022-08-19 | End: 2022-08-20

## 2022-08-19 RX ORDER — NALOXONE HYDROCHLORIDE 4 MG/.1ML
0.1 SPRAY NASAL
Refills: 0 | Status: DISCONTINUED | OUTPATIENT
Start: 2022-08-19 | End: 2022-08-20

## 2022-08-19 RX ORDER — MULTIVIT-MIN/FERROUS GLUCONATE 9 MG/15 ML
1 LIQUID (ML) ORAL
Qty: 0 | Refills: 0 | DISCHARGE

## 2022-08-19 RX ORDER — HEPARIN SODIUM 5000 [USP'U]/ML
5000 INJECTION INTRAVENOUS; SUBCUTANEOUS ONCE
Refills: 0 | Status: DISCONTINUED | OUTPATIENT
Start: 2022-08-19 | End: 2022-08-19

## 2022-08-19 RX ORDER — SENNA PLUS 8.6 MG/1
2 TABLET ORAL AT BEDTIME
Refills: 0 | Status: DISCONTINUED | OUTPATIENT
Start: 2022-08-19 | End: 2022-08-20

## 2022-08-19 RX ORDER — PREGABALIN 225 MG/1
1 CAPSULE ORAL
Qty: 0 | Refills: 0 | DISCHARGE

## 2022-08-19 RX ORDER — PANTOPRAZOLE SODIUM 20 MG/1
40 TABLET, DELAYED RELEASE ORAL
Refills: 0 | Status: DISCONTINUED | OUTPATIENT
Start: 2022-08-19 | End: 2022-08-20

## 2022-08-19 RX ORDER — INSULIN LISPRO 100/ML
VIAL (ML) SUBCUTANEOUS
Refills: 0 | Status: DISCONTINUED | OUTPATIENT
Start: 2022-08-19 | End: 2022-08-20

## 2022-08-19 RX ORDER — ACETAMINOPHEN 500 MG
1000 TABLET ORAL ONCE
Refills: 0 | Status: COMPLETED | OUTPATIENT
Start: 2022-08-20 | End: 2022-08-20

## 2022-08-19 RX ORDER — INSULIN GLARGINE 100 [IU]/ML
8 INJECTION, SOLUTION SUBCUTANEOUS AT BEDTIME
Refills: 0 | Status: DISCONTINUED | OUTPATIENT
Start: 2022-08-19 | End: 2022-08-20

## 2022-08-19 RX ORDER — GLUCAGON INJECTION, SOLUTION 0.5 MG/.1ML
1 INJECTION, SOLUTION SUBCUTANEOUS ONCE
Refills: 0 | Status: DISCONTINUED | OUTPATIENT
Start: 2022-08-19 | End: 2022-08-20

## 2022-08-19 RX ORDER — GABAPENTIN 400 MG/1
300 CAPSULE ORAL ONCE
Refills: 0 | Status: COMPLETED | OUTPATIENT
Start: 2022-08-19 | End: 2022-08-19

## 2022-08-19 RX ORDER — CHOLECALCIFEROL (VITAMIN D3) 125 MCG
1 CAPSULE ORAL
Qty: 0 | Refills: 0 | DISCHARGE

## 2022-08-19 RX ORDER — ASCORBIC ACID 60 MG
1 TABLET,CHEWABLE ORAL
Qty: 0 | Refills: 0 | DISCHARGE

## 2022-08-19 RX ADMIN — HYDROMORPHONE HYDROCHLORIDE 30 MILLILITER(S): 2 INJECTION INTRAMUSCULAR; INTRAVENOUS; SUBCUTANEOUS at 09:30

## 2022-08-19 RX ADMIN — Medication 400 MILLIGRAM(S): at 12:00

## 2022-08-19 RX ADMIN — Medication 8 MILLIGRAM(S): at 22:31

## 2022-08-19 RX ADMIN — Medication 1000 MILLIGRAM(S): at 23:00

## 2022-08-19 RX ADMIN — INSULIN GLARGINE 8 UNIT(S): 100 INJECTION, SOLUTION SUBCUTANEOUS at 22:31

## 2022-08-19 RX ADMIN — Medication 400 MILLIGRAM(S): at 22:30

## 2022-08-19 RX ADMIN — Medication 2: at 22:31

## 2022-08-19 RX ADMIN — HEPARIN SODIUM 7500 UNIT(S): 5000 INJECTION INTRAVENOUS; SUBCUTANEOUS at 22:30

## 2022-08-19 RX ADMIN — Medication 3 UNIT(S): at 20:30

## 2022-08-19 RX ADMIN — Medication 1000 MILLIGRAM(S): at 12:15

## 2022-08-19 RX ADMIN — Medication 2: at 17:00

## 2022-08-19 RX ADMIN — SODIUM CHLORIDE 30 MILLILITER(S): 9 INJECTION, SOLUTION INTRAVENOUS at 07:01

## 2022-08-19 RX ADMIN — BUDESONIDE AND FORMOTEROL FUMARATE DIHYDRATE 2 PUFF(S): 160; 4.5 AEROSOL RESPIRATORY (INHALATION) at 22:03

## 2022-08-19 RX ADMIN — HEPARIN SODIUM 7500 UNIT(S): 5000 INJECTION INTRAVENOUS; SUBCUTANEOUS at 15:00

## 2022-08-19 RX ADMIN — GABAPENTIN 300 MILLIGRAM(S): 400 CAPSULE ORAL at 07:01

## 2022-08-19 RX ADMIN — ONDANSETRON 4 MILLIGRAM(S): 8 TABLET, FILM COATED ORAL at 13:30

## 2022-08-19 RX ADMIN — LIDOCAINE 1 PATCH: 4 CREAM TOPICAL at 20:18

## 2022-08-19 RX ADMIN — Medication 10 MILLIGRAM(S): at 16:30

## 2022-08-19 RX ADMIN — Medication 975 MILLIGRAM(S): at 07:02

## 2022-08-19 RX ADMIN — PANTOPRAZOLE SODIUM 40 MILLIGRAM(S): 20 TABLET, DELAYED RELEASE ORAL at 18:31

## 2022-08-19 RX ADMIN — Medication 1: at 12:15

## 2022-08-19 NOTE — ASU PREOP CHECKLIST - SELECT TESTS ORDERED
156/BMP/CBC/Type and Screen/EKG/POCT Blood Glucose 158/BMP/CBC/Type and Screen/EKG/POCT Blood Glucose

## 2022-08-19 NOTE — PATIENT PROFILE ADULT - DOMESTIC TRAVEL HIGH RISK QUESTION
No

## 2022-08-19 NOTE — BRIEF OPERATIVE NOTE - COMMENTS
I first assisted through out the entire case, including port placement, bedside assist while the surgeon at the console, and wound closure, DARY Mariano

## 2022-08-19 NOTE — PATIENT PROFILE ADULT - FALL HARM RISK - HARM RISK INTERVENTIONS

## 2022-08-19 NOTE — PATIENT PROFILE ADULT - FUNCTIONAL ASSESSMENT - BASIC MOBILITY 6.
3-calculated by average/Not able to assess (calculate score using [facility] averaging method)

## 2022-08-20 ENCOUNTER — TRANSCRIPTION ENCOUNTER (OUTPATIENT)
Age: 57
End: 2022-08-20

## 2022-08-20 VITALS — HEART RATE: 86 BPM

## 2022-08-20 LAB
ANION GAP SERPL CALC-SCNC: 13 MMOL/L — SIGNIFICANT CHANGE UP (ref 7–14)
BUN SERPL-MCNC: 19 MG/DL — SIGNIFICANT CHANGE UP (ref 7–23)
CALCIUM SERPL-MCNC: 9.5 MG/DL — SIGNIFICANT CHANGE UP (ref 8.4–10.5)
CHLORIDE SERPL-SCNC: 102 MMOL/L — SIGNIFICANT CHANGE UP (ref 98–107)
CO2 SERPL-SCNC: 20 MMOL/L — LOW (ref 22–31)
CREAT SERPL-MCNC: 0.85 MG/DL — SIGNIFICANT CHANGE UP (ref 0.5–1.3)
EGFR: 102 ML/MIN/1.73M2 — SIGNIFICANT CHANGE UP
GLUCOSE BLDC GLUCOMTR-MCNC: 207 MG/DL — HIGH (ref 70–99)
GLUCOSE BLDC GLUCOMTR-MCNC: 219 MG/DL — HIGH (ref 70–99)
GLUCOSE SERPL-MCNC: 192 MG/DL — HIGH (ref 70–99)
HCT VFR BLD CALC: 40 % — SIGNIFICANT CHANGE UP (ref 39–50)
HGB BLD-MCNC: 12.9 G/DL — LOW (ref 13–17)
MAGNESIUM SERPL-MCNC: 1.8 MG/DL — SIGNIFICANT CHANGE UP (ref 1.6–2.6)
MCHC RBC-ENTMCNC: 28.2 PG — SIGNIFICANT CHANGE UP (ref 27–34)
MCHC RBC-ENTMCNC: 32.3 GM/DL — SIGNIFICANT CHANGE UP (ref 32–36)
MCV RBC AUTO: 87.5 FL — SIGNIFICANT CHANGE UP (ref 80–100)
NRBC # BLD: 0 /100 WBCS — SIGNIFICANT CHANGE UP (ref 0–0)
NRBC # FLD: 0 K/UL — SIGNIFICANT CHANGE UP (ref 0–0)
PHOSPHATE SERPL-MCNC: 3.3 MG/DL — SIGNIFICANT CHANGE UP (ref 2.5–4.5)
PLATELET # BLD AUTO: 349 K/UL — SIGNIFICANT CHANGE UP (ref 150–400)
POTASSIUM SERPL-MCNC: 4.3 MMOL/L — SIGNIFICANT CHANGE UP (ref 3.5–5.3)
POTASSIUM SERPL-SCNC: 4.3 MMOL/L — SIGNIFICANT CHANGE UP (ref 3.5–5.3)
RBC # BLD: 4.57 M/UL — SIGNIFICANT CHANGE UP (ref 4.2–5.8)
RBC # FLD: 17.1 % — HIGH (ref 10.3–14.5)
SODIUM SERPL-SCNC: 135 MMOL/L — SIGNIFICANT CHANGE UP (ref 135–145)
WBC # BLD: 12.34 K/UL — HIGH (ref 3.8–10.5)
WBC # FLD AUTO: 12.34 K/UL — HIGH (ref 3.8–10.5)

## 2022-08-20 PROCEDURE — 99238 HOSP IP/OBS DSCHRG MGMT 30/<: CPT

## 2022-08-20 PROCEDURE — 71045 X-RAY EXAM CHEST 1 VIEW: CPT | Mod: 26

## 2022-08-20 PROCEDURE — 99233 SBSQ HOSP IP/OBS HIGH 50: CPT

## 2022-08-20 RX ORDER — OXYCODONE HYDROCHLORIDE 5 MG/1
1 TABLET ORAL
Qty: 10 | Refills: 0
Start: 2022-08-20

## 2022-08-20 RX ORDER — ACETAMINOPHEN 500 MG
650 TABLET ORAL EVERY 6 HOURS
Refills: 0 | Status: DISCONTINUED | OUTPATIENT
Start: 2022-08-20 | End: 2022-08-20

## 2022-08-20 RX ADMIN — PANTOPRAZOLE SODIUM 40 MILLIGRAM(S): 20 TABLET, DELAYED RELEASE ORAL at 05:55

## 2022-08-20 RX ADMIN — HEPARIN SODIUM 7500 UNIT(S): 5000 INJECTION INTRAVENOUS; SUBCUTANEOUS at 05:55

## 2022-08-20 RX ADMIN — Medication 400 MILLIGRAM(S): at 03:40

## 2022-08-20 RX ADMIN — Medication 2: at 07:52

## 2022-08-20 RX ADMIN — Medication 650 MILLIGRAM(S): at 09:52

## 2022-08-20 RX ADMIN — Medication 1000 MILLIGRAM(S): at 04:00

## 2022-08-20 RX ADMIN — Medication 3 UNIT(S): at 07:54

## 2022-08-20 NOTE — DISCHARGE NOTE PROVIDER - HOSPITAL COURSE
57 yo male presents to PST unit with pre-op diagnosis of interstitial pulmonary disease scheduled for left video assisted thoracoscopy, robotic assisted, left lung biopsy with Dr. Loyola. He reports developing extreme SOB & fatigue 1 week after receiving Pfizer vaccine and was diagnosed with interstitial pneumonia.  Patient underwent FB, robot-assisted LVATS, LLL wedge on 8/19/22 and discharged home next day in stable condition.

## 2022-08-20 NOTE — DISCHARGE NOTE PROVIDER - NSDCFUSCHEDAPPT_GEN_ALL_CORE_FT
Yuriy Reynaga  [facility]  [facility] [address]  Scheduled Appointment: 09/21/2022

## 2022-08-20 NOTE — DISCHARGE NOTE PROVIDER - NSDCFUADDINST_GEN_ALL_CORE_FT
Please, call Thoracic Surgery office at [phone] and schedule a follow-up appointment with your surgeon in 2 weeks.

## 2022-08-20 NOTE — PROGRESS NOTE ADULT - SUBJECTIVE AND OBJECTIVE BOX
Anesthesia Pain Management Service    SUBJECTIVE: Patient is doing well with IV PCA and no significant problems reported.    Pain Scale Score	At rest: __1/10_ 	With Activity: ___ 	[X ] Refer to charted pain scores    THERAPY:    [ ] IV PCA Morphine		[ ] 5 mg/mL	[ ] 1 mg/mL  [X ] IV PCA Hydromorphone	[ ] 5 mg/mL	[X ] 1 mg/mL  [ ] IV PCA Fentanyl		[ ] 50 micrograms/mL    Demand dose __0.2_ lockout __6_ (minutes) Continuous Rate _0__ Total: _1.1__   mg used (in past 24 hrs)      MEDICATIONS  (STANDING):  budesonide 160 MICROgram(s)/formoterol 4.5 MICROgram(s) Inhaler 2 Puff(s) Inhalation two times a day  dextrose 5%. 1000 milliLiter(s) (100 mL/Hr) IV Continuous <Continuous>  dextrose 5%. 1000 milliLiter(s) (50 mL/Hr) IV Continuous <Continuous>  dextrose 50% Injectable 25 Gram(s) IV Push once  dextrose 50% Injectable 12.5 Gram(s) IV Push once  dextrose 50% Injectable 25 Gram(s) IV Push once  doxazosin 8 milliGRAM(s) Oral at bedtime  glucagon  Injectable 1 milliGRAM(s) IntraMuscular once  heparin   Injectable 7500 Unit(s) SubCutaneous every 8 hours  insulin glargine Injectable (LANTUS) 8 Unit(s) SubCutaneous at bedtime  insulin lispro (ADMELOG) corrective regimen sliding scale   SubCutaneous Before meals and at bedtime  insulin lispro Injectable (ADMELOG) 3 Unit(s) SubCutaneous before breakfast  insulin lispro Injectable (ADMELOG) 3 Unit(s) SubCutaneous before lunch  insulin lispro Injectable (ADMELOG) 3 Unit(s) SubCutaneous before dinner  lactated ringers. 1000 milliLiter(s) (30 mL/Hr) IV Continuous <Continuous>  melatonin 3 milliGRAM(s) Oral at bedtime  pantoprazole    Tablet 40 milliGRAM(s) Oral before breakfast  polyethylene glycol 3350 17 Gram(s) Oral daily  senna 2 Tablet(s) Oral at bedtime    MEDICATIONS  (PRN):  acetaminophen     Tablet .. 650 milliGRAM(s) Oral every 6 hours PRN Temp greater or equal to 38C (100.4F), Mild Pain (1 - 3)  ALBUTerol    90 MICROgram(s) HFA Inhaler 2 Puff(s) Inhalation every 6 hours PRN Shortness of Breath and/or Wheezing  dextrose Oral Gel 15 Gram(s) Oral once PRN Blood Glucose LESS THAN 70 milliGRAM(s)/deciliter  metoclopramide Injectable 10 milliGRAM(s) IV Push every 8 hours PRN Nausea / Vomiting      OBJECTIVE: Patient sitting up in chair.    Sedation Score:	[ X] Alert	[ ] Drowsy 	[ ] Arousable	[ ] Asleep	[ ] Unresponsive    Side Effects:	[X ] None	[ ] Nausea	[ ] Vomiting	[ ] Pruritus  		[ ] Other:    Vital Signs Last 24 Hrs  T(C): 36 (20 Aug 2022 08:00), Max: 36.7 (19 Aug 2022 20:00)  T(F): 96.8 (20 Aug 2022 08:00), Max: 98 (19 Aug 2022 20:00)  HR: 85 (20 Aug 2022 08:00) (47 - 85)  BP: 114/62 (20 Aug 2022 07:00) (111/79 - 137/80)  BP(mean): 77 (20 Aug 2022 07:00) (74 - 99)  RR: 18 (20 Aug 2022 08:00) (10 - 25)  SpO2: 97% (20 Aug 2022 08:00) (91% - 100%)    Parameters below as of 20 Aug 2022 08:00  Patient On (Oxygen Delivery Method): room air        ASSESSMENT/ PLAN    Therapy to  be:	[ ] Continue   [ X] Discontinued   [X ] Change to prn Analgesics    Documentation and Verification of current medications:   [X] Done	[ ] Not done, not elligible    Comments: IV PCA discontinued by primary team. PRN Oral/IV opioids and/or Adjuvant non-opioid medication to be ordered at this point.    Progress Note written now but Patient was seen earlier.
Anesthesia Pain Management Service    SUBJECTIVE: Patient s/p spinal morphine initially & now on surgical spinal fusion protocol with pain manageable and no problems. Patient states his pain has been controlled but he was experiencing some pain in the left arm where multiple IV lines were placed. Reports he was unable to sleep last night but unrelated to pain. He was able to drink some fluids with no nausea or vomiting but has no appetite to eat breakfast yet. Denies headache, numbness and tingling in bilateral lower extremities.   Pain Scale Score: 2/10 Refer to charted pain scores    THERAPY:    s/p spinal PF morphine yesterday.      MEDICATIONS  (STANDING):  acetaminophen   IV Intermittent - Peds. 1000 milliGRAM(s) IV Intermittent every 6 hours  chlorhexidine 2% Topical Cloths - Peds 1 Application(s) Topical once  dextrose 5% + sodium chloride 0.9% with potassium chloride 20 mEq/L. - Pediatric 1000 milliLiter(s) (100 mL/Hr) IV Continuous <Continuous>  diazepam  Oral Tab/Cap - Peds 5 milliGRAM(s) Oral every 6 hours  heparin   Infusion - Pediatric 0.04 Unit(s)/kG/Hr (3 mL/Hr) IV Continuous <Continuous>  ketorolac IV Push - Peds. 30 milliGRAM(s) IV Push every 8 hours  lidocaine  4% Topical Cream - Peds 1 Application(s) Topical once  midazolam   Oral Liquid - Peds 20 milliGRAM(s) Oral once  morphine PF Spinal Injection - Peds 0.1 milliGRAM(s) IntraThecal. once  oxyCODONE   IR Oral Tab/Cap - Peds 7.5 milliGRAM(s) Oral every 4 hours    MEDICATIONS  (PRN):  dexAMETHasone IV Intermittent - Pediatric 5 milliGRAM(s) IV Intermittent every 6 hours PRN Nausea, IF ondansetron is ineffective after 30 - 60 minutes  HYDROmorphone    IV Push - Peds 0.5 milliGRAM(s) IV Push every 4 hours PRN Severe Breakthough Pain (7 - 10)  naloxone  IV Push - Peds 0.1 milliGRAM(s) IV Push every 3 minutes PRN For ANY of the following changes in patient status:  A. RR less than 10 breaths/min, B. Oxygen saturation less than 90%, C. Sedation scoreof 6  ondansetron IV Intermittent - Peds 4 milliGRAM(s) IV Intermittent every 8 hours PRN Nausea      OBJECTIVE: Patient laying in bed, mother at bedside.    Sedation Score:	[ x] Alert	[ ] Drowsy	[ ] Arousable	[ ] Asleep	[ ] Unresponsive    Side Effects:	[ x] None	[ ] Nausea	[ ] Vomiting	[ ] Pruritus  		  [ ] Weakness		[ ] Numbness	[ ] Other:    Vital Signs Last 24 Hrs  T(C): 37 (20 Aug 2022 05:00), Max: 37.3 (19 Aug 2022 23:00)  T(F): 98.6 (20 Aug 2022 05:00), Max: 99.1 (19 Aug 2022 23:00)  HR: 65 (20 Aug 2022 05:00) (65 - 93)  BP: 120/50 (20 Aug 2022 05:00) (98/78 - 125/64)  BP(mean): 66 (20 Aug 2022 05:00) (66 - 83)  RR: 20 (20 Aug 2022 05:00) (15 - 23)  SpO2: 98% (20 Aug 2022 05:00) (93% - 100%)    Parameters below as of 20 Aug 2022 05:00  Patient On (Oxygen Delivery Method): room air        ASSESSMENT/ PLAN  [  ] Patient transitioned to prn analgesics  [ ] Pain management per primary service, pain service to sign off   [x]Documentation and Verification of current medications     Comments: Continue current pain regimen. Standing & PRN Oral/IV opioids and diazepam plus non-opioid Adjuvant analgesics as per surgical spinal fusion  protocol. May call if pain not adequately controlled.    Progress Note written now but Patient was seen earlier.  
MISA MCCURDY                     MRN-[id number]    HPI:  57 yo male presents to PST unit with pre-op diagnosis of interstitial pulmonary disease scheduled for left video assisted thoracoscopy, robotic assisted, left lung biopsy with Dr. Loyola. He reports developing extreme SOB & fatigue 1 week after receiving Pfizer vaccine and was diagnosed with interstitial pneumonia.  (10 Aug 2022 14:24)    Procedure:  FB, robot-assisted LVATS, LLL wedge, 20F L CT                       Issues:              Interstitial pulmonary disease              Postop pain              Chest tube in place  HTN (hypertension)  Type 2 diabetes mellitus      PAST MEDICAL & SURGICAL HISTORY:  HTN (hypertension)      Type 2 diabetes mellitus      Interstitial pulmonary disease      H/O umbilical hernia repair      History of bladder surgery  removal of polyps      S/P appendectomy                VITAL SIGNS:  Vital Signs Last 24 Hrs  T(C): 36 (20 Aug 2022 08:00), Max: 36.7 (19 Aug 2022 20:00)  T(F): 96.8 (20 Aug 2022 08:00), Max: 98 (19 Aug 2022 20:00)  HR: 85 (20 Aug 2022 08:00) (47 - 85)  BP: 114/62 (20 Aug 2022 07:00) (111/79 - 137/80)  BP(mean): 77 (20 Aug 2022 07:00) (74 - 99)  RR: 18 (20 Aug 2022 08:00) (10 - 25)  SpO2: 97% (20 Aug 2022 08:00) (91% - 100%)    Parameters below as of 20 Aug 2022 08:00  Patient On (Oxygen Delivery Method): room air        I/Os:   I&O's Detail    19 Aug 2022 07:01  -  20 Aug 2022 07:00  --------------------------------------------------------  IN:    IV PiggyBack: 100 mL    Lactated Ringers: 240 mL  Total IN: 340 mL    OUT:    Chest Tube (mL): 5 mL    Voided (mL): 1600 mL  Total OUT: 1605 mL    Total NET: -1265 mL          CAPILLARY BLOOD GLUCOSE      POCT Blood Glucose.: 207 mg/dL (20 Aug 2022 07:48)  POCT Blood Glucose.: 226 mg/dL (19 Aug 2022 22:27)  POCT Blood Glucose.: 219 mg/dL (19 Aug 2022 20:33)  POCT Blood Glucose.: 207 mg/dL (19 Aug 2022 16:46)  POCT Blood Glucose.: 196 mg/dL (19 Aug 2022 11:56)      =======================MEDICATIONS===================  MEDICATIONS  (STANDING):  budesonide 160 MICROgram(s)/formoterol 4.5 MICROgram(s) Inhaler 2 Puff(s) Inhalation two times a day  dextrose 5%. 1000 milliLiter(s) (100 mL/Hr) IV Continuous <Continuous>  dextrose 5%. 1000 milliLiter(s) (50 mL/Hr) IV Continuous <Continuous>  dextrose 50% Injectable 25 Gram(s) IV Push once  dextrose 50% Injectable 12.5 Gram(s) IV Push once  dextrose 50% Injectable 25 Gram(s) IV Push once  doxazosin 8 milliGRAM(s) Oral at bedtime  glucagon  Injectable 1 milliGRAM(s) IntraMuscular once  heparin   Injectable 7500 Unit(s) SubCutaneous every 8 hours  insulin glargine Injectable (LANTUS) 8 Unit(s) SubCutaneous at bedtime  insulin lispro (ADMELOG) corrective regimen sliding scale   SubCutaneous Before meals and at bedtime  insulin lispro Injectable (ADMELOG) 3 Unit(s) SubCutaneous before breakfast  insulin lispro Injectable (ADMELOG) 3 Unit(s) SubCutaneous before lunch  insulin lispro Injectable (ADMELOG) 3 Unit(s) SubCutaneous before dinner  lactated ringers. 1000 milliLiter(s) (30 mL/Hr) IV Continuous <Continuous>  melatonin 3 milliGRAM(s) Oral at bedtime  pantoprazole    Tablet 40 milliGRAM(s) Oral before breakfast  polyethylene glycol 3350 17 Gram(s) Oral daily  senna 2 Tablet(s) Oral at bedtime    MEDICATIONS  (PRN):  ALBUTerol    90 MICROgram(s) HFA Inhaler 2 Puff(s) Inhalation every 6 hours PRN Shortness of Breath and/or Wheezing  dextrose Oral Gel 15 Gram(s) Oral once PRN Blood Glucose LESS THAN 70 milliGRAM(s)/deciliter  metoclopramide Injectable 10 milliGRAM(s) IV Push every 8 hours PRN Nausea / Vomiting      PHYSICAL EXAM============================  General:                         Awake, alert, not in any distress  Neuro:                            Moving all extremities to commands.   Respiratory:	Air entry fair and  bilateral conducted sounds                                           Effort even and unlabored.  CV:		Regular rate and rhythm. Normal S1/S2                                          Distal pulses present.  Abdomen:	                     Soft, non-distended. Bowel sounds present   Skin:		No rash.  Extremities:	Warm, no cyanosis or edema.  Palpable pulses    ============================LABS=========================                        12.9   12.34 )-----------( 349      ( 20 Aug 2022 04:12 )             40.0     08-20    135  |  102  |  19  ----------------------------<  192<H>  4.3   |  20<L>  |  0.85    Ca    9.5      20 Aug 2022 04:12  Phos  3.3     08-20  Mg     1.80     08-20    A/P:   56yMale s/p FB, robot-assisted LVATS, LLL wedge, 20F L CT, experiencing  pain with deep breathing.                             Neuro:                                         Pain control with PCA  /  Tylenol PRN                            Cardiovascular:                                          Telemetry (medical test) - Reviewed by me today independently. Normal sinus rhythm with some PVCs / A-fib.                                          Continue hemodynamic monitoring to prevent decompensation.    HTN: Currently not on any meds                            Respiratory:                                         Postop hypoxemia requiring O2 via nasal cannula probably due to postop pain - Wean nasal cannula for goal O2sat above 92%.                                             Encourage incentive spirometry.                                                   Chest PT and frequent suctioning. Continue bronchodilators, Pulmozyme and inhaled 3% saline inhalations.                                                      OOB to chair & ambulate w/ assistance.                                                           Continuous pulse oximetry for support & to prevent decompensation.                                                                                                                           GI                                         On puree diet, advance to  diabetic  diet as tolerated                                         Continue Zofran / Reglan for nausea - PRN                                         Continue bowel regimen	                                                                 Renal:                                         Continue LR  30cc/hr                                         Monitor I/Os and electrolytes                                                                                        Hem/ Onc:                                         DVT prophylaxis with SQ Heparin and SCDs                                         Monitor chest tube output &  signs of bleeding.                                          Follow CBC in AM                           Infectious disease:                                            Monitor for fever / leukocytosis.                                          All surgical incision / chest tube  sites look clean                            Endocrine                                             DM-2 / Hyperglycemia: Continue Accu-Checks with coverage  Restart Lantus and Admolog                                                Pertinent clinical, laboratory, radiographic, hemodynamic, echocardiographic, respiratory data, microbiologic data and chart were reviewed and analyzed frequently throughout the course of the day and night.     Patient seen, examined and plan discussed with CT Surgeon   / CTICU team during rounds.    OOB to chair and ambulate as tolerated.     Status discussed with patient and updated plan of care.     I have spent 35 minutes with this patient including 20 minutes of time coordinating care in the ICU..      Erica Graham DO FACEP    
CALLIMISA      56y   Male   [id number]         codeine (Unknown)             Daily Height in cm: 187.96 (19 Aug 2022 06:32)    Daily Drug Dosing Weight  Height (cm): 188 (19 Aug 2022 06:32)  Weight (kg): 121.6 (19 Aug 2022 06:32)  BMI (kg/m2): 34.4 (19 Aug 2022 06:32)  BSA (m2): 2.46 (19 Aug 2022 06:32)    HPI:  55 yo male presents to PST unit with pre-op diagnosis of interstitial pulmonary disease scheduled for left video assisted thoracoscopy, robotic assisted, left lung biopsy with Dr. Loyola. He reports developing extreme SOB & fatigue 1 week after receiving Pfizer vaccine and was diagnosed with interstitial pneumonia.  (10 Aug 2022 14:24)    Procedure:  FB, robot-assisted LVATS, LLL wedge, 20F L CT                       Issues:              Interstitial pulmonary disease              Postop pain              Chest tube in place  HTN (hypertension)  Type 2 diabetes mellitus        Postop course:     Patient reports moderate pain at chest wall incision sites which is worse with coughing and deep breathing without associated fever or dyspnea. Pain is improved with use of PCA and  oral pain meds.         Home Medications:  acetaminophen 325 mg oral tablet: 2 tab(s) orally every 6 hours, As needed, Temp greater or equal to 38C (100.4F), Mild Pain (1 - 3) (19 Aug 2022 06:51)  ascorbic acid 500 mg oral tablet: 1 tab(s) orally once a day (19 Aug 2022 06:51)  Basaglar KwikPen 100 units/mL subcutaneous solution: 10 unit(s) subcutaneous once a day (at bedtime) (19 Aug 2022 06:51)  Centrum Silver oral tablet: 1 tab(s) orally once a day (19 Aug 2022 06:51)  cyanocobalamin 500 mcg oral tablet: 1 tab(s) orally once a day (19 Aug 2022 06:51)  esomeprazole 20 mg oral delayed release capsule: 1 cap(s) orally once a day (19 Aug 2022 06:51)  melatonin 3 mg oral tablet: 1 tab(s) orally once a day (at bedtime) (19 Aug 2022 06:51)  NovoLOG 100 units/mL subcutaneous solution: 3 unit(s) subcutaneous 3 times a day (before meals) (19 Aug 2022 06:51)  Vitamin D3 25 mcg (1000 intl units) oral tablet: 1 tab(s) orally once a day (19 Aug 2022 06:51)    PAST MEDICAL & SURGICAL HISTORY:  HTN (hypertension)      Type 2 diabetes mellitus      Interstitial pulmonary disease      H/O umbilical hernia repair      History of bladder surgery  removal of polyps      S/P appendectomy        Vital Signs Last 24 Hrs  T(C): 35.8 (19 Aug 2022 09:15), Max: 37 (19 Aug 2022 06:32)  T(F): 96.5 (19 Aug 2022 09:15), Max: 98.6 (19 Aug 2022 06:32)  HR: 49 (19 Aug 2022 11:00) (49 - 101)  BP: 122/74 (19 Aug 2022 11:00) (122/74 - 131/75)  BP(mean): 89 (19 Aug 2022 11:00) (82 - 97)  RR: 11 (19 Aug 2022 11:00) (11 - 19)  SpO2: 98% (19 Aug 2022 11:00) (95% - 99%)    Parameters below as of 19 Aug 2022 11:00  Patient On (Oxygen Delivery Method): nasal cannula w/ humidification      I&O's Detail    19 Aug 2022 07:01  -  19 Aug 2022 12:01  --------------------------------------------------------  IN:    Lactated Ringers: 120 mL  Total IN: 120 mL    OUT:    Chest Tube (mL): 5 mL  Total OUT: 5 mL    Total NET: 115 mL        CAPILLARY BLOOD GLUCOSE      POCT Blood Glucose.: 196 mg/dL (19 Aug 2022 11:56)  POCT Blood Glucose.: 158 mg/dL (19 Aug 2022 06:29)    Home Medications:  acetaminophen 325 mg oral tablet: 2 tab(s) orally every 6 hours, As needed, Temp greater or equal to 38C (100.4F), Mild Pain (1 - 3) (19 Aug 2022 06:51)  ascorbic acid 500 mg oral tablet: 1 tab(s) orally once a day (19 Aug 2022 06:51)  Basaglar KwikPen 100 units/mL subcutaneous solution: 10 unit(s) subcutaneous once a day (at bedtime) (19 Aug 2022 06:51)  Centrum Silver oral tablet: 1 tab(s) orally once a day (19 Aug 2022 06:51)  cyanocobalamin 500 mcg oral tablet: 1 tab(s) orally once a day (19 Aug 2022 06:51)  esomeprazole 20 mg oral delayed release capsule: 1 cap(s) orally once a day (19 Aug 2022 06:51)  melatonin 3 mg oral tablet: 1 tab(s) orally once a day (at bedtime) (19 Aug 2022 06:51)  NovoLOG 100 units/mL subcutaneous solution: 3 unit(s) subcutaneous 3 times a day (before meals) (19 Aug 2022 06:51)  Vitamin D3 25 mcg (1000 intl units) oral tablet: 1 tab(s) orally once a day (19 Aug 2022 06:51)    MEDICATIONS  (STANDING):  acetaminophen     Tablet .. 975 milliGRAM(s) Oral every 6 hours  acetaminophen   IVPB .. 1000 milliGRAM(s) IV Intermittent once  budesonide 160 MICROgram(s)/formoterol 4.5 MICROgram(s) Inhaler 2 Puff(s) Inhalation two times a day  doxazosin 8 milliGRAM(s) Oral at bedtime  heparin   Injectable 7500 Unit(s) SubCutaneous every 8 hours  HYDROmorphone PCA (1 mG/mL) 30 milliLiter(s) PCA Continuous PCA Continuous  insulin lispro (ADMELOG) corrective regimen sliding scale   SubCutaneous Before meals and at bedtime  lactated ringers. 1000 milliLiter(s) (30 mL/Hr) IV Continuous <Continuous>  melatonin 3 milliGRAM(s) Oral at bedtime  pantoprazole    Tablet 40 milliGRAM(s) Oral before breakfast  polyethylene glycol 3350 17 Gram(s) Oral daily  senna 2 Tablet(s) Oral at bedtime    MEDICATIONS  (PRN):  ALBUTerol    90 MICROgram(s) HFA Inhaler 2 Puff(s) Inhalation every 6 hours PRN Shortness of Breath and/or Wheezing  HYDROmorphone PCA (1 mG/mL) Rescue Clinician Bolus 0.5 milliGRAM(s) IV Push every 15 minutes PRN for Pain Scale GREATER THAN 6  naloxone Injectable 0.1 milliGRAM(s) IV Push every 3 minutes PRN For ANY of the following changes in patient status:  A. RR LESS THAN 10 breaths per minute, B. Oxygen saturation LESS THAN 90%, C. Sedation score of 6  ondansetron Injectable 4 milliGRAM(s) IV Push every 6 hours PRN Nausea        Physical exam:                             General:               Pt is awake, alert,  appears to be in pain but not in distress                              Eyes:                     Sclera white, Conjunctiva normal, Eyelids normal, Pupils symmetrical and reactive                                               Neuro:                  Nonfocal                             Psych:                   A&Ox3                          Cardiovascular:   S1 & S2, regular                           Respiratory:         Air entry is fair and equal on both sides, has bilateral conducted sounds                           GI:                          Soft, nondistended and nontender, Bowel sounds active                            Ext:                        No cyanosis or edema     Labs:                                                         CXR: Postop changes, left chest tube in place.     Plan:  General: 56yMale s/p FB, robot-assisted LVATS, LLL wedge, 20F L CT, experiencing  pain with deep breathing.                             Neuro:                                         Pain control with PCA  /  Tylenol PRN                            Cardiovascular:                                          Telemetry (medical test) - Reviewed by me today independently. Normal sinus rhythm with some PVCs / A-fib.                                          Continue hemodynamic monitoring to prevent decompensation.    HTN: Currently not on any meds                            Respiratory:                                         Postop hypoxemia requiring O2 via nasal cannula probably due to postop pain - Wean nasal cannula for goal O2sat above 92%.                                              Obtain CXR . Encourage incentive spirometry.                                                   Chest PT and frequent suctioning. Continue bronchodilators, Pulmozyme and inhaled 3% saline inhalations.                                                      OOB to chair & ambulate w/ assistance.                                                           Continuous pulse oximetry for support & to prevent decompensation.                                         Monitor chest tube output                                         Chest tube to suction                                                                                         GI                                         On puree diet, advance to  diabetic  diet as tolerated                                         Continue Zofran / Reglan for nausea - PRN                                         Continue bowel regimen	                                                                 Renal:                                         Continue LR  30cc/hr                                         Monitor I/Os and electrolytes                                                                                        Hem/ Onc:                                         DVT prophylaxis with SQ Heparin and SCDs                                         Monitor chest tube output &  signs of bleeding.                                          Follow CBC in AM                           Infectious disease:                                            Monitor for fever / leukocytosis.                                          All surgical incision / chest tube  sites look clean                            Endocrine                                             DM-2 / Hyperglycemia: Continue Accu-Checks with coverage  Restart Lantus and Admolog                                                Pertinent clinical, laboratory, radiographic, hemodynamic, echocardiographic, respiratory data, microbiologic data and chart were reviewed and analyzed frequently throughout the course of the day and night.     Patient seen, examined and plan discussed with CT Surgeon   / CTICU team during rounds.    OOB to chair and ambulate as tolerated.     Status discussed with patient and updated plan of care.     I have spent 40 minutes with this patient including 20 minutes of time coordinating care in the ICU..            Bari Baptiste MD

## 2022-08-20 NOTE — DISCHARGE NOTE PROVIDER - CARE PROVIDER_API CALL
Scotty Loyola)  Thoracic Surgery  [phone] [place] [address]  Phone: [phone]  Fax: [phone]  Follow Up Time:

## 2022-08-20 NOTE — DISCHARGE NOTE PROVIDER - NSDCCPCAREPLAN_GEN_ALL_CORE_FT
PRINCIPAL DISCHARGE DIAGNOSIS  Diagnosis: Interstitial lung disease  Assessment and Plan of Treatment:

## 2022-08-20 NOTE — DISCHARGE NOTE NURSING/CASE MANAGEMENT/SOCIAL WORK - PATIENT PORTAL LINK FT
You can access the FollowMyHealth Patient Portal offered by [facility] by registering at the following website: [email or web address]. By joining [employer]’s FollowMyHealth portal, you will also be able to view your health information using other applications (apps) compatible with our system.

## 2022-08-20 NOTE — DISCHARGE NOTE NURSING/CASE MANAGEMENT/SOCIAL WORK - NSDCPEFALRISK_GEN_ALL_CORE
For information on Fall & Injury Prevention, visit: [email or web address] OR  [email or web address] OR  https://www.cdc.gov/steadi/patient.html

## 2022-08-20 NOTE — DISCHARGE NOTE PROVIDER - NSDCACTIVITY_GEN_ALL_CORE
Do not drive or operate machinery/Showering allowed/Walking - Indoors allowed/No heavy lifting/straining/Walking - Outdoors allowed/Follow Instructions Provided by your Surgical Team

## 2022-08-20 NOTE — DISCHARGE NOTE PROVIDER - NSDCMRMEDTOKEN_GEN_ALL_CORE_FT
acetaminophen 325 mg oral tablet: 2 tab(s) orally every 6 hours, As needed, Temp greater or equal to 38C (100.4F), Mild Pain (1 - 3)  albuterol 90 mcg/inh inhalation aerosol: 2 puff(s) inhaled every 6 hours, As needed, Shortness of Breath and/or Wheezing  ascorbic acid 500 mg oral tablet: 1 tab(s) orally once a day  Basaglar KwikPen 100 units/mL subcutaneous solution: 10 unit(s) subcutaneous once a day (at bedtime)  budesonide-formoterol 160 mcg-4.5 mcg/inh inhalation aerosol: 2 puff(s) inhaled 2 times a day   Centrum Silver oral tablet: 1 tab(s) orally once a day  cyanocobalamin 500 mcg oral tablet: 1 tab(s) orally once a day  doxazosin 8 mg oral tablet: 1 tab(s) orally once a day (at bedtime)  esomeprazole 20 mg oral delayed release capsule: 1 cap(s) orally once a day  losartan 100 mg oral tablet: 1 tab(s) orally once a day  melatonin 3 mg oral tablet: 1 tab(s) orally once a day (at bedtime)  metFORMIN 500 mg oral tablet: 1 tab(s) orally once a day   NovoLOG 100 units/mL subcutaneous solution: 3 unit(s) subcutaneous 3 times a day (before meals)  oxyCODONE 5 mg oral capsule: 1 cap(s) orally every 6 hours, As Needed -for severe pain MDD:5   Vitamin D3 25 mcg (1000 intl units) oral tablet: 1 tab(s) orally once a day

## 2022-08-22 ENCOUNTER — NON-APPOINTMENT (OUTPATIENT)
Age: 57
End: 2022-08-22

## 2022-08-22 PROBLEM — E11.9 TYPE 2 DIABETES MELLITUS WITHOUT COMPLICATIONS: Chronic | Status: ACTIVE | Noted: 2022-08-10

## 2022-08-22 PROBLEM — J84.9 INTERSTITIAL PULMONARY DISEASE, UNSPECIFIED: Chronic | Status: ACTIVE | Noted: 2022-08-10

## 2022-08-24 LAB
CULTURE RESULTS: SIGNIFICANT CHANGE UP
SPECIMEN SOURCE: SIGNIFICANT CHANGE UP
SURGICAL PATHOLOGY STUDY: SIGNIFICANT CHANGE UP

## 2022-08-31 ENCOUNTER — APPOINTMENT (OUTPATIENT)
Dept: THORACIC SURGERY | Facility: CLINIC | Age: 57
End: 2022-08-31

## 2022-08-31 ENCOUNTER — NON-APPOINTMENT (OUTPATIENT)
Age: 57
End: 2022-08-31

## 2022-08-31 PROCEDURE — 99443: CPT

## 2022-08-31 NOTE — DIETITIAN INITIAL EVALUATION ADULT - PERTINENT LABORATORY DATA
06-07    130<L>  |  98  |  36<H>  ----------------------------<  373<H>  5.1   |  23  |  1.01    Ca    9.1      07 Jun 2022 07:12    POCT Blood Glucose.: 365 mg/dL (06-08-22 @ 12:02)  A1C with Estimated Average Glucose Result: 8.3 % (06-06-22 @ 06:26)   0 = understands/communicates without difficulty

## 2022-09-07 ENCOUNTER — APPOINTMENT (OUTPATIENT)
Dept: THORACIC SURGERY | Facility: CLINIC | Age: 57
End: 2022-09-07

## 2022-09-07 VITALS
WEIGHT: 274.5 LBS | BODY MASS INDEX: 36.38 KG/M2 | DIASTOLIC BLOOD PRESSURE: 84 MMHG | HEIGHT: 73 IN | TEMPERATURE: 98.1 F | HEART RATE: 96 BPM | RESPIRATION RATE: 18 BRPM | OXYGEN SATURATION: 99 % | SYSTOLIC BLOOD PRESSURE: 142 MMHG

## 2022-09-07 PROCEDURE — 99212 OFFICE O/P EST SF 10 MIN: CPT

## 2022-09-08 ENCOUNTER — TRANSCRIPTION ENCOUNTER (OUTPATIENT)
Age: 57
End: 2022-09-08

## 2022-09-15 ENCOUNTER — OUTPATIENT (OUTPATIENT)
Dept: OUTPATIENT SERVICES | Facility: HOSPITAL | Age: 57
LOS: 1 days | End: 2022-09-15
Payer: MEDICAID

## 2022-09-15 ENCOUNTER — APPOINTMENT (OUTPATIENT)
Dept: CT IMAGING | Facility: CLINIC | Age: 57
End: 2022-09-15

## 2022-09-15 DIAGNOSIS — Z90.49 ACQUIRED ABSENCE OF OTHER SPECIFIED PARTS OF DIGESTIVE TRACT: Chronic | ICD-10-CM

## 2022-09-15 DIAGNOSIS — Z98.890 OTHER SPECIFIED POSTPROCEDURAL STATES: Chronic | ICD-10-CM

## 2022-09-15 DIAGNOSIS — J84.9 INTERSTITIAL PULMONARY DISEASE, UNSPECIFIED: ICD-10-CM

## 2022-09-15 PROCEDURE — 71250 CT THORAX DX C-: CPT

## 2022-09-15 PROCEDURE — 71250 CT THORAX DX C-: CPT | Mod: 26

## 2022-09-17 LAB
CULTURE RESULTS: SIGNIFICANT CHANGE UP
SPECIMEN SOURCE: SIGNIFICANT CHANGE UP

## 2022-09-21 ENCOUNTER — APPOINTMENT (OUTPATIENT)
Dept: INTERNAL MEDICINE | Facility: CLINIC | Age: 57
End: 2022-09-21

## 2022-09-21 VITALS
BODY MASS INDEX: 36.45 KG/M2 | WEIGHT: 275 LBS | HEART RATE: 76 BPM | DIASTOLIC BLOOD PRESSURE: 87 MMHG | RESPIRATION RATE: 16 BRPM | OXYGEN SATURATION: 96 % | HEIGHT: 73 IN | SYSTOLIC BLOOD PRESSURE: 124 MMHG | TEMPERATURE: 98 F

## 2022-09-21 DIAGNOSIS — E11.9 TYPE 2 DIABETES MELLITUS W/OUT COMPLICATIONS: ICD-10-CM

## 2022-09-21 PROCEDURE — 94060 EVALUATION OF WHEEZING: CPT

## 2022-09-21 PROCEDURE — ZZZZZ: CPT

## 2022-09-21 PROCEDURE — 94727 GAS DIL/WSHOT DETER LNG VOL: CPT

## 2022-09-21 PROCEDURE — 94729 DIFFUSING CAPACITY: CPT

## 2022-09-21 PROCEDURE — 99214 OFFICE O/P EST MOD 30 MIN: CPT | Mod: 25

## 2022-09-21 RX ORDER — DEXAMETHASONE 2 MG/1
2 TABLET ORAL
Qty: 7 | Refills: 0 | Status: DISCONTINUED | COMMUNITY
Start: 2022-06-21 | End: 2022-09-21

## 2022-09-21 RX ORDER — DOXYCYCLINE HYCLATE 100 MG/1
100 CAPSULE ORAL
Qty: 20 | Refills: 0 | Status: DISCONTINUED | COMMUNITY
Start: 2022-05-26 | End: 2022-09-21

## 2022-09-21 RX ORDER — DEXAMETHASONE 4 MG/1
4 TABLET ORAL
Qty: 7 | Refills: 0 | Status: DISCONTINUED | COMMUNITY
Start: 2022-06-21 | End: 2022-09-21

## 2022-09-21 NOTE — DISCUSSION/SUMMARY
[FreeTextEntry1] : Mr. Garcia presents for follow-up evaluation.  He has a history of interstitial lung disease/organizing pneumonia which may have been caused by coronavirus vaccine.  His condition has been steadily improving.  He is off of corticosteroid therapy.  Complete pulmonary function test show significant improvement compared to August 2022. CT scan of the chest is also significantly improved.  There has been interval resolution of the left apical groundglass opacities and consolidative opacities in the lingula and lung bases compared to 7/13/2022.  There is still peripheral reticulation at the lung apices and mid to lower lung zones with peripheral groundglass opacities with traction bronchiolectasis.\par Patient will follow-up in 6 months with repeat CT scan of the chest and complete pulmonary function test.

## 2022-09-21 NOTE — HISTORY OF PRESENT ILLNESS
[TextBox_4] : Mr. Garcia presents for follow-up evaluation accompanied by his fiancée.  He is currently off of steroid therapy for at least 2 months.  His shortness of breath is significantly improved.  His O2 saturation on room air at home has been greater than 95% for the past several weeks.  Thorascopic lung biopsy by Dr. Loyola on 8/19/2022 showed organizing pneumonia.  This is possibly related to coronavirus vaccine he received on 5/22.  Currently, his exercise capacity has significantly improved.  He still gets some shortness of breath with exertion.  Mr. Garcia now presents for complete pulmonary function test and review of most recent CAT scan of the chest.

## 2022-09-21 NOTE — PROCEDURE
[FreeTextEntry1] : Complete pulmonary function test were performed.  FEV1 is 3.26 L which is 80% predicted.  FVC is 4.07 L which is 77% predicted.  FEV1 over FVC ratio is 80%.  FEF 2575% is 3.10 L/s which is 91% predicted.  PEF is 11.16 L/s which is 110% predicted.  There is no significant bronchodilator response.  Total lung capacity is 5.40 L which is 70% predicted.  Diffusing capacity is 53% predicted.  There has been significant improvement in spirometry, lung volumes and diffusing capacity compared to pulmonary function tests of 8/4/2022.

## 2022-10-08 LAB
CULTURE RESULTS: SIGNIFICANT CHANGE UP
SPECIMEN SOURCE: SIGNIFICANT CHANGE UP

## 2022-11-02 ENCOUNTER — NON-APPOINTMENT (OUTPATIENT)
Age: 57
End: 2022-11-02

## 2022-12-05 ENCOUNTER — NON-APPOINTMENT (OUTPATIENT)
Age: 57
End: 2022-12-05

## 2022-12-15 ENCOUNTER — APPOINTMENT (OUTPATIENT)
Dept: INTERNAL MEDICINE | Facility: CLINIC | Age: 57
End: 2022-12-15

## 2023-01-25 ENCOUNTER — NON-APPOINTMENT (OUTPATIENT)
Age: 58
End: 2023-01-25

## 2023-03-15 ENCOUNTER — OUTPATIENT (OUTPATIENT)
Dept: OUTPATIENT SERVICES | Facility: HOSPITAL | Age: 58
LOS: 1 days | End: 2023-03-15
Payer: MEDICAID

## 2023-03-15 ENCOUNTER — APPOINTMENT (OUTPATIENT)
Dept: CT IMAGING | Facility: CLINIC | Age: 58
End: 2023-03-15
Payer: COMMERCIAL

## 2023-03-15 DIAGNOSIS — Z98.890 OTHER SPECIFIED POSTPROCEDURAL STATES: Chronic | ICD-10-CM

## 2023-03-15 DIAGNOSIS — J84.9 INTERSTITIAL PULMONARY DISEASE, UNSPECIFIED: ICD-10-CM

## 2023-03-15 DIAGNOSIS — Z90.49 ACQUIRED ABSENCE OF OTHER SPECIFIED PARTS OF DIGESTIVE TRACT: Chronic | ICD-10-CM

## 2023-03-15 PROCEDURE — 71250 CT THORAX DX C-: CPT | Mod: 26

## 2023-03-15 PROCEDURE — 71250 CT THORAX DX C-: CPT

## 2023-03-21 ENCOUNTER — NON-APPOINTMENT (OUTPATIENT)
Age: 58
End: 2023-03-21

## 2023-03-21 NOTE — ED PROVIDER NOTE - GASTROINTESTINAL, MLM
Called patient to discuss pathology report. Discussed endometrial cancer. We discussed need for referral to GYN/ONC, referral placed. We briefly discussed surgical management is primary management depending on a lot of factors.     A.   Uterine fluid; removal:  -Necroinflammatory debris; insufficient viable tissue for evaluation.     B.   Endometrium; curettage:  -Endometrioid adenocarcinoma, FIGO grade 2.  
Abdomen soft, non-tender, no guarding.

## 2023-03-22 ENCOUNTER — APPOINTMENT (OUTPATIENT)
Dept: INTERNAL MEDICINE | Facility: CLINIC | Age: 58
End: 2023-03-22
Payer: COMMERCIAL

## 2023-03-22 VITALS
TEMPERATURE: 98.1 F | DIASTOLIC BLOOD PRESSURE: 76 MMHG | WEIGHT: 268 LBS | HEIGHT: 73 IN | SYSTOLIC BLOOD PRESSURE: 114 MMHG | HEART RATE: 74 BPM | RESPIRATION RATE: 16 BRPM | OXYGEN SATURATION: 97 % | BODY MASS INDEX: 35.52 KG/M2

## 2023-03-22 DIAGNOSIS — R06.09 OTHER FORMS OF DYSPNEA: ICD-10-CM

## 2023-03-22 DIAGNOSIS — R09.02 HYPOXEMIA: ICD-10-CM

## 2023-03-22 DIAGNOSIS — Z01.818 ENCOUNTER FOR OTHER PREPROCEDURAL EXAMINATION: ICD-10-CM

## 2023-03-22 DIAGNOSIS — Z99.81 HYPOXEMIA: ICD-10-CM

## 2023-03-22 PROCEDURE — ZZZZZ: CPT

## 2023-03-22 PROCEDURE — 94060 EVALUATION OF WHEEZING: CPT

## 2023-03-22 PROCEDURE — 99214 OFFICE O/P EST MOD 30 MIN: CPT | Mod: 25

## 2023-03-22 NOTE — DISCUSSION/SUMMARY
[FreeTextEntry1] :  Mr. Garcia is a 57-year-old male who presents for follow-up evaluation.\par He continues to have some shortness of breath with exertion.  I have explained that this is multifactorial.  He does have some persistent scarring on repeat CT scan of the chest.  Unfortunate, CT scan of chest was unable to be viewed as the PACS system was down.  Mr. Garcia also has a significant mount of cardiovascular deconditioning as he has been significantly sedentary for the past several months.  I have encouraged him to start a graduated exercise program.  I have also told him that he can go outside on a regular basis.\par Complete pulmonary function tests show normal spirometry and lung volumes with a mildly decreased diffusing capacity.\par Follow-up in 6 months with repeat spirometry.

## 2023-03-22 NOTE — PROCEDURE
[FreeTextEntry1] : Complete pulmonary function test performed.\par FEV1 3.83 L which is 94% predicted.\par FVC 4.84 L which is 92% predicted.  FEV1/FVC ratio 79%.\par FEF 25/75% is 3.54 L/s which is 104% predicted.\par PEF is 12.79 L/s which is 126% predicted.\par Postbronchodilator FVC is 4.73 L which is 90% predicted.  FEV1 is 3.99 L which is 99% predicted.  FEV1 over FVC ratio is 84%.  There is no significant bronchodilator response.  Total lung capacity is 6.37 L which is 82% predicted.  Diffusing capacity is 65% predicted.  There is been significant improvement in pulmonary function test compared to study

## 2023-03-22 NOTE — HISTORY OF PRESENT ILLNESS
[TextBox_4] : Mr. Garcia presents for follow-up evaluation.  He is feeling well.  He does have some shortness of breath with exertion.  Patient has a history of organizing pneumonia which developed approximately 2 weeks after he received coronavirus vaccine.  Organizing pneumonia was approved through thorascopic lung biopsy.  Patient was treated with prolonged course of steroids.  Currently he is off of steroids.  He does have shortness of breath with exertion, however, it is significantly improved from previous.  He states he is able to walk his dog without feeling short of breath.  Mr. Garcia has no nocturnal symptoms of cough or dyspnea.  He is no longer using metered-dose inhaler therapy.

## 2023-05-26 ENCOUNTER — OFFICE (OUTPATIENT)
Dept: URBAN - METROPOLITAN AREA CLINIC 70 | Facility: CLINIC | Age: 58
Setting detail: OPHTHALMOLOGY
End: 2023-05-26
Payer: COMMERCIAL

## 2023-05-26 ENCOUNTER — RX ONLY (RX ONLY)
Age: 58
End: 2023-05-26

## 2023-05-26 DIAGNOSIS — H01.005: ICD-10-CM

## 2023-05-26 DIAGNOSIS — E11.9: ICD-10-CM

## 2023-05-26 DIAGNOSIS — H25.13: ICD-10-CM

## 2023-05-26 DIAGNOSIS — H01.002: ICD-10-CM

## 2023-05-26 DIAGNOSIS — H02.403: ICD-10-CM

## 2023-05-26 DIAGNOSIS — H01.004: ICD-10-CM

## 2023-05-26 DIAGNOSIS — H01.001: ICD-10-CM

## 2023-05-26 DIAGNOSIS — H04.123: ICD-10-CM

## 2023-05-26 PROCEDURE — 92014 COMPRE OPH EXAM EST PT 1/>: CPT | Performed by: STUDENT IN AN ORGANIZED HEALTH CARE EDUCATION/TRAINING PROGRAM

## 2023-05-26 ASSESSMENT — KERATOMETRY
OS_K2POWER_DIOPTERS: 42.75
OD_K2POWER_DIOPTERS: 44.50
OD_K1POWER_DIOPTERS: 43.50
OS_K1POWER_DIOPTERS: 41.25
OD_AXISANGLE_DEGREES: 152
OS_AXISANGLE_DEGREES: 093

## 2023-05-26 ASSESSMENT — CORNEAL TRAUMA - ABRASION: OS_ABRASION: ABSENT

## 2023-05-26 ASSESSMENT — REFRACTION_AUTOREFRACTION
OS_CYLINDER: -0.75
OD_CYLINDER: -1.00
OS_AXIS: 088
OD_SPHERE: +0.25
OS_SPHERE: +0.25
OD_AXIS: 050

## 2023-05-26 ASSESSMENT — LID POSITION - PTOSIS
OD_PTOSIS: RUL 2+
OS_PTOSIS: LUL 2+

## 2023-05-26 ASSESSMENT — AXIALLENGTH_DERIVED
OD_AL: 23.506
OS_AL: 24.2072

## 2023-05-26 ASSESSMENT — SPHEQUIV_DERIVED
OD_SPHEQUIV: -0.25
OS_SPHEQUIV: -0.125

## 2023-05-26 ASSESSMENT — VISUAL ACUITY
OS_BCVA: 20/20-1
OD_BCVA: 20/20-1

## 2023-05-26 ASSESSMENT — LID EXAM ASSESSMENTS
OD_MRD1: 1 MM
OS_MRD1: 1 MM
OD_BLEPHARITIS: RLL RUL 1+ 2+
OS_BLEPHARITIS: LLL LUL 1+ 2+

## 2023-05-26 ASSESSMENT — CONFRONTATIONAL VISUAL FIELD TEST (CVF)
OS_FINDINGS: FULL
OD_FINDINGS: FULL

## 2023-05-26 ASSESSMENT — CORNEAL TRAUMA - FOREIGN BODY: OS_FOREIGNBODY: ABSENT

## 2023-06-23 ENCOUNTER — OFFICE (OUTPATIENT)
Dept: URBAN - METROPOLITAN AREA CLINIC 6 | Facility: CLINIC | Age: 58
Setting detail: OPHTHALMOLOGY
End: 2023-06-23
Payer: COMMERCIAL

## 2023-06-23 ENCOUNTER — RX ONLY (RX ONLY)
Age: 58
End: 2023-06-23

## 2023-06-23 DIAGNOSIS — H04.123: ICD-10-CM

## 2023-06-23 DIAGNOSIS — H25.13: ICD-10-CM

## 2023-06-23 DIAGNOSIS — H01.004: ICD-10-CM

## 2023-06-23 DIAGNOSIS — H00.11: ICD-10-CM

## 2023-06-23 DIAGNOSIS — H01.001: ICD-10-CM

## 2023-06-23 PROBLEM — H02.403 PTOSIS OF EYELID, UNSPECIFIED; BOTH EYES: Status: ACTIVE | Noted: 2023-06-23

## 2023-06-23 PROCEDURE — 99213 OFFICE O/P EST LOW 20 MIN: CPT | Performed by: OPHTHALMOLOGY

## 2023-06-23 ASSESSMENT — REFRACTION_AUTOREFRACTION
OS_SPHERE: +0.50
OD_AXIS: 10
OS_AXIS: 100
OD_SPHERE: -0.75
OS_CYLINDER: -1.00
OD_CYLINDER: -1.00

## 2023-06-23 ASSESSMENT — LID POSITION - PTOSIS
OD_PTOSIS: RUL 2+
OS_PTOSIS: LUL 2+

## 2023-06-23 ASSESSMENT — SPHEQUIV_DERIVED
OD_SPHEQUIV: -1.25
OS_SPHEQUIV: 0

## 2023-06-23 ASSESSMENT — AXIALLENGTH_DERIVED
OS_AL: 23.5461
OD_AL: 23.8053

## 2023-06-23 ASSESSMENT — CONFRONTATIONAL VISUAL FIELD TEST (CVF)
OS_FINDINGS: FULL
OD_FINDINGS: FULL

## 2023-06-23 ASSESSMENT — VISUAL ACUITY
OD_BCVA: 20/25
OS_BCVA: 20/20-1

## 2023-06-23 ASSESSMENT — KERATOMETRY
OS_AXISANGLE_DEGREES: 96
OD_K2POWER_DIOPTERS: 44.50
OS_K1POWER_DIOPTERS: 43.50
OD_K1POWER_DIOPTERS: 44.00
OS_K2POWER_DIOPTERS: 43.75
OD_AXISANGLE_DEGREES: 119

## 2023-06-23 ASSESSMENT — LID EXAM ASSESSMENTS
OS_BLEPHARITIS: LLL LUL 2+
OD_BLEPHARITIS: RLL RUL 2+

## 2023-06-23 ASSESSMENT — TONOMETRY
OD_IOP_MMHG: 18
OS_IOP_MMHG: 20

## 2023-07-06 NOTE — PATIENT PROFILE ADULT - PRO INTERPRETER NEED 2
English Bexarotene Counseling:  I discussed with the patient the risks of bexarotene including but not limited to hair loss, dry lips/skin/eyes, liver abnormalities, hyperlipidemia, pancreatitis, depression/suicidal ideation, photosensitivity, drug rash/allergic reactions, hypothyroidism, anemia, leukopenia, infection, cataracts, and teratogenicity.  Patient understands that they will need regular blood tests to check lipid profile, liver function tests, white blood cell count, thyroid function tests and pregnancy test if applicable.

## 2023-07-20 ENCOUNTER — OFFICE (OUTPATIENT)
Dept: URBAN - METROPOLITAN AREA CLINIC 102 | Facility: CLINIC | Age: 58
Setting detail: OPHTHALMOLOGY
End: 2023-07-20
Payer: COMMERCIAL

## 2023-07-20 DIAGNOSIS — H01.004: ICD-10-CM

## 2023-07-20 DIAGNOSIS — H01.001: ICD-10-CM

## 2023-07-20 DIAGNOSIS — E11.9: ICD-10-CM

## 2023-07-20 DIAGNOSIS — H01.002: ICD-10-CM

## 2023-07-20 PROBLEM — H01.005 BLEPHARITIS; RIGHT UPPER LID, RIGHT LOWER LID, LEFT UPPER LID, LEFT LOWER LID: Status: ACTIVE | Noted: 2023-07-20

## 2023-07-20 PROBLEM — H02.401 PTOSIS OF EYELID, UNSPECIFIED; RIGHT EYE, LEFT EYE, BOTH EYES: Status: ACTIVE | Noted: 2023-07-20

## 2023-07-20 PROBLEM — H02.403 PTOSIS OF EYELID, UNSPECIFIED; RIGHT EYE, LEFT EYE, BOTH EYES: Status: ACTIVE | Noted: 2023-07-20

## 2023-07-20 PROBLEM — H02.402 PTOSIS OF EYELID, UNSPECIFIED; RIGHT EYE, LEFT EYE, BOTH EYES: Status: ACTIVE | Noted: 2023-07-20

## 2023-07-20 PROCEDURE — 92014 COMPRE OPH EXAM EST PT 1/>: CPT | Performed by: OPHTHALMOLOGY

## 2023-07-20 PROCEDURE — 92134 CPTRZ OPH DX IMG PST SGM RTA: CPT | Performed by: OPHTHALMOLOGY

## 2023-07-20 ASSESSMENT — LID POSITION - PTOSIS
OS_PTOSIS: LUL 2+
OD_PTOSIS: RUL 2+

## 2023-07-20 ASSESSMENT — VISUAL ACUITY
OD_BCVA: 20/20
OS_BCVA: 20/25

## 2023-07-20 ASSESSMENT — KERATOMETRY
METHOD_AUTO_MANUAL: AUTO
OS_AXISANGLE_DEGREES: 96
OD_AXISANGLE_DEGREES: 119
OS_K1POWER_DIOPTERS: 43.50
OD_K1POWER_DIOPTERS: 44.00
OD_K2POWER_DIOPTERS: 44.50
OS_K2POWER_DIOPTERS: 43.75

## 2023-07-20 ASSESSMENT — AXIALLENGTH_DERIVED
OS_AL: 23.5461
OD_AL: 23.8053

## 2023-07-20 ASSESSMENT — SPHEQUIV_DERIVED
OD_SPHEQUIV: -1.25
OS_SPHEQUIV: 0

## 2023-07-20 ASSESSMENT — REFRACTION_AUTOREFRACTION
OS_AXIS: 100
OD_SPHERE: -0.75
OD_CYLINDER: -1.00
OD_AXIS: 10
OS_SPHERE: +0.50
OS_CYLINDER: -1.00

## 2023-07-20 ASSESSMENT — CONFRONTATIONAL VISUAL FIELD TEST (CVF)
OD_FINDINGS: FULL
OS_FINDINGS: FULL

## 2023-08-03 NOTE — DIETITIAN NUTRITION RISK NOTIFICATION - FINDINGS BASED ON COMPREHENSIVE NUTRITION ASSESSMENT, CONSULTATION PERFORMED ON
8/3/2023 at 1:00 PM, patient returns for reading of Tuberculin Skin Test placed on Right forearm date of 8/1/2023, at 11:30 AM.  Reading is 00 mm induration.  Patient given completed paper work.    Documented by: Jackie David MA     08-Jun-2022

## 2023-09-05 ENCOUNTER — EMERGENCY (EMERGENCY)
Facility: HOSPITAL | Age: 58
LOS: 0 days | Discharge: HOME | End: 2023-09-05
Attending: STUDENT IN AN ORGANIZED HEALTH CARE EDUCATION/TRAINING PROGRAM
Payer: COMMERCIAL

## 2023-09-05 VITALS
TEMPERATURE: 100 F | HEART RATE: 80 BPM | RESPIRATION RATE: 19 BRPM | DIASTOLIC BLOOD PRESSURE: 82 MMHG | SYSTOLIC BLOOD PRESSURE: 158 MMHG | OXYGEN SATURATION: 98 %

## 2023-09-05 VITALS — HEIGHT: 74 IN | WEIGHT: 270.07 LBS

## 2023-09-05 DIAGNOSIS — U07.1 COVID-19: ICD-10-CM

## 2023-09-05 DIAGNOSIS — J84.9 INTERSTITIAL PULMONARY DISEASE, UNSPECIFIED: ICD-10-CM

## 2023-09-05 DIAGNOSIS — Z90.49 ACQUIRED ABSENCE OF OTHER SPECIFIED PARTS OF DIGESTIVE TRACT: Chronic | ICD-10-CM

## 2023-09-05 DIAGNOSIS — E11.9 TYPE 2 DIABETES MELLITUS WITHOUT COMPLICATIONS: ICD-10-CM

## 2023-09-05 DIAGNOSIS — R50.9 FEVER, UNSPECIFIED: ICD-10-CM

## 2023-09-05 DIAGNOSIS — R53.1 WEAKNESS: ICD-10-CM

## 2023-09-05 DIAGNOSIS — Z88.5 ALLERGY STATUS TO NARCOTIC AGENT: ICD-10-CM

## 2023-09-05 DIAGNOSIS — Z90.49 ACQUIRED ABSENCE OF OTHER SPECIFIED PARTS OF DIGESTIVE TRACT: ICD-10-CM

## 2023-09-05 DIAGNOSIS — R05.9 COUGH, UNSPECIFIED: ICD-10-CM

## 2023-09-05 DIAGNOSIS — Z79.84 LONG TERM (CURRENT) USE OF ORAL HYPOGLYCEMIC DRUGS: ICD-10-CM

## 2023-09-05 DIAGNOSIS — I10 ESSENTIAL (PRIMARY) HYPERTENSION: ICD-10-CM

## 2023-09-05 DIAGNOSIS — Z79.4 LONG TERM (CURRENT) USE OF INSULIN: ICD-10-CM

## 2023-09-05 DIAGNOSIS — Z98.890 OTHER SPECIFIED POSTPROCEDURAL STATES: Chronic | ICD-10-CM

## 2023-09-05 LAB
ALBUMIN SERPL ELPH-MCNC: 4 G/DL — SIGNIFICANT CHANGE UP (ref 3.3–5)
ALP SERPL-CCNC: 80 U/L — SIGNIFICANT CHANGE UP (ref 40–120)
ALT FLD-CCNC: 35 U/L — SIGNIFICANT CHANGE UP (ref 12–78)
ANION GAP SERPL CALC-SCNC: 9 MMOL/L — SIGNIFICANT CHANGE UP (ref 5–17)
APPEARANCE UR: CLEAR — SIGNIFICANT CHANGE UP
AST SERPL-CCNC: 29 U/L — SIGNIFICANT CHANGE UP (ref 15–37)
BACTERIA # UR AUTO: NEGATIVE — SIGNIFICANT CHANGE UP
BASOPHILS # BLD AUTO: 0.03 K/UL — SIGNIFICANT CHANGE UP (ref 0–0.2)
BASOPHILS NFR BLD AUTO: 0.7 % — SIGNIFICANT CHANGE UP (ref 0–2)
BILIRUB SERPL-MCNC: 0.5 MG/DL — SIGNIFICANT CHANGE UP (ref 0.2–1.2)
BILIRUB UR-MCNC: NEGATIVE — SIGNIFICANT CHANGE UP
BUN SERPL-MCNC: 16 MG/DL — SIGNIFICANT CHANGE UP (ref 7–23)
CALCIUM SERPL-MCNC: 8.9 MG/DL — SIGNIFICANT CHANGE UP (ref 8.5–10.1)
CHLORIDE SERPL-SCNC: 103 MMOL/L — SIGNIFICANT CHANGE UP (ref 96–108)
CO2 SERPL-SCNC: 24 MMOL/L — SIGNIFICANT CHANGE UP (ref 22–31)
COLOR SPEC: YELLOW — SIGNIFICANT CHANGE UP
CREAT SERPL-MCNC: 1.08 MG/DL — SIGNIFICANT CHANGE UP (ref 0.5–1.3)
DIFF PNL FLD: ABNORMAL
EGFR: 80 ML/MIN/1.73M2 — SIGNIFICANT CHANGE UP
EOSINOPHIL # BLD AUTO: 0 K/UL — SIGNIFICANT CHANGE UP (ref 0–0.5)
EOSINOPHIL NFR BLD AUTO: 0 % — SIGNIFICANT CHANGE UP (ref 0–6)
EPI CELLS # UR: NEGATIVE — SIGNIFICANT CHANGE UP
GLUCOSE SERPL-MCNC: 104 MG/DL — HIGH (ref 70–99)
GLUCOSE UR QL: 1000 MG/DL
HCT VFR BLD CALC: 46.6 % — SIGNIFICANT CHANGE UP (ref 39–50)
HGB BLD-MCNC: 15.8 G/DL — SIGNIFICANT CHANGE UP (ref 13–17)
HYALINE CASTS # UR AUTO: NEGATIVE /LPF — SIGNIFICANT CHANGE UP
IMM GRANULOCYTES NFR BLD AUTO: 0.2 % — SIGNIFICANT CHANGE UP (ref 0–0.9)
KETONES UR-MCNC: ABNORMAL
LACTATE SERPL-SCNC: 1.5 MMOL/L — SIGNIFICANT CHANGE UP (ref 0.7–2)
LEUKOCYTE ESTERASE UR-ACNC: NEGATIVE — SIGNIFICANT CHANGE UP
LYMPHOCYTES # BLD AUTO: 0.96 K/UL — LOW (ref 1–3.3)
LYMPHOCYTES # BLD AUTO: 22.7 % — SIGNIFICANT CHANGE UP (ref 13–44)
MCHC RBC-ENTMCNC: 29.6 PG — SIGNIFICANT CHANGE UP (ref 27–34)
MCHC RBC-ENTMCNC: 33.9 GM/DL — SIGNIFICANT CHANGE UP (ref 32–36)
MCV RBC AUTO: 87.3 FL — SIGNIFICANT CHANGE UP (ref 80–100)
MONOCYTES # BLD AUTO: 0.7 K/UL — SIGNIFICANT CHANGE UP (ref 0–0.9)
MONOCYTES NFR BLD AUTO: 16.6 % — HIGH (ref 2–14)
NEUTROPHILS # BLD AUTO: 2.52 K/UL — SIGNIFICANT CHANGE UP (ref 1.8–7.4)
NEUTROPHILS NFR BLD AUTO: 59.8 % — SIGNIFICANT CHANGE UP (ref 43–77)
NITRITE UR-MCNC: NEGATIVE — SIGNIFICANT CHANGE UP
PH UR: 5 — SIGNIFICANT CHANGE UP (ref 5–8)
PLATELET # BLD AUTO: 171 K/UL — SIGNIFICANT CHANGE UP (ref 150–400)
POTASSIUM SERPL-MCNC: 3.5 MMOL/L — SIGNIFICANT CHANGE UP (ref 3.5–5.3)
POTASSIUM SERPL-SCNC: 3.5 MMOL/L — SIGNIFICANT CHANGE UP (ref 3.5–5.3)
PROT SERPL-MCNC: 7.8 GM/DL — SIGNIFICANT CHANGE UP (ref 6–8.3)
PROT UR-MCNC: SIGNIFICANT CHANGE UP MG/DL
RAPID RVP RESULT: DETECTED
RBC # BLD: 5.34 M/UL — SIGNIFICANT CHANGE UP (ref 4.2–5.8)
RBC # FLD: 14.6 % — HIGH (ref 10.3–14.5)
RBC CASTS # UR COMP ASSIST: NEGATIVE /HPF — SIGNIFICANT CHANGE UP (ref 0–4)
SARS-COV-2 RNA SPEC QL NAA+PROBE: DETECTED
SODIUM SERPL-SCNC: 136 MMOL/L — SIGNIFICANT CHANGE UP (ref 135–145)
SP GR SPEC: 1.02 — SIGNIFICANT CHANGE UP (ref 1.01–1.02)
TROPONIN I, HIGH SENSITIVITY RESULT: 25.65 NG/L — SIGNIFICANT CHANGE UP
UROBILINOGEN FLD QL: NEGATIVE — SIGNIFICANT CHANGE UP
WBC # BLD: 4.22 K/UL — SIGNIFICANT CHANGE UP (ref 3.8–10.5)
WBC # FLD AUTO: 4.22 K/UL — SIGNIFICANT CHANGE UP (ref 3.8–10.5)
WBC UR QL: SIGNIFICANT CHANGE UP /HPF (ref 0–5)

## 2023-09-05 PROCEDURE — 87040 BLOOD CULTURE FOR BACTERIA: CPT

## 2023-09-05 PROCEDURE — 0225U NFCT DS DNA&RNA 21 SARSCOV2: CPT

## 2023-09-05 PROCEDURE — 81001 URINALYSIS AUTO W/SCOPE: CPT

## 2023-09-05 PROCEDURE — 84484 ASSAY OF TROPONIN QUANT: CPT

## 2023-09-05 PROCEDURE — 99285 EMERGENCY DEPT VISIT HI MDM: CPT

## 2023-09-05 PROCEDURE — 93010 ELECTROCARDIOGRAM REPORT: CPT

## 2023-09-05 PROCEDURE — 36415 COLL VENOUS BLD VENIPUNCTURE: CPT

## 2023-09-05 PROCEDURE — 85025 COMPLETE CBC W/AUTO DIFF WBC: CPT

## 2023-09-05 PROCEDURE — 71045 X-RAY EXAM CHEST 1 VIEW: CPT | Mod: 26

## 2023-09-05 PROCEDURE — 83605 ASSAY OF LACTIC ACID: CPT

## 2023-09-05 PROCEDURE — 71045 X-RAY EXAM CHEST 1 VIEW: CPT

## 2023-09-05 PROCEDURE — 93005 ELECTROCARDIOGRAM TRACING: CPT

## 2023-09-05 PROCEDURE — 80053 COMPREHEN METABOLIC PANEL: CPT

## 2023-09-05 PROCEDURE — 99285 EMERGENCY DEPT VISIT HI MDM: CPT | Mod: 25

## 2023-09-05 PROCEDURE — 87086 URINE CULTURE/COLONY COUNT: CPT

## 2023-09-05 RX ORDER — SODIUM CHLORIDE 9 MG/ML
3800 INJECTION INTRAMUSCULAR; INTRAVENOUS; SUBCUTANEOUS ONCE
Refills: 0 | Status: COMPLETED | OUTPATIENT
Start: 2023-09-05 | End: 2023-09-05

## 2023-09-05 RX ORDER — ONDANSETRON 8 MG/1
1 TABLET, FILM COATED ORAL
Qty: 1 | Refills: 0
Start: 2023-09-05

## 2023-09-05 RX ADMIN — SODIUM CHLORIDE 3800 MILLILITER(S): 9 INJECTION INTRAMUSCULAR; INTRAVENOUS; SUBCUTANEOUS at 18:15

## 2023-09-05 NOTE — ED STATDOCS - NS_ ATTENDINGSCRIBEDETAILS _ED_A_ED_FT
I, Ivan Johnston DO,  performed the initial face to face bedside interview with this patient regarding history of present illness, review of symptoms and relevant past medical, social and family history.  I completed an independent physical examination.  The history, relevant review of systems, past medical and surgical history, medical decision making, and physical examination was documented by the scribe in my presence and I attest to the accuracy of the documentation.

## 2023-09-05 NOTE — ED STATDOCS - ATTENDING APP SHARED VISIT CONTRIBUTION OF CARE
I, Ivan Johnston DO, personally saw the patient with ACP.  I have personally performed a face to face diagnostic evaluation on this patient.  I have reviewed the ACP note and agree with the history, exam, and plan of care, except as noted.   The initial assessment was performed by myself and then the patient was handed off to the ACP. The patient was followed and re-evaluated by the ACP. All labs, imaging and procedures were evaluated and performed by the ACP and I was available for consultation if any questions in the patients care came up.

## 2023-09-05 NOTE — ED ADULT NURSE NOTE - OBJECTIVE STATEMENT
pt c/o general malaise, s/s  since sunday, decreased appetite. Pt told to come to ER immediately bc of reaction covid vaccine last year. pt A&ox4, ambulatory No s/s of distress.

## 2023-09-05 NOTE — ED STATDOCS - NS ED ROS FT
General: +fever, no nausea, no vomiting. COVID+  HEENT: No loc, no ha, no dizziness  Respiratory: no trouble breathing. +mild cough  Cardiovascular: No chest pain  GI: No abdominal pain, no diarrhea  : + urinary complaints  Endocrine: + generalized weakness  Neurological: No weakness, numbness  MSK: no recent trauma

## 2023-09-05 NOTE — ED STATDOCS - OBJECTIVE STATEMENT
58 y/o male w/PMHx of interstitial pulmonary disease, type 2 DM, HTN presents to ED sent by PMD for COVID+. States he was in the ICU last year with diagnosis of interstitial pulmonary disease s/p thoracoscopy, robotic assisted left lung biopsy with Dr. Loyola. Reports his significant other tested positive for COVID yesterday, called PMD and was told to come to ED for further testing. Endorses generalized weakness, mild fever at home, mild cough and dysuria. No runny nose. Had shortness of breath 2 days ago, denies any at this time. Pulmonologist: Dr. Reynaga. 56 y/o male w/PMHx of interstitial pulmonary disease, type 2 DM, HTN presents to ED sent by PMD for COVID+. States he was in the ICU last year with diagnosis of interstitial pulmonary disease s/p thoracoscopy, robotic assisted left lung biopsy with Dr. Loyola. Reports his significant other tested positive for COVID yesterday, called PMD and was told to come to ED for further testing. Endorses generalized weakness, mild fever at home, mild cough, chest tightness and dysuria. No runny nose. Had shortness of breath 2 days ago, denies any at this time. Pulmonologist: Dr. Reynaga.

## 2023-09-05 NOTE — ED STATDOCS - PROGRESS NOTE DETAILS
patient seen and evaluated s/p workup.  He is feeling much better.  normal spo2, denies SOB, has rx for paxlovid.  he is concerned about side effect of nausea, sent rx for zofran.  reviewed strict return precautions -Tammy Chapman PA-C

## 2023-09-05 NOTE — ED ADULT TRIAGE NOTE - CHIEF COMPLAINT QUOTE
VU VALDEZ FOR +COVID. PT STATES "I WAS IN THE ICU LAST YEAR WITH COVID AND MY DOCTOR TOLD ME TO IMMEDIATELY COME TO THE ER FOR FURTHER TESTING AND ADMISSION. C/O CP, DIFFICULTY BREATHING, O2 95, rr 22, hr 110, WEAKNESS AND FATIGUE. PMH: hbp, COVID. EKG IN TRIAGE.

## 2023-09-05 NOTE — ED STATDOCS - PATIENT PORTAL LINK FT
You can access the FollowMyHealth Patient Portal offered by [facility] by registering at the following website: [email or web address]. By joining [employer]’s FollowMyHealth portal, you will also be able to view your health information using other applications (apps) compatible with our system.

## 2023-09-05 NOTE — ED STATDOCS - CLINICAL SUMMARY MEDICAL DECISION MAKING FREE TEXT BOX
58 y/o M here s/p +COVID with generalized weakness, cough, and fever. will r/o PNA vs COVID vs UTI. will do labs, imaging, and reassess.

## 2023-09-05 NOTE — ED STATDOCS - NSFOLLOWUPINSTRUCTIONS_ED_ALL_ED_FT
COVID-19  COVID-19, or coronavirus disease 2019, is an infection that is caused by a new (novel) coronavirus called SARS-CoV-2. COVID-19 can cause many symptoms. In some people, the virus may not cause any symptoms. In others, it may cause mild or severe symptoms. Some people with severe infection develop severe disease.    What are the causes?  The human body, showing how the coronavirus travels from the air to a person's lungs.  This illness is caused by a virus. The virus may be in the air as tiny specks of fluid (aerosols) or droplets, or it may be on surfaces. You may catch the virus by:  Breathing in droplets from an infected person. Droplets can be spread by a person breathing, speaking, singing, coughing, or sneezing.  Touching something, like a table or a doorknob, that has virus on it (is contaminated) and then touching your mouth, nose, or eyes.  What increases the risk?  Risk for infection:    You are more likely to get infected with the COVID-19 virus if:  You are within 6 ft (1.8 m) of a person with COVID-19 for 15 minutes or longer.  You are providing care for a person who is infected with COVID-19.  You are in close personal contact with other people. Close personal contact includes hugging, kissing, or sharing eating or drinking utensils.  Risk for serious illness caused by COVID-19:    You are more likely to get seriously ill from the COVID-19 virus if:  You have cancer.  You have a long-term (chronic) disease, such as:  Chronic lung disease. This includes pulmonary embolism, chronic obstructive pulmonary disease, and cystic fibrosis.  Long-term disease that lowers your body's ability to fight infection (immunocompromise).  Serious cardiac conditions, such as heart failure, coronary artery disease, or cardiomyopathy.  Diabetes.  Chronic kidney disease.  Liver diseases. These include cirrhosis, nonalcoholic fatty liver disease, alcoholic liver disease, or autoimmune hepatitis.  You have obesity.  You are pregnant or were recently pregnant.  You have sickle cell disease.  What are the signs or symptoms?  Symptoms of this condition can range from mild to severe. Symptoms may appear any time from 2 to 14 days after being exposed to the virus. They include:  Fever or chills.  Shortness of breath or trouble breathing.  Feeling tired or very tired.  Headaches, body aches, or muscle aches.  Runny or stuffy nose, sneezing, coughing, or sore throat.  New loss of taste or smell. This is rare.  Some people may also have stomach problems, such as nausea, vomiting, or diarrhea.    Other people may not have any symptoms of COVID-19.    How is this diagnosed?  A sample being collected by swabbing the nose.  This condition may be diagnosed by testing samples to check for the COVID-19 virus. The most common tests are the PCR test and the antigen test. Tests may be done in the lab or at home. They include:  Using a swab to take a sample of fluid from the back of your nose and throat (nasopharyngeal fluid), from your nose, or from your throat.  Testing a sample of saliva from your mouth.  Testing a sample of coughed-up mucus from your lungs (sputum).  How is this treated?  Treatment for COVID-19 infection depends on the severity of the condition.  Mild symptoms can be managed at home with rest, fluids, and over-the-counter medicines.  Serious symptoms may be treated in a hospital intensive care unit (ICU). Treatment in the ICU may include:  Supplemental oxygen. Extra oxygen is given through a tube in the nose, a face mask, or a fishman.  Medicines. These may include:  Antivirals, such as monoclonal antibodies. These help your body fight off certain viruses that can cause disease.  Anti-inflammatories, such as corticosteroids. These reduce inflammation and suppress the immune system.  Antithrombotics. These prevent or treat blood clots, if they develop.  Convalescent plasma. This helps boost your immune system, if you have an underlying immunosuppressive condition or are getting immunosuppressive treatments.  Prone positioning. This means you will lie on your stomach. This helps oxygen to get into your lungs.  Infection control measures.  If you are at risk for more serious illness caused by COVID-19, your health care provider may prescribe two long-acting monoclonal antibodies, given together every 6 months.    How is this prevented?  To protect yourself:    Use preventive medicine (pre-exposure prophylaxis). You may get pre-exposure prophylaxis if you have moderate or severe immunocompromise.  Get vaccinated. Anyone 6 months old or older who meets guidelines can get a COVID-19 vaccine or vaccine series. This includes people who are pregnant or making breast milk (lactating).  Get an added dose of COVID-19 vaccine after your first vaccine or vaccine series if you have moderate to severe immunocompromise. This applies if you have had a solid organ transplant or have been diagnosed with an immunocompromising condition.  You should get the added dose 4 weeks after you got the first COVID-19 vaccine or vaccine series.  If you get an mRNA vaccine, you will need a 3-dose primary series.  If you get the J&J/Lew vaccine, you will need a 2-dose primary series, with the second dose being an mRNA vaccine.  Talk to your health care provider about getting experimental monoclonal antibodies. This treatment is approved under emergency use authorization to prevent severe illness before or after being exposed to the COVID-19 virus. You may be given monoclonal antibodies if:  You have moderate or severe immunocompromise. This includes treatments that lower your immune response. People with immunocompromise may not develop protection against COVID-19 when they are vaccinated.  You cannot be vaccinated. You may not get a vaccine if you have a severe allergic reaction to the vaccine or its components.  You are not fully vaccinated.  You are in a facility where COVID-19 is present and:  Are in close contact with a person who is infected with the COVID-19 virus.  Are at high risk of being exposed to the COVID-19 virus.  You are at risk of illness from new variants of the COVID-19 virus.  To protect others:    If you have symptoms of COVID-19, take steps to prevent the virus from spreading to others.  Stay home. Leave your house only to get medical care. Do not use public transit, if possible.  Do not travel while you are sick.  Wash your hands often with soap and water for at least 20 seconds. If soap and water are not available, use alcohol-based hand .  Make sure that all people in your household wash their hands well and often.  Cough or sneeze into a tissue or your sleeve or elbow. Do not cough or sneeze into your hand or into the air.  Where to find more information  Centers for Disease Control and Prevention: www.cdc.gov/coronavirus  World Health Organization: www.who.int/health-topics/coronavirus  Get help right away if:  You have trouble breathing.  You have pain or pressure in your chest.  You are confused.  You have bluish lips and fingernails.  You have trouble waking from sleep.  You have symptoms that get worse.  These symptoms may be an emergency. Get help right away. Call 911.  Do not wait to see if the symptoms will go away.  Do not drive yourself to the hospital.  Summary  COVID-19 is an infection that is caused by a new coronavirus.  Sometimes, there are no symptoms. Other times, symptoms range from mild to severe. Some people with a severe COVID-19 infection develop severe disease.  The virus that causes COVID-19 can spread from person to person through droplets or aerosols from breathing, speaking, singing, coughing, or sneezing.  Mild symptoms of COVID-19 can be managed at home with rest, fluids, and over-the-counter medicines.  This information is not intended to replace advice given to you by your health care provider. Make sure you discuss any questions you have with your health care provider.

## 2023-09-06 LAB
CULTURE RESULTS: NO GROWTH — SIGNIFICANT CHANGE UP
SPECIMEN SOURCE: SIGNIFICANT CHANGE UP

## 2023-09-11 LAB
CULTURE RESULTS: SIGNIFICANT CHANGE UP
CULTURE RESULTS: SIGNIFICANT CHANGE UP
SPECIMEN SOURCE: SIGNIFICANT CHANGE UP
SPECIMEN SOURCE: SIGNIFICANT CHANGE UP

## 2023-09-19 ENCOUNTER — RESULT CHARGE (OUTPATIENT)
Age: 58
End: 2023-09-19

## 2023-09-20 ENCOUNTER — APPOINTMENT (OUTPATIENT)
Dept: INTERNAL MEDICINE | Facility: CLINIC | Age: 58
End: 2023-09-20
Payer: COMMERCIAL

## 2023-09-20 VITALS
DIASTOLIC BLOOD PRESSURE: 80 MMHG | OXYGEN SATURATION: 96 % | WEIGHT: 270 LBS | BODY MASS INDEX: 35.02 KG/M2 | SYSTOLIC BLOOD PRESSURE: 124 MMHG | HEART RATE: 79 BPM | HEIGHT: 73.5 IN | RESPIRATION RATE: 16 BRPM | TEMPERATURE: 97.9 F

## 2023-09-20 DIAGNOSIS — R94.2 ABNORMAL RESULTS OF PULMONARY FUNCTION STUDIES: ICD-10-CM

## 2023-09-20 DIAGNOSIS — J84.9 INTERSTITIAL PULMONARY DISEASE, UNSPECIFIED: ICD-10-CM

## 2023-09-20 DIAGNOSIS — J84.89 OTHER SPECIFIED INTERSTITIAL PULMONARY DISEASES: ICD-10-CM

## 2023-09-20 DIAGNOSIS — J84.10 PULMONARY FIBROSIS, UNSPECIFIED: ICD-10-CM

## 2023-09-20 PROCEDURE — 94727 GAS DIL/WSHOT DETER LNG VOL: CPT

## 2023-09-20 PROCEDURE — 99214 OFFICE O/P EST MOD 30 MIN: CPT | Mod: 25

## 2023-09-20 PROCEDURE — 94729 DIFFUSING CAPACITY: CPT

## 2023-09-20 PROCEDURE — ZZZZZ: CPT

## 2023-09-20 PROCEDURE — 94060 EVALUATION OF WHEEZING: CPT

## 2023-11-01 ENCOUNTER — APPOINTMENT (OUTPATIENT)
Dept: ORTHOPEDIC SURGERY | Facility: CLINIC | Age: 58
End: 2023-11-01
Payer: COMMERCIAL

## 2023-11-01 VITALS — HEART RATE: 84 BPM | SYSTOLIC BLOOD PRESSURE: 115 MMHG | DIASTOLIC BLOOD PRESSURE: 77 MMHG

## 2023-11-01 PROCEDURE — 20610 DRAIN/INJ JOINT/BURSA W/O US: CPT | Mod: LT

## 2023-11-01 PROCEDURE — 99213 OFFICE O/P EST LOW 20 MIN: CPT | Mod: 25

## 2024-02-16 ENCOUNTER — RX ONLY (RX ONLY)
Age: 59
End: 2024-02-16

## 2024-02-16 ENCOUNTER — OFFICE (OUTPATIENT)
Dept: URBAN - METROPOLITAN AREA CLINIC 70 | Facility: CLINIC | Age: 59
Setting detail: OPHTHALMOLOGY
End: 2024-02-16
Payer: COMMERCIAL

## 2024-02-16 DIAGNOSIS — H02.534: ICD-10-CM

## 2024-02-16 DIAGNOSIS — H02.531: ICD-10-CM

## 2024-02-16 DIAGNOSIS — H16.221: ICD-10-CM

## 2024-02-16 PROBLEM — H02.402 PTOSIS OF EYELID, UNSPECIFIED; RIGHT EYE, LEFT EYE, BOTH EYES: Status: ACTIVE | Noted: 2024-02-16

## 2024-02-16 PROBLEM — H02.401 PTOSIS OF EYELID, UNSPECIFIED; RIGHT EYE, LEFT EYE, BOTH EYES: Status: ACTIVE | Noted: 2024-02-16

## 2024-02-16 PROBLEM — H02.403 PTOSIS OF EYELID, UNSPECIFIED; RIGHT EYE, LEFT EYE, BOTH EYES: Status: ACTIVE | Noted: 2024-02-16

## 2024-02-16 PROCEDURE — 92012 INTRM OPH EXAM EST PATIENT: CPT | Performed by: STUDENT IN AN ORGANIZED HEALTH CARE EDUCATION/TRAINING PROGRAM

## 2024-02-16 ASSESSMENT — LID POSITION - PTOSIS
OS_PTOSIS: LUL 2+
OD_PTOSIS: RUL 2+

## 2024-02-16 ASSESSMENT — SPHEQUIV_DERIVED
OD_SPHEQUIV: -1.25
OS_SPHEQUIV: 0

## 2024-02-16 ASSESSMENT — REFRACTION_AUTOREFRACTION
OS_CYLINDER: -1.00
OD_SPHERE: -0.75
OS_AXIS: 100
OD_AXIS: 10
OS_SPHERE: +0.50
OD_CYLINDER: -1.00

## 2024-02-16 ASSESSMENT — SUPERFICIAL PUNCTATE KERATITIS (SPK): OD_SPK: T 1+

## 2024-02-16 ASSESSMENT — CONFRONTATIONAL VISUAL FIELD TEST (CVF)
OS_FINDINGS: FULL
OD_FINDINGS: FULL

## 2024-04-25 NOTE — DISCHARGE NOTE NURSING/CASE MANAGEMENT/SOCIAL WORK - DATE OF LAST VACCINATION
Acute Gyn Visit    Chief Complaint   Patient presents with    Office Visit       HPI:   Milan Block is a 34 year old  patient with Past Medical History (PMH) significant for *** who presents today ***    Has service to  providers, has not scheduled  ***  Vaginal discharge: ***  Vaginal odor: ***  Vaginal itching: ***  Vaginal pain: ***  Dysuria: ***  History of STIs: ***  Sexually active: ***  Barrier protection: ***  Birth control: ***    Past Medical History:   Diagnosis Date    Allergy     molds    Female hirsutism     Gestational hypertension, third trimester 2023    H. pylori infection 2012       Current Outpatient Medications   Medication Sig Dispense Refill    diclofenac (VOLTAREN) 1 % gel Apply 4 grams topically 4 times daily as needed (pain). (Patient not taking: Reported on 2024) 200 g 0    benzonatate (TESSALON PERLES) 200 MG capsule Take 1 capsule by mouth every 8 hours as needed for Cough. (Patient not taking: Reported on 2024) 30 capsule 0    ibuprofen (MOTRIN) 600 MG tablet Take 600 mg by mouth every 6 hours as needed. (Patient not taking: Reported on 2024)      acetaminophen (TYLENOL) 500 MG tablet Take 2 tablets by mouth every 8 hours as needed for Pain. (Patient not taking: Reported on 2024) 40 tablet 0    HYDROcodone-acetaminophen (NORCO) 5-325 MG per tablet Take 1 tablet by mouth every 6 hours as needed for Pain. (Patient not taking: Reported on 2024) 12 tablet 0    Prenatal Vit-Fe Fumarate-FA (Prenatal/Folic Acid) Tab Take 1 tablet by mouth daily. Dispense whichever prenatal vitamin is covered by patient's insurance. (Patient not taking: Reported on 2024) 90 tablet 4    fluticasone (FLONASE) 50 MCG/ACT nasal spray Spray 1 spray in each nostril daily. (Patient not taking: Reported on 2024) 16 g 0     No current facility-administered medications for this visit.       ALLERGIES:   Allergen Reactions    Sulfa Antibiotics HIVES     Patient is unaware  and unsure of allergy       PMH, Past Surgical History (PSH), and social history were reviewed and updated.    PE:  Visit Vitals  /85 (BP Location: RUE - Right upper extremity, Patient Position: Sitting, Cuff Size: Large Adult)   Pulse 79   Ht 5' 7\" (1.702 m)   Wt 109.6 kg (241 lb 9.6 oz)   LMP 2024 (Approximate)   BMI 37.84 kg/m²     Chaperone present for sensitive examination ***    General: No acute distress (NAD), alert, oriented.   Heart: Regular rate and rhythm (RRR),no S3/S4 or murmurs.  Lungs: Clear to auscultation bilaterally (CTAB), no increased work, no rales/rhonchi/wheezing.  Abd: Soft, nontender, positive bowel sounds (+BS). No acute abdomen, no rebound or guarding. No hernias or hepatosplenomegaly  Ext: No edema; calves nontender.  Skin: Warm, dry; no rashes noted.    Urethra: Normal in appearance, *** prolapse or caruncles  Bladder: *** anterior tenderness, *** fullness  Vulva: {VULVA:923914::\"grossly unremarkable\",\"no lesions or masses noted\",\"no erythema or discharge\"}  Vagina:{VAGINA 2:347452::\"normal size & caliber\"}  Cervix: {CERVIX EXAM:858656::\"Normal in appearance\",\"no lesions or masses\",\"Pap smear is obtained.\"}  Uterus: {UTERUS-INTERNAL:017874::\"firm, non-tender\",\"normal size\",\"normal shape\",\"AV\",\"AF\"}  Adnexa: {ADNEXA:687147::\"unremarkable\",\"non-tender\",\"no fullness noted\",\"no masses noted\"}  Rectum: normal in appearance, *** lesions, *** external hemorrhoids    Provider performed saline and KOH prep completed by myself today shows:  Vaginal pH ***  Hyphae - {positive/negative:677161}  Budding yeast - {positive/negative:019027}  Clue cells - {positive/negative:994310}  Trichomonads - {positive/negative:097917}  WBCs - {INCREASED/NORMAL/DECREASED:836023::\"Normal\"}  Lactobacilli - {INCREASED/NORMAL/DECREASED:686428::\"Normal\"}  Cytolysis present - {yes no:675628}  Parabasal cells - {INCREASED/NORMAL/DECREASED:399863::\"Normal\"}    Assessment:  Milan Block is a 34 year old   patient ***  ***    Plan:  ***    No orders of the defined types were placed in this encounter.       Disposition: ***    No LOS data to display  This includes {Time LOS:2251066}.      Jessica A Behrens,   4/25/2024    this works to provide relief  -Reviewed expectations with PFPT    Plan:  -UA reflex culture  -SwabOne vaginitis panel  -Vaginal STI screen  -Service to pelvic floor physical therapy. Expectations reviewed.   -Service to FM to evaluate hip/back/leg pain given relationship to cold weather, this could be the etiology leading to pelvic pain or vice versa. Given information to arrange care, order already in.   -Trial of daily miralax for constipation, reviewed how this contributes to pain.  -Consider treatment of menses to help reduce pain    Orders Placed This Encounter    SwabOne Vaginitis Panel    Chlamydia/Gonorrhea by Nucleic Acid Amplification     Order Specific Question:   How should test results be released to the patient's [facility] portal?     Answer:   Automatic Release [100002]    SwabOne Bacterial Vaginosis by ANSON    SwabOne Candida/Trichomonas Panel by ANSON    SERVICE TO PHYSICAL THERAPY     Based on evaluation, occupational or physical therapists may be utilized, unless otherwise indicated here.       Referral Priority:   Routine     Referral Type:   Consult & Treatment     Referral Reason:   PreCert/Auth Required     Requested Specialty:   Physical Therapy     Number of Visits Requested:   1     Expiration Date:   4/25/2025    URINALYSIS, MACROSCOPIC -POINT OF CARE      Disposition: RTC as needed after treatment with pelvic floor PT.     I spent a total of 30 minutes on the day of the visit.  This includes pre-charting, chart review, documenting, and time spent in direct patient care and counseling .    Jessica A Behrens, DO  4/30/2024   CC: Dr. Rendon   11-Mar-2021

## 2024-04-30 ENCOUNTER — APPOINTMENT (OUTPATIENT)
Dept: ORTHOPEDIC SURGERY | Facility: CLINIC | Age: 59
End: 2024-04-30
Payer: COMMERCIAL

## 2024-04-30 VITALS
WEIGHT: 270 LBS | HEIGHT: 73.5 IN | DIASTOLIC BLOOD PRESSURE: 82 MMHG | SYSTOLIC BLOOD PRESSURE: 127 MMHG | BODY MASS INDEX: 35.02 KG/M2 | HEART RATE: 78 BPM

## 2024-04-30 DIAGNOSIS — M17.12 UNILATERAL PRIMARY OSTEOARTHRITIS, LEFT KNEE: ICD-10-CM

## 2024-04-30 PROCEDURE — 73560 X-RAY EXAM OF KNEE 1 OR 2: CPT | Mod: 50

## 2024-04-30 PROCEDURE — 73565 X-RAY EXAM OF KNEES: CPT | Mod: 59

## 2024-04-30 PROCEDURE — 20610 DRAIN/INJ JOINT/BURSA W/O US: CPT | Mod: LT

## 2024-05-01 PROBLEM — M17.12 PRIMARY OSTEOARTHRITIS OF LEFT KNEE: Status: ACTIVE | Noted: 2020-02-20

## 2024-05-01 NOTE — PHYSICAL EXAM
[de-identified] : Right Knee: Range of Motion in Degrees                    Claimant: Normal: Flexion Active   135                  135-degrees Flexion Passive   135                 135-degrees Extension Active   0-5                 0-5-degrees Extension Passive   0-5                 0-5-degrees   No weakness to flexion/extension.  No evidence of instability in the AP plane on varus or valgus stress.  Negative Lachman.  Negative pivot shift.  Negative anterior drawer test.  Negative posterior drawer test.  Negative Jossue.  Negative Apley grind.  No medial or lateral joint line tenderness.  Positive tenderness over the medial and lateral facet of the patella.  Positive patellofemoral crepitations.  No lateral tilting patella.  No patella apprehension.  Positive crepitation in the medial and lateral femoral condyle.  No proximal or distal swelling, edema or tenderness.  No gross motor or sensory deficits.  Mild intra-articular swelling.  2+ DP and PT pulses.  No varus or valgus malalignment.  Skin is intact.  No rashes, scars or lesions.   Left Knee: Range of Motion in Degrees                    Claimant: Normal: Flexion Active   135                  135-degrees Flexion Passive   135                 135-degrees Extension Active   0-5                 0-5-degrees Extension Passive   0-5                 0-5-degrees   No weakness to flexion/extension.  No evidence of instability in the AP plane on varus or valgus stress.  Negative Lachman.  Negative pivot shift.  Negative anterior drawer test.  Negative posterior drawer test.  Negative Jossue.  Negative Apley grind.  No medial or lateral joint line tenderness.  Positive tenderness over the medial and lateral facet of the patella.  Positive patellofemoral crepitations.  No lateral tilting patella.  No patella apprehension.  Positive crepitation in the medial and lateral femoral condyle.  No proximal or distal swelling, edema or tenderness.  No gross motor or sensory deficits.  Mild intra-articular swelling.  2+ DP and PT pulses.  No varus or valgus malalignment.  Skin is intact.  No rashes, scars or lesions.   [de-identified] : Appearance:  Well-developed, well-nourished male in no acute distress.   [de-identified] : Radiographs, two views of the right knee taken in the office today, reveal moderate osteoarthritis. Radiographs, two views of the left knee taken in the office today, reveal moderate osteoarthritis. Radiograph, one view AP standing of bilateral knees taken in the office today, reveals moderate osteoarthritis (left worse than right).

## 2024-05-01 NOTE — PROCEDURE
[de-identified] :    Indication: Osteoarthritis of left knee   Consent: At this time, I have recommended an injection to the left knee.  The risks and benefits of the procedure were discussed with the patient in detail.  Upon verbal consent of the patient, we proceeded with the injection as noted below.   Additionally, due to diabetes, the patient has been informed that cortisone may cause a spike in blood sugar.  In light of this, the patient wishes to proceed with the cortisone injection.   Description of Procedure: After a sterile prep, the patient underwent an injection of approximately 9 mL of 1% Lidocaine 10 mg/mL without epinephrine and 1 mL of triamcinolone acetonide (40 mg/mL) into the left knee.  The patient tolerated the procedure well.  There were no complications.   : [employer] Drug Name: Triamcinolone Acetonide Injectable Suspension USP NDC#: [address]-6 Lot#:   [id number] Expiration Date: 08/31/2025

## 2024-05-01 NOTE — REASON FOR VISIT
[Follow-Up Visit] : a follow-up visit for [FreeTextEntry2] : his left knee and a new concern for his right knee

## 2024-05-01 NOTE — ADDENDUM
[FreeTextEntry1] : This note was written by Dewey Durant on 05/01/2024, acting as a scribe for VERN CERDA, EMIGDIO/L, PA

## 2024-05-01 NOTE — DISCUSSION/SUMMARY
[de-identified] : At this time, I recommended ice and elevation for the left knee osteoarthritis.  The patient will come back on Friday to get a cortisone injection for the right knee osteoarthritis.

## 2024-05-03 ENCOUNTER — APPOINTMENT (OUTPATIENT)
Dept: ORTHOPEDIC SURGERY | Facility: CLINIC | Age: 59
End: 2024-05-03

## 2024-07-23 ENCOUNTER — OFFICE (OUTPATIENT)
Dept: URBAN - METROPOLITAN AREA CLINIC 102 | Facility: CLINIC | Age: 59
Setting detail: OPHTHALMOLOGY
End: 2024-07-23
Payer: COMMERCIAL

## 2024-07-23 ENCOUNTER — RX ONLY (RX ONLY)
Age: 59
End: 2024-07-23

## 2024-07-23 DIAGNOSIS — H02.403: ICD-10-CM

## 2024-07-23 PROCEDURE — 92014 COMPRE OPH EXAM EST PT 1/>: CPT | Performed by: OPHTHALMOLOGY

## 2024-07-23 ASSESSMENT — LID POSITION - PTOSIS
OD_PTOSIS: RUL 2+
OS_PTOSIS: LUL 2+

## 2024-07-23 ASSESSMENT — CONFRONTATIONAL VISUAL FIELD TEST (CVF)
OD_FINDINGS: FULL
OS_FINDINGS: FULL

## 2024-09-23 ENCOUNTER — APPOINTMENT (OUTPATIENT)
Dept: ORTHOPEDIC SURGERY | Facility: CLINIC | Age: 59
End: 2024-09-23

## 2024-09-23 VITALS
HEIGHT: 73 IN | HEART RATE: 82 BPM | BODY MASS INDEX: 34.46 KG/M2 | SYSTOLIC BLOOD PRESSURE: 145 MMHG | WEIGHT: 260 LBS | DIASTOLIC BLOOD PRESSURE: 86 MMHG

## 2024-09-23 DIAGNOSIS — M17.0 BILATERAL PRIMARY OSTEOARTHRITIS OF KNEE: ICD-10-CM

## 2024-09-23 PROCEDURE — 99202 OFFICE O/P NEW SF 15 MIN: CPT | Mod: 25

## 2024-09-23 PROCEDURE — 20610 DRAIN/INJ JOINT/BURSA W/O US: CPT | Mod: LT

## 2024-09-24 PROBLEM — M17.0 BILATERAL PRIMARY OSTEOARTHRITIS OF KNEE: Status: ACTIVE | Noted: 2024-09-23

## 2024-09-24 NOTE — PHYSICAL EXAM
[de-identified] : Right Knee: Range of Motion in Degrees                                                 Claimant:                  Normal:  Flexion Active                           120                   135-degrees  Flexion Passive                        120                   135-degrees  Extension Active                       10                    0-5-degrees  Extension Passive                    10                    0-5-degrees    No weakness to flexion/extension.  No evidence of instability in the AP plane or varus or valgus stress.  Negative Lachman. Negative pivot shift.  Negative anterior drawer test.  Negative posterior drawer test.  Negative Jossue.  Negative Apley grind.  No medial or lateral joint line tenderness.  Positive tenderness over the medial and lateral facet of the patella.  Positive patellofemoral crepitations.  No lateral tilting patella.  No patella apprehension.  Positive crepitation in the medial and lateral femoral condyle.  No proximal or distal swelling, edema or tenderness.  No gross motor or sensory deficits.  Mild intra-articular swelling.  2+ DP and PT pulses. Moderate varus deformity.  Skin is intact.  No rashes, scars or lesions.   Left Knee: Range of Motion in Degrees                                                 Claimant:                  Normal:  Flexion Active                           120                   135-degrees  Flexion Passive                        120                   135-degrees  Extension Active                       10                     0-5-degrees  Extension Passive                    10                     0-5-degrees    No weakness to flexion/extension.  No evidence of instability in the AP plane or varus or valgus stress.  Negative Lachman. Negative pivot shift.  Negative anterior drawer test.  Negative posterior drawer test.  Negative Jossue.  Negative Apley grind.  No medial or lateral joint line tenderness.  Positive tenderness over the medial and lateral facet of the patella.  Positive patellofemoral crepitations.  No lateral tilting patella.  No patella apprehension.  Positive crepitation in the medial and lateral femoral condyle.  No proximal or distal swelling, edema or tenderness.  No gross motor or sensory deficits.  Mild intra-articular swelling.  2+ DP and PT pulses.  Moderate varus deformity.  Skin is intact.  No rashes, scars or lesions.  [de-identified] : Ambulating with a slightly antalgic to antalgic gait.  Station:  Normal.  [de-identified] : Appearance:  Well-developed, well-nourished male in no acute distress.   [de-identified] : Review of radiographs reveal early to severe degenerative changes bilaterally (left is worse than the right).

## 2024-09-24 NOTE — HISTORY OF PRESENT ILLNESS
[de-identified] : The patient comes in today for his bilateral knees. He has been seen and treated by Annabel[facility] in the past.

## 2024-09-24 NOTE — PROCEDURE
[de-identified] : Indication: Osteoarthritis of the left knee   Consent: At this time, I have recommended an injection into the left knee. The risks and benefits of the procedure were discussed with the patient in detail.  Upon verbal consent of the patient, we proceeded with the injection as noted below.   Procedure: After a sterile prep, the patient underwent an injection of 9 mL of 1% Lidocaine (10mg/mL) without epinephrine and 1 mL of triamcinolone acetonide (40mg/mL) into the left knee. The patient tolerated the procedure well.  There were no complications.   :  [employer] Drug name:     Triamcinolone Acetonide Injectable Suspension USP NDC #:            [address]-9 Lot #:               [id number] Expiration:      08/31/2025

## 2024-09-24 NOTE — DISCUSSION/SUMMARY
[de-identified] : At this time, due to osteoarthritis of the bilateral knees (left is worse than the right), I recommended a cortisone injection into the left knee (as noted above), ice and elevation. He will start on therapy. He will be reassessed in six weeks; at which time we will discuss the potential for further intervention.

## 2024-09-24 NOTE — PROCEDURE
[de-identified] : Indication: Osteoarthritis of the left knee   Consent: At this time, I have recommended an injection into the left knee. The risks and benefits of the procedure were discussed with the patient in detail.  Upon verbal consent of the patient, we proceeded with the injection as noted below.   Procedure: After a sterile prep, the patient underwent an injection of 9 mL of 1% Lidocaine (10mg/mL) without epinephrine and 1 mL of triamcinolone acetonide (40mg/mL) into the left knee. The patient tolerated the procedure well.  There were no complications.   :  [employer] Drug name:     Triamcinolone Acetonide Injectable Suspension USP NDC #:            [address]-1 Lot #:               [id number] Expiration:      08/31/2025

## 2024-09-24 NOTE — HISTORY OF PRESENT ILLNESS
[de-identified] : The patient comes in today for his bilateral knees. He has been seen and treated by Annabel[facility] in the past.

## 2024-09-24 NOTE — PHYSICAL EXAM
[de-identified] : Right Knee: Range of Motion in Degrees                                                 Claimant:                  Normal:  Flexion Active                           120                   135-degrees  Flexion Passive                        120                   135-degrees  Extension Active                       10                    0-5-degrees  Extension Passive                    10                    0-5-degrees    No weakness to flexion/extension.  No evidence of instability in the AP plane or varus or valgus stress.  Negative Lachman. Negative pivot shift.  Negative anterior drawer test.  Negative posterior drawer test.  Negative Jossue.  Negative Apley grind.  No medial or lateral joint line tenderness.  Positive tenderness over the medial and lateral facet of the patella.  Positive patellofemoral crepitations.  No lateral tilting patella.  No patella apprehension.  Positive crepitation in the medial and lateral femoral condyle.  No proximal or distal swelling, edema or tenderness.  No gross motor or sensory deficits.  Mild intra-articular swelling.  2+ DP and PT pulses. Moderate varus deformity.  Skin is intact.  No rashes, scars or lesions.   Left Knee: Range of Motion in Degrees                                                 Claimant:                  Normal:  Flexion Active                           120                   135-degrees  Flexion Passive                        120                   135-degrees  Extension Active                       10                     0-5-degrees  Extension Passive                    10                     0-5-degrees    No weakness to flexion/extension.  No evidence of instability in the AP plane or varus or valgus stress.  Negative Lachman. Negative pivot shift.  Negative anterior drawer test.  Negative posterior drawer test.  Negative Jossue.  Negative Apley grind.  No medial or lateral joint line tenderness.  Positive tenderness over the medial and lateral facet of the patella.  Positive patellofemoral crepitations.  No lateral tilting patella.  No patella apprehension.  Positive crepitation in the medial and lateral femoral condyle.  No proximal or distal swelling, edema or tenderness.  No gross motor or sensory deficits.  Mild intra-articular swelling.  2+ DP and PT pulses.  Moderate varus deformity.  Skin is intact.  No rashes, scars or lesions.  [de-identified] : Ambulating with a slightly antalgic to antalgic gait.  Station:  Normal.  [de-identified] : Appearance:  Well-developed, well-nourished male in no acute distress.   [de-identified] : Review of radiographs reveal early to severe degenerative changes bilaterally (left is worse than the right).

## 2024-09-24 NOTE — ADDENDUM
[FreeTextEntry1] : This note was written by Macrina Nava on 09/24/2024 acting as a scribe for MONICA GRIFFIN III, MD

## 2024-09-24 NOTE — DISCUSSION/SUMMARY
[de-identified] : At this time, due to osteoarthritis of the bilateral knees (left is worse than the right), I recommended a cortisone injection into the left knee (as noted above), ice and elevation. He will start on therapy. He will be reassessed in six weeks; at which time we will discuss the potential for further intervention.

## 2024-09-25 NOTE — ED ADULT TRIAGE NOTE - BIRTH SEX
Current Risk Score: 87%    Last ED Visit/Admit: 10/2023  Last OV: 06/18/2024    Advance Care Planning - On File    [place] Programs Currently Enrolled: N/A    Open Care Gaps: Flu, Shingles & Covid Vaccines    SDoH - Reviewed     [facility] Outreach Note:     Mr. Miramontes states he is okay. He is accompanied by his son, Vishal. He reports that he is in need of a  and would also like a homemaker. [facility] informed him that he has to stop declining offered services in order for him to get the help that he needs. Patient verbalized understanding.     He complains that his free government phone was turned off because they told him he never uses it. So now he has no phone. YOMAIRA will research a local [employer] mobile near patient's home. He most take [place] medicaid insurance card & [employer] Card in order to receive free cellular service.  Patient verbalized understanding.     He has not completed the [facility] process. He states he did receive the packet in the mail from [facility], but never called them back to start the process.     He has declined the flu vaccine. He states when he takes it he becomes ill.    Endorses that he is compliant with present medication regimen and treatment plan. Outside of his requests he has no concerns at this time.     [facility] will work on  and cell phone assistance.     
Male

## 2025-01-03 ENCOUNTER — APPOINTMENT (OUTPATIENT)
Dept: INTERNAL MEDICINE | Facility: CLINIC | Age: 60
End: 2025-01-03
Payer: COMMERCIAL

## 2025-01-03 VITALS
HEART RATE: 115 BPM | DIASTOLIC BLOOD PRESSURE: 76 MMHG | SYSTOLIC BLOOD PRESSURE: 140 MMHG | HEIGHT: 73 IN | WEIGHT: 261 LBS | TEMPERATURE: 98.6 F | BODY MASS INDEX: 34.59 KG/M2 | OXYGEN SATURATION: 96 %

## 2025-01-03 DIAGNOSIS — J84.9 INTERSTITIAL PULMONARY DISEASE, UNSPECIFIED: ICD-10-CM

## 2025-01-03 DIAGNOSIS — R06.09 OTHER FORMS OF DYSPNEA: ICD-10-CM

## 2025-01-03 DIAGNOSIS — J84.89 OTHER SPECIFIED INTERSTITIAL PULMONARY DISEASES: ICD-10-CM

## 2025-01-03 PROCEDURE — 94729 DIFFUSING CAPACITY: CPT

## 2025-01-03 PROCEDURE — G2211 COMPLEX E/M VISIT ADD ON: CPT

## 2025-01-03 PROCEDURE — ZZZZZ: CPT

## 2025-01-03 PROCEDURE — 99214 OFFICE O/P EST MOD 30 MIN: CPT | Mod: 25

## 2025-01-03 PROCEDURE — 94060 EVALUATION OF WHEEZING: CPT

## 2025-01-03 PROCEDURE — 94727 GAS DIL/WSHOT DETER LNG VOL: CPT

## 2025-01-17 ENCOUNTER — APPOINTMENT (OUTPATIENT)
Dept: ORTHOPEDIC SURGERY | Facility: CLINIC | Age: 60
End: 2025-01-17
Payer: COMMERCIAL

## 2025-01-17 DIAGNOSIS — M17.12 UNILATERAL PRIMARY OSTEOARTHRITIS, LEFT KNEE: ICD-10-CM

## 2025-01-17 PROCEDURE — 73560 X-RAY EXAM OF KNEE 1 OR 2: CPT | Mod: LT

## 2025-01-17 PROCEDURE — 99214 OFFICE O/P EST MOD 30 MIN: CPT | Mod: 25

## 2025-01-17 PROCEDURE — 20610 DRAIN/INJ JOINT/BURSA W/O US: CPT | Mod: LT

## 2025-01-21 VITALS — BODY MASS INDEX: 34.59 KG/M2 | WEIGHT: 261 LBS | HEIGHT: 73 IN

## 2025-04-21 ENCOUNTER — NON-APPOINTMENT (OUTPATIENT)
Age: 60
End: 2025-04-21

## 2025-04-22 ENCOUNTER — APPOINTMENT (OUTPATIENT)
Dept: ORTHOPEDIC SURGERY | Facility: CLINIC | Age: 60
End: 2025-04-22
Payer: COMMERCIAL

## 2025-04-22 VITALS — BODY MASS INDEX: 34.59 KG/M2 | WEIGHT: 261 LBS | HEIGHT: 73 IN

## 2025-04-22 DIAGNOSIS — M17.12 UNILATERAL PRIMARY OSTEOARTHRITIS, LEFT KNEE: ICD-10-CM

## 2025-04-22 PROCEDURE — 20610 DRAIN/INJ JOINT/BURSA W/O US: CPT | Mod: LT

## 2025-04-22 PROCEDURE — 99213 OFFICE O/P EST LOW 20 MIN: CPT | Mod: 25

## 2025-04-22 RX ORDER — SODIUM HYALURONATE INTRA-ARTICULAR SOLN PREF SYR 25 MG/2.5ML 25/2.5 MG/ML
25 SOLUTION PREFILLED SYRINGE INTRAARTICULAR
Qty: 5 | Refills: 0 | Status: ACTIVE | OUTPATIENT
Start: 2025-04-22

## 2025-07-15 ENCOUNTER — APPOINTMENT (OUTPATIENT)
Dept: ORTHOPEDIC SURGERY | Facility: CLINIC | Age: 60
End: 2025-07-15

## 2025-07-15 VITALS — BODY MASS INDEX: 34.59 KG/M2 | WEIGHT: 261 LBS | HEIGHT: 73 IN

## 2025-07-15 PROCEDURE — 20610 DRAIN/INJ JOINT/BURSA W/O US: CPT | Mod: LT

## 2025-07-15 PROCEDURE — 99213 OFFICE O/P EST LOW 20 MIN: CPT | Mod: 25

## 2025-07-29 ENCOUNTER — OFFICE (OUTPATIENT)
Dept: URBAN - METROPOLITAN AREA CLINIC 102 | Facility: CLINIC | Age: 60
Setting detail: OPHTHALMOLOGY
End: 2025-07-29
Payer: COMMERCIAL

## 2025-07-29 DIAGNOSIS — H25.13: ICD-10-CM

## 2025-07-29 DIAGNOSIS — H02.534: ICD-10-CM

## 2025-07-29 DIAGNOSIS — E11.9: ICD-10-CM

## 2025-07-29 DIAGNOSIS — H02.531: ICD-10-CM

## 2025-07-29 DIAGNOSIS — H16.221: ICD-10-CM

## 2025-07-29 PROCEDURE — 92014 COMPRE OPH EXAM EST PT 1/>: CPT | Performed by: OPHTHALMOLOGY

## 2025-07-29 PROCEDURE — 92020 GONIOSCOPY: CPT | Performed by: OPHTHALMOLOGY

## 2025-07-29 ASSESSMENT — REFRACTION_AUTOREFRACTION
OD_SPHERE: -0.75
OD_AXIS: 015
OS_AXIS: 000
OD_CYLINDER: -1.00
OS_SPHERE: -0.50
OS_CYLINDER: SPH

## 2025-07-29 ASSESSMENT — KERATOMETRY
METHOD_AUTO_MANUAL: AUTO
OD_K1POWER_DIOPTERS: 44.25
OS_AXISANGLE_DEGREES: 097
OD_AXISANGLE_DEGREES: 148
OD_K2POWER_DIOPTERS: 45.00
OS_K2POWER_DIOPTERS: 44.50
OS_K1POWER_DIOPTERS: 44.00

## 2025-07-29 ASSESSMENT — CONFRONTATIONAL VISUAL FIELD TEST (CVF)
OD_FINDINGS: FULL
OS_FINDINGS: FULL

## 2025-07-29 ASSESSMENT — VISUAL ACUITY
OD_BCVA: 20/25
OS_BCVA: 20/25

## 2025-07-29 ASSESSMENT — TONOMETRY
OD_IOP_MMHG: 19
OS_IOP_MMHG: 19

## 2025-07-29 ASSESSMENT — LID POSITION - PTOSIS
OD_PTOSIS: RUL 2+
OS_PTOSIS: LUL 2+

## 2025-09-16 ENCOUNTER — APPOINTMENT (OUTPATIENT)
Dept: ORTHOPEDIC SURGERY | Facility: CLINIC | Age: 60
End: 2025-09-16
Payer: COMMERCIAL

## 2025-09-16 DIAGNOSIS — M17.12 UNILATERAL PRIMARY OSTEOARTHRITIS, LEFT KNEE: ICD-10-CM

## 2025-09-16 PROCEDURE — 99212 OFFICE O/P EST SF 10 MIN: CPT | Mod: 93

## (undated) DEVICE — XI 12MM AND STAPLER CANNULA SEAL

## (undated) DEVICE — ENDOCATCH GENERAL 10MM (PURPLE)

## (undated) DEVICE — XI ARM GRASPER TIP UP FENESTRATED

## (undated) DEVICE — GOWN XL

## (undated) DEVICE — DRSG CURITY GAUZE SPONGE 4 X 4" 12-PLY

## (undated) DEVICE — POSITIONER FOAM HEAD CRADLE (PINK)

## (undated) DEVICE — TUBING STRYKEFLOW II SUCTION / IRRIGATOR

## (undated) DEVICE — SUT POLYSORB 4-0 P-12 UNDYED

## (undated) DEVICE — TUBING SUCTION 20FT

## (undated) DEVICE — XI ARM GRASPER BIPOLAR LONG 8MM

## (undated) DEVICE — XI DRAPE COLUMN

## (undated) DEVICE — SUT MONOCRYL 4-0 27" PS-2 UNDYED

## (undated) DEVICE — XI DRAPE ARM

## (undated) DEVICE — SUT PDS II 2-0 27" SH

## (undated) DEVICE — DRSG TEGADERM 2.5X3"

## (undated) DEVICE — BLADE SURGICAL #15 CARBON

## (undated) DEVICE — BLADE SURGICAL #10 STAINLESS

## (undated) DEVICE — TUBING OLYMPUS INSUFFLATION

## (undated) DEVICE — SUT POLYSORB 2-0 30" V-20 UNDYED

## (undated) DEVICE — DRAPE INSTRUMENT POUCH 6.75" X 11"

## (undated) DEVICE — D HELP - CLEARVIEW CLEARIFY SYSTEM

## (undated) DEVICE — XI ARM FORCEP FENESTRATED BIPOLAR 8MM

## (undated) DEVICE — WARMING BLANKET LOWER ADULT

## (undated) DEVICE — CHEST DRAIN OASIS DRY SUCTION WATER SEAL

## (undated) DEVICE — SPECIMEN CONTAINER 100ML

## (undated) DEVICE — XI OBTURATOR OPTICAL BLADELESS 8MM

## (undated) DEVICE — ENDOCATCH GENERAL 15MM (PURPLE)

## (undated) DEVICE — XI ENDOWRIST 12 - 8 MM CANNULA REDUCER

## (undated) DEVICE — DRSG GAUZE PACKTNER ROLL

## (undated) DEVICE — SUT SILK 0 24" SH DA

## (undated) DEVICE — SUT SURGIPRO 0 30" GS-22

## (undated) DEVICE — SUT VICRYL 0 27" UR-6

## (undated) DEVICE — SOL IRR POUR NS 0.9% 500ML

## (undated) DEVICE — XI ARM PERMANENT CAUTERY SPATULA

## (undated) DEVICE — WARMING BLANKET UPPER ADULT

## (undated) DEVICE — VENODYNE/SCD SLEEVE CALF MEDIUM

## (undated) DEVICE — ELCTR BOVIE TIP BLADE INSULATED 2.75" EDGE

## (undated) DEVICE — GRASPER LAPA 5MMX35CM

## (undated) DEVICE — XI ARM CLIP APPLIER LARGE

## (undated) DEVICE — XI SEAL UNIV 5- 8 MM

## (undated) DEVICE — TROCAR COVIDIEN VERSASTEP PLUS 15MM STANDARD

## (undated) DEVICE — XI ARM FORCEP CADIERE 8MM

## (undated) DEVICE — PACK ROBOTIC LIJ

## (undated) DEVICE — NDL HYPO REGULAR BEVEL 22G X 1.5" (TURQUOISE)

## (undated) DEVICE — FOLEY TRAY 16FR 5CC LTX UMETER CLOSED